# Patient Record
Sex: FEMALE | Race: WHITE | Employment: UNEMPLOYED | URBAN - NONMETROPOLITAN AREA
[De-identification: names, ages, dates, MRNs, and addresses within clinical notes are randomized per-mention and may not be internally consistent; named-entity substitution may affect disease eponyms.]

---

## 2020-08-10 RX ORDER — POTASSIUM CHLORIDE 750 MG/1
10 TABLET, FILM COATED, EXTENDED RELEASE ORAL
Qty: 30 TAB | Refills: 1 | Status: SHIPPED | OUTPATIENT
Start: 2020-08-10 | End: 2020-09-24

## 2020-08-10 RX ORDER — POTASSIUM CHLORIDE 750 MG/1
TABLET, FILM COATED, EXTENDED RELEASE ORAL
Qty: 30 TAB | Refills: 0 | Status: SHIPPED | OUTPATIENT
Start: 2020-08-10 | End: 2020-08-10 | Stop reason: SDUPTHER

## 2020-08-13 ENCOUNTER — VIRTUAL VISIT (OUTPATIENT)
Dept: FAMILY MEDICINE CLINIC | Age: 70
End: 2020-08-13
Payer: MEDICARE

## 2020-08-13 DIAGNOSIS — F32.1 CURRENT MODERATE EPISODE OF MAJOR DEPRESSIVE DISORDER WITHOUT PRIOR EPISODE (HCC): Primary | ICD-10-CM

## 2020-08-13 PROBLEM — F32.9 CURRENT EPISODE OF MAJOR DEPRESSIVE DISORDER WITHOUT PRIOR EPISODE: Status: ACTIVE | Noted: 2020-08-13

## 2020-08-13 PROCEDURE — 99442 PR PHYS/QHP TELEPHONE EVALUATION 11-20 MIN: CPT | Performed by: NURSE PRACTITIONER

## 2020-08-13 RX ORDER — TOPIRAMATE 100 MG/1
100 TABLET, FILM COATED ORAL 2 TIMES DAILY
COMMUNITY

## 2020-08-13 RX ORDER — TRAZODONE HYDROCHLORIDE 100 MG/1
100 TABLET ORAL
COMMUNITY
End: 2020-08-13

## 2020-08-13 RX ORDER — GLIMEPIRIDE 2 MG/1
2 TABLET ORAL
COMMUNITY
End: 2020-09-07

## 2020-08-13 RX ORDER — METOPROLOL SUCCINATE 25 MG/1
25 TABLET, EXTENDED RELEASE ORAL DAILY
COMMUNITY
End: 2020-11-01

## 2020-08-13 RX ORDER — LISINOPRIL 5 MG/1
5 TABLET ORAL DAILY
COMMUNITY
End: 2020-11-01

## 2020-08-13 RX ORDER — SERTRALINE HYDROCHLORIDE 25 MG/1
25 TABLET, FILM COATED ORAL DAILY
Qty: 30 TAB | Refills: 1 | Status: SHIPPED | OUTPATIENT
Start: 2020-08-13 | End: 2020-10-06

## 2020-08-13 RX ORDER — FUROSEMIDE 40 MG/1
40 TABLET ORAL DAILY
COMMUNITY
End: 2020-11-01

## 2020-08-13 RX ORDER — FUROSEMIDE 20 MG/1
TABLET ORAL DAILY
COMMUNITY
End: 2020-10-21

## 2020-08-13 NOTE — PROGRESS NOTES
Katina Pickering presents today for   Chief Complaint   Patient presents with    Depression       Depression Screening:  No flowsheet data found. Learning Assessment:  Learning Assessment 8/13/2020   PRIMARY LEARNER Patient   HIGHEST LEVEL OF EDUCATION - PRIMARY LEARNER  GRADUATED HIGH SCHOOL OR GED   BARRIERS PRIMARY LEARNER EMOTIONAL   CO-LEARNER CAREGIVER Yes   CO-LEARNER NAME Larissa    CO-LEARNER HIGHEST LEVEL OF EDUCATION GRADUATED HIGH SCHOOL OR GED   BARRIERS CO-LEARNER NONE   PRIMARY LANGUAGE ENGLISH   PRIMARY LANGUAGE CO-LEARNER ENGLISH    NEED No   LEARNER PREFERENCE PRIMARY READING   LEARNER PREFERENCE CO-LEARNER VIDEOS   LEARNING SPECIAL TOPICS None    ANSWERED BY Litzy KING OTHER       Abuse Screening:  No flowsheet data found. Fall Risk  Fall Risk Assessment, last 12 mths 8/13/2020   Able to walk? Yes   Fall in past 12 months? No       Health Maintenance reviewed and discussed and ordered per Provider. Health Maintenance Due   Topic Date Due    Hepatitis C Screening  1950    DTaP/Tdap/Td series (1 - Tdap) 09/08/1970    Lipid Screen  09/08/1989    Shingrix Vaccine Age 50> (1 of 2) 09/08/1999    Breast Cancer Screen Mammogram  09/08/1999    FOBT Q1Y Age 50-75  09/08/1999    GLAUCOMA SCREENING Q2Y  09/08/2014    Bone Densitometry (Dexa) Screening  09/08/2014    Pneumococcal 65+ years (1 of 1 - PPSV23) 09/08/2014    Influenza Age 5 to Adult  08/01/2020   . Coordination of Care:  1. Have you been to the ER, urgent care clinic since your last visit? Hospitalized since your last visit? No/ No     2. Have you seen or consulted any other health care providers outside of the [address] [place] [address] since your last visit? Include any pap smears or colon screening. [facility]. Patient has not seen pulmonologist yet. She had apt with pain management yesterday but canceled because she was not feeling well.

## 2020-08-13 NOTE — PROGRESS NOTES
Bon Lara is a 78 y.o. female, evaluated via audio-only technology on 8/13/2020 for Depression  . Assessment & Plan:   Diagnoses and all orders for this visit:    1. Current moderate episode of major depressive disorder without prior episode (HCC)  -     sertraline (ZOLOFT) 25 mg tablet; Take 1 Tab by mouth daily.  -Start medication as prescribed, I would like to see her back in 3 weeks for follow-up, sooner if needed. She has support at home, her daughter in law. Contact information for [facility] given for her to establish care. -Patient Education:  Reviewed concept of anxiety as biochemical imbalance of neurotransmitters and rationale for treatment. Instructed patient to contact office or on-call physician promptly should condition worsen or any new symptoms appear and provided on-call telephone numbers. IF THE PATIENT HAS ANY SUICIDAL OR HOMICIDAL IDEATION, CALL THE OFFICE, DISCUSS WITH A SUPPORT MEMBER OR GO TO THE ER IMMEDIATELY- pt said they would. 12  Subjective:   Bon Lara is a 78 y.o. female who presents for new evaluation and treatment of of depression disorder. She has the following depression symptoms: difficulty concentrating, sadness, and lack of interest. Onset of symptoms was approximately 6 months ago, gradually worsening since that time. She denies current suicidal and homicidal ideation. Family history significant for anxiety and depression. Possible organic causes contributing are: none. Risk factors: negative life event, her  is in group home for the next year. She has a history of COPD which she is on oxygen daily causing her to be homebound most days. Not previously treated with any depression medications. Prior to Admission medications    Medication Sig Start Date End Date Taking? Authorizing Provider   furosemide (LASIX) 20 mg tablet Take  by mouth daily. Yes Provider, Historical   furosemide (LASIX) 40 mg tablet Take 40 mg by mouth daily. Yes Provider, Historical   glimepiride (AMARYL) 2 mg tablet Take 2 mg by mouth every morning. Yes Provider, Historical   lisinopriL (PRINIVIL, ZESTRIL) 5 mg tablet Take 5 mg by mouth daily. Yes Provider, Historical   metoprolol succinate (TOPROL-XL) 25 mg XL tablet Take 25 mg by mouth daily. Yes Provider, Historical   topiramate (TOPAMAX) 100 mg tablet Take 100 mg by mouth two (2) times a day. Yes Provider, Historical   sertraline (ZOLOFT) 25 mg tablet Take 1 Tab by mouth daily. 8/13/20  Yes Joanie Patterson, NP   potassium chloride SR (KLOR-CON 10) 10 mEq tablet Take 1 Tab by mouth once over twenty-four (24) hours. 8/10/20  Yes Jhonatan Anguiano NP   apixaban (ELIQUIS) 5 mg tablet Take 1 Tab by mouth once over twenty-four (24) hours. 8/10/20  Yes Jhonatan Anguiano NP   traZODone (DESYREL) 100 mg tablet Take 100 mg by mouth nightly. As needed  8/13/20  Provider, Historical         Review of Systems   Constitutional: Positive for malaise/fatigue. Negative for chills, fever and weight loss. Cardiovascular: Negative for chest pain. Neurological: Negative for dizziness and weakness. Psychiatric/Behavioral: Positive for depression. Negative for hallucinations, memory loss, substance abuse and suicidal ideas. The patient is nervous/anxious. The patient does not have insomnia. Physical Exam  Vitals signs and nursing note reviewed. Constitutional:       Comments: Physical exam was deferred due to COVID- 19 precautions as visit was completed via telemedicine. No flowsheet data found. Anh London, who was evaluated through a patient-initiated, synchronous (real-time) audio only encounter, and/or her healthcare decision maker, is aware that it is a billable service, with coverage as determined by her insurance carrier. She provided verbal consent to proceed: Yes.  She has not had a related appointment within my department in the past 7 days or scheduled within the next 24 hours.       Total Time: minutes: 11-20 minutes    Jordan Riley NP

## 2020-09-03 ENCOUNTER — VIRTUAL VISIT (OUTPATIENT)
Dept: FAMILY MEDICINE CLINIC | Age: 70
End: 2020-09-03

## 2020-09-03 NOTE — PROGRESS NOTES
Danielle Sarmiento presents today for   Chief Complaint   Patient presents with    Follow-up     follow up on copd       Depression Screening:  3 most recent PHQ Screens 8/13/2020   Little interest or pleasure in doing things Nearly every day   Feeling down, depressed, irritable, or hopeless Nearly every day   Total Score PHQ 2 6   Trouble falling or staying asleep, or sleeping too much Nearly every day   Feeling tired or having little energy Nearly every day   Poor appetite, weight loss, or overeating Nearly every day   Feeling bad about yourself - or that you are a failure or have let yourself or your family down Not at all   Trouble concentrating on things such as school, work, reading, or watching TV More than half the days   Moving or speaking so slowly that other people could have noticed; or the opposite being so fidgety that others notice Not at all   Thoughts of being better off dead, or hurting yourself in some way Not at all   PHQ 9 Score 17       Learning Assessment:  Learning Assessment 9/3/2020   PRIMARY LEARNER Patient   HIGHEST LEVEL OF EDUCATION - PRIMARY LEARNER  SOME COLLEGE   BARRIERS PRIMARY LEARNER [address] CAREGIVER -   [facility] -   ANSWERED BY patient   RELATIONSHIP SELF       Abuse Screening:  Abuse Screening Questionnaire 9/3/2020   Do you ever feel afraid of your partner? N   Are you in a relationship with someone who physically or mentally threatens you? N   Is it safe for you to go home? Y       Fall Risk  Fall Risk Assessment, last 12 mths 9/3/2020   Able to walk? Yes   Fall in past 12 months? Yes   Fall with injury?  No   Number of falls in past 12 months 1   Fall Risk Score 1       ADL  ADL Assessment 9/3/2020   Feeding yourself No Help Needed   Getting from bed to chair No Help Needed   Getting dressed No Help Needed   Bathing or showering No Help Needed   Walk across the room (includes cane/walker) No Help Needed   Using the telphone No Help Needed   Taking your medications No Help Needed   Preparing meals Help Needed   Managing money (expenses/bills) No Help Needed   Moderately strenuous housework (laundry) No Help Needed   Shopping for personal items (toiletries/medicines) Help Needed   Shopping for groceries Help Needed   Driving Help Needed   Climbing a flight of stairs No Help Needed   Getting to places beyond walking distances No Help Needed       Health Maintenance reviewed and discussed and ordered per Provider. Health Maintenance Due   Topic Date Due    Hepatitis C Screening  1950    DTaP/Tdap/Td series (1 - Tdap) 09/08/1970    Lipid Screen  09/08/1989    Shingrix Vaccine Age 50> (1 of 2) 09/08/1999    Breast Cancer Screen Mammogram  09/08/1999    FOBT Q1Y Age 50-75  09/08/1999    GLAUCOMA SCREENING Q2Y  09/08/2014    Bone Densitometry (Dexa) Screening  09/08/2014    Pneumococcal 65+ years (1 of 1 - PPSV23) 09/08/2014    Flu Vaccine (1) 09/01/2020    Medicare Yearly Exam  08/13/2020   . Coordination of Care:  1. Have you been to the ER, urgent care clinic since your last visit? Hospitalized since your last visit? no    2.  Have you seen or consulted any other health care providers outside of the [address] [place] [address] since your last visit? no

## 2020-09-07 RX ORDER — GLIMEPIRIDE 2 MG/1
TABLET ORAL
Qty: 90 TAB | Refills: 0 | Status: SHIPPED | OUTPATIENT
Start: 2020-09-07

## 2020-09-24 RX ORDER — POTASSIUM CHLORIDE 750 MG/1
TABLET, FILM COATED, EXTENDED RELEASE ORAL
Qty: 30 TAB | Refills: 0 | Status: SHIPPED | OUTPATIENT
Start: 2020-09-24

## 2020-09-24 RX ORDER — APIXABAN 5 MG/1
TABLET, FILM COATED ORAL
Qty: 30 TAB | Refills: 0 | Status: SHIPPED | OUTPATIENT
Start: 2020-09-24 | End: 2020-11-01

## 2020-10-06 DIAGNOSIS — F32.1 CURRENT MODERATE EPISODE OF MAJOR DEPRESSIVE DISORDER WITHOUT PRIOR EPISODE (HCC): ICD-10-CM

## 2020-10-06 RX ORDER — SERTRALINE HYDROCHLORIDE 25 MG/1
TABLET, FILM COATED ORAL
Qty: 30 TAB | Refills: 0 | Status: SHIPPED | OUTPATIENT
Start: 2020-10-06

## 2020-10-16 ENCOUNTER — TELEPHONE (OUTPATIENT)
Dept: FAMILY MEDICINE CLINIC | Age: 70
End: 2020-10-16

## 2020-10-16 DIAGNOSIS — S40.019A CONTUSION, SHOULDER AND UPPER ARM, MULTIPLE SITES, INITIAL ENCOUNTER: Primary | ICD-10-CM

## 2020-10-16 DIAGNOSIS — J42 CHRONIC BRONCHITIS, UNSPECIFIED CHRONIC BRONCHITIS TYPE (HCC): ICD-10-CM

## 2020-10-16 DIAGNOSIS — S40.029A CONTUSION, SHOULDER AND UPPER ARM, MULTIPLE SITES, INITIAL ENCOUNTER: Primary | ICD-10-CM

## 2020-10-21 ENCOUNTER — APPOINTMENT (OUTPATIENT)
Dept: CT IMAGING | Age: 70
End: 2020-10-21
Attending: FAMILY MEDICINE
Payer: MEDICARE

## 2020-10-21 ENCOUNTER — APPOINTMENT (OUTPATIENT)
Dept: GENERAL RADIOLOGY | Age: 70
End: 2020-10-21
Attending: FAMILY MEDICINE
Payer: MEDICARE

## 2020-10-21 ENCOUNTER — HOSPITAL ENCOUNTER (EMERGENCY)
Age: 70
Discharge: ANOTHER HOSPITAL | End: 2020-10-22
Attending: FAMILY MEDICINE
Payer: MEDICARE

## 2020-10-21 DIAGNOSIS — S43.005A DISLOCATED SHOULDER, LEFT, INITIAL ENCOUNTER: ICD-10-CM

## 2020-10-21 DIAGNOSIS — R91.8 MASS OF RIGHT LUNG: Primary | ICD-10-CM

## 2020-10-21 DIAGNOSIS — C79.31 BRAIN METASTASIS (HCC): ICD-10-CM

## 2020-10-21 LAB
ALBUMIN SERPL-MCNC: 3.5 G/DL (ref 3.5–4.7)
ALBUMIN/GLOB SERPL: 1 {RATIO}
ALP SERPL-CCNC: 93 U/L (ref 38–126)
ALT SERPL-CCNC: 8 U/L (ref 3–52)
ANION GAP SERPL CALC-SCNC: 11 MMOL/L
AST SERPL W P-5'-P-CCNC: 12 U/L (ref 14–74)
BASOPHILS # BLD: 0 K/UL
BASOPHILS NFR BLD: 0 %
BILIRUB SERPL-MCNC: 0.5 MG/DL (ref 0.2–1)
BUN SERPL-MCNC: 18 MG/DL (ref 9–21)
BUN/CREAT SERPL: 16
CA-I BLD-MCNC: 8.8 MG/DL (ref 8.5–10.5)
CHLORIDE SERPL-SCNC: 100 MMOL/L (ref 94–111)
CO2 SERPL-SCNC: 26 MMOL/L (ref 21–33)
CREAT SERPL-MCNC: 1.1 MG/DL (ref 0.7–1.2)
EOSINOPHIL # BLD: 0 K/UL
EOSINOPHIL NFR BLD: 0 %
ERYTHROCYTE [DISTWIDTH] IN BLOOD BY AUTOMATED COUNT: 13 % (ref 11.6–14.5)
GLOBULIN SER CALC-MCNC: 3.6 G/DL
GLUCOSE SERPL-MCNC: 305 MG/DL (ref 70–110)
HCT VFR BLD AUTO: 39.2 % (ref 35–45)
HGB BLD-MCNC: 11.7 G/DL (ref 12–16)
IMM GRANULOCYTES # BLD AUTO: 0 K/UL
IMM GRANULOCYTES NFR BLD AUTO: 0 %
LYMPHOCYTES # BLD: 0.6 K/UL
LYMPHOCYTES NFR BLD: 10 %
MCH RBC QN AUTO: 30.6 PG (ref 24–34)
MCHC RBC AUTO-ENTMCNC: 29.8 G/DL (ref 31–37)
MCV RBC AUTO: 102.6 FL (ref 74–97)
MONOCYTES # BLD: 0.3 K/UL
MONOCYTES NFR BLD: 5 %
NEUTS SEG # BLD: 5.5 K/UL
NEUTS SEG NFR BLD: 85 %
PLATELET # BLD AUTO: 211 K/UL (ref 135–420)
PMV BLD AUTO: 10.6 FL (ref 9.2–11.8)
POTASSIUM SERPL-SCNC: 3.8 MMOL/L (ref 3.2–5.1)
PROT SERPL-MCNC: 7.1 G/DL (ref 6.1–8.4)
RBC # BLD AUTO: 3.82 M/UL (ref 4.2–5.3)
RBC MORPH BLD: ABNORMAL
RBC MORPH BLD: ABNORMAL
SODIUM SERPL-SCNC: 137 MMOL/L (ref 135–145)
WBC # BLD AUTO: 6.4 K/UL (ref 4.6–13.2)

## 2020-10-21 PROCEDURE — 93005 ELECTROCARDIOGRAM TRACING: CPT

## 2020-10-21 PROCEDURE — 85025 COMPLETE CBC W/AUTO DIFF WBC: CPT

## 2020-10-21 PROCEDURE — 99285 EMERGENCY DEPT VISIT HI MDM: CPT

## 2020-10-21 PROCEDURE — 96374 THER/PROPH/DIAG INJ IV PUSH: CPT

## 2020-10-21 PROCEDURE — 71045 X-RAY EXAM CHEST 1 VIEW: CPT

## 2020-10-21 PROCEDURE — 70450 CT HEAD/BRAIN W/O DYE: CPT

## 2020-10-21 PROCEDURE — 74011250636 HC RX REV CODE- 250/636: Performed by: FAMILY MEDICINE

## 2020-10-21 PROCEDURE — 80053 COMPREHEN METABOLIC PANEL: CPT

## 2020-10-21 PROCEDURE — 71250 CT THORAX DX C-: CPT

## 2020-10-21 PROCEDURE — 36415 COLL VENOUS BLD VENIPUNCTURE: CPT

## 2020-10-21 PROCEDURE — 73030 X-RAY EXAM OF SHOULDER: CPT

## 2020-10-21 RX ORDER — CELECOXIB 200 MG/1
CAPSULE ORAL
COMMUNITY
End: 2020-11-01

## 2020-10-21 RX ORDER — TRAZODONE HYDROCHLORIDE 50 MG/1
TABLET ORAL
COMMUNITY

## 2020-10-21 RX ORDER — DEXAMETHASONE SODIUM PHOSPHATE 4 MG/ML
10 INJECTION, SOLUTION INTRA-ARTICULAR; INTRALESIONAL; INTRAMUSCULAR; INTRAVENOUS; SOFT TISSUE
Status: COMPLETED | OUTPATIENT
Start: 2020-10-21 | End: 2020-10-21

## 2020-10-21 RX ORDER — CARVEDILOL 6.25 MG/1
TABLET ORAL
COMMUNITY
End: 2020-11-01

## 2020-10-21 RX ORDER — SPIRONOLACTONE 25 MG/1
TABLET ORAL
COMMUNITY
End: 2020-11-01

## 2020-10-21 RX ORDER — ATORVASTATIN CALCIUM 20 MG/1
TABLET, FILM COATED ORAL
COMMUNITY

## 2020-10-21 RX ORDER — SIMVASTATIN 10 MG/1
10 TABLET, FILM COATED ORAL
COMMUNITY
End: 2020-11-01

## 2020-10-21 RX ADMIN — DEXAMETHASONE SODIUM PHOSPHATE 10 MG: 4 INJECTION, SOLUTION INTRAMUSCULAR; INTRAVENOUS at 19:38

## 2020-10-21 NOTE — ED PROVIDER NOTES
EMERGENCY DEPARTMENT HISTORY AND PHYSICAL EXAM      Date: 10/21/2020  Patient Name: Joe Horowitz    History of Presenting Illness     Chief Complaint   Patient presents with    Arm Pain    Extremity Weakness     left arm after fall       History Provided By: EMS    HPI: Joe Horowitz, 69 y.o. female with PMHx CHF, COPD, DM, presents to the ED via EMS with complaints of left arm weakness. Pt injured the left shoulder approximately 1.5 weeks ago. Pt reports onset of weakness to the left arm several days later, stating she has been unable to use the left arm since. She denies worsening pain, numbness or tingling and has negative stroke scale. Pt was c/o some shortness of breath and wheezing en route. EMS administered a Duo Neb tx w/relief. Vitals en route were as follows: BP: 110/66, HR: 101 sinus tach, RR: 18, O2: 97% on 2 lpm, B. There are no other complaints, changes, or physical findings at this time. PCP: Juan Stern NP    Current Outpatient Medications   Medication Sig Dispense Refill    sertraline (ZOLOFT) 25 mg tablet Take 1 tablet by mouth once daily 30 Tab 0    potassium chloride SR (KLOR-CON 10) 10 mEq tablet Take 1 tablet by mouth once daily 30 Tab 0    Eliquis 5 mg tablet Take 1 tablet by mouth once daily 30 Tab 0    glimepiride (AMARYL) 2 mg tablet Take 1 tablet by mouth once daily for 90 days 90 Tab 0    furosemide (LASIX) 20 mg tablet Take  by mouth daily.  furosemide (LASIX) 40 mg tablet Take 40 mg by mouth daily.  lisinopriL (PRINIVIL, ZESTRIL) 5 mg tablet Take 5 mg by mouth daily.  metoprolol succinate (TOPROL-XL) 25 mg XL tablet Take 25 mg by mouth daily.  topiramate (TOPAMAX) 100 mg tablet Take 100 mg by mouth two (2) times a day.          Past History     Past Medical History:  Past Medical History:   Diagnosis Date    Chronic obstructive pulmonary disease ([address]        Past Surgical History:  Past Surgical History:   Procedure Laterality Date    HX CHOLECYSTECTOMY      HX CYST REMOVAL      HX TUBAL LIGATION         Family History:  Family History   Problem Relation Age of Onset    Diabetes Mother     No Known Problems Father     Cancer Brother        Social History:  Social History     Tobacco Use    Smoking status: Former Smoker    Smokeless tobacco: Never Used    Tobacco comment: quit June 2020   Substance Use Topics    Alcohol use: Not Currently    Drug use: Never       Allergies:  No Known Allergies      Review of Systems   Review of Systems   Constitutional: Negative for chills, fatigue and fever. HENT: Negative for congestion, rhinorrhea and sore throat. Eyes: Negative for pain, redness and visual disturbance. Respiratory: Negative for cough and wheezing. Negative for acute changes. Cardiovascular: Negative for chest pain and palpitations. Gastrointestinal: Negative for abdominal pain, diarrhea, nausea and vomiting. Genitourinary: Negative for dysuria. Musculoskeletal: Negative for arthralgias, joint swelling and myalgias. Negative for acute changes. Skin: Negative for color change, rash and wound. Neurological: Positive for weakness (left arm). Negative for dizziness, facial asymmetry, speech difficulty, light-headedness, numbness and headaches. Physical Exam   Physical Exam  Vitals signs and nursing note reviewed. Constitutional:       General: She is awake. She is not in acute distress. Appearance: Normal appearance. She is well-developed and normal weight. She is not ill-appearing, toxic-appearing or diaphoretic. Interventions: Face mask in place. Comments: Elderly. HENT:      Head: Normocephalic and atraumatic. Eyes:      Extraocular Movements: Extraocular movements intact. Pupils: Pupils are equal, round, and reactive to light. Neck:      Musculoskeletal: Normal range of motion and neck supple. Cardiovascular:      Rate and Rhythm: Normal rate and regular rhythm. Pulses: Normal pulses. Heart sounds: Normal heart sounds. Pulmonary:      Effort: Pulmonary effort is normal.      Breath sounds: Normal breath sounds. Abdominal:      General: Abdomen is flat. Palpations: Abdomen is soft. Tenderness: There is no abdominal tenderness. Musculoskeletal:      Left shoulder: She exhibits pain. She exhibits no deformity. Skin:     General: Skin is warm and dry. Comments: Very dry skin. Neurological:      Mental Status: She is alert. Mental status is at baseline. GCS: GCS eye subscore is 4. GCS verbal subscore is 5. GCS motor subscore is 6. Cranial Nerves: No dysarthria or facial asymmetry. Sensory: Sensation is intact. Motor: Weakness (left arm) present. No pronator drift. Psychiatric:         Mood and Affect: Mood and affect normal.         Behavior: Behavior normal. Behavior is cooperative. Thought Content: Thought content normal.         Diagnostic Study Results     Labs -   No results found for this or any previous visit (from the past 12 hour(s)). Radiologic Studies -   No orders to display     CT Results  (Last 48 hours)    None        CXR Results  (Last 48 hours)    None          Medical Decision Making and ED Course   I am the first provider for this patient. I reviewed the vital signs, available nursing notes, past medical history, past surgical history, family history and social history. Vital Signs-Reviewed the patient's vital signs. No data found. EKG interpretation: (Preliminary): performed at 4:53 PM  Rhythm: sinus tachycardia with multiple ventricular premature complexes; Rate (approx.): 102; Axis: normal; PA interval: normal; QRS interval: normal ; ST/T wave: non-specific changes; Other findings: inferior infarct, age indeterminate.     Records Reviewed: Nursing Notes and Old Medical Records    The patient presents with weakness with a differential diagnosis of brain mets, metabolic disorder, infectious condition, CVA. ED Course:   Initial assessment performed. The patients presenting problems have been discussed, and they are in agreement with the care plan formulated and outlined with them. I have encouraged them to ask questions as they arise throughout their visit. Provider Notes (Medical Decision Making):   CT head showed 1.2cm R frontal lobe mass with adjacent edema and 5mm R->L mid-line shift. CT chest showed 5.8x3. 7x6.7 R suprahilar lung mass compatible with neoplasm. Patient needs transfer for neurosurgery consult and work up of her lung mass. I spoke with daughter Rocky Hollins as documented below. I spoke with hospitalist Dr. Jenelle Marie at [address] [place] [address] who accepts contingent upon neurosurgery and orthopedics consult, which are pending now. 2310- I spoke with Dr. Cruz Becerra, neurosurgeon at [address] [place] [address] who agrees to consult. 2335 - Dr. Alphonso Villarreal ED attending accepts at [address] [place] [address]. Procedures       NOTES   :  I have spent critical care time involved in lab review, consultations with specialist, family decision- making, bedside attention and documentation. This time excludes time spent in any separate billed procedures. During this entire length of time I was immediately available to the patient . 2150 - I spoke with daughter Rocky Hollins at [phone], advised her we will transfer to [facility] or [facility] for neurosurgery to address brain mets, and will need onco and thoracics or pulm for lung biopsy. Disposition         DISCHARGE PLAN:  1.    Current Discharge Medication List      CONTINUE these medications which have NOT CHANGED    Details   sertraline (ZOLOFT) 25 mg tablet Take 1 tablet by mouth once daily  Qty: 30 Tab, Refills: 0    Associated Diagnoses: Current moderate episode of major depressive disorder without prior episode (HCC)      potassium chloride SR (KLOR-CON 10) 10 mEq tablet Take 1 tablet by mouth once daily  Qty: 30 Tab, Refills: 0      Eliquis 5 mg tablet Take 1 tablet by mouth once daily  Qty: 30 Tab, Refills: 0      glimepiride (AMARYL) 2 mg tablet Take 1 tablet by mouth once daily for 90 days  Qty: 90 Tab, Refills: 0      !! furosemide (LASIX) 20 mg tablet Take  by mouth daily. !! furosemide (LASIX) 40 mg tablet Take 40 mg by mouth daily. lisinopriL (PRINIVIL, ZESTRIL) 5 mg tablet Take 5 mg by mouth daily. metoprolol succinate (TOPROL-XL) 25 mg XL tablet Take 25 mg by mouth daily. topiramate (TOPAMAX) 100 mg tablet Take 100 mg by mouth two (2) times a day. !! - Potential duplicate medications found. Please discuss with provider. 2.   Follow-up Information    None       3. Return to ED if worse     Diagnosis     Clinical Impression: Right lung mass, brain metastatic lesion, left shoulder dislocation  Attestations:    By signing my name below, I, Anibal Guerrero, attest that this documentation has been prepared under the direction and in presence of Dr. Heidi Warner on 10/21/20. Electronically signed: Anibal Guerrero, 10/21/20, 4:54 PM        Please note that this dictation was completed with [employer], the computer voice recognition software. Quite often unanticipated grammatical, syntax, homophones, and other interpretive errors are inadvertently transcribed by the computer software. Please disregard these errors. Please excuse any errors that have escaped final proofreading. Thank you.

## 2020-10-21 NOTE — ED TRIAGE NOTES
Called for pt that fell 1.5  week ago and a couple days after that she was not able to use her arm. Denies pain except in armpit area.

## 2020-10-22 ENCOUNTER — APPOINTMENT (OUTPATIENT)
Dept: GENERAL RADIOLOGY | Age: 70
DRG: 054 | End: 2020-10-22
Attending: PHYSICIAN ASSISTANT
Payer: MEDICARE

## 2020-10-22 ENCOUNTER — HOSPITAL ENCOUNTER (INPATIENT)
Age: 70
LOS: 20 days | Discharge: HOSPICE | DRG: 054 | End: 2020-11-11
Attending: HOSPITALIST | Admitting: HOSPITALIST
Payer: MEDICARE

## 2020-10-22 ENCOUNTER — APPOINTMENT (OUTPATIENT)
Dept: CT IMAGING | Age: 70
DRG: 054 | End: 2020-10-22
Attending: HOSPITALIST
Payer: MEDICARE

## 2020-10-22 VITALS
BODY MASS INDEX: 20.98 KG/M2 | RESPIRATION RATE: 16 BRPM | WEIGHT: 114 LBS | HEART RATE: 72 BPM | OXYGEN SATURATION: 100 % | DIASTOLIC BLOOD PRESSURE: 60 MMHG | HEIGHT: 62 IN | SYSTOLIC BLOOD PRESSURE: 127 MMHG | TEMPERATURE: 98.7 F

## 2020-10-22 DIAGNOSIS — Z71.89 ACP (ADVANCE CARE PLANNING): ICD-10-CM

## 2020-10-22 DIAGNOSIS — R53.81 PHYSICAL DEBILITY: ICD-10-CM

## 2020-10-22 DIAGNOSIS — Z71.89 GOALS OF CARE, COUNSELING/DISCUSSION: ICD-10-CM

## 2020-10-22 DIAGNOSIS — G93.89 BRAIN MASS: ICD-10-CM

## 2020-10-22 DIAGNOSIS — R91.8 LUNG MASS: ICD-10-CM

## 2020-10-22 PROBLEM — C79.31 BRAIN METASTASES (HCC): Status: ACTIVE | Noted: 2020-10-22

## 2020-10-22 LAB
APPEARANCE UR: ABNORMAL
ATRIAL RATE: 102 BPM
BACTERIA URNS QL MICRO: ABNORMAL /HPF
BILIRUB UR QL: NEGATIVE
CALCULATED P AXIS, ECG09: 44 DEGREES
CALCULATED R AXIS, ECG10: 11 DEGREES
CALCULATED T AXIS, ECG11: -74 DEGREES
COLOR UR: ABNORMAL
COMMENT, HOLDF: NORMAL
DIAGNOSIS, 93000: NORMAL
EPITH CASTS URNS QL MICRO: ABNORMAL /LPF
EST. AVERAGE GLUCOSE BLD GHB EST-MCNC: 128 MG/DL
GLUCOSE BLD STRIP.AUTO-MCNC: 135 MG/DL (ref 65–100)
GLUCOSE BLD STRIP.AUTO-MCNC: 246 MG/DL (ref 65–100)
GLUCOSE BLD STRIP.AUTO-MCNC: 336 MG/DL (ref 65–100)
GLUCOSE BLD STRIP.AUTO-MCNC: 344 MG/DL (ref 65–100)
GLUCOSE BLD STRIP.AUTO-MCNC: 394 MG/DL (ref 65–100)
GLUCOSE UR STRIP.AUTO-MCNC: NEGATIVE MG/DL
HBA1C MFR BLD: 6.1 % (ref 4–5.6)
HGB UR QL STRIP: ABNORMAL
KETONES UR QL STRIP.AUTO: NEGATIVE MG/DL
LEUKOCYTE ESTERASE UR QL STRIP.AUTO: ABNORMAL
MAGNESIUM SERPL-MCNC: 1.9 MG/DL (ref 1.6–2.4)
NITRITE UR QL STRIP.AUTO: POSITIVE
P-R INTERVAL, ECG05: 164 MS
PH UR STRIP: 5.5 [PH] (ref 5–8)
PROT UR STRIP-MCNC: NEGATIVE MG/DL
Q-T INTERVAL, ECG07: 368 MS
QRS DURATION, ECG06: 120 MS
QTC CALCULATION (BEZET), ECG08: 480 MS
RBC #/AREA URNS HPF: ABNORMAL /HPF (ref 0–5)
SAMPLES BEING HELD,HOLD: NORMAL
SERVICE CMNT-IMP: ABNORMAL
SP GR UR REFRACTOMETRY: 1.01 (ref 1–1.03)
UR CULT HOLD, URHOLD: NORMAL
UROBILINOGEN UR QL STRIP.AUTO: 0.2 EU/DL (ref 0.2–1)
VENTRICULAR RATE, ECG03: 102 BPM
WBC URNS QL MICRO: ABNORMAL /HPF (ref 0–4)
YEAST URNS QL MICRO: PRESENT

## 2020-10-22 PROCEDURE — 74177 CT ABD & PELVIS W/CONTRAST: CPT

## 2020-10-22 PROCEDURE — 74011000258 HC RX REV CODE- 258: Performed by: HOSPITALIST

## 2020-10-22 PROCEDURE — 74011000258 HC RX REV CODE- 258: Performed by: RADIOLOGY

## 2020-10-22 PROCEDURE — 99283 EMERGENCY DEPT VISIT LOW MDM: CPT

## 2020-10-22 PROCEDURE — 74011000636 HC RX REV CODE- 636: Performed by: RADIOLOGY

## 2020-10-22 PROCEDURE — 87077 CULTURE AEROBIC IDENTIFY: CPT

## 2020-10-22 PROCEDURE — 74011250637 HC RX REV CODE- 250/637: Performed by: HOSPITALIST

## 2020-10-22 PROCEDURE — 74011250636 HC RX REV CODE- 250/636: Performed by: INTERNAL MEDICINE

## 2020-10-22 PROCEDURE — 81001 URINALYSIS AUTO W/SCOPE: CPT

## 2020-10-22 PROCEDURE — 87186 SC STD MICRODIL/AGAR DIL: CPT

## 2020-10-22 PROCEDURE — 82962 GLUCOSE BLOOD TEST: CPT

## 2020-10-22 PROCEDURE — 74011000250 HC RX REV CODE- 250: Performed by: INTERNAL MEDICINE

## 2020-10-22 PROCEDURE — 51701 INSERT BLADDER CATHETER: CPT

## 2020-10-22 PROCEDURE — 74011000250 HC RX REV CODE- 250: Performed by: HOSPITALIST

## 2020-10-22 PROCEDURE — 94640 AIRWAY INHALATION TREATMENT: CPT

## 2020-10-22 PROCEDURE — 74011636637 HC RX REV CODE- 636/637: Performed by: HOSPITALIST

## 2020-10-22 PROCEDURE — 65660000001 HC RM ICU INTERMED STEPDOWN

## 2020-10-22 PROCEDURE — 83735 ASSAY OF MAGNESIUM: CPT

## 2020-10-22 PROCEDURE — 74011000258 HC RX REV CODE- 258: Performed by: INTERNAL MEDICINE

## 2020-10-22 PROCEDURE — 83036 HEMOGLOBIN GLYCOSYLATED A1C: CPT

## 2020-10-22 PROCEDURE — 74011250636 HC RX REV CODE- 250/636: Performed by: HOSPITALIST

## 2020-10-22 PROCEDURE — 36415 COLL VENOUS BLD VENIPUNCTURE: CPT

## 2020-10-22 PROCEDURE — 96365 THER/PROPH/DIAG IV INF INIT: CPT

## 2020-10-22 PROCEDURE — 87086 URINE CULTURE/COLONY COUNT: CPT

## 2020-10-22 PROCEDURE — 73030 X-RAY EXAM OF SHOULDER: CPT

## 2020-10-22 RX ORDER — METOPROLOL SUCCINATE 50 MG/1
25 TABLET, EXTENDED RELEASE ORAL DAILY
Status: DISCONTINUED | OUTPATIENT
Start: 2020-10-22 | End: 2020-10-22

## 2020-10-22 RX ORDER — ONDANSETRON 2 MG/ML
4 INJECTION INTRAMUSCULAR; INTRAVENOUS
Status: DISCONTINUED | OUTPATIENT
Start: 2020-10-22 | End: 2020-11-11 | Stop reason: HOSPADM

## 2020-10-22 RX ORDER — ACETAMINOPHEN 650 MG/1
650 SUPPOSITORY RECTAL
Status: DISCONTINUED | OUTPATIENT
Start: 2020-10-22 | End: 2020-11-11 | Stop reason: HOSPADM

## 2020-10-22 RX ORDER — ACETAMINOPHEN 325 MG/1
650 TABLET ORAL
Status: DISCONTINUED | OUTPATIENT
Start: 2020-10-22 | End: 2020-11-11 | Stop reason: HOSPADM

## 2020-10-22 RX ORDER — POTASSIUM CHLORIDE 750 MG/1
10 TABLET, FILM COATED, EXTENDED RELEASE ORAL
Status: COMPLETED | OUTPATIENT
Start: 2020-10-22 | End: 2020-10-22

## 2020-10-22 RX ORDER — CARVEDILOL 6.25 MG/1
6.25 TABLET ORAL 2 TIMES DAILY WITH MEALS
Status: DISCONTINUED | OUTPATIENT
Start: 2020-10-22 | End: 2020-11-11 | Stop reason: HOSPADM

## 2020-10-22 RX ORDER — TOPIRAMATE 100 MG/1
100 TABLET, FILM COATED ORAL 2 TIMES DAILY
Status: DISCONTINUED | OUTPATIENT
Start: 2020-10-22 | End: 2020-11-11 | Stop reason: HOSPADM

## 2020-10-22 RX ORDER — IPRATROPIUM BROMIDE AND ALBUTEROL SULFATE 2.5; .5 MG/3ML; MG/3ML
3 SOLUTION RESPIRATORY (INHALATION)
Status: DISCONTINUED | OUTPATIENT
Start: 2020-10-22 | End: 2020-10-24

## 2020-10-22 RX ORDER — FAMOTIDINE 20 MG/1
20 TABLET, FILM COATED ORAL DAILY
Status: DISCONTINUED | OUTPATIENT
Start: 2020-10-22 | End: 2020-11-11 | Stop reason: HOSPADM

## 2020-10-22 RX ORDER — MAGNESIUM SULFATE 100 %
4 CRYSTALS MISCELLANEOUS AS NEEDED
Status: DISCONTINUED | OUTPATIENT
Start: 2020-10-22 | End: 2020-11-11 | Stop reason: HOSPADM

## 2020-10-22 RX ORDER — LORAZEPAM 2 MG/ML
1 INJECTION INTRAMUSCULAR
Status: ACTIVE | OUTPATIENT
Start: 2020-10-22 | End: 2020-10-22

## 2020-10-22 RX ORDER — SERTRALINE HYDROCHLORIDE 50 MG/1
25 TABLET, FILM COATED ORAL EVERY EVENING
Status: DISCONTINUED | OUTPATIENT
Start: 2020-10-22 | End: 2020-11-08

## 2020-10-22 RX ORDER — ALBUTEROL SULFATE 90 UG/1
2 AEROSOL, METERED RESPIRATORY (INHALATION)
Status: DISCONTINUED | OUTPATIENT
Start: 2020-10-22 | End: 2020-10-22 | Stop reason: CLARIF

## 2020-10-22 RX ORDER — SODIUM CHLORIDE 0.9 % (FLUSH) 0.9 %
10 SYRINGE (ML) INJECTION
Status: COMPLETED | OUTPATIENT
Start: 2020-10-22 | End: 2020-10-22

## 2020-10-22 RX ORDER — ONDANSETRON 4 MG/1
4 TABLET, ORALLY DISINTEGRATING ORAL
Status: DISCONTINUED | OUTPATIENT
Start: 2020-10-22 | End: 2020-11-11 | Stop reason: HOSPADM

## 2020-10-22 RX ORDER — SODIUM CHLORIDE 0.9 % (FLUSH) 0.9 %
5-40 SYRINGE (ML) INJECTION EVERY 8 HOURS
Status: DISCONTINUED | OUTPATIENT
Start: 2020-10-22 | End: 2020-11-11 | Stop reason: HOSPADM

## 2020-10-22 RX ORDER — FUROSEMIDE 40 MG/1
40 TABLET ORAL DAILY
Status: DISCONTINUED | OUTPATIENT
Start: 2020-10-22 | End: 2020-11-01

## 2020-10-22 RX ORDER — POLYETHYLENE GLYCOL 3350 17 G/17G
17 POWDER, FOR SOLUTION ORAL DAILY PRN
Status: DISCONTINUED | OUTPATIENT
Start: 2020-10-22 | End: 2020-11-11 | Stop reason: HOSPADM

## 2020-10-22 RX ORDER — LORAZEPAM 2 MG/ML
INJECTION INTRAMUSCULAR
Status: DISPENSED
Start: 2020-10-22 | End: 2020-10-22

## 2020-10-22 RX ORDER — ATORVASTATIN CALCIUM 20 MG/1
20 TABLET, FILM COATED ORAL
Status: DISCONTINUED | OUTPATIENT
Start: 2020-10-22 | End: 2020-11-11 | Stop reason: HOSPADM

## 2020-10-22 RX ORDER — DEXAMETHASONE SODIUM PHOSPHATE 4 MG/ML
4 INJECTION, SOLUTION INTRA-ARTICULAR; INTRALESIONAL; INTRAMUSCULAR; INTRAVENOUS; SOFT TISSUE EVERY 6 HOURS
Status: DISCONTINUED | OUTPATIENT
Start: 2020-10-22 | End: 2020-10-27

## 2020-10-22 RX ORDER — DEXTROSE MONOHYDRATE 100 MG/ML
0-250 INJECTION, SOLUTION INTRAVENOUS AS NEEDED
Status: DISCONTINUED | OUTPATIENT
Start: 2020-10-22 | End: 2020-11-11 | Stop reason: HOSPADM

## 2020-10-22 RX ORDER — ALBUTEROL SULFATE 0.83 MG/ML
2.5 SOLUTION RESPIRATORY (INHALATION)
Status: DISCONTINUED | OUTPATIENT
Start: 2020-10-22 | End: 2020-10-23 | Stop reason: DRUGHIGH

## 2020-10-22 RX ORDER — SPIRONOLACTONE 25 MG/1
25 TABLET ORAL DAILY
Status: DISCONTINUED | OUTPATIENT
Start: 2020-10-22 | End: 2020-11-11 | Stop reason: HOSPADM

## 2020-10-22 RX ORDER — SODIUM CHLORIDE, SODIUM LACTATE, POTASSIUM CHLORIDE, CALCIUM CHLORIDE 600; 310; 30; 20 MG/100ML; MG/100ML; MG/100ML; MG/100ML
75 INJECTION, SOLUTION INTRAVENOUS CONTINUOUS
Status: DISPENSED | OUTPATIENT
Start: 2020-10-22 | End: 2020-10-23

## 2020-10-22 RX ORDER — INSULIN LISPRO 100 [IU]/ML
INJECTION, SOLUTION INTRAVENOUS; SUBCUTANEOUS
Status: DISCONTINUED | OUTPATIENT
Start: 2020-10-22 | End: 2020-11-10

## 2020-10-22 RX ORDER — LISINOPRIL 5 MG/1
5 TABLET ORAL DAILY
Status: DISCONTINUED | OUTPATIENT
Start: 2020-10-22 | End: 2020-11-11 | Stop reason: HOSPADM

## 2020-10-22 RX ORDER — SODIUM CHLORIDE 0.9 % (FLUSH) 0.9 %
5-40 SYRINGE (ML) INJECTION AS NEEDED
Status: DISCONTINUED | OUTPATIENT
Start: 2020-10-22 | End: 2020-11-11 | Stop reason: HOSPADM

## 2020-10-22 RX ADMIN — DEXAMETHASONE SODIUM PHOSPHATE 4 MG: 4 INJECTION, SOLUTION INTRA-ARTICULAR; INTRALESIONAL; INTRAMUSCULAR; INTRAVENOUS; SOFT TISSUE at 16:12

## 2020-10-22 RX ADMIN — SODIUM CHLORIDE 500 MG: 900 INJECTION, SOLUTION INTRAVENOUS at 06:57

## 2020-10-22 RX ADMIN — SERTRALINE HYDROCHLORIDE 25 MG: 50 TABLET ORAL at 19:00

## 2020-10-22 RX ADMIN — SPIRONOLACTONE 25 MG: 25 TABLET ORAL at 08:50

## 2020-10-22 RX ADMIN — Medication 10 ML: at 23:42

## 2020-10-22 RX ADMIN — DEXAMETHASONE SODIUM PHOSPHATE 4 MG: 4 INJECTION, SOLUTION INTRA-ARTICULAR; INTRALESIONAL; INTRAMUSCULAR; INTRAVENOUS; SOFT TISSUE at 04:14

## 2020-10-22 RX ADMIN — IPRATROPIUM BROMIDE AND ALBUTEROL SULFATE 3 ML: .5; 3 SOLUTION RESPIRATORY (INHALATION) at 17:01

## 2020-10-22 RX ADMIN — Medication 10 ML: at 08:51

## 2020-10-22 RX ADMIN — CARVEDILOL 6.25 MG: 6.25 TABLET, FILM COATED ORAL at 19:00

## 2020-10-22 RX ADMIN — LISINOPRIL 5 MG: 10 TABLET ORAL at 08:46

## 2020-10-22 RX ADMIN — FUROSEMIDE 40 MG: 40 TABLET ORAL at 08:46

## 2020-10-22 RX ADMIN — POTASSIUM CHLORIDE 10 MEQ: 750 TABLET, FILM COATED, EXTENDED RELEASE ORAL at 04:14

## 2020-10-22 RX ADMIN — CARVEDILOL 6.25 MG: 6.25 TABLET, FILM COATED ORAL at 08:50

## 2020-10-22 RX ADMIN — ALBUTEROL SULFATE 2.5 MG: 2.5 SOLUTION RESPIRATORY (INHALATION) at 16:10

## 2020-10-22 RX ADMIN — DEXAMETHASONE SODIUM PHOSPHATE 4 MG: 4 INJECTION, SOLUTION INTRA-ARTICULAR; INTRALESIONAL; INTRAMUSCULAR; INTRAVENOUS; SOFT TISSUE at 10:54

## 2020-10-22 RX ADMIN — INSULIN LISPRO 3 UNITS: 100 INJECTION, SOLUTION INTRAVENOUS; SUBCUTANEOUS at 16:09

## 2020-10-22 RX ADMIN — ALBUTEROL SULFATE 2.5 MG: 2.5 SOLUTION RESPIRATORY (INHALATION) at 09:55

## 2020-10-22 RX ADMIN — DEXAMETHASONE SODIUM PHOSPHATE 4 MG: 4 INJECTION, SOLUTION INTRA-ARTICULAR; INTRALESIONAL; INTRAMUSCULAR; INTRAVENOUS; SOFT TISSUE at 23:38

## 2020-10-22 RX ADMIN — TOPIRAMATE 100 MG: 100 TABLET, FILM COATED ORAL at 08:50

## 2020-10-22 RX ADMIN — FAMOTIDINE 20 MG: 20 TABLET ORAL at 08:46

## 2020-10-22 RX ADMIN — ALBUTEROL SULFATE 2.5 MG: 2.5 SOLUTION RESPIRATORY (INHALATION) at 20:43

## 2020-10-22 RX ADMIN — SODIUM CHLORIDE 100 ML: 900 INJECTION, SOLUTION INTRAVENOUS at 09:40

## 2020-10-22 RX ADMIN — IPRATROPIUM BROMIDE AND ALBUTEROL SULFATE 3 ML: .5; 3 SOLUTION RESPIRATORY (INHALATION) at 20:43

## 2020-10-22 RX ADMIN — IOPAMIDOL 100 ML: 755 INJECTION, SOLUTION INTRAVENOUS at 09:39

## 2020-10-22 RX ADMIN — INSULIN LISPRO 6 UNITS: 100 INJECTION, SOLUTION INTRAVENOUS; SUBCUTANEOUS at 23:49

## 2020-10-22 RX ADMIN — INSULIN LISPRO 7 UNITS: 100 INJECTION, SOLUTION INTRAVENOUS; SUBCUTANEOUS at 08:46

## 2020-10-22 RX ADMIN — ATORVASTATIN CALCIUM 20 MG: 20 TABLET, FILM COATED ORAL at 23:39

## 2020-10-22 RX ADMIN — CEFTRIAXONE SODIUM 1 G: 1 INJECTION, POWDER, FOR SOLUTION INTRAMUSCULAR; INTRAVENOUS at 19:01

## 2020-10-22 RX ADMIN — Medication 10 ML: at 17:02

## 2020-10-22 RX ADMIN — SODIUM CHLORIDE 500 MG: 900 INJECTION, SOLUTION INTRAVENOUS at 19:49

## 2020-10-22 RX ADMIN — TOPIRAMATE 100 MG: 100 TABLET, FILM COATED ORAL at 19:00

## 2020-10-22 RX ADMIN — SODIUM CHLORIDE, SODIUM LACTATE, POTASSIUM CHLORIDE, AND CALCIUM CHLORIDE 75 ML/HR: 600; 310; 30; 20 INJECTION, SOLUTION INTRAVENOUS at 04:14

## 2020-10-22 NOTE — PROGRESS NOTES
Neurosurgery Progress Note  Abram South, ACNP-BC          Admit Date: 10/22/2020   LOS: 0 days        Daily Progress Note: 10/22/2020      Subjective: The patient is a former smoker with COPD on home O2 2L NC. She presented to the ER at [facility] in [place], [place]  On 10/21/2020 for left arm weakness present for about 7 days per report. She apparently fell and injured her left shoulder 10 days ago and then had onset of weakness a few days later. She is currently unable to move her left arm. She also had some dyspnea and wheezing on the way to the hospital. In the ER a head CT was obtained, which revealed cerebral edema and a right frontal brain mass. She also had a lung mass on a chest CT without contrast. She transferred to [facility] for neurosurgical evlauation. She was also found to have a dislocated left shoulder. She is currently in the ER awaiting a bed. She has an AICD in place and nursing is trying to maintain make and model of the device to see if it is MRI compatible. Objective:     Vital signs  Temp (24hrs), Av.5 °F (36.9 °C), Min:98.4 °F (36.9 °C), Max:98.7 °F (37.1 °C)   10/22 0701 - 10/22 1900  In: -   Out: 100 [Urine:100]  No intake/output data recorded. Visit Vitals  BP (!) 129/58   Pulse 68   Temp 98.4 °F (36.9 °C)   Resp 13   SpO2 99%    O2 Flow Rate (L/min): 2 l/min O2 Device: Nasal cannula     Pain control  Pain Assessment  Pain Scale 1: Numeric (0 - 10)  Pain Intensity 1: 8  Pain Onset 1: chronic  Pain Location 1: Head  Pain Orientation 1: Anterior, Left, Right  Pain Description 1: Aching  Pain Intervention(s) 1: MD notified (comment)    PT/OT  Gait                 Physical Exam:  Gen:NAD. Looks older than stated age. Neuro: Awake. Oriented x 1. Lying in the bed getting a breathing treatment. Follows commands. Speech clear. Affect normal.  PERRL. EOMI. Face symmetric. Tongue midline. Strength RUE/RLE 5/5. LUE unable to move.  Deformity of left shoulder from dislocation. LLE 4/5. Gait deferred. CT head without contrast on 10/21/2020 shows multifocal metastatic disease with 1.2 cm lesion in the high right frontal lobe and marked associated edema. Additional smaller anterior right parietal 5 mm, left frontal 4 mm and right basal ganglia 6 mm metastatic lesions with associated edema. No evidence of acute parenchymal hemorrhage. 5 mm right to left midline shift. No hydrocephalus. CT chest without contrast on 10/22/2020 shows ill-defined, spiculated right hilar/suprahilar mass measuring  5.8 x 5.7 cm with possible postobstructive right upper infiltrate. Mass infiltrates and attenuates the right mainstem bronchus. Mediastinal adenopathy concerning for local metastatic disease.   Moderate right pleural effusion. 1.2 cm soft tissue nodule posterior medial to the upper left kidney, new from prior exam. Concerning for metastatic disease.     24 hour results:    Recent Results (from the past 24 hour(s))   EKG, 12 LEAD, INITIAL    Collection Time: 10/21/20  4:53 PM   Result Value Ref Range    Ventricular Rate 102 BPM    Atrial Rate 102 BPM    P-R Interval 164 ms    QRS Duration 120 ms    Q-T Interval 368 ms    QTC Calculation (Bezet) 480 ms    Calculated P Axis 44 degrees    Calculated R Axis 11 degrees    Calculated T Axis -74 degrees    Diagnosis       Sinus tachycardia  Multiple ventricular premature complexes  Nonspecific intraventricular conduction delay  Inferior infarct, age indeterminate  Lateral leads are also involved     CBC WITH AUTOMATED DIFF    Collection Time: 10/21/20  5:35 PM   Result Value Ref Range    WBC 6.4 4.6 - 13.2 K/uL    RBC 3.82 (L) 4.20 - 5.30 M/uL    HGB 11.7 (L) 12.0 - 16.0 g/dL    HCT 39.2 35.0 - 45.0 %    .6 (H) 74.0 - 97.0 FL    MCH 30.6 24.0 - 34.0 PG    MCHC 29.8 (L) 31.0 - 37.0 g/dL    RDW 13.0 11.6 - 14.5 %    PLATELET 422 662 - 660 K/uL    MPV 10.6 9.2 - 11.8 FL    NEUTROPHILS 85 %    LYMPHOCYTES 10 %    MONOCYTES 5 % EOSINOPHILS 0 %    BASOPHILS 0 %    IMMATURE GRANULOCYTES 0 %    ABS. NEUTROPHILS 5.5 K/UL    ABS. LYMPHOCYTES 0.6 K/UL    ABS. MONOCYTES 0.3 K/UL    ABS. EOSINOPHILS 0.0 K/UL    ABS. BASOPHILS 0.0 K/UL    ABS. IMM. GRANS. 0.0 K/UL    RBC COMMENTS Poikilocytosis  1+        RBC COMMENTS Anisocytosis  1+       METABOLIC PANEL, COMPREHENSIVE    Collection Time: 10/21/20  5:35 PM   Result Value Ref Range    Sodium 137 135 - 145 mmol/L    Potassium 3.8 3.2 - 5.1 mmol/L    Chloride 100 94 - 111 mmol/L    CO2 26 21 - 33 mmol/L    Anion gap 11 mmol/L    Glucose 305 (H) 70 - 110 mg/dL    BUN 18 9 - 21 mg/dL    Creatinine 1.10 0.70 - 1.20 mg/dL    BUN/Creatinine ratio 16      GFR est AA 59 ml/min/1.73m2    GFR est non-AA 49 ml/min/1.73m2    Calcium 8.8 8.5 - 10.5 mg/dL    Bilirubin, total 0.5 0.2 - 1.0 mg/dL    AST (SGOT) 12 (L) 14 - 74 U/L    ALT (SGPT) 8 3 - 52 U/L    Alk. phosphatase 93 38 - 126 U/L    Protein, total 7.1 6.1 - 8.4 g/dL    Albumin 3.5 3.5 - 4.7 g/dL    Globulin 3.6 g/dL    A-G Ratio 1.0     GLUCOSE, POC    Collection Time: 10/22/20  6:22 AM   Result Value Ref Range    Glucose (POC) 336 (H) 65 - 100 mg/dL    Performed by Juan Jones    GLUCOSE, POC    Collection Time: 10/22/20  8:40 AM   Result Value Ref Range    Glucose (POC) 344 (H) 65 - 100 mg/dL    Performed by Liam Castro    MAGNESIUM    Collection Time: 10/22/20  9:00 AM   Result Value Ref Range    Magnesium 1.9 1.6 - 2.4 mg/dL   HEMOGLOBIN A1C WITH EAG    Collection Time: 10/22/20  9:00 AM   Result Value Ref Range    Hemoglobin A1c 6.1 (H) 4.0 - 5.6 %    Est. average glucose 128 mg/dL   SAMPLES BEING HELD    Collection Time: 10/22/20  9:00 AM   Result Value Ref Range    SAMPLES BEING HELD 1RED,2BLU     COMMENT        Add-on orders for these samples will be processed based on acceptable specimen integrity and analyte stability, which may vary by analyte.           Assessment:     Active Problems:    Brain metastases ([address] (10/22/2020)        Plan:   1. Brain metastases   - likely due to lung cancer   - MRI brain with and without contrast to better characterize the lesions if able to do with AICD   - if unable to obtain info on AICD or if not MRI compatible, then will need to complete CT head with contrast   - Decadron   - Keppra   - will make further recs after imaging completed. Pt is likely high risk for surgery and general anesthesia with her multitude of medical conditions   - hold anti-coagulation   - ok to eat from NSGY standpoint if passes bedside STAND  2. Cerebral edema with brain compression   - due to #1   - plans as above  3. Lung mass   - new diagnosis. Will likely need biopsy of the lung mass to gain pathology   - will need oncology consult  4. COPD, chronic   - on home O2 2L NC   - Duo-nebs   - Hospitalist following  5. Hyperglycemia, steroid-induced   - Hgb A1c 6.1   - SSI and accu-checks   - Hospitalist following  6. Chronic systolic CHF, NYHA class II   - AICD in place - looking for make and model to determine if patient can have MRI   - Cont aldactone   - Hospitalist following  7.  Left shoulder dislocation   - Orthopedics consulted and will likely need to reduce under sedation    Activity: up with assist  DVT ppx: SCDs  Dispo: tbd    Plan d/w Dr. Nga Romero, ER nurse, orthopedics team.      Katina Calles NP

## 2020-10-22 NOTE — ED NOTES
Pt tolerated 100% diet tray and water by straw. Pt also drank boost few hours ago without diffiuclty.

## 2020-10-22 NOTE — ROUTINE PROCESS
TRANSFER - OUT REPORT: 
 
Verbal report given to Bob Baxter RN(name) on Javan Carreon  being transferred to [facility] ED(unit) for routine progression of care Report consisted of patients Situation, Background, Assessment and  
Recommendations(SBAR). Information from the following report(s) ED Summary, MAR, Recent Results and Cardiac Rhythm 02 was reviewed with the receiving nurse. Lines:  
Peripheral IV 10/21/20 Left;Posterior Hand (Active) Site Assessment Clean, dry, & intact 10/21/20 1719 Phlebitis Assessment 0 10/21/20 1719 Infiltration Assessment 0 10/21/20 1719 Dressing Status Clean, dry, & intact 10/21/20 1719 Dressing Type Transparent 10/21/20 1719 Hub Color/Line Status Blue 10/21/20 1719 Peripheral IV 10/21/20 (Active) Site Assessment Clean, dry, & intact 10/21/20 1723 Phlebitis Assessment 0 10/21/20 1723 Infiltration Assessment 0 10/21/20 1723 Dressing Status Clean, dry, & intact 10/21/20 1723 Dressing Type Transparent 10/21/20 1723 Hub Color/Line Status Blue 10/21/20 1723 Alcohol Cap Used Yes 10/21/20 1723 Opportunity for questions and clarification was provided.    
 
Patient transported with: 
 Monitor and 02

## 2020-10-22 NOTE — ED TRIAGE NOTES
Pt arrives from [facility] ER for newfound lung and brain cancer. Pt went to [facility] for a fall that occurred 1.5 weeks ago.

## 2020-10-22 NOTE — ED NOTES
Dr. Wynn Lines discussing plan of care with Dr. Stacia Ohaar, ER attending at [facility]. Dr. Stacia Ohara accepts patient for transfer.

## 2020-10-22 NOTE — PROGRESS NOTES
MRI safety note:    No information on pt's ICD is available yet, will await  name and device model # in order to determine eligibility for MRI.

## 2020-10-22 NOTE — H&P
History and Physical                                                    Primary Care Provider: John Sanchez NP    NAME: Nghia Andrade   :  1950   MRN:  [id number]     Date/Time:  10/22/2020 3:29 AM    Patient PCP: John Sanchez NP    Patient allows participation of the people present in the room to discuss their medical care. History of Presenting Illness:   Nghia Andrade is a 69 y.o. female,  who has history of chronic systolic heart failure status post ICD, chronic COPD on home oxygen 2 L, hypertension, hyperlipidemia, anticoagulation with Eliquis who presents with fall 2 weeks back and left sided weakness. She reports that her left arm felt funny and also she was having weakness in the left arm. It is not painful. And she did not seek medical attention. Given persistence of symptoms, EMS was called and to take her to the local emergency room. At the ER there work-up revealed that she has lung mass as well as brain tumors. She was given IV Decadron and transferred to this facility for neurosurgical evaluation. She was also noted to have a dislocated left shoulder. There was report of lung mass which is right suprahilar with progression into the mediastinum. There is also report of 1.2 cm right frontal mass with 5 cm right to left shift. There is no report of seizures. The patient denies any fever, chills, chest pain, cough, congestion, recent illness, palpitations, or dysuria. Review of Systems:  A comprehensive review of systems was negative except for that written in the History of Present Illness.      Past Medical History:   Diagnosis Date    Chronic back pain     Chronic obstructive pulmonary disease ([address]     Diabetes ([facility] [address]     Fall in home     Heart failure ([facility] [address] [facility]     Hypertension     Ill-defined condition     home 02 2LNC      Past Surgical History:   Procedure Laterality Date    CARDIAC SURG PROCEDURE UNLIST      internal cardiac defibrillator  HX CHOLECYSTECTOMY      HX CYST REMOVAL      HX TUBAL LIGATION       Prior to Admission medications    Medication Sig Start Date End Date Taking? Authorizing Provider   traZODone (DESYREL) 50 mg tablet trazodone 50 mg tablet   TAKE 2 TO 3 TABLETS BY MOUTH EVERY DAY AT BEDTIME AS NEEDED    Michael Ku MD   spironolactone (ALDACTONE) 25 mg tablet spironolactone 25 mg tablet   TAKE 1 TABLET BY MOUTH ONCE DAILY    Michael Ku MD   simvastatin (ZOCOR) 10 mg tablet Take 10 mg by mouth nightly. [place], MD Michael   clopidogrel bisulfate (PLAVIX PO) Plavix    Michael Ku MD   celecoxib (CeleBREX) 200 mg capsule Celebrex 200 mg capsule   Take 1 capsule twice a day by oral route for 90 days. Michael Ku MD   carvediloL (COREG) 6.25 mg tablet carvedilol 6.25 mg tablet    Michael Ku MD   atorvastatin (LIPITOR) 20 mg tablet atorvastatin 20 mg tablet   TAKE 1 TABLET BY MOUTH ONCE DAILY FOR 90 DAYS    Michael Ku MD   cyclobenzaprine-TENS electrode 10 mg cmpk cyclobenzaprine 10 mg tablet   Take 1 tablet twice a day by oral route as needed for 90 days. Other, MD Michael   sertraline (ZOLOFT) 25 mg tablet Take 1 tablet by mouth once daily 10/6/20   Gigi Connor NP   potassium chloride SR (KLOR-CON 10) 10 mEq tablet Take 1 tablet by mouth once daily 9/24/20   Gigi Connor NP   Eliquis 5 mg tablet Take 1 tablet by mouth once daily 9/24/20   Gigi Connor NP   glimepiride (AMARYL) 2 mg tablet Take 1 tablet by mouth once daily for 90 days 9/7/20   Gigi Connor NP   furosemide (LASIX) 40 mg tablet Take 40 mg by mouth daily. Provider, Historical   lisinopriL (PRINIVIL, ZESTRIL) 5 mg tablet Take 5 mg by mouth daily. Provider, Historical   metoprolol succinate (TOPROL-XL) 25 mg XL tablet Take 25 mg by mouth daily. Provider, Historical   topiramate (TOPAMAX) 100 mg tablet Take 100 mg by mouth two (2) times a day.     Provider, Historical     No Known Allergies   Family History   Problem Relation Age of Onset    Diabetes Mother     No Known Problems Father     Cancer Brother         SOCIAL HISTORY:  Patient resides:  Independently X   Assisted Living    SNF    With family care       Smoking history:   None    Former X   Chronic      Alcohol history:   None X   Social    Chronic      Ambulates:   Independently    w/cane X   w/walker    w/wc    CODE STATUS:  DNR    Full X   Other      Objective:     Physical Exam:     Visit Vitals  BP (!) 144/74   Pulse 85   Temp 98.5 °F (36.9 °C)   Resp 18           General:  Alert, cooperative, no distress, appears stated age. Head:  Normocephalic, without obvious abnormality, atraumatic. Eyes:  Conjunctivae/corneas clear. PERRL, EOMs intact. Nose: Nares normal. Septum midline. Mucosa normal. No drainage or sinus tenderness. Throat: Lips, mucosa, and tongue normal. Teeth and gums normal.   Neck: Supple, symmetrical, trachea midline, no adenopathy, thyroid: no enlargement/tenderness/nodules, no carotid bruit and no JVD. Back:   Symmetric, no curvature. ROM normal. No CVA tenderness. Lungs:   Clear to auscultation bilaterally. Chest wall:  No tenderness or deformity. Heart:  Regular rate and rhythm, S1, S2 normal, no murmur, click, rub or gallop. Abdomen:   Soft, non-tender. Bowel sounds normal. No masses,  No organomegaly. Extremities: Extremities normal, atraumatic, no cyanosis or edema. Left shoulder is externally rotated and not palpable in socket. Pulses: 2+ and symmetric all extremities. Skin: Skin color, texture, turgor normal. No rashes or lesions   Neurologic: CNII-XII intact. Strength is 3 out of 5 in the left upper and left lower extremity . Handgrip is diminished  5 to find the right upper and right lower extremity    Cap refill: Brisk, less than 3 seconds  Pulses: 2+, symmetric in all extremities         EKG:  normal EKG, normal sinus rhythm, occasional unifocal pvc.       Data Review:     Recent Days:  Recent Labs 10/21/20  1735   WBC 6.4   HGB 11.7*   HCT 39.2        Recent Labs     10/21/20  1735      K 3.8      CO2 26   *   BUN 18   CREA 1.10   CA 8.8   ALB 3.5   TBILI 0.5   ALT 8     No results for input(s): PH, PCO2, PO2, HCO3, FIO2 in the last 72 hours. 24 Hour Results:  Recent Results (from the past 24 hour(s))   EKG, 12 LEAD, INITIAL    Collection Time: 10/21/20  4:53 PM   Result Value Ref Range    Ventricular Rate 102 BPM    Atrial Rate 102 BPM    P-R Interval 164 ms    QRS Duration 120 ms    Q-T Interval 368 ms    QTC Calculation (Bezet) 480 ms    Calculated P Axis 44 degrees    Calculated R Axis 11 degrees    Calculated T Axis -74 degrees    Diagnosis       Sinus tachycardia  Multiple ventricular premature complexes  Nonspecific intraventricular conduction delay  Inferior infarct, age indeterminate  Lateral leads are also involved     CBC WITH AUTOMATED DIFF    Collection Time: 10/21/20  5:35 PM   Result Value Ref Range    WBC 6.4 4.6 - 13.2 K/uL    RBC 3.82 (L) 4.20 - 5.30 M/uL    HGB 11.7 (L) 12.0 - 16.0 g/dL    HCT 39.2 35.0 - 45.0 %    .6 (H) 74.0 - 97.0 FL    MCH 30.6 24.0 - 34.0 PG    MCHC 29.8 (L) 31.0 - 37.0 g/dL    RDW 13.0 11.6 - 14.5 %    PLATELET 843 639 - 465 K/uL    MPV 10.6 9.2 - 11.8 FL    NEUTROPHILS 85 %    LYMPHOCYTES 10 %    MONOCYTES 5 %    EOSINOPHILS 0 %    BASOPHILS 0 %    IMMATURE GRANULOCYTES 0 %    ABS. NEUTROPHILS 5.5 K/UL    ABS. LYMPHOCYTES 0.6 K/UL    ABS. MONOCYTES 0.3 K/UL    ABS. EOSINOPHILS 0.0 K/UL    ABS. BASOPHILS 0.0 K/UL    ABS. IMM.  GRANS. 0.0 K/UL    RBC COMMENTS Poikilocytosis  1+        RBC COMMENTS Anisocytosis  1+       METABOLIC PANEL, COMPREHENSIVE    Collection Time: 10/21/20  5:35 PM   Result Value Ref Range    Sodium 137 135 - 145 mmol/L    Potassium 3.8 3.2 - 5.1 mmol/L    Chloride 100 94 - 111 mmol/L    CO2 26 21 - 33 mmol/L    Anion gap 11 mmol/L    Glucose 305 (H) 70 - 110 mg/dL    BUN 18 9 - 21 mg/dL    Creatinine 1.10 0.70 - 1.20 mg/dL    BUN/Creatinine ratio 16      GFR est AA 59 ml/min/1.73m2    GFR est non-AA 49 ml/min/1.73m2    Calcium 8.8 8.5 - 10.5 mg/dL    Bilirubin, total 0.5 0.2 - 1.0 mg/dL    AST (SGOT) 12 (L) 14 - 74 U/L    ALT (SGPT) 8 3 - 52 U/L    Alk. phosphatase 93 38 - 126 U/L    Protein, total 7.1 6.1 - 8.4 g/dL    Albumin 3.5 3.5 - 4.7 g/dL    Globulin 3.6 g/dL    A-G Ratio 1.0           Imaging:   No results found. Assessment:       Given the patient's current clinical presentation, I have a high level of concern for decompensation if discharged from the emergency department. Complex decision making was performed, which includes reviewing the patient's available past medical records, laboratory results, and x-ray films. My assessment of this patient's clinical condition and my plan of care is as follows. Active Problems:    Brain metastases ([facility] [address] (10/22/2020)           Plan:     Left sided weakness with fall prior to admission  Reviewed Imaging reports from outside ER  Likely secondary to brain mass with midline shift  Continue steroids initiated in ER  Seizure precautions  Neurosurgery request consultation  Closely monitor neuro checks    Lung mass with erosion into mediastinum  Suspicious for carcinoma  Request pulmonary evaluation -for tissue biopsy    Chronic COPD with chronic hypoxic respiratory failure   Not in acute exacerbation  Continued home oxygen  Order inhalers    Chronic congestive heart failure, presumed systolic given patient has ICD and is on Aldactone  Continued home medications  Requested medical records from her home hospital.     Left shoulder dislocation - 2 weeks back after fall   Orthopedic consult requested  Pain control    Chronic anticoagulation with Eliquis  Hold as planning for procedures    H/o CAD  Hold plavix today if planned for biopsies.     Hypertension  Continue with Coreg    Diabetes type 2  POC checks and sliding scale insulin    Check U/A for preadmit Code Status:   Surrogate Decision Maker: daughter  Candice Pugh  [phone]    Called and No answer. DVT Prophylaxis: SCD  GI Prophylaxis: Pepcid    FUNCTIONAL STATUS PRIOR TO ADMISSION: Ambulatory with Use of Assistive Devices    Patient is from: [address] [place] [address] discussed with: Patient/Family  Anticipated Disposition:  PT, OT, RN and SAH/Rehab  Anticipated Discharge: Greater than 48 hours      Patient was explained about the risk associated with hospitalization including (and not a complete) risk of falls,fractures,blood clots,Bleeding from medications (including anticoagulants used for DVT ppx), Sepsis,allergic reactions,infections,radiation risk from the imaging studies performed,transfusions of blood products,Renal failure  and also risk of death. Patient also understands and agrees to the treatment plan including medications and also understand the risk with radiation while undergoing imaging studies. The patient  And  family  (after permission given by the patient) understands the need to be hospitalized and follow medical orders, agree with the admission plan. Thank You Dr Kermit Gray NP for taking care of your patient. Please call if you have any questions. Signed By: Arcelia Henao MD     October 22, 2020             Please note that this dictation was completed with [employer] the [employer] voice recognition software. Quite often unanticipated grammatical, syntax, homophones, and other interpretive errors are inadvertently transcribed by the computer software. Please disregard these errors. Please excuse any errors that have escaped final proofreading. Thank you.

## 2020-10-22 NOTE — ED NOTES
Pt called out and upon arriving in the room, the pt was visibly shaking. ED attending notified and hospitalist paged to come see pt. Pt had stopped convulsing when hospitalist arrived to see pt. Hospitalist noticed of situation and states he will place orders to address issue. Seizure pads applied to bed.

## 2020-10-22 NOTE — ED NOTES
Bedside report given to Richard Mckinney RN using SBAR. Pt changed and repositioned. Heels floated. Left arm supported with pillow.

## 2020-10-22 NOTE — ED NOTES
[id number] [facility] ambulance service called for transport to [facility].  Per Santiago Boggs is approximately 1 hour to 1 hour and 15 minutes

## 2020-10-22 NOTE — ED NOTES
Dr. Yonatan Le discussing plan of care with hospitalist, Dr. Bard Talamantes, at [address] [place] [address]. Dr. Bard Talamantes reports no beds at [address] [place] [address] but the patient may go to ER if ER attending, Ortho, and Neuro-surgery agree to accept and consult.

## 2020-10-22 NOTE — PROGRESS NOTES
SLP Contact Note    Consult received and appreciated. Patient chart reviewed. Noted pt undergoing neurosurgical work-up. Speech difficulties noted, however, will await further work-up and then will complete evaluation pending this work-up. Of note, pt passed STAND and is able to initiate diet.       Thank you,  HILARIA Parker.Ed, [id number] [facility]  Speech-Language Pathologist

## 2020-10-22 NOTE — ED NOTES
Patient resting on stretcher. No distress noted. Patient to be medicated with Decadron per Physician orders.  Will continue to monitor

## 2020-10-22 NOTE — ED NOTES
Dr. Sanjay Vasquez into talk with patient about CT findings. Will also call her daughter Ish Tucker.

## 2020-10-22 NOTE — ED NOTES
Patient has deformity to left shoulder. Pulses palpable. Patient has a bony deformity to right clavicle. Patient reports if was from a previous fall.

## 2020-10-22 NOTE — ED NOTES
Message left for a cardiologist that was found in pts chart from 2007 regarding information on ICD. Pt unaware of name of cardiologist, cardiology office, ICD name or when it was inserted. Notified MRI that attempting to find info but unsuccessful in reaching the cardiologist office. VM left at cardiologist office for return call to this RN.

## 2020-10-22 NOTE — ED NOTES
Pt in room 29. Removed off EMS sheets and changed into clean brief. Pt reconnected to monitor x3. [email or web address]. Currently c/o headache.

## 2020-10-22 NOTE — CONSULTS
Name: Arley [address]: [address]   : 1950 Admit Date: 10/22/2020   Phone: [phone]  Room: [place] [address]   PCP: Gigi Connor NP  MRN: [id number]   Date: 10/22/2020  Code: Full Code        HPI:    11:51 AM       History was obtained from medical records. I was asked by Edith Yanez MD to see Sindy Cason in consultation for a chief complaint of abnormal CT chest.    History of Present Illness: 69year old female with past medical history chronic systolic heart failure status post ICD, chronic COPD on home oxygen 2 L, hypertension, hyperlipidemia, anticoagulation with Eliquis who presented to [facility] transferred from outside hospital with left side weakness. Apparently had seizures in the hospital with shaking but no LOS. She received keppra (no ativan administered). Patient denies shortness of breath on exertion but has cough with clear sputum and wheezing. No fever, chills, night sweats. Oriented x 1. Unable to provide details of her history. Images:  CT Head - 1.2 cm right frontal mass with 5 mm right to left shift. CT Chest - right hilar mass with mediastinal lymphadenopathy.     Past Medical History:   Diagnosis Date    Chronic back pain     Chronic obstructive pulmonary disease ([address]     Diabetes ([address]     Fall in home     Heart failure ([address]     Hypertension     Ill-defined condition     home 02 2LNC       Past Surgical History:   Procedure Laterality Date    CARDIAC SURG PROCEDURE UNLIST      internal cardiac defibrillator    HX CHOLECYSTECTOMY      HX CYST REMOVAL      HX PACEMAKER  2016    [employer]     HX TUBAL LIGATION         Family History   Problem Relation Age of Onset    Diabetes Mother     No Known Problems Father     Cancer Brother        Social History     Tobacco Use    Smoking status: Former Smoker    Smokeless tobacco: Never Used    Tobacco comment: quit 2020   Substance Use Topics    Alcohol use: Not Currently       No Known Allergies    Current Facility-Administered Medications   Medication Dose Route Frequency    sodium chloride (NS) flush 5-40 mL  5-40 mL IntraVENous Q8H    sodium chloride (NS) flush 5-40 mL  5-40 mL IntraVENous PRN    acetaminophen (TYLENOL) tablet 650 mg  650 mg Oral Q6H PRN    Or    acetaminophen (TYLENOL) suppository 650 mg  650 mg Rectal Q6H PRN    polyethylene glycol (MIRALAX) packet 17 g  17 g Oral DAILY PRN    lactated Ringers infusion  75 mL/hr IntraVENous CONTINUOUS    ondansetron (ZOFRAN ODT) tablet 4 mg  4 mg Oral Q8H PRN    Or    ondansetron (ZOFRAN) injection 4 mg  4 mg IntraVENous Q6H PRN    famotidine (PEPCID) tablet 20 mg  20 mg Oral DAILY    dexamethasone (DECADRON) 4 mg/mL injection 4 mg  4 mg IntraVENous Q6H    atorvastatin (LIPITOR) tablet 20 mg  20 mg Oral QHS    carvediloL (COREG) tablet 6.25 mg  6.25 mg Oral BID WITH MEALS    furosemide (LASIX) tablet 40 mg  40 mg Oral DAILY    lisinopriL (PRINIVIL, ZESTRIL) tablet 5 mg  5 mg Oral DAILY    sertraline (ZOLOFT) tablet 25 mg  25 mg Oral QPM    spironolactone (ALDACTONE) tablet 25 mg  25 mg Oral DAILY    topiramate (TOPAMAX) tablet 100 mg  100 mg Oral BID    albuterol (PROVENTIL VENTOLIN) nebulizer solution 2.5 mg  2.5 mg Nebulization Q4H RT    LORazepam (ATIVAN) injection 1 mg  1 mg IntraVENous NOW    levETIRAcetam (KEPPRA) 500 mg in 0.9% sodium chloride 100 mL IVPB  500 mg IntraVENous Q12H    glucose chewable tablet 16 g  4 Tab Oral PRN    glucagon (GLUCAGEN) injection 1 mg  1 mg IntraMUSCular PRN    dextrose 10% infusion 0-250 mL  0-250 mL IntraVENous PRN    insulin lispro (HUMALOG) injection   SubCUTAneous AC&HS    iohexoL (OMNIPAQUE) 240 mg iodine/mL solution 50 mL  50 mL Oral RAD ONCE     Current Outpatient Medications   Medication Sig    traZODone (DESYREL) 50 mg tablet trazodone 50 mg tablet   TAKE 2 TO 3 TABLETS BY MOUTH EVERY DAY AT BEDTIME AS NEEDED    spironolactone (ALDACTONE) 25 mg tablet spironolactone 25 mg tablet   TAKE 1 TABLET BY MOUTH ONCE DAILY    simvastatin (ZOCOR) 10 mg tablet Take 10 mg by mouth nightly.  clopidogrel bisulfate (PLAVIX PO) Plavix    celecoxib (CeleBREX) 200 mg capsule Celebrex 200 mg capsule   Take 1 capsule twice a day by oral route for 90 days.  carvediloL (COREG) 6.25 mg tablet carvedilol 6.25 mg tablet    atorvastatin (LIPITOR) 20 mg tablet atorvastatin 20 mg tablet   TAKE 1 TABLET BY MOUTH ONCE DAILY FOR 90 DAYS    cyclobenzaprine-TENS electrode 10 mg cmpk cyclobenzaprine 10 mg tablet   Take 1 tablet twice a day by oral route as needed for 90 days.  sertraline (ZOLOFT) 25 mg tablet Take 1 tablet by mouth once daily    potassium chloride SR (KLOR-CON 10) 10 mEq tablet Take 1 tablet by mouth once daily    Eliquis 5 mg tablet Take 1 tablet by mouth once daily    glimepiride (AMARYL) 2 mg tablet Take 1 tablet by mouth once daily for 90 days    furosemide (LASIX) 40 mg tablet Take 40 mg by mouth daily.  lisinopriL (PRINIVIL, ZESTRIL) 5 mg tablet Take 5 mg by mouth daily.  metoprolol succinate (TOPROL-XL) 25 mg XL tablet Take 25 mg by mouth daily.  topiramate (TOPAMAX) 100 mg tablet Take 100 mg by mouth two (2) times a day. REVIEW OF SYSTEMS   Negative except as stated in the HPI. Physical Exam:   Visit Vitals  BP (!) 129/58   Pulse 68   Temp 98.4 °F (36.9 °C)   Resp 13   SpO2 99%       General:  No distress   Head:  Normocephalic. Eyes:  Conjunctivae/corneas clear. Nose: Nares normal.   Throat: Lips, mucosa, and tongue normal.   Neck: Supple, symmetrical, trachea midline. Lungs:   Clear to auscultation bilaterally. Heart:  Regular rate and rhythm, S1, S2 normal, no murmur, click, rub or gallop. Abdomen:   Soft, non-tender. Bowel sounds normal.   Extremities: Left shoulder dislocation. Pulses: 2+ and symmetric all extremities.            Neurologic: Grossly nonfocal       Lab Results   Component Value Date/Time Sodium 137 10/21/2020 05:35 PM    Potassium 3.8 10/21/2020 05:35 PM    Chloride 100 10/21/2020 05:35 PM    CO2 26 10/21/2020 05:35 PM    BUN 18 10/21/2020 05:35 PM    Creatinine 1.10 10/21/2020 05:35 PM    Glucose 305 (H) 10/21/2020 05:35 PM    Calcium 8.8 10/21/2020 05:35 PM    Magnesium 1.9 10/22/2020 09:00 AM       Lab Results   Component Value Date/Time    WBC 6.4 10/21/2020 05:35 PM    HGB 11.7 (L) 10/21/2020 05:35 PM    PLATELET 864 65/27/7133 05:35 PM    .6 (H) 10/21/2020 05:35 PM       Lab Results   Component Value Date/Time    Alk. phosphatase 93 10/21/2020 05:35 PM    Protein, total 7.1 10/21/2020 05:35 PM    Albumin 3.5 10/21/2020 05:35 PM    Globulin 3.6 10/21/2020 05:35 PM         IMPRESSION:  ===========  -Right hilar mass with endobronchial lesion.  -Mediastinal and hilar lymphadenopathy.  -1.2 cm frontal mass with 5 mm midline shift. -COPD with chronic hypoxemic - on 2lpm by NC at home  -Nicotine dependence.  -Atrial fibrillation with anticoagulation with Eliquis. -CAD on Plavix. PLAN:  =====  -EBUS/ endobronchial biopsy if no plan to go after brain lesion (too small). Will check with Neurosurgery.  -Will need to hold Plavix (5 days) and eliquis (2 days) before bopsy and would need to review cardiac records.  -Formal cardiac clearance when records avalable. -Keppra and decadron per neurosurgery.  -add ipratropium and albuterol nebz. Thank you for the consult.     Connie Gray MD

## 2020-10-22 NOTE — ED NOTES
[facility], No distress noted.

## 2020-10-22 NOTE — CONSULTS
ORTHO CONSULT NOTE    Subjective:     Date of Consultation:  October 22, 2020    Annis Cushing is a 69 y.o. female who is being seen for possible left shoulder dislocation. She reports a fall occurred at least a week ago. Weakness and inability to use the left arm developed several days later. She presented to the ER [facility] yesterday with left arm weakness. Xray of the left shoulder showed a left clavicle fracture. ER provider at [facility] documented a left shoulder dislocation although this is not on radiology report. A CT of the chest and head were performed as well showing lung mass consistent with neoplasm and brain mass consistent with metastatic disease. She was transferred to [facility] for management of the CT findings. Oncology, neurosurgery, pulmonary evaluation and work-up are ongoing. Orthopedic surgery has been consulted for evaluation of left shoulder.     Patient Active Problem List    Diagnosis Date Noted    Brain metastases ([address] 10/22/2020    Chronic bronchitis ([address] 10/16/2020    Contusion, shoulder and upper arm, multiple sites, initial encounter 10/16/2020    Current episode of major depressive disorder without prior episode 08/13/2020     Family History   Problem Relation Age of Onset    Diabetes Mother     No Known Problems Father     Cancer Brother       Social History     Tobacco Use    Smoking status: Former Smoker    Smokeless tobacco: Never Used    Tobacco comment: quit June 2020   Substance Use Topics    Alcohol use: Not Currently     Past Medical History:   Diagnosis Date    Chronic back pain     Chronic obstructive pulmonary disease ([address]     Diabetes ([address]     Fall in home     Heart failure ([address]     Hypertension     Ill-defined condition     home 02 2LNC      Past Surgical History:   Procedure Laterality Date    CARDIAC SURG PROCEDURE UNLIST      internal cardiac defibrillator    HX CHOLECYSTECTOMY      HX CYST REMOVAL      HX PACEMAKER  08/08/2016 [facility]     HX TUBAL LIGATION        Prior to Admission medications    Medication Sig Start Date End Date Taking? Authorizing Provider   traZODone (DESYREL) 50 mg tablet trazodone 50 mg tablet   TAKE 2 TO 3 TABLETS BY MOUTH EVERY DAY AT BEDTIME AS NEEDED    Michael Ku MD   spironolactone (ALDACTONE) 25 mg tablet spironolactone 25 mg tablet   TAKE 1 TABLET BY MOUTH ONCE DAILY    Michael Ku MD   simvastatin (ZOCOR) 10 mg tablet Take 10 mg by mouth nightly. Michael Ku MD   clopidogrel bisulfate (PLAVIX PO) Plavix    Michael Ku MD   celecoxib (CeleBREX) 200 mg capsule Celebrex 200 mg capsule   Take 1 capsule twice a day by oral route for 90 days. Michael Ku MD   carvediloL (COREG) 6.25 mg tablet carvedilol 6.25 mg tablet    Michael Ku MD   atorvastatin (LIPITOR) 20 mg tablet atorvastatin 20 mg tablet   TAKE 1 TABLET BY MOUTH ONCE DAILY FOR 90 DAYS    Michael Ku MD   cyclobenzaprine-TENS electrode 10 mg cmpk cyclobenzaprine 10 mg tablet   Take 1 tablet twice a day by oral route as needed for 90 days. Michael Ku MD   sertraline (ZOLOFT) 25 mg tablet Take 1 tablet by mouth once daily 10/6/20   Kermit Gray NP   potassium chloride SR (KLOR-CON 10) 10 mEq tablet Take 1 tablet by mouth once daily 9/24/20   Kermit Gray NP   Eliquis 5 mg tablet Take 1 tablet by mouth once daily 9/24/20   Kermit Gray NP   glimepiride (AMARYL) 2 mg tablet Take 1 tablet by mouth once daily for 90 days 9/7/20   Kermit Gray NP   furosemide (LASIX) 40 mg tablet Take 40 mg by mouth daily. Provider, Historical   lisinopriL (PRINIVIL, ZESTRIL) 5 mg tablet Take 5 mg by mouth daily. Provider, Historical   metoprolol succinate (TOPROL-XL) 25 mg XL tablet Take 25 mg by mouth daily. Provider, Historical   topiramate (TOPAMAX) 100 mg tablet Take 100 mg by mouth two (2) times a day.     Provider, Historical     Current Facility-Administered Medications   Medication Dose Route Frequency    sodium chloride (NS) flush 5-40 mL  5-40 mL IntraVENous Q8H    sodium chloride (NS) flush 5-40 mL  5-40 mL IntraVENous PRN    acetaminophen (TYLENOL) tablet 650 mg  650 mg Oral Q6H PRN    Or    acetaminophen (TYLENOL) suppository 650 mg  650 mg Rectal Q6H PRN    polyethylene glycol (MIRALAX) packet 17 g  17 g Oral DAILY PRN    lactated Ringers infusion  75 mL/hr IntraVENous CONTINUOUS    ondansetron (ZOFRAN ODT) tablet 4 mg  4 mg Oral Q8H PRN    Or    ondansetron (ZOFRAN) injection 4 mg  4 mg IntraVENous Q6H PRN    famotidine (PEPCID) tablet 20 mg  20 mg Oral DAILY    dexamethasone (DECADRON) 4 mg/mL injection 4 mg  4 mg IntraVENous Q6H    atorvastatin (LIPITOR) tablet 20 mg  20 mg Oral QHS    carvediloL (COREG) tablet 6.25 mg  6.25 mg Oral BID WITH MEALS    furosemide (LASIX) tablet 40 mg  40 mg Oral DAILY    lisinopriL (PRINIVIL, ZESTRIL) tablet 5 mg  5 mg Oral DAILY    sertraline (ZOLOFT) tablet 25 mg  25 mg Oral QPM    spironolactone (ALDACTONE) tablet 25 mg  25 mg Oral DAILY    topiramate (TOPAMAX) tablet 100 mg  100 mg Oral BID    albuterol (PROVENTIL VENTOLIN) nebulizer solution 2.5 mg  2.5 mg Nebulization Q4H RT    LORazepam (ATIVAN) injection 1 mg  1 mg IntraVENous NOW    levETIRAcetam (KEPPRA) 500 mg in 0.9% sodium chloride 100 mL IVPB  500 mg IntraVENous Q12H    glucose chewable tablet 16 g  4 Tab Oral PRN    glucagon (GLUCAGEN) injection 1 mg  1 mg IntraMUSCular PRN    dextrose 10% infusion 0-250 mL  0-250 mL IntraVENous PRN    insulin lispro (HUMALOG) injection   SubCUTAneous AC&HS    iohexoL (OMNIPAQUE) 240 mg iodine/mL solution 50 mL  50 mL Oral RAD ONCE     Current Outpatient Medications   Medication Sig    traZODone (DESYREL) 50 mg tablet trazodone 50 mg tablet   TAKE 2 TO 3 TABLETS BY MOUTH EVERY DAY AT BEDTIME AS NEEDED    spironolactone (ALDACTONE) 25 mg tablet spironolactone 25 mg tablet   TAKE 1 TABLET BY MOUTH ONCE DAILY    simvastatin (ZOCOR) 10 mg tablet Take 10 mg by mouth nightly.  clopidogrel bisulfate (PLAVIX PO) Plavix    celecoxib (CeleBREX) 200 mg capsule Celebrex 200 mg capsule   Take 1 capsule twice a day by oral route for 90 days.  carvediloL (COREG) 6.25 mg tablet carvedilol 6.25 mg tablet    atorvastatin (LIPITOR) 20 mg tablet atorvastatin 20 mg tablet   TAKE 1 TABLET BY MOUTH ONCE DAILY FOR 90 DAYS    cyclobenzaprine-TENS electrode 10 mg cmpk cyclobenzaprine 10 mg tablet   Take 1 tablet twice a day by oral route as needed for 90 days.  sertraline (ZOLOFT) 25 mg tablet Take 1 tablet by mouth once daily    potassium chloride SR (KLOR-CON 10) 10 mEq tablet Take 1 tablet by mouth once daily    Eliquis 5 mg tablet Take 1 tablet by mouth once daily    glimepiride (AMARYL) 2 mg tablet Take 1 tablet by mouth once daily for 90 days    furosemide (LASIX) 40 mg tablet Take 40 mg by mouth daily.  lisinopriL (PRINIVIL, ZESTRIL) 5 mg tablet Take 5 mg by mouth daily.  metoprolol succinate (TOPROL-XL) 25 mg XL tablet Take 25 mg by mouth daily.  topiramate (TOPAMAX) 100 mg tablet Take 100 mg by mouth two (2) times a day. No Known Allergies     Review of Systems:  A comprehensive review of systems was negative except for that written in the HPI.     Mental Status: no dementia    Objective:     Patient Vitals for the past 8 hrs:   BP Temp Pulse Resp SpO2   10/22/20 1000 (!) 129/58  68 13 99 %   10/22/20 0956     100 %   10/22/20 0828 (!) 152/67 98.4 °F (36.9 °C) 70 24 100 %   10/22/20 0800 (!) 152/76  79 22 100 %   10/22/20 0745 (!) 163/76  81 22 100 %   10/22/20 0715 (!) 142/75  78 24 100 %   10/22/20 0700 136/65  70 18 100 %   10/22/20 0645 134/71  65 17 100 %   10/22/20 0630 130/71  79 19 100 %   10/22/20 0615 112/89  76 24 100 %   10/22/20 0600 (!) 150/70  72 21 100 %   10/22/20 0545 (!) 146/62  60 20 100 %   10/22/20 0530 130/61  70 18 100 %   10/22/20 0515 129/70  72 20 100 %   10/22/20 0500 139/73  82 27 100 %   10/22/20 0445 139/64  70 20 100 %   10/22/20 0430 (!) 143/69  78 21 100 %   10/22/20 0415   72 22 100 %   10/22/20 0400 125/88  80 22 100 %   10/22/20 0345 116/76  81 21 100 %   10/22/20 0328 (!) 144/74 98.5 °F (36.9 °C) 85 18      Temp (24hrs), Av.5 °F (36.9 °C), Min:98.4 °F (36.9 °C), Max:98.7 °F (37.1 °C)      PHYSICAL EXAM:   General: 78yo female, cooperative, no distress, appears stated age  CNS: alert/oriented, normal mood  Lungs: receiving supplemental O2  Skin: no acute dermatologic abnormalities  Extremities: left humerus appears to be sitting anteriorly, shoulder passive ROM limited,cannot actively raise left arm,   Vascular: capillary refill <2 secs in left hand  Neurologic: marked weakness left UE    Imaging Review:   XR SHOULDER LT AP/LAT MIN 2 V 10/21/2020 18:03  CLINICAL INDICATION/HISTORY: left arm weakness  -Additional: None  COMPARISON: None  TECHNIQUE: 3 views of the left wrist  ______________    FINDINGS:  BONES: Nondisplaced fracture of the distal clavicle. No aggressive appearing  osseous lytic or blastic lesion. SOFT TISSUES: Unremarkable.  _______________     IMPRESSION:  Nondisplaced fracture of the distal clavicle    CT CHEST WO CONT 10/21/2020 19:18  INDICATION: Pain.     COMPARISON: None.     TECHNIQUE: Axial CT imaging from the thoracic inlet through the diaphragm  without intravenous contrast. Multiplanar reformations were generated.     One or more dose reduction techniques were used on this CT: automated exposure  control, adjustment of the mAs and/or kVp according to patient size, and  iterative reconstruction techniques. The specific techniques used on this CT  exam have been documented in the patient's electronic medical record.   Digital  Imaging and Communications in Medicine (DICOM) format image data are available  to nonaffiliated external healthcare facilities or entities on a secure, media  free, reciprocally searchable basis with patient authorization for at least a  12-month period after this study.     _______________     FINDINGS:     LUNGS: Centrilobular emphysema. Right hilar/suprahilar 5.8 x 5.7 cm spiculated  mass with infiltration and attenuation of right upper lobe main stem bronchus. Possible post obstructive infiltrate in the right upper lobe.     PLEURA: Moderate right pleural effusion.     AIRWAY: Unremarkable.     MEDIASTINUM: Left chest wall AICD/pacemaker. Aorta is normal caliber with  scattered vascular calcifications.     LYMPH NODES: Pretracheal and subcarinal adenopathy measuring up to 1.7 cm.     UPPER ABDOMEN: Nodular thickening of the left adrenal gland without discrete  nodule. Cholecystectomy. Atrophic right kidney. 1.2 cm soft tissue nodule  posterior medial to the upper left kidney, new from prior exam.     OSSEOUS STRUCTURES: T3 vertebra plana and associated kyphosis     _______________     IMPRESSION  IMPRESSION:     Ill-defined, spiculated right hilar/suprahilar mass measuring  5.8 x 5.7 cm with  possible postobstructive right upper infiltrate. Mass infiltrates and attenuates  the right mainstem bronchus.     Mediastinal adenopathy concerning for local metastatic disease.     Moderate right pleural effusion.     1.2 cm soft tissue nodule posterior medial to the upper left kidney, new from  prior exam. Concerning for metastatic disease.     T3 vertebra plana and associated kyphosis     Note: Preliminary report sent to the Emergency Department by teleradiology at  the time of the study.       Impression:     Patient Active Problem List    Diagnosis Date Noted    Brain metastases ([address] 10/22/2020    Chronic bronchitis ([address] 10/16/2020    Contusion, shoulder and upper arm, multiple sites, initial encounter 10/16/2020    Current episode of major depressive disorder without prior episode 08/13/2020     Active Problems:    Brain metastases ([address] (10/22/2020)    Left distal clavicle fracture, non-displaced  Possible subacute left glenohumeral dislocation    Plan:     After reviewing imaging studies (left shoulder xray, chest CT) from 10/21/20 with Dr. Romeo Luis, it doesn't appear that the glenohumeral joint is dislocated. I've ordered new shoulder xrays including an axial view to confirm. No apparent dislocation. Clavicle fx can be treated in sling for comfort. Consults and recommendations from multiple other specialties re: CT findings are ongoing.      LIBRA Hernandez

## 2020-10-22 NOTE — PROGRESS NOTES
Likely brain mets on non contrast hct. Mri and systemic work up pending. Continue decadron and keppra.   Further recs to follow

## 2020-10-22 NOTE — ED NOTES
Bedside shift change report given to Kelly Spangler (oncoming nurse) by Tennille Garzon (offgoing nurse). Report included the following information SBAR, ED Summary and Recent Results.

## 2020-10-22 NOTE — PROGRESS NOTES
[address] [facility] Adult  Hospitalist Group                                                                                          Hospitalist Progress Note  Samantha Gonzales,   Answering service: [phone] from in house phone        Date of Service:  10/22/2020  NAME:  Tamy Huggins  :  1950  MRN:  [id number]      Admission Summary:   69 y.o. female,  who has history of chronic systolic heart failure status post ICD, chronic COPD on home oxygen 2 L, hypertension, hyperlipidemia, anticoagulation with Eliquis who presents with fall 2 weeks back and left sided weakness. She reports that her left arm felt funny and also she was having weakness in the left arm. It is not painful. And she did not seek medical attention. Given persistence of symptoms, EMS was called and to take her to the local emergency room. At the ER there work-up revealed that she has lung mass as well as brain tumors. She was given IV Decadron and transferred to this facility for neurosurgical evaluation. She was also noted to have a dislocated left shoulder. There was report of lung mass which is right suprahilar with progression into the mediastinum. There is also report of 1.2 cm right frontal mass with 5 cm right to left shift. There is no report of seizures. Interval history / Subjective: Follow up left sided weakness. Patient seen and examined. Intermittently confused. No acute distress. Requesting food. Attempted to call family multiple times but no answer and voicemail is full. Assessment & Plan:     Brain masses, consistent with metastatic disease:  Cerebral edema secondary to above:  Left-sided weakness:  -Neurosurgery consulted  -MRI brain pending, plan to be completed 10/23  -Continue steroids, Keppra  -Seizure precautions  -Neuro checks     Lung mass with erosion into mediastinum, suspicious for carcinoma:  -Pulmonary consulted.   Plans for EUS/endobronchial biopsy   --per note, will need to be off Plavix x5 days, Eliquis x2 days and formal cardiac clearance     Chronic COPD with chronic hypoxic respiratory failure, not in exacerbation  -Continue home oxygen, AA nebs    Moderate right pleural effusion:   -respiratory status stable, monitor closely, consider thoracentesis     Chronic congestive heart failure, presumed systolic given patient has ICD and is on Aldactone  -Continued home medications  -Requested medical records from her home hospital.      Clavicular fracture:  Shoulder dislocation ruled out:  -Appreciate orthopedic surgery. No evidence of shoulder dislocation. Can wear sling for comfort      Chronic anticoagulation with Eliquis  -Hold as planning for procedures    H/o CAD  -Hold plavix today if planned for biopsies.     Hypertension  -Continue with Coreg     Diabetes type 2  -POC checks and sliding scale insulin     Concern for UTI: empiric rocephin, follow cultures    Palliative care consult      Code status: full   DVT prophylaxis: SCDs    Care Plan discussed with: Patient/Family  Anticipated Disposition: Home w/Family  Anticipated Discharge: Greater than 48 hours     Hospital Problems  Never Reviewed          Codes Class Noted POA    Brain metastases ([facility] [address] ICD-10-CM: C79.31  ICD-9-CM: 198.3  10/22/2020 Unknown                Review of Systems:     Negative unless stated above    Vital Signs:    Last 24hrs VS reviewed since prior progress note. Most recent are:  Visit Vitals  /60   Pulse 61   Temp 98 °F (36.7 °C)   Resp 18   SpO2 100%         Intake/Output Summary (Last 24 hours) at 10/22/2020 1736  Last data filed at 10/22/2020 1701  Gross per 24 hour   Intake 1208.75 ml   Output 102 ml   Net 1106.75 ml        Physical Examination:             Constitutional:  No acute distress, cooperative, pleasant    ENT:  Oral mucosa moist, oropharynx benign. Resp:  CTA bilaterally. No wheezing/rhonchi/rales.  No accessory muscle use   CV:  Regular rhythm, normal rate, no murmurs, gallops, rubs    GI:  Soft, non distended, non tender. normoactive bowel sounds, no hepatosplenomegaly     Musculoskeletal:  No edema, warm, 2+ pulses throughout    Neurologic:  No movement left upper extremity, alert to name            Data Review:    Review and/or order of clinical lab test  Review and/or order of tests in the radiology section of [facility]  Review and/or order of tests in the medicine section of [facility]      Labs:     Recent Labs     10/21/20  1735   WBC 6.4   HGB 11.7*   HCT 39.2        Recent Labs     10/22/20  0900 10/21/20  1735   NA  --  137   K  --  3.8   CL  --  100   CO2  --  26   BUN  --  18   CREA  --  1.10   GLU  --  305*   CA  --  8.8   MG 1.9  --      Recent Labs     10/21/20  1735   ALT 8   AP 93   TBILI 0.5   TP 7.1   ALB 3.5   GLOB 3.6     No results for input(s): INR, PTP, APTT, INREXT in the last 72 hours. No results for input(s): FE, TIBC, PSAT, FERR in the last 72 hours. No results found for: FOL, RBCF   No results for input(s): PH, PCO2, PO2 in the last 72 hours. No results for input(s): CPK, CKNDX, TROIQ in the last 72 hours.     No lab exists for component: CPKMB  No results found for: CHOL, CHOLX, CHLST, CHOLV, HDL, HDLP, LDL, LDLC, DLDLP, TGLX, TRIGL, TRIGP, CHHD, CHHDX  Lab Results   Component Value Date/Time    Glucose (POC) 246 (H) 10/22/2020 04:06 PM    Glucose (POC) 135 (H) 10/22/2020 11:51 AM    Glucose (POC) 344 (H) 10/22/2020 08:40 AM    Glucose (POC) 336 (H) 10/22/2020 06:22 AM     Lab Results   Component Value Date/Time    Color YELLOW/STRAW 10/22/2020 09:15 AM    Appearance TURBID (A) 10/22/2020 09:15 AM    Specific gravity 1.012 10/22/2020 09:15 AM    pH (UA) 5.5 10/22/2020 09:15 AM    Protein Negative 10/22/2020 09:15 AM    Glucose Negative 10/22/2020 09:15 AM    Ketone Negative 10/22/2020 09:15 AM    Bilirubin Negative 10/22/2020 09:15 AM    Urobilinogen 0.2 10/22/2020 09:15 AM    Nitrites Positive (A) 10/22/2020 09:15 AM    Leukocyte Esterase LARGE (A) 10/22/2020 09:15 AM    Epithelial cells FEW 10/22/2020 09:15 AM    Bacteria 4+ (A) 10/22/2020 09:15 AM    WBC  10/22/2020 09:15 AM    RBC 0-5 10/22/2020 09:15 AM         Medications Reviewed:     Current Facility-Administered Medications   Medication Dose Route Frequency    sodium chloride (NS) flush 5-40 mL  5-40 mL IntraVENous Q8H    sodium chloride (NS) flush 5-40 mL  5-40 mL IntraVENous PRN    acetaminophen (TYLENOL) tablet 650 mg  650 mg Oral Q6H PRN    Or    acetaminophen (TYLENOL) suppository 650 mg  650 mg Rectal Q6H PRN    polyethylene glycol (MIRALAX) packet 17 g  17 g Oral DAILY PRN    lactated Ringers infusion  75 mL/hr IntraVENous CONTINUOUS    ondansetron (ZOFRAN ODT) tablet 4 mg  4 mg Oral Q8H PRN    Or    ondansetron (ZOFRAN) injection 4 mg  4 mg IntraVENous Q6H PRN    famotidine (PEPCID) tablet 20 mg  20 mg Oral DAILY    dexamethasone (DECADRON) 4 mg/mL injection 4 mg  4 mg IntraVENous Q6H    atorvastatin (LIPITOR) tablet 20 mg  20 mg Oral QHS    carvediloL (COREG) tablet 6.25 mg  6.25 mg Oral BID WITH MEALS    furosemide (LASIX) tablet 40 mg  40 mg Oral DAILY    lisinopriL (PRINIVIL, ZESTRIL) tablet 5 mg  5 mg Oral DAILY    sertraline (ZOLOFT) tablet 25 mg  25 mg Oral QPM    spironolactone (ALDACTONE) tablet 25 mg  25 mg Oral DAILY    topiramate (TOPAMAX) tablet 100 mg  100 mg Oral BID    albuterol (PROVENTIL VENTOLIN) nebulizer solution 2.5 mg  2.5 mg Nebulization Q4H RT    LORazepam (ATIVAN) injection 1 mg  1 mg IntraVENous NOW    levETIRAcetam (KEPPRA) 500 mg in 0.9% sodium chloride 100 mL IVPB  500 mg IntraVENous Q12H    glucose chewable tablet 16 g  4 Tab Oral PRN    glucagon (GLUCAGEN) injection 1 mg  1 mg IntraMUSCular PRN    dextrose 10% infusion 0-250 mL  0-250 mL IntraVENous PRN    insulin lispro (HUMALOG) injection   SubCUTAneous AC&HS    iohexoL (OMNIPAQUE) 240 mg iodine/mL solution 50 mL  50 mL Oral RAD ONCE    albuterol-ipratropium (DUO-NEB) 2.5 MG-0.5 MG/3 ML  3 mL Nebulization Q6H RT     Current Outpatient Medications   Medication Sig    traZODone (DESYREL) 50 mg tablet trazodone 50 mg tablet   TAKE 2 TO 3 TABLETS BY MOUTH EVERY DAY AT BEDTIME AS NEEDED    spironolactone (ALDACTONE) 25 mg tablet spironolactone 25 mg tablet   TAKE 1 TABLET BY MOUTH ONCE DAILY    simvastatin (ZOCOR) 10 mg tablet Take 10 mg by mouth nightly.  clopidogrel bisulfate (PLAVIX PO) Plavix    celecoxib (CeleBREX) 200 mg capsule Celebrex 200 mg capsule   Take 1 capsule twice a day by oral route for 90 days.  carvediloL (COREG) 6.25 mg tablet carvedilol 6.25 mg tablet    atorvastatin (LIPITOR) 20 mg tablet atorvastatin 20 mg tablet   TAKE 1 TABLET BY MOUTH ONCE DAILY FOR 90 DAYS    cyclobenzaprine-TENS electrode 10 mg cmpk cyclobenzaprine 10 mg tablet   Take 1 tablet twice a day by oral route as needed for 90 days.  sertraline (ZOLOFT) 25 mg tablet Take 1 tablet by mouth once daily    potassium chloride SR (KLOR-CON 10) 10 mEq tablet Take 1 tablet by mouth once daily    Eliquis 5 mg tablet Take 1 tablet by mouth once daily    glimepiride (AMARYL) 2 mg tablet Take 1 tablet by mouth once daily for 90 days    furosemide (LASIX) 40 mg tablet Take 40 mg by mouth daily.  lisinopriL (PRINIVIL, ZESTRIL) 5 mg tablet Take 5 mg by mouth daily.  metoprolol succinate (TOPROL-XL) 25 mg XL tablet Take 25 mg by mouth daily.     topiramate (TOPAMAX) 100 mg tablet Take 100 mg by mouth two (2) times a day.     ______________________________________________________________________  EXPECTED LENGTH OF STAY: - - -  ACTUAL LENGTH OF STAY:          0                 Smaantha Hodan Mckeon, DO

## 2020-10-22 NOTE — ED NOTES
Transfer center called, spoke with [facility] Petty, no beds available at [facility]. Will need to look for facility that has opthalmology. Transfer center will call [facility] in [address]

## 2020-10-22 NOTE — PROGRESS NOTES
Device orders received from pt's cardiologist. [employer] is not available today, the only time they have tomorrow is 1:30pm. We will plan to do pt's MRI at that time. If she cannot make this time we will not be able to reschedule her scan until Monday due to availability of rep to be here. Pt has had brain surgery as indicated on MRI screening sheet. Unknown what kind of surgery this was, no related records found in chart. Dr. Nini Skinner cleared for MRI via CT images.

## 2020-10-22 NOTE — PROGRESS NOTES
MRI safety note:    Ms. Tahira Canchola has a [employer] Evera MRI XT DR ICD model # G1938598, with RV defibrillation lead model #6947M 62cm and RA pacing lead 5076-52cm. (This information was obtained from [address] who got the information from the patient's daughter. I [facility] that [facility] This system is MR conditional to 3T per [employer] device manuals. Device settings form faxed to [facility] in [facility], I spoke with Naomie De Paz RN who will make sure this gets addressed and faxed back. When settings form is received back I will coordinate a time with [employer] rep for Ms. Chandra's MRI. MRI will most likely be arranged for tomorrow. If cardiology form not received back by tomorrow then this could delay MRI availability to Monday as [employer] is not available for MRI on the weekends.

## 2020-10-23 ENCOUNTER — APPOINTMENT (OUTPATIENT)
Dept: MRI IMAGING | Age: 70
DRG: 054 | End: 2020-10-23
Attending: HOSPITALIST
Payer: MEDICARE

## 2020-10-23 LAB
ANION GAP SERPL CALC-SCNC: 8 MMOL/L (ref 5–15)
BUN SERPL-MCNC: 29 MG/DL (ref 6–20)
BUN/CREAT SERPL: 23 (ref 12–20)
CALCIUM SERPL-MCNC: 8.7 MG/DL (ref 8.5–10.1)
CHLORIDE SERPL-SCNC: 97 MMOL/L (ref 97–108)
CO2 SERPL-SCNC: 31 MMOL/L (ref 21–32)
COMMENT, HOLDF: NORMAL
CREAT SERPL-MCNC: 1.24 MG/DL (ref 0.55–1.02)
GLUCOSE BLD STRIP.AUTO-MCNC: 117 MG/DL (ref 65–100)
GLUCOSE BLD STRIP.AUTO-MCNC: 332 MG/DL (ref 65–100)
GLUCOSE BLD STRIP.AUTO-MCNC: 394 MG/DL (ref 65–100)
GLUCOSE BLD STRIP.AUTO-MCNC: 397 MG/DL (ref 65–100)
GLUCOSE BLD STRIP.AUTO-MCNC: 403 MG/DL (ref 65–100)
GLUCOSE SERPL-MCNC: 340 MG/DL (ref 65–100)
MAGNESIUM SERPL-MCNC: 2 MG/DL (ref 1.6–2.4)
POTASSIUM SERPL-SCNC: 3.8 MMOL/L (ref 3.5–5.1)
SAMPLES BEING HELD,HOLD: NORMAL
SERVICE CMNT-IMP: ABNORMAL
SODIUM SERPL-SCNC: 136 MMOL/L (ref 136–145)

## 2020-10-23 PROCEDURE — 92610 EVALUATE SWALLOWING FUNCTION: CPT | Performed by: SPEECH-LANGUAGE PATHOLOGIST

## 2020-10-23 PROCEDURE — 80048 BASIC METABOLIC PNL TOTAL CA: CPT

## 2020-10-23 PROCEDURE — 83735 ASSAY OF MAGNESIUM: CPT

## 2020-10-23 PROCEDURE — 74011636637 HC RX REV CODE- 636/637: Performed by: HOSPITALIST

## 2020-10-23 PROCEDURE — 36415 COLL VENOUS BLD VENIPUNCTURE: CPT

## 2020-10-23 PROCEDURE — 74011250636 HC RX REV CODE- 250/636: Performed by: INTERNAL MEDICINE

## 2020-10-23 PROCEDURE — 99223 1ST HOSP IP/OBS HIGH 75: CPT | Performed by: NURSE PRACTITIONER

## 2020-10-23 PROCEDURE — 74011250637 HC RX REV CODE- 250/637: Performed by: HOSPITALIST

## 2020-10-23 PROCEDURE — A9575 INJ GADOTERATE MEGLUMI 0.1ML: HCPCS | Performed by: HOSPITALIST

## 2020-10-23 PROCEDURE — 99222 1ST HOSP IP/OBS MODERATE 55: CPT | Performed by: INTERNAL MEDICINE

## 2020-10-23 PROCEDURE — 74011250636 HC RX REV CODE- 250/636: Performed by: HOSPITALIST

## 2020-10-23 PROCEDURE — 70553 MRI BRAIN STEM W/O & W/DYE: CPT

## 2020-10-23 PROCEDURE — 74011000258 HC RX REV CODE- 258: Performed by: HOSPITALIST

## 2020-10-23 PROCEDURE — 74011000258 HC RX REV CODE- 258: Performed by: INTERNAL MEDICINE

## 2020-10-23 PROCEDURE — 94640 AIRWAY INHALATION TREATMENT: CPT

## 2020-10-23 PROCEDURE — 74011000250 HC RX REV CODE- 250: Performed by: INTERNAL MEDICINE

## 2020-10-23 PROCEDURE — 82962 GLUCOSE BLOOD TEST: CPT

## 2020-10-23 PROCEDURE — 65660000001 HC RM ICU INTERMED STEPDOWN

## 2020-10-23 RX ORDER — GADOTERATE MEGLUMINE 376.9 MG/ML
10 INJECTION INTRAVENOUS
Status: COMPLETED | OUTPATIENT
Start: 2020-10-23 | End: 2020-10-23

## 2020-10-23 RX ORDER — SODIUM CHLORIDE 0.9 % (FLUSH) 0.9 %
10 SYRINGE (ML) INJECTION ONCE
Status: COMPLETED | OUTPATIENT
Start: 2020-10-23 | End: 2020-10-23

## 2020-10-23 RX ADMIN — HUMAN INSULIN 10 UNITS: 100 INJECTION, SUSPENSION SUBCUTANEOUS at 16:57

## 2020-10-23 RX ADMIN — FUROSEMIDE 40 MG: 40 TABLET ORAL at 14:21

## 2020-10-23 RX ADMIN — GADOTERATE MEGLUMINE 10 ML: 376.9 INJECTION INTRAVENOUS at 13:45

## 2020-10-23 RX ADMIN — FAMOTIDINE 20 MG: 20 TABLET ORAL at 09:00

## 2020-10-23 RX ADMIN — DEXAMETHASONE SODIUM PHOSPHATE 4 MG: 4 INJECTION, SOLUTION INTRA-ARTICULAR; INTRALESIONAL; INTRAMUSCULAR; INTRAVENOUS; SOFT TISSUE at 15:45

## 2020-10-23 RX ADMIN — CARVEDILOL 6.25 MG: 6.25 TABLET, FILM COATED ORAL at 09:00

## 2020-10-23 RX ADMIN — DEXAMETHASONE SODIUM PHOSPHATE 4 MG: 4 INJECTION, SOLUTION INTRA-ARTICULAR; INTRALESIONAL; INTRAMUSCULAR; INTRAVENOUS; SOFT TISSUE at 09:01

## 2020-10-23 RX ADMIN — IPRATROPIUM BROMIDE AND ALBUTEROL SULFATE 3 ML: .5; 3 SOLUTION RESPIRATORY (INHALATION) at 09:50

## 2020-10-23 RX ADMIN — INSULIN LISPRO 10 UNITS: 100 INJECTION, SOLUTION INTRAVENOUS; SUBCUTANEOUS at 09:01

## 2020-10-23 RX ADMIN — Medication 10 ML: at 13:45

## 2020-10-23 RX ADMIN — SPIRONOLACTONE 25 MG: 25 TABLET ORAL at 08:58

## 2020-10-23 RX ADMIN — ACETAMINOPHEN 650 MG: 325 TABLET ORAL at 05:46

## 2020-10-23 RX ADMIN — ATORVASTATIN CALCIUM 20 MG: 20 TABLET, FILM COATED ORAL at 22:19

## 2020-10-23 RX ADMIN — SODIUM CHLORIDE 500 MG: 900 INJECTION, SOLUTION INTRAVENOUS at 09:01

## 2020-10-23 RX ADMIN — IPRATROPIUM BROMIDE AND ALBUTEROL SULFATE 3 ML: .5; 3 SOLUTION RESPIRATORY (INHALATION) at 15:06

## 2020-10-23 RX ADMIN — SODIUM CHLORIDE 500 MG: 900 INJECTION, SOLUTION INTRAVENOUS at 19:49

## 2020-10-23 RX ADMIN — CARVEDILOL 6.25 MG: 6.25 TABLET, FILM COATED ORAL at 17:03

## 2020-10-23 RX ADMIN — IPRATROPIUM BROMIDE AND ALBUTEROL SULFATE 3 ML: .5; 3 SOLUTION RESPIRATORY (INHALATION) at 20:43

## 2020-10-23 RX ADMIN — LISINOPRIL 5 MG: 10 TABLET ORAL at 09:00

## 2020-10-23 RX ADMIN — TOPIRAMATE 100 MG: 100 TABLET, FILM COATED ORAL at 09:00

## 2020-10-23 RX ADMIN — DEXAMETHASONE SODIUM PHOSPHATE 4 MG: 4 INJECTION, SOLUTION INTRA-ARTICULAR; INTRALESIONAL; INTRAMUSCULAR; INTRAVENOUS; SOFT TISSUE at 05:28

## 2020-10-23 RX ADMIN — Medication 10 ML: at 15:46

## 2020-10-23 RX ADMIN — INSULIN LISPRO 12 UNITS: 100 INJECTION, SOLUTION INTRAVENOUS; SUBCUTANEOUS at 12:59

## 2020-10-23 RX ADMIN — CEFTRIAXONE SODIUM 1 G: 1 INJECTION, POWDER, FOR SOLUTION INTRAMUSCULAR; INTRAVENOUS at 17:00

## 2020-10-23 RX ADMIN — DEXAMETHASONE SODIUM PHOSPHATE 4 MG: 4 INJECTION, SOLUTION INTRA-ARTICULAR; INTRALESIONAL; INTRAMUSCULAR; INTRAVENOUS; SOFT TISSUE at 22:19

## 2020-10-23 RX ADMIN — TOPIRAMATE 100 MG: 100 TABLET, FILM COATED ORAL at 17:03

## 2020-10-23 RX ADMIN — ACETAMINOPHEN 650 MG: 325 TABLET ORAL at 22:46

## 2020-10-23 RX ADMIN — INSULIN LISPRO 4 UNITS: 100 INJECTION, SOLUTION INTRAVENOUS; SUBCUTANEOUS at 22:19

## 2020-10-23 RX ADMIN — Medication 10 ML: at 22:19

## 2020-10-23 RX ADMIN — Medication 10 ML: at 05:29

## 2020-10-23 RX ADMIN — SERTRALINE HYDROCHLORIDE 25 MG: 50 TABLET ORAL at 17:03

## 2020-10-23 NOTE — PROGRESS NOTES
PCCM:    VSS, on 2lpm supplemental O2    HgB 11.7    Plan:     Discussed with attending and we will tentatively plan for EBUS next week.   Will need to be off of plavix and eliquis until procedure  Will need cardiology evaluation prior to procedure  PRN over the weekend    [address] [id number] Yuan PA-C

## 2020-10-23 NOTE — PROGRESS NOTES
Problem: Pressure Injury - Risk of  Goal: *Prevention of pressure injury  Description: Document Elio Scale and appropriate interventions in the flowsheet. Outcome: Progressing Towards Goal  Note: Pressure Injury Interventions:  Sensory Interventions: Assess changes in LOC, Avoid rigorous massage over bony prominences, Check visual cues for pain, Discuss PT/OT consult with provider, Float heels, Keep linens dry and wrinkle-free, Maintain/enhance activity level, Minimize linen layers, Monitor skin under medical devices, Pressure redistribution bed/mattress (bed type), Turn and reposition approx. every two hours (pillows and wedges if needed)    Moisture Interventions: Absorbent underpads, Assess need for specialty bed, Check for incontinence Q2 hours and as needed, Contain wound drainage, Internal/External fecal devices, Internal/External urinary devices, Limit adult briefs, Maintain skin hydration (lotion/cream), Minimize layers, Moisture barrier    Activity Interventions: Assess need for specialty bed, Increase time out of bed, Pressure redistribution bed/mattress(bed type), PT/OT evaluation    Mobility Interventions: Assess need for specialty bed, HOB 30 degrees or less, Pressure redistribution bed/mattress (bed type), PT/OT evaluation, Turn and reposition approx.  every two hours(pillow and wedges)    Nutrition Interventions: Document food/fluid/supplement intake, Offer support with meals,snacks and hydration    Friction and Shear Interventions: Apply protective barrier, creams and emollients, Foam dressings/transparent film/skin sealants, HOB 30 degrees or less, Lift sheet, Lift team/patient mobility team, Minimize layers, Sit at 90-degree angle, Transfer aides:transfer board/Hollie lift/ceiling lift, Transferring/repositioning devices                Problem: Breathing Pattern - Ineffective  Goal: *Absence of hypoxia  Outcome: Progressing Towards Goal  Goal: *Use of effective breathing techniques  Outcome: Progressing Towards Goal     Problem: Falls - Risk of  Goal: *Absence of Falls  Description: Document Omar Doe Fall Risk and appropriate interventions in the flowsheet.   Outcome: Progressing Towards Goal  Note: Fall Risk Interventions:            Medication Interventions: Assess postural VS orthostatic hypotension, Evaluate medications/consider consulting pharmacy, Patient to call before getting OOB, Teach patient to arise slowly, Utilize gait belt for transfers/ambulation    Elimination Interventions: Bed/chair exit alarm, Call light in reach, Elevated toilet seat, Patient to call for help with toileting needs, Stay With Me (per policy), Toilet paper/wipes in reach, Toileting schedule/hourly rounds    History of Falls Interventions: Bed/chair exit alarm, Consult care management for discharge planning, Door open when patient unattended, Evaluate medications/consider consulting pharmacy, Investigate reason for fall, Room close to nurse's station, Utilize gait belt for transfer/ambulation, Assess for delayed presentation/identification of injury for 48 hrs (comment for end date), Vital signs minimum Q4HRs X 24 hrs (comment for end date)

## 2020-10-23 NOTE — CONSULTS
Palliative Medicine Consult  El: [phone] ([phone])    Patient Name: Dione Paige  YOB: 1950    Date of Initial Consult: October 23, 2020  Reason for Consult: Care decisions  Requesting Provider: Dr. Mel Bonilla  Primary Care Physician: Yaron Fletcher, LULI     SUMMARY:   Dione Paige is a 69 y.o. with a past history of COPD, CHF, CAD, ICD, HTN, DM2, previous tobb use, cholecystectomy, who was admitted on 10/22/2020 from home after falling 2 weeks prior with resultant left sided weakness and imaging revealed rt hilar mass and multiple brain lesions. MRI with multiple brain mets precluding her from surgery. Radiation onc recommended by NSG. Path pending. Seen by oncology and there is high suspicion of stage 4 lung cancer. Brain mass, consistent with mets, lung mass with erosion into mediastinal, moderate right pleural effusion, postobstructive infiltrate RUL  Probable UTI,     Current medical issues leading to Palliative Medicine involvement include: support with care decisions given presumed metastatic disease. Full Code status. Rad onc consulted. SOCIAL HISTORY:  She is marred. She lives with her son and daughter-in-law. Her  unfortunately is incarcerated. She quit using tobacco in 06/2020. PALLIATIVE DIAGNOSES:   1. Brain mass  2. Lung Mass  3. Rt infiltrate  4. Goals of care       PLAN:   1. Pt seen without family present. Services introduced  2. Pt able to tell me that she has cancer and her shoulder is dislocated but no other specifics  3. Updated pt on findings thus far and inquired about support  4. She says that her daughter in law is who she relies on the most.  She agreed to complete an AMD naming her as her agent but not today. 5. Discussed the standard aggressive options for care~ surgery, radiation, chemo and asked has she thought about whether she would want to pursue aggressive management  6.  Pt shared that her son had cancer with those treatments and survived it. She noted that he is much younger than she is. 7. Acknowledged that additional testing is pending and that will provide more information  8. Asked if I could involve family in our discussions and she declined  9. We agreed to follow up on Monday to discuss further  10. Initial consult note routed to primary continuity provider and/or primary health care team members  6. Communicated plan of care with: Palliative [facility] Team     GOALS OF CARE / TREATMENT PREFERENCES:     GOALS OF CARE:  Patient/Health Care Proxy Stated Goals: Prolong life    TREATMENT PREFERENCES:   Code Status: Full Code    Advance Care Planning:  [x] [facility] Interdisciplinary Team has updated the ACP Navigator with Health Care Decision Maker and Patient Capacity    Primary [facility] - [facility] - [phone]      Advance Care Planning 10/23/2020   Confirm Advance Directive None   Patient Would Like to Complete Advance Directive No       Medical Interventions: Full interventions     Other Instructions:   Artificially Administered Nutrition: No feeding tube     Other:    As far as possible, the palliative care team has discussed with patient / health care proxy about goals of care / treatment preferences for patient. HISTORY:     History obtained from: chart, team    CHIEF COMPLAINT: pt admitted with aforementioned history and issues    HPI/SUBJECTIVE:    The patient is:   [x] Verbal and participatory  [] Non-participatory due to:    No pain unless she is moving her shoulder     Clinical Pain Assessment (nonverbal scale for severity on nonverbal patients):   Clinical Pain Assessment  Severity: 0          Duration: for how long has pt been experiencing pain (e.g., 2 days, 1 month, years)  Frequency: how often pain is an issue (e.g., several times per day, once every few days, constant)     FUNCTIONAL ASSESSMENT:     Palliative Performance Scale (PPS):  PPS: 40 PSYCHOSOCIAL/SPIRITUAL SCREENING:     Palliative IDT has assessed this patient for cultural preferences / practices and a referral made as appropriate to needs (Cultural Services, Patient Advocacy, Ethics, etc.)    Any spiritual / Taoist concerns:  [] Yes /  [x] No    Caregiver Burnout:  [] Yes /  [x] No /  [] No Caregiver Present      Anticipatory grief assessment:   [x] Normal  / [] Maladaptive       ESAS Anxiety: Anxiety: 0    ESAS Depression:          REVIEW OF SYSTEMS:     Positive and pertinent negative findings in ROS are noted above in HPI. The following systems were [x] reviewed / [] unable to be reviewed as noted in HPI  Other findings are noted below. Systems: constitutional, ears/nose/mouth/throat, respiratory, gastrointestinal, genitourinary, musculoskeletal, integumentary, neurologic, psychiatric, endocrine. Positive findings noted below. Modified ESAS Completed by: provider   Fatigue: 4 Drowsiness: 0     Pain: 0   Anxiety: 0       Dyspnea: 0           Stool Occurrence(s): 1        PHYSICAL EXAM:     From RN flowsheet:  Wt Readings from Last 3 Encounters:   10/26/20 124 lb 1.6 oz (56.3 kg)   10/21/20 114 lb (51.7 kg)     Blood pressure 110/60, pulse 80, temperature 98.2 °F (36.8 °C), resp. rate 22, weight 124 lb 1.6 oz (56.3 kg), SpO2 99 %.     Pain Scale 1: Numeric (0 - 10)  Pain Intensity 1: 0  Pain Onset 1: chronic  Pain Location 1: Head  Pain Orientation 1: Anterior, Left, Right  Pain Description 1: Aching  Pain Intervention(s) 1: MD notified (comment)  Last bowel movement, if known:     Constitutional: alert, conversant*  Eyes: pupils equal, anicteric  ENMT: no nasal discharge, moist mucous membranes  Cardiovascular: regular rhythm, distal pulses intact  Respiratory: breathing not labored, symmetric  Gastrointestinal: soft non-tender, +bowel sounds  Musculoskeletal: no deformity, left shoulder pain with manipulation  Skin: warm, dry  Neurologic: following commands, moving all extremities  Psychiatric: flat affect, no hallucinations  Other:       HISTORY:     Principal Problem:    Brain metastases ([facility] [address] (10/22/2020)      Past Medical History:   Diagnosis Date    Chronic back pain     Chronic obstructive pulmonary disease ([facility] [address]     Diabetes ([facility] [address]     Fall in home     Heart failure ([facility] [address]     Hypertension     Ill-defined condition     home 02 2LNC      Past Surgical History:   Procedure Laterality Date    CARDIAC SURG PROCEDURE UNLIST      internal cardiac defibrillator    HX CHOLECYSTECTOMY      HX CYST REMOVAL      HX PACEMAKER  08/08/2016    [employer]     HX TUBAL LIGATION        Family History   Problem Relation Age of Onset    Diabetes Mother     No Known Problems Father     Cancer Brother       History reviewed, no pertinent family history.   Social History     Tobacco Use    Smoking status: Former Smoker    Smokeless tobacco: Never Used    Tobacco comment: quit June 2020   Substance Use Topics    Alcohol use: Not Currently     No Known Allergies   Current Facility-Administered Medications   Medication Dose Route Frequency    albuterol-ipratropium (DUO-NEB) 2.5 MG-0.5 MG/3 ML  3 mL Nebulization Q4H PRN    melatonin tablet 3 mg  3 mg Oral QHS    insulin lispro (HUMALOG) injection 5 Units  5 Units SubCUTAneous TIDAC    sodium chloride (NS) flush 5-40 mL  5-40 mL IntraVENous Q8H    sodium chloride (NS) flush 5-40 mL  5-40 mL IntraVENous PRN    acetaminophen (TYLENOL) tablet 650 mg  650 mg Oral Q6H PRN    Or    acetaminophen (TYLENOL) suppository 650 mg  650 mg Rectal Q6H PRN    polyethylene glycol (MIRALAX) packet 17 g  17 g Oral DAILY PRN    ondansetron (ZOFRAN ODT) tablet 4 mg  4 mg Oral Q8H PRN    Or    ondansetron (ZOFRAN) injection 4 mg  4 mg IntraVENous Q6H PRN    famotidine (PEPCID) tablet 20 mg  20 mg Oral DAILY    dexamethasone (DECADRON) 4 mg/mL injection 4 mg  4 mg IntraVENous Q6H    atorvastatin (LIPITOR) tablet 20 mg  20 mg Oral QHS    carvediloL (COREG) tablet 6.25 mg  6.25 mg Oral BID WITH MEALS    furosemide (LASIX) tablet 40 mg  40 mg Oral DAILY    lisinopriL (PRINIVIL, ZESTRIL) tablet 5 mg  5 mg Oral DAILY    sertraline (ZOLOFT) tablet 25 mg  25 mg Oral QPM    spironolactone (ALDACTONE) tablet 25 mg  25 mg Oral DAILY    topiramate (TOPAMAX) tablet 100 mg  100 mg Oral BID    levETIRAcetam (KEPPRA) 500 mg in 0.9% sodium chloride 100 mL IVPB  500 mg IntraVENous Q12H    glucose chewable tablet 16 g  4 Tab Oral PRN    glucagon (GLUCAGEN) injection 1 mg  1 mg IntraMUSCular PRN    dextrose 10% infusion 0-250 mL  0-250 mL IntraVENous PRN    insulin lispro (HUMALOG) injection   SubCUTAneous AC&HS          LAB AND IMAGING FINDINGS:     Lab Results   Component Value Date/Time    WBC 6.4 10/21/2020 05:35 PM    HGB 11.7 (L) 10/21/2020 05:35 PM    PLATELET 271 87/63/9145 05:35 PM     Lab Results   Component Value Date/Time    Sodium 136 10/26/2020 04:09 AM    Potassium 4.8 10/26/2020 04:09 AM    Chloride 103 10/26/2020 04:09 AM    CO2 29 10/26/2020 04:09 AM    BUN 45 (H) 10/26/2020 04:09 AM    Creatinine 1.52 (H) 10/26/2020 04:09 AM    Calcium 8.6 10/26/2020 04:09 AM    Magnesium 2.4 10/24/2020 06:37 AM      Lab Results   Component Value Date/Time    Alk. phosphatase 93 10/21/2020 05:35 PM    Protein, total 7.1 10/21/2020 05:35 PM    Albumin 3.5 10/21/2020 05:35 PM    Globulin 3.6 10/21/2020 05:35 PM     No results found for: INR, PTMR, PTP, PT1, PT2, APTT, INREXT, INREXT   No results found for: IRON, FE, TIBC, IBCT, PSAT, FERR   No results found for: PH, PCO2, PO2  No components found for: GLPOC   No results found for: CPK, CKMB             Total time:  70 min  Counseling / coordination time, spent as noted above: 60 min  > 50% counseling / coordination?:  y    Prolonged service was provided for  []30 min   []75 min in face to face time in the presence of the patient, spent as noted above.   Time Start:   Time End:   Note: this can only be billed with [id number] (initial) or [id number] (follow up). If multiple start / stop times, list each separately.

## 2020-10-23 NOTE — PROGRESS NOTES
[facility]  Hospitalist Group                                                                                          Hospitalist Progress Note  Asiya Meadows MD  Answering service: [phone] from in house phone        Date of Service:  10/23/2020  NAME:  Juan Estrada  :  1950  MRN:  [id number]      Admission Summary:   69 y.o. female,  who has history of chronic systolic heart failure status post ICD, chronic COPD on home oxygen 2 L, hypertension, hyperlipidemia, anticoagulation with Eliquis who presents with fall 2 weeks back and left sided weakness. She reports that her left arm felt funny and also she was having weakness in the left arm. It is not painful. And she did not seek medical attention. Given persistence of symptoms, EMS was called and to take her to the local emergency room. At the ER there work-up revealed that she has lung mass as well as brain tumors. She was given IV Decadron and transferred to this facility for neurosurgical evaluation. She was also noted to have a dislocated left shoulder. There was report of lung mass which is right suprahilar with progression into the mediastinum. There is also report of 1.2 cm right frontal mass with 5 cm right to left shift. There is no report of seizures. Interval history / Subjective:   F/U left sided weakness. No new issues  Comfortably lying on bed  On 2l NC     Assessment & Plan:     Brain masses, consistent with metastatic disease:  Cerebral edema secondary to above:  Left-sided weakness:  -Neurosurgery consulted  -MRI brain done, impression pendng  -Continue steroids, Keppra  -Seizure precautions  -Neuro checks     Lung mass with erosion into mediastinum, suspicious for carcinoma:  -Pulmonary consulted.   Plans for EUS/endobronchial biopsy next week  --per note, will need to be off Plavix x5 days, Eliquis x2 days and formal cardiac clearance    Moderate right pleural effusion: -respiratory status stable, monitor closely, consider thoracentesis     Postobstructive infiltrate RUL  -Seen on CT  -no unusual cough, fever  -on Ceftriaxone    Probable UTI  -urine culture Proteus  -on Ceftriaxone for 3 days     Chronic COPD with chronic hypoxic respiratory failure, not in exacerbation  -Continue home oxygen, AA nebs      Chronic congestive heart failure, presumed systolic given patient has ICD and is on Aldactone  -Continued home medications  -Requested medical records from her home hospital.      Clavicular fracture:  Shoulder dislocation ruled out:  -Appreciate orthopedic surgery. No evidence of shoulder dislocation. Can wear sling for comfort      Chronic anticoagulation with Eliquis for ??  -The patient does not know why she is on chronic anticoagulation  -Hold as planning for procedures    H/o CAD  -Hold plavix today if planned for biopsies.     Hypertension  -Continue with Coreg     Diabetes type 2 with hyperglycemia   -Hba1c 6.1  -POC checks and sliding scale insulin  -Will put the patient on NPH 10 units BID  -Adjust based on blood sugars    Smoking  -says she used to smoke but quit in January this year  -Encourage     Diabetic diet    PT/OT      Code status: full. Awaiting palliative care  DVT prophylaxis: SCDs  Called Bipin Luna at [phone] but could not talk to her    Care Plan discussed with: Patient/Family  Anticipated Disposition: Home w/Family  Anticipated Discharge: Greater than 48 hours     Hospital Problems  Date Reviewed: 10/23/2020          Codes Class Noted POA    * (Principal) Brain metastases ([facility] [address] ICD-10-CM: C79.31  ICD-9-CM: 198.3  10/22/2020 Yes                Review of Systems:     Negative unless stated above    Vital Signs:    Last 24hrs VS reviewed since prior progress note.  Most recent are:  Visit Vitals  BP (!) 96/48 (BP 1 Location: Right arm, BP Patient Position: At rest)   Pulse 73   Temp 98.4 °F (36.9 °C)   Resp 19   Wt 52.7 kg (116 lb 1.6 oz)   SpO2 98%   BMI 21.23 kg/m²         Intake/Output Summary (Last 24 hours) at 10/23/2020 1133  Last data filed at 10/22/2020 1701  Gross per 24 hour   Intake 1208.75 ml   Output 2 ml   Net 1206.75 ml        Physical Examination:             Constitutional:  No acute distress, cooperative, pleasant    ENT:  Oral mucosa moist, oropharynx benign. Resp:  CTA bilaterally. No wheezing/rhonchi/rales. No accessory muscle use   CV:  Regular rhythm, normal rate, no murmurs, gallops, rubs    GI:  Soft, non distended, non tender. normoactive bowel sounds, no hepatosplenomegaly     Musculoskeletal:  No edema, warm, 2+ pulses throughout    Neurologic:  No movement left upper extremity, alert to name            Data Review:    Review and/or order of clinical lab test  Review and/or order of tests in the radiology section of CPT  Review and/or order of tests in the medicine section of CPT      Labs:     Recent Labs     10/21/20  1735   WBC 6.4   HGB 11.7*   HCT 39.2        Recent Labs     10/23/20  0538 10/23/20  0537 10/22/20  0900 10/21/20  1735   NA  --  136  --  137   K  --  3.8  --  3.8   CL  --  97  --  100   CO2  --  31  --  26   BUN  --  29*  --  18   CREA  --  1.24*  --  1.10   GLU  --  340*  --  305*   CA  --  8.7  --  8.8   MG 2.0  --  1.9  --      Recent Labs     10/21/20  1735   ALT 8   AP 93   TBILI 0.5   TP 7.1   ALB 3.5   GLOB 3.6     No results for input(s): INR, PTP, APTT, INREXT, INREXT in the last 72 hours. No results for input(s): FE, TIBC, PSAT, FERR in the last 72 hours. No results found for: FOL, RBCF   No results for input(s): PH, PCO2, PO2 in the last 72 hours. No results for input(s): CPK, CKNDX, TROIQ in the last 72 hours.     No lab exists for component: CPKMB  No results found for: CHOL, CHOLX, CHLST, CHOLV, HDL, HDLP, LDL, LDLC, DLDLP, TGLX, TRIGL, TRIGP, CHHD, CHHDX  Lab Results   Component Value Date/Time    Glucose (POC) 397 (H) 10/23/2020 07:54 AM    Glucose (POC) 403 (H) 10/23/2020 07:51 AM    Glucose (POC) 394 (H) 10/22/2020 11:21 PM    Glucose (POC) 246 (H) 10/22/2020 04:06 PM    Glucose (POC) 135 (H) 10/22/2020 11:51 AM     Lab Results   Component Value Date/Time    Color YELLOW/STRAW 10/22/2020 09:15 AM    Appearance TURBID (A) 10/22/2020 09:15 AM    Specific gravity 1.012 10/22/2020 09:15 AM    pH (UA) 5.5 10/22/2020 09:15 AM    Protein Negative 10/22/2020 09:15 AM    Glucose Negative 10/22/2020 09:15 AM    Ketone Negative 10/22/2020 09:15 AM    Bilirubin Negative 10/22/2020 09:15 AM    Urobilinogen 0.2 10/22/2020 09:15 AM    Nitrites Positive (A) 10/22/2020 09:15 AM    Leukocyte Esterase LARGE (A) 10/22/2020 09:15 AM    Epithelial cells FEW 10/22/2020 09:15 AM    Bacteria 4+ (A) 10/22/2020 09:15 AM    WBC  10/22/2020 09:15 AM    RBC 0-5 10/22/2020 09:15 AM         Medications Reviewed:     Current Facility-Administered Medications   Medication Dose Route Frequency    sodium chloride (NS) flush 5-40 mL  5-40 mL IntraVENous Q8H    sodium chloride (NS) flush 5-40 mL  5-40 mL IntraVENous PRN    acetaminophen (TYLENOL) tablet 650 mg  650 mg Oral Q6H PRN    Or    acetaminophen (TYLENOL) suppository 650 mg  650 mg Rectal Q6H PRN    polyethylene glycol (MIRALAX) packet 17 g  17 g Oral DAILY PRN    ondansetron (ZOFRAN ODT) tablet 4 mg  4 mg Oral Q8H PRN    Or    ondansetron (ZOFRAN) injection 4 mg  4 mg IntraVENous Q6H PRN    famotidine (PEPCID) tablet 20 mg  20 mg Oral DAILY    dexamethasone (DECADRON) 4 mg/mL injection 4 mg  4 mg IntraVENous Q6H    atorvastatin (LIPITOR) tablet 20 mg  20 mg Oral QHS    carvediloL (COREG) tablet 6.25 mg  6.25 mg Oral BID WITH MEALS    furosemide (LASIX) tablet 40 mg  40 mg Oral DAILY    lisinopriL (PRINIVIL, ZESTRIL) tablet 5 mg  5 mg Oral DAILY    sertraline (ZOLOFT) tablet 25 mg  25 mg Oral QPM    spironolactone (ALDACTONE) tablet 25 mg  25 mg Oral DAILY    topiramate (TOPAMAX) tablet 100 mg  100 mg Oral BID    levETIRAcetam (KEPPRA) 500 mg in 0.9% sodium chloride 100 mL IVPB  500 mg IntraVENous Q12H    glucose chewable tablet 16 g  4 Tab Oral PRN    glucagon (GLUCAGEN) injection 1 mg  1 mg IntraMUSCular PRN    dextrose 10% infusion 0-250 mL  0-250 mL IntraVENous PRN    insulin lispro (HUMALOG) injection   SubCUTAneous AC&HS    albuterol-ipratropium (DUO-NEB) 2.5 MG-0.5 MG/3 ML  3 mL Nebulization Q6H RT    cefTRIAXone (ROCEPHIN) 1 g in 0.9% sodium chloride (MBP/ADV) 50 mL  1 g IntraVENous Q24H     ______________________________________________________________________  EXPECTED LENGTH OF STAY: - - -  ACTUAL LENGTH OF STAY:          1                 Elva Schumacher MD

## 2020-10-23 NOTE — CONSULTS
Patient seen, chart reviewed, note dictated. [id number]    68 y/o woman with a h/o tobacco abuse, CHF, COPD and T2DM, admitted after fall and left arm weakness. Found on imaging to have multiple CNS lesions, right hilar mass with encroachment on right mainstem bronchus and multiple liver lesions. Seen by NSG and Pulm with tentative plans for FOB/EBUS for diagnosis. 1. Suspected stage IV lung CA in patient with an ECOG PS <=2: Agree with biopsy for diagnosis. Will likely benefit from upfront CNS WBRT prior to systemic treatment. Will ask Rad Onc to see. Will request PDL-1 and NGS from biopsy tissue as she would not be a candidate for cytotoxic chemotherapy, but may be a candidate for oral oncolytics or PDL-1. I have reached out to her daughter in law, but reached only voicemail with mailbox full. Agree with early involvement of palliative care. Thank you for consult.      Michael Hutchison MD  Hem/Onc

## 2020-10-23 NOTE — CONSULTS
Cardiology Consult/Progress Note           10/22/2020  3:15 AM   Brain metastases ([facility] [address] [C79.31]     HPI:   Jose Montoya is a 69 y.o. female admitted for Brain metastases ([facility] [address] [C79.31]. Patient presented to [facility] ED for left arm weakness. Apparently she fell a week prior and injured her arm. She was unable to use her arm. She underwent CT of head and right frontal lobe mass was found. Ensuing Chest CT found lung mass. She was transferred to [facility] for higher level of care. She is a poor historian, provides little history. She does know she has an ICD implanted, but does not know why. She cannot remember the name of her Cardiologist, but he is in [address]. She is to undergo EBUS evaluation for lung mass for biopsy. Investigation:  Telemetry: normal sinus rhythm     ECG: Sinus tachycardia    Multiple ventricular premature complexes    Nonspecific intraventricular conduction delay    Inferior infarct, age indeterminate    Lateral leads are also involved     Echocardiogram:    None. Assessment and Plan     1. Lung mass   - erosion into mediastinum   - Pulm following   - Plan for EBUS next weeks  2. Brain masses   - c/w mets   - cerebral edema   - NS consulted rad onc. Too many tumors to resect  3. Right pleural effusion   - consider diagnostic thoracentesis  4. COPD   - per Pulmonary  5. Heart failure   - suspect systolic - has ICD   - PTA meds include Coreg, Aldactone and Lisinopril - GDT meds   - Nuc stress 7/30/2020 at outside facility. Unable to see results  6. CAD   - suspected ICM   - on PTA Plavix, Coreg, Lipitor. No ASA to avoid triple therapy    Agree with holding Plavix and Eliquis. Suspect ICM with CAD and systolic dysfunction with ICD and on GDT meds. Stable at this time. OK to proceed with possible EBUS. CT with right moderate pleural effusion. Recommend thoracentesis first - suspect this will show malignancy.   Will see again on Monday, prn over the weekend. []    High complexity decision making was performed  []    Patient is at high-risk of decompensation with multiple organ involvement    Review of Symptoms:  Respiratory: No exertional dyspnea, orthopnea, PND, cough, hemoptysis, URI. Cardiovascular: No CP, palpitations, sweating, lightheadedness, dizziness, syncope, presyncope, lower extremity swelling. *Otherwise no other pertinent positive or negative symptoms on ROS.      Patient Active Problem List    Diagnosis Date Noted    Brain metastases ([facility] [address] [address] 10/22/2020    Chronic bronchitis ([address] 10/16/2020    Contusion, shoulder and upper arm, multiple sites, initial encounter 10/16/2020    Current episode of major depressive disorder without prior episode 08/13/2020      Lorena Li NP  Past Medical History:   Diagnosis Date    Chronic back pain     Chronic obstructive pulmonary disease ([address]     Diabetes ([facility] [address]     Fall in home     Heart failure ([facility] [address] [facility]     Hypertension     Ill-defined condition     home [address]      Past Surgical History:   Procedure Laterality Date    CARDIAC SURG PROCEDURE UNLIST      internal cardiac defibrillator    HX CHOLECYSTECTOMY      HX CYST REMOVAL      HX PACEMAKER  08/08/2016    [employer]     HX TUBAL LIGATION       Social History     Socioeconomic History    Marital status:      Spouse name: Not on file    Number of children: Not on file    Years of education: Not on file    Highest education level: Not on file   Tobacco Use    Smoking status: Former Smoker    Smokeless tobacco: Never Used    Tobacco comment: quit June 2020   Substance and Sexual Activity    Alcohol use: Not Currently    Drug use: Never    Sexual activity: Not Currently     Family History   Problem Relation Age of Onset    Diabetes Mother     No Known Problems Father     Cancer Brother       Current Facility-Administered Medications   Medication Dose Route Frequency    insulin NPH (NOVOLIN N, HUMULIN N) injection 10 Units  10 Units SubCUTAneous ACB&D    sodium chloride (NS) flush 5-40 mL  5-40 mL IntraVENous Q8H    sodium chloride (NS) flush 5-40 mL  5-40 mL IntraVENous PRN    acetaminophen (TYLENOL) tablet 650 mg  650 mg Oral Q6H PRN    Or    acetaminophen (TYLENOL) suppository 650 mg  650 mg Rectal Q6H PRN    polyethylene glycol (MIRALAX) packet 17 g  17 g Oral DAILY PRN    ondansetron (ZOFRAN ODT) tablet 4 mg  4 mg Oral Q8H PRN    Or    ondansetron (ZOFRAN) injection 4 mg  4 mg IntraVENous Q6H PRN    famotidine (PEPCID) tablet 20 mg  20 mg Oral DAILY    dexamethasone (DECADRON) 4 mg/mL injection 4 mg  4 mg IntraVENous Q6H    atorvastatin (LIPITOR) tablet 20 mg  20 mg Oral QHS    carvediloL (COREG) tablet 6.25 mg  6.25 mg Oral BID WITH MEALS    furosemide (LASIX) tablet 40 mg  40 mg Oral DAILY    lisinopriL (PRINIVIL, ZESTRIL) tablet 5 mg  5 mg Oral DAILY    sertraline (ZOLOFT) tablet 25 mg  25 mg Oral QPM    spironolactone (ALDACTONE) tablet 25 mg  25 mg Oral DAILY    topiramate (TOPAMAX) tablet 100 mg  100 mg Oral BID    levETIRAcetam (KEPPRA) 500 mg in 0.9% sodium chloride 100 mL IVPB  500 mg IntraVENous Q12H    glucose chewable tablet 16 g  4 Tab Oral PRN    glucagon (GLUCAGEN) injection 1 mg  1 mg IntraMUSCular PRN    dextrose 10% infusion 0-250 mL  0-250 mL IntraVENous PRN    insulin lispro (HUMALOG) injection   SubCUTAneous AC&HS    albuterol-ipratropium (DUO-NEB) 2.5 MG-0.5 MG/3 ML  3 mL Nebulization Q6H RT    cefTRIAXone (ROCEPHIN) 1 g in 0.9% sodium chloride (MBP/ADV) 50 mL  1 g IntraVENous Q24H      Prior to Admission Medications   Prescriptions Last Dose Informant Patient Reported? Taking?    Eliquis 5 mg tablet 10/16/2020 at Unknown time  No Yes   Sig: Take 1 tablet by mouth once daily   atorvastatin (LIPITOR) 20 mg tablet 10/16/2020 at Unknown time  Yes Yes   Sig: atorvastatin 20 mg tablet   TAKE 1 TABLET BY MOUTH ONCE DAILY FOR 90 DAYS   carvediloL (COREG) 6.25 mg tablet 10/16/2020 at Unknown time  Yes Yes   Sig: carvedilol 6.25 mg tablet   celecoxib (CeleBREX) 200 mg capsule Unknown at Unknown time  Yes No   Sig: Celebrex 200 mg capsule   Take 1 capsule twice a day by oral route for 90 days. clopidogrel bisulfate (PLAVIX PO) Unknown at Unknown time  Yes No   Sig: Plavix   cyclobenzaprine-TENS electrode 10 mg cmpk Unknown at Unknown time  Yes No   Sig: cyclobenzaprine 10 mg tablet   Take 1 tablet twice a day by oral route as needed for 90 days. furosemide (LASIX) 40 mg tablet 10/16/2020 at Unknown time  Yes Yes   Sig: Take 40 mg by mouth daily. glimepiride (AMARYL) 2 mg tablet 10/16/2020 at Unknown time  No Yes   Sig: Take 1 tablet by mouth once daily for 90 days   lisinopriL (PRINIVIL, ZESTRIL) 5 mg tablet 10/16/2020 at Unknown time  Yes Yes   Sig: Take 5 mg by mouth daily. metoprolol succinate (TOPROL-XL) 25 mg XL tablet 10/16/2020 at Unknown time  Yes Yes   Sig: Take 25 mg by mouth daily. potassium chloride SR (KLOR-CON 10) 10 mEq tablet 10/16/2020 at Unknown time  No Yes   Sig: Take 1 tablet by mouth once daily   sertraline (ZOLOFT) 25 mg tablet Unknown at Unknown time  No No   Sig: Take 1 tablet by mouth once daily   simvastatin (ZOCOR) 10 mg tablet Unknown at Unknown time  Yes No   Sig: Take 10 mg by mouth nightly. spironolactone (ALDACTONE) 25 mg tablet 10/16/2020 at Unknown time  Yes Yes   Sig: spironolactone 25 mg tablet   TAKE 1 TABLET BY MOUTH ONCE DAILY   topiramate (TOPAMAX) 100 mg tablet 10/16/2020 at Unknown time  Yes Yes   Sig: Take 100 mg by mouth two (2) times a day.    traZODone (DESYREL) 50 mg tablet Unknown at Unknown time  Yes No   Sig: trazodone 50 mg tablet   TAKE 2 TO 3 TABLETS BY MOUTH EVERY DAY AT BEDTIME AS NEEDED      Facility-Administered Medications: None      No Known Allergies    Labs:   Recent Results (from the past 24 hour(s))   GLUCOSE, POC    Collection Time: 10/22/20  4:06 PM   Result Value Ref Range    Glucose (POC) 246 (H) 65 - 100 mg/dL    Performed by Oscar Prakash    GLUCOSE, POC    Collection Time: 10/22/20 11:21 PM   Result Value Ref Range    Glucose (POC) 394 (H) 65 - 100 mg/dL    Performed by DANIELLE FLORES    METABOLIC PANEL, BASIC    Collection Time: 10/23/20  5:37 AM   Result Value Ref Range    Sodium 136 136 - 145 mmol/L    Potassium 3.8 3.5 - 5.1 mmol/L    Chloride 97 97 - 108 mmol/L    CO2 31 21 - 32 mmol/L    Anion gap 8 5 - 15 mmol/L    Glucose 340 (H) 65 - 100 mg/dL    BUN 29 (H) 6 - 20 MG/DL    Creatinine 1.24 (H) 0.55 - 1.02 MG/DL    BUN/Creatinine ratio 23 (H) 12 - 20      GFR est AA 52 (L) >60 ml/min/1.73m2    GFR est non-AA 43 (L) >60 ml/min/1.73m2    Calcium 8.7 8.5 - 10.1 MG/DL   MAGNESIUM    Collection Time: 10/23/20  5:38 AM   Result Value Ref Range    Magnesium 2.0 1.6 - 2.4 mg/dL   SAMPLES BEING HELD    Collection Time: 10/23/20  5:38 AM   Result Value Ref Range    SAMPLES BEING HELD  1 LAV     COMMENT        Add-on orders for these samples will be processed based on acceptable specimen integrity and analyte stability, which may vary by analyte.    GLUCOSE, POC    Collection Time: 10/23/20  7:51 AM   Result Value Ref Range    Glucose (POC) 403 (H) 65 - 100 mg/dL    Per[facility] by [facility]    GLUCOSE, POC    Collection Time: 10/23/20  7:54 AM   Result Value Ref Range    Glucose (POC) 397 (H) 65 - 100 mg/dL    Per[facility] by [facility]    GLUCOSE, POC    Collection Time: 10/23/20 12:01 PM   Result Value Ref Range    Glucose (POC) 394 (H) 65 - 100 mg/dL    Performed by Kayce Brooks        Intake/Output Summary (Last 24 hours) at 10/23/2020 1601  Last data filed at 10/22/2020 1701  Gross per 24 hour   Intake 1208.75 ml   Output 2 ml   Net 1206.75 ml      Patient Vitals for the past 24 hrs:   Temp Pulse Resp BP SpO2   10/23/20 1507     97 %   10/23/20 1418 98.5 °F (36.9 °C) 79 20 (!) 112/56 97 %   10/23/20 1015 98.4 °F (36.9 °C) 73 19 (!) 96/48 98 %   10/23/20 0952     97 % 10/23/20 0858  78  (!) 109/54    10/23/20 0600 98.3 °F (36.8 °C) 62 18 102/68 98 %   10/23/20 0247 98.2 °F (36.8 °C) 62 16 121/68 97 %   10/23/20 0145  62 18 (!) 98/47 100 %   10/23/20 0130  71 21 (!) 97/51 100 %   10/23/20 0115  66 18 (!) 100/46 100 %   10/22/20 2300 98.3 °F (36.8 °C) 73 21 (!) 93/39 100 %   10/22/20 2200  73 19 (!) 95/44 100 %   10/22/20 2043     100 %   10/22/20 1856  87 18 (!) 103/50 99 %        General:    Alert, cooperative, no distress. Psychiatric:   Normal Mood and affect    Eye/ENT:   Pupils equal, No asymmetry, Conjunctival pink. Able to hear voice at normal amplitude   Lungs:   Visibly symmetric chest expansion, No palpable tenderness. Clear to auscultation bilaterally. Heart:    Regular rate and rhythm, S1, S2 normal, no murmur, click, rub or gallop. No JVD, Normal palpable     peripheral pulses. No cyanosis   Abdomen:    Soft, non-tender. Bowel sounds normal. No masses,  No organomegaly. Extremities:   Extremities normal, atraumatic, no edema. Neurologic:   CN II-XII grossly intact. No gross focal deficits       Miriam Lockett.  Tom Monroe MD  10/23/2020   4:01 PM      [facility] Office:   [address] [facility] Office:  [address] Jose Leung [address] [facility]  [address]     [address] [place] [address], [address] [place] [address], [address] [place] [address] Nw  P: [phone]    P: [phone]  F: [phone]    F: [phone]

## 2020-10-23 NOTE — PROGRESS NOTES
Mri shows multiple brain metastases. She would not benefit clinically from surgical intervention.   Recommend radiation onc consult

## 2020-10-23 NOTE — PROGRESS NOTES
Problem: Dysphagia (Adult)  Goal: *Acute Goals and Plan of Care (Insert Text)  Description: Speech Therapy Goals  Initiated 10/23/2020  1. Patient will tolerate regular diet/thin liquids without overt s/s aspiration within 7 days. Outcome: Progressing Towards Goal     SPEECH LANGUAGE PATHOLOGY BEDSIDE SWALLOW EVALUATION  Patient: Sofia Zuniga (39 y.o. female)  Date: 10/23/2020  Primary Diagnosis: Brain metastases ([facility] [C79.31]        Precautions:        ASSESSMENT :  Based on the objective data described below, the patient presents with grossly intact oropharyngeal swallow. Patient tolerated all PO trials without overt s/s aspiration. Patient reports no difficultly swallow. Risk of aspiration is elevated given new neuro diagnosis. Given bedside presentation fell patient is safe for regular diet, thin liquids. Patient with limited verbal out put but speaks in full sentences and with good fund of knowledge. Full speech/language evaluation not warranted at this time. Patient will benefit from skilled intervention to address the above impairments. Patients rehabilitation potential is considered to be Good     PLAN :  Recommendations and Planned Interventions:  Regular diet, thin liquids  Frequency/Duration: Patient will be followed by speech-language pathology x 1 for dysphagia treatment/diet tolerance. SLP to be available to further assessment pending treatment plan. Discharge Recommendations: To Be Determined     SUBJECTIVE:   Patient stated No when asked if she was having difficulty swallowing. Patient engaged in session but with flat affect. No concerns reported. RN at bedside for medication administration.     OBJECTIVE:     Past Medical History:   Diagnosis Date    Chronic back pain     Chronic obstructive pulmonary disease ([facility] [address]     Diabetes ([facility]     Fall in home     Heart failure ([facility]     Hypertension     Ill-defined condition     home [address] [facility]     Past Surgical History: Procedure Laterality Date    CARDIAC SURG PROCEDURE UNLIST      internal cardiac defibrillator    HX CHOLECYSTECTOMY      HX CYST REMOVAL      HX PACEMAKER  08/08/2016    [employer]     HX TUBAL LIGATION       Prior Level of Function/Home Situation:   Home Situation  Home Environment: Private residence  # Steps to Enter: 3  One/Two Story Residence: One story  Living Alone: No  Support Systems: Family member(s)  Patient Expects to be Discharged to[de-identified] Rehabilitation facility  Current DME Used/Available at Home: [address] [place] prior to admission: Regular per patient report  Current Diet:  Mechanical soft   Cognitive and Communication Status:  Neurologic State: Alert  Orientation Level: Oriented to person, Oriented to place, Oriented to situation  Cognition: Follows commands  Perception: Appears intact  Perseveration: No perseveration noted  Safety/Judgement: Awareness of environment  Oral Assessment:  Oral Assessment  Labial: No impairment  Dentition: Upper & lower dentures  Oral Hygiene: Moist and clean oral mucosa  Lingual: No impairment  Velum: Unable to visualize  Mandible: No impairment  P.O. Trials:  Patient Position: Upright in bed  Vocal quality prior to P.O.: No impairment  Consistency Presented: Pill/Tablet; Solid; Thin liquid  How Presented: Self-fed/presented;Straw;Successive swallows     Bolus Acceptance: No impairment  Bolus Formation/Control: No impairment     Propulsion: No impairment  Oral Residue: None  Initiation of Swallow: No impairment  Laryngeal Elevation: Functional  Aspiration Signs/Symptoms: None  Pharyngeal Phase Characteristics: No impairment, issues, or problems              Oral Phase Severity: No impairment  Pharyngeal Phase Severity : No impairment    NOMS:   The NOMS functional outcome measure was used to quantify this patient's level of swallowing impairment.   Based on the NOMS, the patient was determined to be at level 7 for swallow function       NOMS Swallowing Levels:  Level 1 (CN): NPO  Level 2 (CM): NPO but takes consistency in therapy  Level 3 (CL): Takes less than 50% of nutrition p.o. and continues with nonoral feedings; and/or safe with mod cues; and/or max diet restriction  Level 4 (CK): Safe swallow but needs mod cues; and/or mod diet restriction; and/or still requires some nonoral feeding/supplements  Level 5 (CJ): Safe swallow with min diet restriction; and/or needs min cues  Level 6 (CI): Independent with p.o.; rare cues; usually self cues; may need to avoid some foods or needs extra time  Level 7 ([address] [place] [address]): Independent for all p.o.  EVA. (2003). National Outcomes Measurement System (NOMS): Adult Speech-Language Pathology User's Guide. After treatment:   Patient left in no apparent distress in bed, Call bell within reach, and Nursing notified    COMMUNICATION/EDUCATION:   Patient was educated regarding role of SLP, diet and POC. Patient nodded. The patient's plan of care including recommendations, planned interventions, and recommended diet changes were discussed with: Registered nurse. Patient/family have participated as able in goal setting and plan of care. Patient/family agree to work toward stated goals and plan of care.     Thank you for this referral.  Alicia Delatorre, SLP  Time Calculation: 15 mins

## 2020-10-23 NOTE — PROGRESS NOTES
Mri pending. Pt is quite systemically ill/debillitated. After full work up will opine on cns treatment.

## 2020-10-23 NOTE — PROGRESS NOTES
Transition of Care Plan:        -RUR 15%  -Continue Care  -Disposition TBD    Reason for Admission:   Fall. Patient is alert and oriented. Lives in her home with son, daughter in law, and her grandchildren. Patient uses a cane for assistance. On room air, no home O2. Patient doesn't drive. Her family transports to and from. PCP confirmed Dr. Vanda Hill and uses [facility] in [address]. Medicare pt has received, reviewed, and signed 2nd IM letter informing them of their right to appeal the discharge. Signed copy has been placed on pt bedside chart. RUR Score:     15%             PCP: First and Last name:  Dr. Enrique Roberson you (patient/family) have any concerns for transition/discharge? Not at this time              Plan for utilizing home health:   Not at this time    Current Advanced Directive/Advance Care Plan:  Not on file. Full Code. Care Management Interventions  PCP Verified by CM: Yes  Mode of Transport at Discharge: Other (see comment)(Son will transport )  Transition of Care Consult (CM Consult):  Other(TBD)  Discharge Durable Medical Equipment: No  Physical Therapy Consult: No  Occupational Therapy Consult: No  Current Support Network: Family Lives Nearby  Confirm Follow Up Transport: Family  The Patient and/or Patient Representative was Provided with a Choice of Provider and Agrees with the Discharge Plan?: Yes  Freedom of Choice List was Provided with Basic Dialogue that Supports the Patient's Individualized Plan of Care/Goals, Treatment Preferences and Shares the Quality Data Associated with the Providers?: Yes  Discharge Location  Discharge Placement: Unable to determine at this time    CM will follow     LEENA Huddleston/CECE

## 2020-10-23 NOTE — ROUTINE PROCESS
Occupational Therapy Screening: 
Services are indicated. Order is required. An InBasket screening referral was triggered for occupational therapy based on results obtained during the nursing admission assessment. The patients chart was reviewed and the patient is appropriate for a skilled therapy evaluation. Please order a consult for occupational therapy if you are in agreement and would like an evaluation to be completed. Thank you.  
 
Stephani Haney, OT

## 2020-10-23 NOTE — PROGRESS NOTES
Bedside shift change report given to Razia Abdul RN (oncoming nurse) by Morris Barahona RN (offgoing nurse). Report included the following information SBAR, Kardex, Procedure Summary, Intake/Output, MAR, Accordion, Recent Results, Cardiac Rhythm SR and Dual Neuro Assessment.

## 2020-10-23 NOTE — PROGRESS NOTES
Problem: Pressure Injury - Risk of  Goal: *Prevention of pressure injury  Description: Document Elio Scale and appropriate interventions in the flowsheet. Outcome: Progressing Towards Goal  Note: Pressure Injury Interventions:  Sensory Interventions: Assess changes in LOC, Assess need for specialty bed, Avoid rigorous massage over bony prominences, Discuss PT/OT consult with provider, Float heels    Moisture Interventions: Assess need for specialty bed, Check for incontinence Q2 hours and as needed, Internal/External urinary devices    Activity Interventions: Assess need for specialty bed, Pressure redistribution bed/mattress(bed type), PT/OT evaluation    Mobility Interventions: Assess need for specialty bed, HOB 30 degrees or less, Pressure redistribution bed/mattress (bed type), PT/OT evaluation    Nutrition Interventions: Document food/fluid/supplement intake, Discuss nutritional consult with provider    Friction and Shear Interventions: HOB 30 degrees or less, Lift sheet                Problem: Falls - Risk of  Goal: *Absence of Falls  Description: Document Edgard Fall Risk and appropriate interventions in the flowsheet.   Outcome: Progressing Towards Goal  Note: Fall Risk Interventions:            Medication Interventions: Evaluate medications/consider consulting pharmacy    Elimination Interventions: Call light in reach, Patient to call for help with toileting needs, Toileting schedule/hourly rounds    History of Falls Interventions: Consult care management for discharge planning, Door open when patient unattended, Evaluate medications/consider consulting pharmacy         Problem: Brain Tumor Day 3 to Discharge  Goal: Activity/Safety  Outcome: Progressing Towards Goal  Goal: Nutrition/Diet  Outcome: Progressing Towards Goal  Goal: Treatments/Interventions/Procedures  Outcome: Progressing Towards Goal

## 2020-10-23 NOTE — CONSULTS
[address]    Name:  Leanna James  MR#:  [id number]  :  1950  ACCOUNT #:  [de-identified]  DATE OF SERVICE:   10/23/2020      HEMATOLOGY AND ONCOLOGY CONSULT NOTE    REASON FOR ADMISSION:  Fall, left arm weakness. REASON FOR CONSULTATION:  Lung mass, multiple brain lesions, multiple liver lesions. HISTORY OF PRESENT ILLNESS:  The patient is a very pleasant 74-year-old woman, who lives in [place]. Unfortunately, over the last week, she began to notice shortness of breath and then began to notice difficulty with arm weakness. She had a fall and ultimately made her way here for further evaluation and treatment through EMS. She underwent CT scan of the head and unfortunately note was made of multiple CNS lesions, largest 1.2 cm in the right frontal lobe with associated edema, but also small lesions in the right parietal, left frontal, right basal ganglia. There is no evidence of intraparenchymal hemorrhage. Given that finding, she underwent additional imaging including CT scan of the chest without contrast that showed a large right hilar mass with encroachment in the right mainstem bronchus, measuring 5.8 x 5.7 cm, along with the CT scan of the abdomen and pelvis that showed multiple low attenuation liver lesions and a moderately large right pleural effusion. Lab data on presentation revealed her bilirubin was normal.  Creatinine was normal on arrival at 1.01. Calcium was normal.    She explains that she is , but her  is in [facility], and she is living with her son and daughter-in-law. She identifies her daughter-in-law as the next of kin, giving the phone number [phone]. Unfortunately, that number goes to voicemail and the mailbox is full, therefore I could not leave a message.   The patient has been seen by Pulmonary service with tentative plans for fiberoptic bronchoscopy and EBUS once her anticoagulation has been held for five days.    PAST MEDICAL HISTORY:  1. Congestive heart failure. 2.  Placement of AICD for unclear arrhythmia. 3.  Unclear cardiac dysrhythmia, on anticoagulation. 4.  COPD. 5.  Type 2 diabetes. 6.  Prior bilateral tubal ligation. 7.  Ganglion removal from cyst.  8.  Status post cholecystectomy. ALLERGIES:  NO KNOWN DRUG ALLERGIES. CURRENT MEDICATIONS IN THE HOSPITAL:  1. Lipitor 20 mg daily. 2.  Coreg 6.25 mg p.o. b.i.d.  3.  Decadron 4 mg IV q.6  4. Pepcid 20 mg p.o. daily. 7.  Lasix 40 mg daily. 8.  Insulin lispro sliding scale. 9.  Keppra 500 mg IV q.12.  10.  Zestril 5 mg p.o. daily. 11.  Zoloft 25 mg p.o. at bedtime. 12.  Aldactone 25 mg daily. 13.  Topamax 100 mg p.o. daily. 14.  Anticoagulation on hold. SOCIAL HISTORY:  She is marred. She lives with her son and daughter-in-law. Her  unfortunately is incarcerated. She quit using tobacco in 06/2020. FAMILY HISTORY:  Reviewed and noncontributory. REVIEW OF SYSTEMS:  GENERAL:  No fevers or chills. HEENT:  No auditory or visual change. LYMPHATIC:  No lumps or bumps in neck, underarm, or groin. CARDIOVASCULAR:  No chest pain or palpitations. PULMONARY:  As above. GI:  No abdominal pain, diarrhea, or constipation. :  No dysuria or incontinence. SKIN:  No rash or changing mole. MUSCULOSKELETAL:  No red or swollen joints. NEUROLOGIC:  As above. PHYSICAL EXAMINATION:  GENERAL:  The patient is awake and alert, but has difficulty rising out of bed for the pulmonary exam.  She is not able to eat her breakfast by herself and request help with an aide. VITAL SIGNS:  /68,  pulse 62, sating 98% on 2 liters. HEENT:  Sclerae anicteric. Oropharynx clear. NECK:  Supple without lymphadenopathy or thyromegaly. HEART:  Regular rate and rhythm without murmur, rub, or gallop. LUNGS:  Clear to auscultation bilaterally. ABDOMEN:  Nontender and nondistended. Normoactive bowel sounds. No hepatosplenomegaly.   EXTREMITIES: No clubbing, cyanosis, or edema. PSYCH:  Normal mood and affect. Alert and oriented x3. RADIOLOGY:  I personally reviewed the images of her CT chest and abdomen, making note of a large right hilar mass with encroachment of the right mainstem bronchus. ASSESSMENT AND PLAN:  A 69-year-old woman with history of tobacco abuse, congestive heart failure, chronic obstructive pulmonary disease, cardiac dysrhythmia, type 2 diabetes, admitted after a fall with left arm weakness, found on imaging to have multiple central nervous system lesions, right hilar mass, and multiple liver lesions. Seen by Neurosurgery and Pulmonary with tentative plans for fiberoptic bronchoscopy, EBUS for diagnosis. 1.  Suspected stage IV lung cancer in a patient with an ECOG performance status less than or equal to 2:  I agree with biopsy for diagnosis. Will likely benefit from central nervous system whole brain radiation therapy prior to systemic treatment. We will ask Radiation Oncology to see. We will request PD-L1 and next generation sequencing from biopsy tissue as she would not be a candidate cytotoxic chemotherapy given her reduced performance status, but may be a candidate for oral oncolytics if she has  mutation or PD-L1 treatment. I have reached out to her daughter-in-law, but received only a voicemail with a full mailbox. I agree with early involvement of Palliative Care. Thank you for the consult. MD GILDARDO Austin/[email or web address]  D:  10/23/2020 8:25  T:  10/23/2020 10:28  JOB #:  [id number]  CC:   Jessei Raya.  Hillary Yu MD

## 2020-10-23 NOTE — ROUTINE PROCESS
TRANSFER - OUT REPORT: 
 
Verbal report given to [address] [place] [address] Rd (name) on Radha Cuellar  being transferred to [facility]STU (unit) for routine progression of care Report consisted of patients Situation, Background, Assessment and  
Recommendations(SBAR). Information from the following report(s) SBAR, ED Summary and Recent Results was reviewed with the receiving nurse. Lines:  
Peripheral IV 10/22/20 Left Wrist (Active) Peripheral IV 10/22/20 Right Wrist (Active) Peripheral IV 10/22/20 Right Forearm (Active) Site Assessment Clean, dry, & intact 10/22/20 5903 Phlebitis Assessment 0 10/22/20 0936 Infiltration Assessment 0 10/22/20 0936 Opportunity for questions and clarification was provided. Patient transported with: 
 [employer]

## 2020-10-23 NOTE — PROGRESS NOTES
Problem: Pressure Injury - Risk of  Goal: *Prevention of pressure injury  Description: Document Elio Scale and appropriate interventions in the flowsheet. Outcome: Progressing Towards Goal  Note: Pressure Injury Interventions:  Sensory Interventions: Assess changes in LOC, Avoid rigorous massage over bony prominences, Check visual cues for pain, Discuss PT/OT consult with provider, Float heels, Keep linens dry and wrinkle-free, Maintain/enhance activity level, Minimize linen layers, Monitor skin under medical devices, Pressure redistribution bed/mattress (bed type), Turn and reposition approx. every two hours (pillows and wedges if needed)    Moisture Interventions: Absorbent underpads, Assess need for specialty bed, Check for incontinence Q2 hours and as needed, Contain wound drainage, Internal/External fecal devices, Internal/External urinary devices, Limit adult briefs, Maintain skin hydration (lotion/cream), Minimize layers, Moisture barrier    Activity Interventions: Assess need for specialty bed, Increase time out of bed, Pressure redistribution bed/mattress(bed type), PT/OT evaluation    Mobility Interventions: Assess need for specialty bed, HOB 30 degrees or less, Pressure redistribution bed/mattress (bed type), PT/OT evaluation, Turn and reposition approx.  every two hours(pillow and wedges)    Nutrition Interventions: Document food/fluid/supplement intake, Offer support with meals,snacks and hydration    Friction and Shear Interventions: Apply protective barrier, creams and emollients, Foam dressings/transparent film/skin sealants, HOB 30 degrees or less, Lift sheet, Lift team/patient mobility team, Minimize layers, Sit at 90-degree angle, Transfer aides:transfer board/Hollie lift/ceiling lift, Transferring/repositioning devices                Problem: Brain Tumor Day 1  Goal: Activity/Safety  Outcome: Progressing Towards Goal  Goal: Consults, if ordered  Outcome: Progressing Towards Goal  Goal: Diagnostic Test/Procedures  Outcome: Progressing Towards Goal  Goal: Nutrition/Diet  Outcome: Progressing Towards Goal  Goal: Discharge Planning  Outcome: Progressing Towards Goal  Goal: Medications  Outcome: Progressing Towards Goal  Goal: Respiratory  Outcome: Progressing Towards Goal  Goal: Treatments/Interventions/Procedures  Outcome: Progressing Towards Goal  Goal: *Neurologic stability  Outcome: Progressing Towards Goal  Goal: *Progress independence mobility/activities (eg: Mobility precautions)  Outcome: Progressing Towards Goal  Goal: *Adequate oxygenation  Outcome: Progressing Towards Goal  Goal: *Hemodynamically stable without vasoactive medications  Outcome: Progressing Towards Goal

## 2020-10-23 NOTE — PROGRESS NOTES
Spiritual Care Assessment/Progress Note  [facility]      NAME: Joe Horowitz      MRN: [id number]  AGE: 69 y.o.  SEX: female  Shinto Affiliation: Unknown   Language: English     10/23/2020           Spiritual Assessment begun in [address] through conversation with:         []Patient        [] Family    [] Friend(s)        Reason for Consult: Palliative Care, Initial/Spiritual Assessment     Spiritual beliefs: (Please include comment if needed)     [x] Identifies with a jace tradition: [employer] [address] [place] [address]        [] Supported by a jace community:            [] Claims no spiritual orientation:           [] Seeking spiritual identity:                [] Adheres to an individual form of spirituality:           [] Not able to assess:                           Identified resources for coping:      [x] Prayer                               [] Music                  [] Guided Imagery     [x] Family/friends                 [] Pet visits     [] Devotional reading                         [] Unknown     [] Other:                                               Interventions offered during this visit: (See comments for more details)    Patient Interventions: Affirmation of emotions/emotional suffering, Catharsis/review of pertinent events in supportive environment, Iconic (affirming the presence of God/Higher Power), Initial/Spiritual assessment, patient floor, Prayer (actual), Prayer (assurance of)           Plan of Care:     [x] Support spiritual and/or cultural needs    [] Support AMD and/or advance care planning process      [] Support grieving process   [] Coordinate Rites and/or Rituals    [] Coordination with community clergy   [] No spiritual needs identified at this time   [] Detailed Plan of Care below (See Comments)  [] Make referral to Music Therapy  [] Make referral to Pet Therapy     [] Make referral to Addiction services  [] Make referral to [facility]  [] Make referral to Spiritual Care Partner  [] No future visits requested        [x] Follow up visits as needed     Comments: Visited Ms Cholo Haney in room [phone] for initial Palliative Care spiritual assessment. Ms Cholo Haney was sitting up in bed. She appeared to be dozing but aroused promptly when name was called. Provided pastoral presence and active listening as Ms Cholo Haney shared that she was very concerned about her cancer diagnosis. Stated that she would like for  to have prayer that it could be removed. Ms Cholo Haney said that she had not been attending [employer] recently but when she did go she went to [employer] of [address] [place] [address]. Had prayer as patient requested and assured her of ongoing  availability for support. : Rev. Eric Davis.  Junior Moreno; [facility], to contact [phone] call: [phone]

## 2020-10-24 LAB
BACTERIA SPEC CULT: ABNORMAL
CC UR VC: ABNORMAL
COMMENT, HOLDF: NORMAL
GLUCOSE BLD STRIP.AUTO-MCNC: 142 MG/DL (ref 65–100)
GLUCOSE BLD STRIP.AUTO-MCNC: 143 MG/DL (ref 65–100)
GLUCOSE BLD STRIP.AUTO-MCNC: 223 MG/DL (ref 65–100)
GLUCOSE BLD STRIP.AUTO-MCNC: 367 MG/DL (ref 65–100)
MAGNESIUM SERPL-MCNC: 2.4 MG/DL (ref 1.6–2.4)
SAMPLES BEING HELD,HOLD: NORMAL
SERVICE CMNT-IMP: ABNORMAL

## 2020-10-24 PROCEDURE — 82962 GLUCOSE BLOOD TEST: CPT

## 2020-10-24 PROCEDURE — 65660000000 HC RM CCU STEPDOWN

## 2020-10-24 PROCEDURE — 94640 AIRWAY INHALATION TREATMENT: CPT

## 2020-10-24 PROCEDURE — 74011636637 HC RX REV CODE- 636/637: Performed by: HOSPITALIST

## 2020-10-24 PROCEDURE — 74011636637 HC RX REV CODE- 636/637: Performed by: INTERNAL MEDICINE

## 2020-10-24 PROCEDURE — 74011000258 HC RX REV CODE- 258: Performed by: HOSPITALIST

## 2020-10-24 PROCEDURE — 83735 ASSAY OF MAGNESIUM: CPT

## 2020-10-24 PROCEDURE — 74011000258 HC RX REV CODE- 258: Performed by: INTERNAL MEDICINE

## 2020-10-24 PROCEDURE — 77010033678 HC OXYGEN DAILY

## 2020-10-24 PROCEDURE — 74011250637 HC RX REV CODE- 250/637: Performed by: HOSPITALIST

## 2020-10-24 PROCEDURE — 74011250636 HC RX REV CODE- 250/636: Performed by: INTERNAL MEDICINE

## 2020-10-24 PROCEDURE — 74011250636 HC RX REV CODE- 250/636: Performed by: HOSPITALIST

## 2020-10-24 PROCEDURE — 36415 COLL VENOUS BLD VENIPUNCTURE: CPT

## 2020-10-24 PROCEDURE — 74011000250 HC RX REV CODE- 250: Performed by: INTERNAL MEDICINE

## 2020-10-24 RX ORDER — INSULIN LISPRO 100 [IU]/ML
5 INJECTION, SOLUTION INTRAVENOUS; SUBCUTANEOUS
Status: DISCONTINUED | OUTPATIENT
Start: 2020-10-24 | End: 2020-10-26

## 2020-10-24 RX ORDER — IPRATROPIUM BROMIDE AND ALBUTEROL SULFATE 2.5; .5 MG/3ML; MG/3ML
3 SOLUTION RESPIRATORY (INHALATION)
Status: DISCONTINUED | OUTPATIENT
Start: 2020-10-24 | End: 2020-10-26

## 2020-10-24 RX ADMIN — ACETAMINOPHEN 650 MG: 325 TABLET ORAL at 21:55

## 2020-10-24 RX ADMIN — IPRATROPIUM BROMIDE AND ALBUTEROL SULFATE 3 ML: .5; 3 SOLUTION RESPIRATORY (INHALATION) at 20:59

## 2020-10-24 RX ADMIN — DEXAMETHASONE SODIUM PHOSPHATE 4 MG: 4 INJECTION, SOLUTION INTRA-ARTICULAR; INTRALESIONAL; INTRAMUSCULAR; INTRAVENOUS; SOFT TISSUE at 04:09

## 2020-10-24 RX ADMIN — SERTRALINE HYDROCHLORIDE 25 MG: 50 TABLET ORAL at 17:04

## 2020-10-24 RX ADMIN — DEXAMETHASONE SODIUM PHOSPHATE 4 MG: 4 INJECTION, SOLUTION INTRA-ARTICULAR; INTRALESIONAL; INTRAMUSCULAR; INTRAVENOUS; SOFT TISSUE at 16:17

## 2020-10-24 RX ADMIN — ATORVASTATIN CALCIUM 20 MG: 20 TABLET, FILM COATED ORAL at 21:38

## 2020-10-24 RX ADMIN — INSULIN LISPRO 15 UNITS: 100 INJECTION, SOLUTION INTRAVENOUS; SUBCUTANEOUS at 12:17

## 2020-10-24 RX ADMIN — INSULIN LISPRO 3 UNITS: 100 INJECTION, SOLUTION INTRAVENOUS; SUBCUTANEOUS at 08:36

## 2020-10-24 RX ADMIN — CARVEDILOL 6.25 MG: 6.25 TABLET, FILM COATED ORAL at 10:15

## 2020-10-24 RX ADMIN — ACETAMINOPHEN 650 MG: 325 TABLET ORAL at 07:03

## 2020-10-24 RX ADMIN — FAMOTIDINE 20 MG: 20 TABLET ORAL at 08:25

## 2020-10-24 RX ADMIN — CARVEDILOL 6.25 MG: 6.25 TABLET, FILM COATED ORAL at 17:43

## 2020-10-24 RX ADMIN — FUROSEMIDE 40 MG: 40 TABLET ORAL at 12:54

## 2020-10-24 RX ADMIN — Medication 10 ML: at 06:33

## 2020-10-24 RX ADMIN — Medication 10 ML: at 21:38

## 2020-10-24 RX ADMIN — SPIRONOLACTONE 25 MG: 25 TABLET ORAL at 08:26

## 2020-10-24 RX ADMIN — TOPIRAMATE 100 MG: 100 TABLET, FILM COATED ORAL at 17:04

## 2020-10-24 RX ADMIN — DEXAMETHASONE SODIUM PHOSPHATE 4 MG: 4 INJECTION, SOLUTION INTRA-ARTICULAR; INTRALESIONAL; INTRAMUSCULAR; INTRAVENOUS; SOFT TISSUE at 10:15

## 2020-10-24 RX ADMIN — HUMAN INSULIN 10 UNITS: 100 INJECTION, SUSPENSION SUBCUTANEOUS at 08:35

## 2020-10-24 RX ADMIN — INSULIN LISPRO 2 UNITS: 100 INJECTION, SOLUTION INTRAVENOUS; SUBCUTANEOUS at 17:37

## 2020-10-24 RX ADMIN — LISINOPRIL 5 MG: 10 TABLET ORAL at 08:25

## 2020-10-24 RX ADMIN — CEFTRIAXONE SODIUM 1 G: 1 INJECTION, POWDER, FOR SOLUTION INTRAMUSCULAR; INTRAVENOUS at 17:00

## 2020-10-24 RX ADMIN — SODIUM CHLORIDE 500 MG: 900 INJECTION, SOLUTION INTRAVENOUS at 06:33

## 2020-10-24 RX ADMIN — SODIUM CHLORIDE 500 MG: 900 INJECTION, SOLUTION INTRAVENOUS at 19:54

## 2020-10-24 RX ADMIN — Medication 10 ML: at 16:17

## 2020-10-24 RX ADMIN — DEXAMETHASONE SODIUM PHOSPHATE 4 MG: 4 INJECTION, SOLUTION INTRA-ARTICULAR; INTRALESIONAL; INTRAMUSCULAR; INTRAVENOUS; SOFT TISSUE at 21:38

## 2020-10-24 RX ADMIN — INSULIN LISPRO 5 UNITS: 100 INJECTION, SOLUTION INTRAVENOUS; SUBCUTANEOUS at 17:37

## 2020-10-24 RX ADMIN — TOPIRAMATE 100 MG: 100 TABLET, FILM COATED ORAL at 08:25

## 2020-10-24 NOTE — PROGRESS NOTES
[facility] Adult  Hospitalist Group                                                                                          Hospitalist Progress Note  Samantha Mi,   Answering service: [phone] OR [phone] from in house phone        Date of Service:  10/24/2020  NAME:  Helen Silva  :  1950  MRN:  [id number]      Admission Summary:   69 y.o. female,  who has history of chronic systolic heart failure status post ICD, chronic COPD on home oxygen 2 L, hypertension, hyperlipidemia, anticoagulation with Eliquis who presents with fall 2 weeks back and left sided weakness. She reports that her left arm felt funny and also she was having weakness in the left arm. It is not painful. And she did not seek medical attention. Given persistence of symptoms, EMS was called and to take her to the local emergency room. At the ER there work-up revealed that she has lung mass as well as brain tumors. She was given IV Decadron and transferred to this facility for neurosurgical evaluation. She was also noted to have a dislocated left shoulder. There was report of lung mass which is right suprahilar with progression into the mediastinum. There is also report of 1.2 cm right frontal mass with 5 cm right to left shift. There is no report of seizures. Interval history / Subjective:   F/U left sided weakness. Patient seen and examined. Family not at bedside. Sleeping and awakens. Denies complaints. Assessment & Plan:     Brain masses, consistent with metastatic disease:  Cerebral edema secondary to above:  Left-sided weakness:  -Neurosurgery consulted  -MRI brain done, impression pendng  -Continue steroids, Keppra  -Seizure precautions  -Neuro checks     Lung mass with erosion into mediastinum, suspicious for carcinoma:  -Pulmonary consulted.   Plans for EUS/endobronchial biopsy next week  --per note, will need to be off Plavix x5 days, Eliquis x2 days and formal cardiac clearance    Moderate right pleural effusion:   -respiratory status stable, monitor closely, consider thoracentesis     Postobstructive infiltrate RUL  -Seen on CT  -no unusual cough, fever  -on Ceftriaxone    Probable UTI  -urine culture Proteus  -continue Ceftriaxone for 3 days       Diabetes type 2 with hyperglycemia   -Hba1c 6.1  -SSI, add meal time   -Continue NPH 10 units BID     Chronic COPD with chronic hypoxic respiratory failure, not in exacerbation  -Continue home oxygen, AA nebs    Chronic congestive heart failure, presumed systolic given patient has ICD and is on Aldactone  -Continued home medications  -Requested medical records from her home hospital     Clavicular fracture:  Shoulder dislocation ruled out:  -Appreciate orthopedic surgery. No evidence of shoulder dislocation. Can wear sling for comfort      Chronic anticoagulation with Eliquis for ??  -The patient does not know why she is on chronic anticoagulation  -Hold as planning for procedures    H/o CAD  -Hold plavix today if planned for biopsies.     Hypertension  -Continue with Coreg    Prior tobacco use     Diabetic diet    PT/OT      Code status: full. Awaiting palliative care  DVT prophylaxis: SCDs    Care Plan discussed with: Patient/Family  Anticipated Disposition: Home w/Family  Anticipated Discharge: Greater than 48 hours     Hospital Problems  Date Reviewed: 10/23/2020          Codes Class Noted POA    * (Principal) Brain metastases ([facility] [address] ICD-10-CM: C79.31  ICD-9-CM: 198.3  10/22/2020 Yes                Review of Systems:     Negative unless stated above    Vital Signs:    Last 24hrs VS reviewed since prior progress note.  Most recent are:  Visit Vitals  BP (!) 108/52   Pulse 62   Temp 98 °F (36.7 °C)   Resp 17   Wt 52.7 kg (116 lb 1.6 oz)   SpO2 93%   BMI 21.23 kg/m²       No intake or output data in the 24 hours ending 10/24/20 1903     Physical Examination:             Constitutional:  No acute distress, cooperative, pleasant    ENT: Oral mucosa moist, oropharynx benign. Resp:  CTA bilaterally. No wheezing/rhonchi/rales. No accessory muscle use   CV:  Regular rhythm, normal rate, no murmurs, gallops, rubs    GI:  Soft, non distended, non tender. normoactive bowel sounds, no hepatosplenomegaly     Musculoskeletal:  No edema, warm, 2+ pulses throughout    Neurologic:  No movement left upper extremity, alert to name            Data Review:    Review and/or order of clinical lab test  Review and/or order of tests in the radiology section of CPT  Review and/or order of tests in the medicine section of CPT      Labs:     No results for input(s): WBC, HGB, HCT, PLT, HGBEXT, HCTEXT, PLTEXT, HGBEXT, HCTEXT, PLTEXT in the last 72 hours. Recent Labs     10/24/20  0637 10/23/20  0538 10/23/20  0537 10/22/20  0900   NA  --   --  136  --    K  --   --  3.8  --    CL  --   --  97  --    CO2  --   --  31  --    BUN  --   --  29*  --    CREA  --   --  1.24*  --    GLU  --   --  340*  --    CA  --   --  8.7  --    MG 2.4 2.0  --  1.9     No results for input(s): ALT, AP, TBIL, TBILI, TP, ALB, GLOB, GGT, AML, LPSE in the last 72 hours. No lab exists for component: SGOT, GPT, AMYP, HLPSE  No results for input(s): INR, PTP, APTT, INREXT, INREXT in the last 72 hours. No results for input(s): FE, TIBC, PSAT, FERR in the last 72 hours. No results found for: FOL, RBCF   No results for input(s): PH, PCO2, PO2 in the last 72 hours. No results for input(s): CPK, CKNDX, TROIQ in the last 72 hours.     No lab exists for component: CPKMB  No results found for: CHOL, CHOLX, CHLST, CHOLV, HDL, HDLP, LDL, LDLC, DLDLP, TGLX, TRIGL, TRIGP, CHHD, CHHDX  Lab Results   Component Value Date/Time    Glucose (POC) 142 (H) 10/24/2020 05:19 PM    Glucose (POC) 367 (H) 10/24/2020 11:36 AM    Glucose (POC) 223 (H) 10/24/2020 07:18 AM    Glucose (POC) 332 (H) 10/23/2020 10:05 PM    Glucose (POC) 117 (H) 10/23/2020 04:29 PM     Lab Results   Component Value Date/Time    Color YELLOW/STRAW 10/22/2020 09:15 AM    Appearance TURBID (A) 10/22/2020 09:15 AM    Specific gravity 1.012 10/22/2020 09:15 AM    pH (UA) 5.5 10/22/2020 09:15 AM    Protein Negative 10/22/2020 09:15 AM    Glucose Negative 10/22/2020 09:15 AM    Ketone Negative 10/22/2020 09:15 AM    Bilirubin Negative 10/22/2020 09:15 AM    Urobilinogen 0.2 10/22/2020 09:15 AM    Nitrites Positive (A) 10/22/2020 09:15 AM    Leukocyte Esterase LARGE (A) 10/22/2020 09:15 AM    Epithelial cells FEW 10/22/2020 09:15 AM    Bacteria 4+ (A) 10/22/2020 09:15 AM    WBC  10/22/2020 09:15 AM    RBC 0-5 10/22/2020 09:15 AM         Medications Reviewed:     Current Facility-Administered Medications   Medication Dose Route Frequency    albuterol-ipratropium (DUO-NEB) 2.5 MG-0.5 MG/3 ML  3 mL Nebulization BID RT    insulin lispro (HUMALOG) injection 5 Units  5 Units SubCUTAneous TIDAC    sodium chloride (NS) flush 5-40 mL  5-40 mL IntraVENous Q8H    sodium chloride (NS) flush 5-40 mL  5-40 mL IntraVENous PRN    acetaminophen (TYLENOL) tablet 650 mg  650 mg Oral Q6H PRN    Or    acetaminophen (TYLENOL) suppository 650 mg  650 mg Rectal Q6H PRN    polyethylene glycol (MIRALAX) packet 17 g  17 g Oral DAILY PRN    ondansetron (ZOFRAN ODT) tablet 4 mg  4 mg Oral Q8H PRN    Or    ondansetron (ZOFRAN) injection 4 mg  4 mg IntraVENous Q6H PRN    famotidine (PEPCID) tablet 20 mg  20 mg Oral DAILY    dexamethasone (DECADRON) 4 mg/mL injection 4 mg  4 mg IntraVENous Q6H    atorvastatin (LIPITOR) tablet 20 mg  20 mg Oral QHS    carvediloL (COREG) tablet 6.25 mg  6.25 mg Oral BID WITH MEALS    furosemide (LASIX) tablet 40 mg  40 mg Oral DAILY    lisinopriL (PRINIVIL, ZESTRIL) tablet 5 mg  5 mg Oral DAILY    sertraline (ZOLOFT) tablet 25 mg  25 mg Oral QPM    spironolactone (ALDACTONE) tablet 25 mg  25 mg Oral DAILY    topiramate (TOPAMAX) tablet 100 mg  100 mg Oral BID    levETIRAcetam (KEPPRA) 500 mg in 0.9% sodium chloride 100 mL IVPB 500 mg IntraVENous Q12H    glucose chewable tablet 16 g  4 Tab Oral PRN    glucagon (GLUCAGEN) injection 1 mg  1 mg IntraMUSCular PRN    dextrose 10% infusion 0-250 mL  0-250 mL IntraVENous PRN    insulin lispro (HUMALOG) injection   SubCUTAneous AC&HS     ______________________________________________________________________  EXPECTED LENGTH OF STAY: - - -  ACTUAL LENGTH OF STAY:          [address], DO

## 2020-10-24 NOTE — PROGRESS NOTES
Hematology-Oncology Progress Note      Lynn Gonzales  1950  [id number]  10/24/2020      Subjective:     Awake and alert. No new complaints. Sister at bedside, wondering how we know she has lung cancer and whether we can confirm it without putting her through the risk and stress of a biopsy. Allergies: Patient has no known allergies.   Current Facility-Administered Medications   Medication Dose Route Frequency Provider Last Rate Last Dose    albuterol-ipratropium (DUO-NEB) 2.5 MG-0.5 MG/3 ML  3 mL Nebulization BID RT Dexter Rust MD   Stopped at 10/24/20 0900    insulin lispro (HUMALOG) injection 5 Units  5 Units SubCUTAneous TIDAC Samantha Burgess DO   Stopped at 10/24/20 1300    sodium chloride (NS) flush 5-40 mL  5-40 mL IntraVENous Q8H Roly Hill MD   10 mL at 10/24/20 3386    sodium chloride (NS) flush 5-40 mL  5-40 mL IntraVENous PRN Musa Hill MD        acetaminophen (TYLENOL) tablet 650 mg  650 mg Oral Q6H PRN Stuart Chaves MD   650 mg at 10/24/20 0703    Or    acetaminophen (TYLENOL) suppository 650 mg  650 mg Rectal Q6H PRN Musa Hill MD        polyethylene glycol (MIRALAX) packet 17 g  17 g Oral DAILY PRN Musa Hill MD        ondansetron (ZOFRAN ODT) tablet 4 mg  4 mg Oral Q8H PRN Musa Hill MD        Or    ondansetron (ZOFRAN) injection 4 mg  4 mg IntraVENous Q6H PRN Musa Hill MD        famotidine (PEPCID) tablet 20 mg  20 mg Oral DAILY Musa Hill MD   20 mg at 10/24/20 0825    dexamethasone (DECADRON) 4 mg/mL injection 4 mg  4 mg IntraVENous Q6H Roly Hill MD   4 mg at 10/24/20 1015    atorvastatin (LIPITOR) tablet 20 mg  20 mg Oral QHS Stuart Chaves MD   20 mg at 10/23/20 2219    carvediloL (COREG) tablet 6.25 mg  6.25 mg Oral BID WITH MEALS Musa Hill MD   6.25 mg at 10/24/20 1015    furosemide (LASIX) tablet 40 mg  40 mg Oral DAILY Musa Hill MD   40 mg at 10/24/20 1254    lisinopriL (PRINIVIL, ZESTRIL) tablet 5 mg  5 mg Oral DAILY Avelina Hill MD   5 mg at 10/24/20 0825    sertraline (ZOLOFT) tablet 25 mg  25 mg Oral QPM Radha Rodriguez MD   25 mg at 10/23/20 1703    spironolactone (ALDACTONE) tablet 25 mg  25 mg Oral DAILY Radha Rodriguez MD   25 mg at 10/24/20 3371    topiramate (TOPAMAX) tablet 100 mg  100 mg Oral BID Radha Rodriguez MD   100 mg at 10/24/20 0825    levETIRAcetam (KEPPRA) 500 mg in 0.9% sodium chloride 100 mL IVPB  500 mg IntraVENous Q12H Radha Rodriguez MD 0 mL/hr at 10/22/20 2004 500 mg at 10/24/20 8741    glucose chewable tablet 16 g  4 Tab Oral PRN Radha Rodriguez MD        glucagon (GLUCAGEN) injection 1 mg  1 mg IntraMUSCular PRN Radha Rodriguez MD        dextrose 10% infusion 0-250 mL  0-250 mL IntraVENous PRN Radha Rodriguez MD        insulin lispro (HUMALOG) injection   SubCUTAneous AC&HS Radha Rodriguze MD   15 Units at 10/24/20 1217    cefTRIAXone (ROCEPHIN) 1 g in 0.9% sodium chloride (MBP/ADV) 50 mL  1 g IntraVENous Q24H Rafael MANRIQUE  mL/hr at 10/23/20 1700 1 g at 10/23/20 1700     Objective:     Patient Vitals for the past 24 hrs:   BP Temp Pulse Resp SpO2   10/24/20 1254   75     10/24/20 0950 (!) 110/51 97.9 °F (36.6 °C) 65 19    10/24/20 0825 (!) 124/57  72     10/24/20 0615 (!) 97/47 97.4 °F (36.3 °C) 64 19    10/24/20 0215 (!) 100/50 98.1 °F (36.7 °C) 67 19 93 %   10/23/20 2203 (!) 107/51 97.9 °F (36.6 °C) 79 22 96 %   10/23/20 2043     96 %   10/23/20 1858 (!) 106/53  77 21    10/23/20 1703   68     10/23/20 1507     97 %   10/23/20 1418 (!) 112/56 98.5 °F (36.9 °C) 79 20 97 %       Gen: NAD  HEENT: PERRL, Sclerae anicteric  Cv: RRR without m/r/g  Pulm: CTA bilaterally  Abd: NABS, NTND, No HSM  Ext: No c/c/e    Available labs reviewed:  Labs:    Recent Results (from the past 24 hour(s))   GLUCOSE, POC    Collection Time: 10/23/20  4:29 PM Result Value Ref Range    Glucose (POC) 117 (H) 65 - 100 mg/dL    Performed by Vish Morgan, POC    Collection Time: 10/23/20 10:05 PM   Result Value Ref Range    Glucose (POC) 332 (H) 65 - 100 mg/dL    Performed by Aleksey Harrington    MAGNESIUM    Collection Time: 10/24/20  6:37 AM   Result Value Ref Range    Magnesium 2.4 1.6 - 2.4 mg/dL   SAMPLES BEING HELD    Collection Time: 10/24/20  6:37 AM   Result Value Ref Range    SAMPLES BEING HELD 1LAV     COMMENT        Add-on orders for these samples will be processed based on acceptable specimen integrity and analyte stability, which may vary by analyte. GLUCOSE, POC    Collection Time: 10/24/20  7:18 AM   Result Value Ref Range    Glucose (POC) 223 (H) 65 - 100 mg/dL    Performed by Aleksey Harrington    GLUCOSE, POC    Collection Time: 10/24/20 11:36 AM   Result Value Ref Range    Glucose (POC) 367 (H) 65 - 100 mg/dL    Performed by Vázquez Meter and Plan     70 y/o woman with a history of tobacco abuse, admitted with left arm weakness. Noted to have hilar mass, multilpe CNS lesions, liver lesions concerning, but not diagnostic of stage IV lung CA. 1. Suspected lung cancer: I spoke at length with the patient and her sister and told them that one option would be to pass on the diagnostic test and go home. Another option, and the one that I would favor, is to do a FOB/EBUS as outlined by pulmonary to obtain a definitive tissue diagnosis. Anticoagulation/anti-platelets being held for that procedure. Case d/w Radiation Oncology. They are aware of patient and are waiting to see pending pathologic diagnosis. Thank you for allowing us to participate in the care of this very pleasant patient.     Sayra Cuellar MD  Hematology/Oncology  Phone [phone]'

## 2020-10-24 NOTE — CONSULTS
PALLIATIVE MEDICINE            Pt seen. Official note pending. Plans to follow up with the pt again on Monday to continue discussions. Verdell Goltz.  [address] MSN, FNP-BC, [facility]

## 2020-10-24 NOTE — PROGRESS NOTES
MRI shows multiple (six) lesions in brain    No real dominant lesion. Likely will need radiation to these because multiple lesions.     Will discuss with Dr. Muna Magaña

## 2020-10-24 NOTE — PROGRESS NOTES
Problem: Pressure Injury - Risk of  Goal: *Prevention of pressure injury  Description: Document Elio Scale and appropriate interventions in the flowsheet. Outcome: Progressing Towards Goal  Note: Pressure Injury Interventions:  Sensory Interventions: Assess changes in LOC, Assess need for specialty bed, Check visual cues for pain, Discuss PT/OT consult with provider    Moisture Interventions: Assess need for specialty bed, Check for incontinence Q2 hours and as needed, Internal/External urinary devices    Activity Interventions: Assess need for specialty bed, Pressure redistribution bed/mattress(bed type), PT/OT evaluation    Mobility Interventions: Assess need for specialty bed, HOB 30 degrees or less, Pressure redistribution bed/mattress (bed type), PT/OT evaluation    Nutrition Interventions: Document food/fluid/supplement intake, Discuss nutritional consult with provider    Friction and Shear Interventions: HOB 30 degrees or less, Lift sheet                Problem: Falls - Risk of  Goal: *Absence of Falls  Description: Document Edgard Fall Risk and appropriate interventions in the flowsheet.   Outcome: Progressing Towards Goal  Note: Fall Risk Interventions:            Medication Interventions: Evaluate medications/consider consulting pharmacy    Elimination Interventions: Call light in reach, Patient to call for help with toileting needs, Toileting schedule/hourly rounds    History of Falls Interventions: Consult care management for discharge planning, Door open when patient unattended, Room close to nurse's station         Problem: Brain Tumor Day 3 to Discharge  Goal: Nutrition/Diet  Outcome: Progressing Towards Goal  Goal: Medications  Outcome: Progressing Towards Goal  Goal: Treatments/Interventions/Procedures  Outcome: Progressing Towards Goal

## 2020-10-24 NOTE — PROGRESS NOTES
ADULT PROTOCOL: JET AEROSOL ASSESSMENT    Patient  Yvonne Romo     69 y.o.   female     10/24/2020  2:47 AM    Breath Sounds Pre Procedure: Right Breath Sounds: Diminished                               Left Breath Sounds: Diminished    Breath Sounds Post Procedure: Right Breath Sounds: Diminished                                 Left Breath Sounds: Diminished    Breathing pattern: Pre procedure Breathing Pattern: Regular          Post procedure      Heart Rate: Pre procedure Pulse: 71           Post procedure Pulse: 77    Resp Rate: Pre procedure Respirations: 24           Post procedure Respirations: 22    Peak Flow: Pre bronchodilator             Post bronchodilator       FVC/FEV1:      Incentive Spirometry:             Cough: Pre procedure Cough: Dry, Non-productive               Post procedure      Suctioned: NO    Sputum: Pre procedure                   Post procedure      Oxygen: O2 Device: Nasal cannula   Flow rate (L/min) 2     Changed: NO    SpO2: Pre procedure SpO2: 97 %   with oxygen              Post procedure SpO2: 95 %  with oxygen    Nebulizer Therapy: Current medications Aerosolized Medications: DuoNeb      Changed: YES    Smoking History:     Problem List:   Patient Active Problem List   Diagnosis Code    Current episode of major depressive disorder without prior episode F32.9    Chronic bronchitis (HCC) J42    Contusion, shoulder and upper arm, multiple sites, initial encounter S40.019A, S40.029A    Brain metastases ([facility] [address] C79.31       Respiratory Therapist: Viky Rojas, RT

## 2020-10-25 LAB
GLUCOSE BLD STRIP.AUTO-MCNC: 200 MG/DL (ref 65–100)
GLUCOSE BLD STRIP.AUTO-MCNC: 201 MG/DL (ref 65–100)
GLUCOSE BLD STRIP.AUTO-MCNC: 228 MG/DL (ref 65–100)
GLUCOSE BLD STRIP.AUTO-MCNC: 283 MG/DL (ref 65–100)
SERVICE CMNT-IMP: ABNORMAL

## 2020-10-25 PROCEDURE — 74011636637 HC RX REV CODE- 636/637: Performed by: INTERNAL MEDICINE

## 2020-10-25 PROCEDURE — 74011636637 HC RX REV CODE- 636/637: Performed by: HOSPITALIST

## 2020-10-25 PROCEDURE — 74011000250 HC RX REV CODE- 250: Performed by: INTERNAL MEDICINE

## 2020-10-25 PROCEDURE — 82962 GLUCOSE BLOOD TEST: CPT

## 2020-10-25 PROCEDURE — 94640 AIRWAY INHALATION TREATMENT: CPT

## 2020-10-25 PROCEDURE — 74011250636 HC RX REV CODE- 250/636: Performed by: HOSPITALIST

## 2020-10-25 PROCEDURE — 74011250637 HC RX REV CODE- 250/637: Performed by: INTERNAL MEDICINE

## 2020-10-25 PROCEDURE — 65660000000 HC RM CCU STEPDOWN

## 2020-10-25 PROCEDURE — 74011000258 HC RX REV CODE- 258: Performed by: HOSPITALIST

## 2020-10-25 PROCEDURE — 74011250637 HC RX REV CODE- 250/637: Performed by: HOSPITALIST

## 2020-10-25 RX ORDER — LANOLIN ALCOHOL/MO/W.PET/CERES
3 CREAM (GRAM) TOPICAL
Status: DISCONTINUED | OUTPATIENT
Start: 2020-10-25 | End: 2020-11-11 | Stop reason: HOSPADM

## 2020-10-25 RX ADMIN — INSULIN LISPRO 3 UNITS: 100 INJECTION, SOLUTION INTRAVENOUS; SUBCUTANEOUS at 09:07

## 2020-10-25 RX ADMIN — ACETAMINOPHEN 650 MG: 325 TABLET ORAL at 21:15

## 2020-10-25 RX ADMIN — SODIUM CHLORIDE 500 MG: 900 INJECTION, SOLUTION INTRAVENOUS at 06:00

## 2020-10-25 RX ADMIN — SERTRALINE HYDROCHLORIDE 25 MG: 50 TABLET ORAL at 18:55

## 2020-10-25 RX ADMIN — Medication 10 ML: at 21:06

## 2020-10-25 RX ADMIN — ACETAMINOPHEN 650 MG: 325 TABLET ORAL at 02:25

## 2020-10-25 RX ADMIN — DEXAMETHASONE SODIUM PHOSPHATE 4 MG: 4 INJECTION, SOLUTION INTRA-ARTICULAR; INTRALESIONAL; INTRAMUSCULAR; INTRAVENOUS; SOFT TISSUE at 21:06

## 2020-10-25 RX ADMIN — INSULIN LISPRO 5 UNITS: 100 INJECTION, SOLUTION INTRAVENOUS; SUBCUTANEOUS at 12:24

## 2020-10-25 RX ADMIN — DEXAMETHASONE SODIUM PHOSPHATE 4 MG: 4 INJECTION, SOLUTION INTRA-ARTICULAR; INTRALESIONAL; INTRAMUSCULAR; INTRAVENOUS; SOFT TISSUE at 04:00

## 2020-10-25 RX ADMIN — INSULIN LISPRO 5 UNITS: 100 INJECTION, SOLUTION INTRAVENOUS; SUBCUTANEOUS at 17:20

## 2020-10-25 RX ADMIN — INSULIN LISPRO 5 UNITS: 100 INJECTION, SOLUTION INTRAVENOUS; SUBCUTANEOUS at 09:08

## 2020-10-25 RX ADMIN — ATORVASTATIN CALCIUM 20 MG: 20 TABLET, FILM COATED ORAL at 21:06

## 2020-10-25 RX ADMIN — SODIUM CHLORIDE 500 MG: 900 INJECTION, SOLUTION INTRAVENOUS at 19:04

## 2020-10-25 RX ADMIN — IPRATROPIUM BROMIDE AND ALBUTEROL SULFATE 3 ML: .5; 3 SOLUTION RESPIRATORY (INHALATION) at 20:24

## 2020-10-25 RX ADMIN — TOPIRAMATE 100 MG: 100 TABLET, FILM COATED ORAL at 18:55

## 2020-10-25 RX ADMIN — FAMOTIDINE 20 MG: 20 TABLET ORAL at 09:09

## 2020-10-25 RX ADMIN — INSULIN LISPRO 2 UNITS: 100 INJECTION, SOLUTION INTRAVENOUS; SUBCUTANEOUS at 21:15

## 2020-10-25 RX ADMIN — IPRATROPIUM BROMIDE AND ALBUTEROL SULFATE 3 ML: .5; 3 SOLUTION RESPIRATORY (INHALATION) at 10:21

## 2020-10-25 RX ADMIN — Medication 3 MG: at 21:06

## 2020-10-25 RX ADMIN — INSULIN LISPRO 3 UNITS: 100 INJECTION, SOLUTION INTRAVENOUS; SUBCUTANEOUS at 17:19

## 2020-10-25 RX ADMIN — CARVEDILOL 6.25 MG: 6.25 TABLET, FILM COATED ORAL at 18:55

## 2020-10-25 RX ADMIN — DEXAMETHASONE SODIUM PHOSPHATE 4 MG: 4 INJECTION, SOLUTION INTRA-ARTICULAR; INTRALESIONAL; INTRAMUSCULAR; INTRAVENOUS; SOFT TISSUE at 16:34

## 2020-10-25 RX ADMIN — ACETAMINOPHEN 650 MG: 325 TABLET ORAL at 09:08

## 2020-10-25 RX ADMIN — Medication 10 ML: at 16:34

## 2020-10-25 RX ADMIN — Medication 10 ML: at 05:57

## 2020-10-25 RX ADMIN — TOPIRAMATE 100 MG: 100 TABLET, FILM COATED ORAL at 09:09

## 2020-10-25 RX ADMIN — DEXAMETHASONE SODIUM PHOSPHATE 4 MG: 4 INJECTION, SOLUTION INTRA-ARTICULAR; INTRALESIONAL; INTRAMUSCULAR; INTRAVENOUS; SOFT TISSUE at 09:09

## 2020-10-25 NOTE — PROGRESS NOTES
Bedside shift change report given to [facility]  (oncoming nurse) by Juan Booker (offgoing nurse). Report included the following information SBAR, Kardex, Intake/Output, MAR and Dual Neuro Assessment.

## 2020-10-25 NOTE — ROUTINE PROCESS
Bedside shift change report given to [address] (oncoming nurse) by Melinda Lester RN (offgoing nurse). Report included the following information SBAR, Kardex, ED Summary, Intake/Output, MAR, Cardiac Rhythm NSR and Dual Neuro Assessment.

## 2020-10-25 NOTE — PROGRESS NOTES
Problem: Brain Tumor Day 3 to Discharge  Goal: Nutrition/Diet  Outcome: Progressing Towards Goal  Goal: Discharge Planning  Outcome: Progressing Towards Goal  Goal: Medications  Outcome: Progressing Towards Goal  Goal: Treatments/Interventions/Procedures  Outcome: Progressing Towards Goal  Goal: Progressive Mobility and Function  Outcome: Progressing Towards Goal  Goal: Psychosocial  Outcome: Progressing Towards Goal

## 2020-10-25 NOTE — PROGRESS NOTES
[facility] Adult  Hospitalist Group                                                                                          Hospitalist Progress Note  Samantha Santana Settler, DO  Answering service: [phone] from in house phone        Date of Service:  10/25/2020  NAME:  Mildred Bello  :  1950  MRN:  [id number]      Admission Summary:   69 y.o. female,  who has history of chronic systolic heart failure status post ICD, chronic COPD on home oxygen 2 L, hypertension, hyperlipidemia, anticoagulation with Eliquis who presents with fall 2 weeks back and left sided weakness. She reports that her left arm felt funny and also she was having weakness in the left arm. It is not painful. And she did not seek medical attention. Given persistence of symptoms, EMS was called and to take her to the local emergency room. At the ER there work-up revealed that she has lung mass as well as brain tumors. She was given IV Decadron and transferred to this facility for neurosurgical evaluation. She was also noted to have a dislocated left shoulder. There was report of lung mass which is right suprahilar with progression into the mediastinum. There is also report of 1.2 cm right frontal mass with 5 cm right to left shift. There is no report of seizures. Interval history / Subjective:   F/U left sided weakness. Patient seen and examined. No acute events. Decreased sleep at night. Left  strength appear improved. Cognition better. Blood glucose more controlled. Called family and updated in detail. Assessment & Plan:     Brain masses, consistent with metastatic disease, suspect lung primary:  Cerebral edema secondary to above:  Left-sided weakness:  -MRI brain done and demonstrated intracranial metastases, largest 1.3 x 1.3 cm, with surrounding edema. Osseous metastases in right frontal, parietal, C2 body  -Neurosurgery consulted. No surgical benefit.  Will need outpatient radiation oncology  -Continue steroids, Keppra  -Seizure precautions  -Neuro checks    Lung mass with erosion into mediastinum, suspicious for carcinoma:  -Pulmonary consulted. Plans for EUS/endobronchial biopsy next week  --per note, will need to be off Plavix x5 days, Eliquis x2 days and formal cardiac clearance    Moderate right pleural effusion:   -respiratory status stable, monitor closely, consider thoracentesis     Postobstructive infiltrate RUL  -Seen on CT  -no unusual cough, fever  -on Ceftriaxone    Probable UTI  -urine culture Proteus  -continue Ceftriaxone for 3 days    Diabetes type 2 with hyperglycemia   -Hba1c 6.1  -SSI, continue meal time, NPH BID     Chronic COPD with chronic hypoxic respiratory failure, not in exacerbation  -Continue home oxygen, AA nebs    Chronic congestive heart failure, presumed systolic given patient has ICD and is on Aldactone  -Continued home medications  -Requested medical records from her home hospital     Clavicular fracture:  Shoulder dislocation ruled out:  -Appreciate orthopedic surgery. No evidence of shoulder dislocation. Can wear sling for comfort       Chronic anticoagulation with Eliquis for ??  -The patient does not know why she is on chronic anticoagulation  -Hold as planning for procedures    H/o CAD  -Hold plavix today if planned for biopsies.     Hypertension  -Continue with Coreg    Prior tobacco use     Diabetic diet    PT/OT      Code status: full. Awaiting palliative care  DVT prophylaxis: SCDs    Care Plan discussed with: Patient/Family  Anticipated Disposition: Home w/Family  Anticipated Discharge: Greater than 48 hours     Hospital Problems  Date Reviewed: 10/23/2020          Codes Class Noted POA    * (Principal) Brain metastases ([facility]) ICD-10-CM: C79.31  ICD-9-CM: 198.3  10/22/2020 Yes                Review of Systems:     Negative unless stated above    Vital Signs:    Last 24hrs VS reviewed since prior progress note.  Most recent are:  Visit Vitals  BP (!) 101/51 (BP 1 Location: Left arm, BP Patient Position: At rest)   Pulse 61   Temp 98 °F (36.7 °C)   Resp 19   Wt 55.4 kg (122 lb 3.2 oz)   SpO2 100%   BMI 22.35 kg/m²       No intake or output data in the 24 hours ending 10/25/20 1048     Physical Examination:             Constitutional:  No acute distress, cooperative, pleasant    ENT:  Oral mucosa moist, oropharynx benign. Resp:  CTA bilaterally. No wheezing/rhonchi/rales. No accessory muscle use   CV:  Regular rhythm, normal rate, no murmurs, gallops, rubs    GI:  Soft, non distended, non tender. normoactive bowel sounds, no hepatosplenomegaly     Musculoskeletal:  No edema, warm, 2+ pulses throughout    Neurologic:  Some left upper extremity  strength, alert            Data Review:    Review and/or order of clinical lab test  Review and/or order of tests in the radiology section of CPT  Review and/or order of tests in the medicine section of CPT      Labs:     No results for input(s): WBC, HGB, HCT, PLT, HGBEXT, HCTEXT, PLTEXT, HGBEXT, HCTEXT, PLTEXT in the last 72 hours. Recent Labs     10/24/20  0637 10/23/20  0538 10/23/20  0537   NA  --   --  136   K  --   --  3.8   CL  --   --  97   CO2  --   --  31   BUN  --   --  29*   CREA  --   --  1.24*   GLU  --   --  340*   CA  --   --  8.7   MG 2.4 2.0  --      No results for input(s): ALT, AP, TBIL, TBILI, TP, ALB, GLOB, GGT, AML, LPSE in the last 72 hours. No lab exists for component: SGOT, GPT, AMYP, HLPSE  No results for input(s): INR, PTP, APTT, INREXT, INREXT in the last 72 hours. No results for input(s): FE, TIBC, PSAT, FERR in the last 72 hours. No results found for: FOL, RBCF   No results for input(s): PH, PCO2, PO2 in the last 72 hours. No results for input(s): CPK, CKNDX, TROIQ in the last 72 hours.     No lab exists for component: CPKMB  No results found for: CHOL, CHOLX, CHLST, CHOLV, HDL, HDLP, LDL, LDLC, DLDLP, TGLX, TRIGL, TRIGP, CHHD, CHHDX  Lab Results   Component Value Date/Time Glucose (POC) 200 (H) 10/25/2020 07:40 AM    Glucose (POC) 143 (H) 10/24/2020 09:54 PM    Glucose (POC) 142 (H) 10/24/2020 05:19 PM    Glucose (POC) 367 (H) 10/24/2020 11:36 AM    Glucose (POC) 223 (H) 10/24/2020 07:18 AM     Lab Results   Component Value Date/Time    Color YELLOW/STRAW 10/22/2020 09:15 AM    Appearance TURBID (A) 10/22/2020 09:15 AM    Specific gravity 1.012 10/22/2020 09:15 AM    pH (UA) 5.5 10/22/2020 09:15 AM    Protein Negative 10/22/2020 09:15 AM    Glucose Negative 10/22/2020 09:15 AM    Ketone Negative 10/22/2020 09:15 AM    Bilirubin Negative 10/22/2020 09:15 AM    Urobilinogen 0.2 10/22/2020 09:15 AM    Nitrites Positive (A) 10/22/2020 09:15 AM    Leukocyte Esterase LARGE (A) 10/22/2020 09:15 AM    Epithelial cells FEW 10/22/2020 09:15 AM    Bacteria 4+ (A) 10/22/2020 09:15 AM    WBC  10/22/2020 09:15 AM    RBC 0-5 10/22/2020 09:15 AM         Medications Reviewed:     Current Facility-Administered Medications   Medication Dose Route Frequency    albuterol-ipratropium (DUO-NEB) 2.5 MG-0.5 MG/3 ML  3 mL Nebulization BID RT    insulin lispro (HUMALOG) injection 5 Units  5 Units SubCUTAneous TIDAC    sodium chloride (NS) flush 5-40 mL  5-40 mL IntraVENous Q8H    sodium chloride (NS) flush 5-40 mL  5-40 mL IntraVENous PRN    acetaminophen (TYLENOL) tablet 650 mg  650 mg Oral Q6H PRN    Or    acetaminophen (TYLENOL) suppository 650 mg  650 mg Rectal Q6H PRN    polyethylene glycol (MIRALAX) packet 17 g  17 g Oral DAILY PRN    ondansetron (ZOFRAN ODT) tablet 4 mg  4 mg Oral Q8H PRN    Or    ondansetron (ZOFRAN) injection 4 mg  4 mg IntraVENous Q6H PRN    famotidine (PEPCID) tablet 20 mg  20 mg Oral DAILY    dexamethasone (DECADRON) 4 mg/mL injection 4 mg  4 mg IntraVENous Q6H    atorvastatin (LIPITOR) tablet 20 mg  20 mg Oral QHS    carvediloL (COREG) tablet 6.25 mg  6.25 mg Oral BID WITH MEALS    furosemide (LASIX) tablet 40 mg  40 mg Oral DAILY    lisinopriL (PRINIVIL, ZESTRIL) tablet 5 mg  5 mg Oral DAILY    sertraline (ZOLOFT) tablet 25 mg  25 mg Oral QPM    spironolactone (ALDACTONE) tablet 25 mg  25 mg Oral DAILY    topiramate (TOPAMAX) tablet 100 mg  100 mg Oral BID    levETIRAcetam (KEPPRA) 500 mg in 0.9% sodium chloride 100 mL IVPB  500 mg IntraVENous Q12H    glucose chewable tablet 16 g  4 Tab Oral PRN    glucagon (GLUCAGEN) injection 1 mg  1 mg IntraMUSCular PRN    dextrose 10% infusion 0-250 mL  0-250 mL IntraVENous PRN    insulin lispro (HUMALOG) injection   SubCUTAneous AC&HS     ______________________________________________________________________  EXPECTED LENGTH OF STAY: - - -  ACTUAL LENGTH OF STAY:          [address] [facility],

## 2020-10-26 LAB
ANION GAP SERPL CALC-SCNC: 4 MMOL/L (ref 5–15)
BUN SERPL-MCNC: 45 MG/DL (ref 6–20)
BUN/CREAT SERPL: 30 (ref 12–20)
CALCIUM SERPL-MCNC: 8.6 MG/DL (ref 8.5–10.1)
CHLORIDE SERPL-SCNC: 103 MMOL/L (ref 97–108)
CO2 SERPL-SCNC: 29 MMOL/L (ref 21–32)
COMMENT, HOLDF: NORMAL
CREAT SERPL-MCNC: 1.52 MG/DL (ref 0.55–1.02)
GLUCOSE BLD STRIP.AUTO-MCNC: 186 MG/DL (ref 65–100)
GLUCOSE BLD STRIP.AUTO-MCNC: 266 MG/DL (ref 65–100)
GLUCOSE BLD STRIP.AUTO-MCNC: 291 MG/DL (ref 65–100)
GLUCOSE BLD STRIP.AUTO-MCNC: 366 MG/DL (ref 65–100)
GLUCOSE BLD STRIP.AUTO-MCNC: 413 MG/DL (ref 65–100)
GLUCOSE SERPL-MCNC: 181 MG/DL (ref 65–100)
POTASSIUM SERPL-SCNC: 4.8 MMOL/L (ref 3.5–5.1)
SAMPLES BEING HELD,HOLD: NORMAL
SERVICE CMNT-IMP: ABNORMAL
SODIUM SERPL-SCNC: 136 MMOL/L (ref 136–145)

## 2020-10-26 PROCEDURE — 74011250636 HC RX REV CODE- 250/636: Performed by: HOSPITALIST

## 2020-10-26 PROCEDURE — 82962 GLUCOSE BLOOD TEST: CPT

## 2020-10-26 PROCEDURE — 92526 ORAL FUNCTION THERAPY: CPT

## 2020-10-26 PROCEDURE — 74011000250 HC RX REV CODE- 250: Performed by: INTERNAL MEDICINE

## 2020-10-26 PROCEDURE — 74011636637 HC RX REV CODE- 636/637: Performed by: INTERNAL MEDICINE

## 2020-10-26 PROCEDURE — 77010033678 HC OXYGEN DAILY

## 2020-10-26 PROCEDURE — 74011250637 HC RX REV CODE- 250/637: Performed by: INTERNAL MEDICINE

## 2020-10-26 PROCEDURE — 36415 COLL VENOUS BLD VENIPUNCTURE: CPT

## 2020-10-26 PROCEDURE — 94640 AIRWAY INHALATION TREATMENT: CPT

## 2020-10-26 PROCEDURE — 74011250637 HC RX REV CODE- 250/637: Performed by: HOSPITALIST

## 2020-10-26 PROCEDURE — 77030038269 HC DRN EXT URIN PURWCK BARD -A

## 2020-10-26 PROCEDURE — 80048 BASIC METABOLIC PNL TOTAL CA: CPT

## 2020-10-26 PROCEDURE — 74011000258 HC RX REV CODE- 258: Performed by: HOSPITALIST

## 2020-10-26 PROCEDURE — 97530 THERAPEUTIC ACTIVITIES: CPT

## 2020-10-26 PROCEDURE — 65660000000 HC RM CCU STEPDOWN

## 2020-10-26 PROCEDURE — 97161 PT EVAL LOW COMPLEX 20 MIN: CPT

## 2020-10-26 PROCEDURE — 74011636637 HC RX REV CODE- 636/637: Performed by: HOSPITALIST

## 2020-10-26 RX ORDER — IPRATROPIUM BROMIDE AND ALBUTEROL SULFATE 2.5; .5 MG/3ML; MG/3ML
3 SOLUTION RESPIRATORY (INHALATION)
Status: DISCONTINUED | OUTPATIENT
Start: 2020-10-26 | End: 2020-11-11 | Stop reason: HOSPADM

## 2020-10-26 RX ORDER — INSULIN GLARGINE 100 [IU]/ML
20 INJECTION, SOLUTION SUBCUTANEOUS
Status: DISCONTINUED | OUTPATIENT
Start: 2020-10-26 | End: 2020-10-27

## 2020-10-26 RX ADMIN — INSULIN LISPRO 5 UNITS: 100 INJECTION, SOLUTION INTRAVENOUS; SUBCUTANEOUS at 16:57

## 2020-10-26 RX ADMIN — SODIUM CHLORIDE 500 MG: 900 INJECTION, SOLUTION INTRAVENOUS at 06:37

## 2020-10-26 RX ADMIN — SERTRALINE HYDROCHLORIDE 25 MG: 50 TABLET ORAL at 18:06

## 2020-10-26 RX ADMIN — DEXAMETHASONE SODIUM PHOSPHATE 4 MG: 4 INJECTION, SOLUTION INTRA-ARTICULAR; INTRALESIONAL; INTRAMUSCULAR; INTRAVENOUS; SOFT TISSUE at 16:56

## 2020-10-26 RX ADMIN — Medication 10 ML: at 16:57

## 2020-10-26 RX ADMIN — FAMOTIDINE 20 MG: 20 TABLET ORAL at 08:16

## 2020-10-26 RX ADMIN — ATORVASTATIN CALCIUM 20 MG: 20 TABLET, FILM COATED ORAL at 22:49

## 2020-10-26 RX ADMIN — Medication 3 MG: at 22:59

## 2020-10-26 RX ADMIN — ACETAMINOPHEN 650 MG: 325 TABLET ORAL at 18:12

## 2020-10-26 RX ADMIN — SPIRONOLACTONE 25 MG: 25 TABLET ORAL at 08:16

## 2020-10-26 RX ADMIN — TOPIRAMATE 100 MG: 100 TABLET, FILM COATED ORAL at 18:06

## 2020-10-26 RX ADMIN — ACETAMINOPHEN 650 MG: 325 TABLET ORAL at 04:10

## 2020-10-26 RX ADMIN — Medication 10 ML: at 22:00

## 2020-10-26 RX ADMIN — IPRATROPIUM BROMIDE AND ALBUTEROL SULFATE 3 ML: .5; 3 SOLUTION RESPIRATORY (INHALATION) at 07:17

## 2020-10-26 RX ADMIN — FUROSEMIDE 40 MG: 40 TABLET ORAL at 08:16

## 2020-10-26 RX ADMIN — INSULIN LISPRO 5 UNITS: 100 INJECTION, SOLUTION INTRAVENOUS; SUBCUTANEOUS at 11:53

## 2020-10-26 RX ADMIN — INSULIN LISPRO 7 UNITS: 100 INJECTION, SOLUTION INTRAVENOUS; SUBCUTANEOUS at 11:53

## 2020-10-26 RX ADMIN — DEXAMETHASONE SODIUM PHOSPHATE 4 MG: 4 INJECTION, SOLUTION INTRA-ARTICULAR; INTRALESIONAL; INTRAMUSCULAR; INTRAVENOUS; SOFT TISSUE at 22:59

## 2020-10-26 RX ADMIN — ACETAMINOPHEN 650 MG: 325 TABLET ORAL at 22:59

## 2020-10-26 RX ADMIN — INSULIN LISPRO 2 UNITS: 100 INJECTION, SOLUTION INTRAVENOUS; SUBCUTANEOUS at 16:56

## 2020-10-26 RX ADMIN — INSULIN LISPRO 3 UNITS: 100 INJECTION, SOLUTION INTRAVENOUS; SUBCUTANEOUS at 23:20

## 2020-10-26 RX ADMIN — INSULIN GLARGINE 20 UNITS: 100 INJECTION, SOLUTION SUBCUTANEOUS at 23:20

## 2020-10-26 RX ADMIN — DEXAMETHASONE SODIUM PHOSPHATE 4 MG: 4 INJECTION, SOLUTION INTRA-ARTICULAR; INTRALESIONAL; INTRAMUSCULAR; INTRAVENOUS; SOFT TISSUE at 04:00

## 2020-10-26 RX ADMIN — SODIUM CHLORIDE 500 MG: 900 INJECTION, SOLUTION INTRAVENOUS at 19:16

## 2020-10-26 RX ADMIN — DEXAMETHASONE SODIUM PHOSPHATE 4 MG: 4 INJECTION, SOLUTION INTRA-ARTICULAR; INTRALESIONAL; INTRAMUSCULAR; INTRAVENOUS; SOFT TISSUE at 09:51

## 2020-10-26 RX ADMIN — INSULIN LISPRO 5 UNITS: 100 INJECTION, SOLUTION INTRAVENOUS; SUBCUTANEOUS at 08:17

## 2020-10-26 RX ADMIN — TOPIRAMATE 100 MG: 100 TABLET, FILM COATED ORAL at 08:16

## 2020-10-26 RX ADMIN — CARVEDILOL 6.25 MG: 6.25 TABLET, FILM COATED ORAL at 18:06

## 2020-10-26 NOTE — PROGRESS NOTES
Transition of Care Plan:        -RUR 15%  -Palliative Consulted, pending updates  -PT recommend SNF vs Home Health (1-2 person Heavy Assist), getting options.  -Procedure then tentative discharge on Thursday if stable  -Transport TBD    CM noted per rounds that patient will have procedure then tentative discharge on Thursday. CM getting SNF & HH options. CM monitoring Palliative note for updates in care at discharge.      CM will follow     LEENA Goff/CECE

## 2020-10-26 NOTE — PROGRESS NOTES
Problem: Brain Tumor Day 3 to Discharge  Goal: Activity/Safety  Outcome: Progressing Towards Goal  Goal: Nutrition/Diet  Outcome: Progressing Towards Goal  Goal: Discharge Planning  Outcome: Progressing Towards Goal  Goal: Medications  Outcome: Progressing Towards Goal  Goal: Treatments/Interventions/Procedures  Outcome: Progressing Towards Goal  Goal: Progressive Mobility and Function  Outcome: Progressing Towards Goal  Goal: Psychosocial  Outcome: Progressing Towards Goal

## 2020-10-26 NOTE — PROGRESS NOTES
Physical Therapy Screening:    An [facility] screening referral was triggered for physical therapy based on results obtained during the nursing admission assessment. The patients chart was reviewed and the patient is appropriate for a skilled therapy evaluation if there is a decline in functional mobility from baseline. Please order a consult for physical therapy if you are in agreement and would like an evaluation to be completed. Thank you.     Sarina Lancaster, PT

## 2020-10-26 NOTE — PROGRESS NOTES
Problem: Dysphagia (Adult)  Goal: *Acute Goals and Plan of Care (Insert Text)  Description: Speech Therapy Goals  Initiated 10/23/2020  1. Patient will tolerate regular diet/thin liquids without overt s/s aspiration within 7 days. MET    Outcome: Resolved/Met     SPEECH LANGUAGE PATHOLOGY DYSPHAGIA TREATMENT/DISCHARGE  Patient: Vince Luo (82 y.o. female)  Date: 10/26/2020  Diagnosis: Brain metastases ([facility] [address] [C79.31]   Brain metastases ([facility] [address]  Procedure(s) (LRB):  ENDOSCOPIC BRONCHOSCOPY ULTRASOUND (EBUS) (N/A)  BRONCHOSCOPY NAVIGATIONAL (N/A)    Precautions:       ASSESSMENT:  Patient with WFL oropharyngeal swallow, and no overt s/s aspiration observed. RN also reported diet tolerance. Note neurosurgical intervention not recommended for brain masses and patient will need outpatient radiation oncology. PLAN:  -Regular/thin liquid diet  Patient will be discharged from acute skilled speech therapy at this time. Rationale for discharge:  Goals achieved    Discharge Recommendations:  None     SUBJECTIVE:   Patient agreeable to PO intake. OBJECTIVE:   Cognitive and Communication Status:  Neurologic State: Alert, Appropriate for age  Orientation Level: Oriented X4  Cognition: Follows commands    Perception: Appears intact    Perseveration: No perseveration noted    Safety/Judgement: Awareness of environment  Dysphagia Treatment:     P.O. Trials:  Patient Position: upright in bed  Vocal quality prior to P.O.: No impairment  Consistency Presented: Thin liquid; Solid  How Presented: Self-fed/presented;Straw     Bolus Acceptance: No impairment  Bolus Formation/Control: No impairment     Propulsion: No impairment  Oral Residue: None  Initiation of Swallow: No impairment  Laryngeal Elevation: Functional  Aspiration Signs/Symptoms: None  Pharyngeal Phase Characteristics: No impairment, issues, or problems                   NOMS:   The NOMS functional outcome measure was used to quantify this patient's level of swallowing impairment. Based on the NOMS, the patient was determined to be at level 7 for swallow function     NOMS Swallowing Levels:  Level 1 (CN): NPO  Level 2 (CM): NPO but takes consistency in therapy  Level 3 (CL): Takes less than 50% of nutrition p.o. and continues with nonoral feedings; and/or safe with mod cues; and/or max diet restriction  Level 4 (CK): Safe swallow but needs mod cues; and/or mod diet restriction; and/or still requires some nonoral feeding/supplements  Level 5 (CJ): Safe swallow with min diet restriction; and/or needs min cues  Level 6 (CI): Independent with p.o.; rare cues; usually self cues; may need to avoid some foods or needs extra time  Level 7 ([address] [place] [address]): Independent for all p.o.  EVA. (2003). National Outcomes Measurement System (NOMS): Adult Speech-Language Pathology User's Guide. Pain:             After treatment:   Patient left in no apparent distress in bed, Call bell within reach, and Nursing notified    COMMUNICATION/EDUCATION:   The patient's plan of care including recommendations, planned interventions, and recommended diet changes were discussed with: Registered nurse.      Katlyn Morel, SLP  Time Calculation: 10 mins

## 2020-10-26 NOTE — PROGRESS NOTES
[address] [facility]  Hospitalist Group                                                                                          Hospitalist Progress Note  Samantha Trevino DO  Answering service: [phone] OR [phone] from in house phone        Date of Service:  10/26/2020  NAME:  Annis Cushing  :  1950  MRN:  [id number]      Admission Summary:   69 y.o. female,  who has history of chronic systolic heart failure status post ICD, chronic COPD on home oxygen 2 L, hypertension, hyperlipidemia, anticoagulation with Eliquis who presents with fall 2 weeks back and left sided weakness. She reports that her left arm felt funny and also she was having weakness in the left arm. It is not painful. And she did not seek medical attention. Given persistence of symptoms, EMS was called and to take her to the local emergency room. At the ER there work-up revealed that she has lung mass as well as brain tumors. She was given IV Decadron and transferred to this facility for neurosurgical evaluation. She was also noted to have a dislocated left shoulder. There was report of lung mass which is right suprahilar with progression into the mediastinum. There is also report of 1.2 cm right frontal mass with 5 cm right to left shift. There is no report of seizures. Interval history / Subjective:   F/U left sided weakness. Patient seen and examined. No acute events. Assessment & Plan:     Brain masses, consistent with metastatic disease, suspect lung primary:  Cerebral edema secondary to above:  Left-sided weakness:  -MRI brain done and demonstrated intracranial metastases, largest 1.3 x 1.3 cm, with surrounding edema. Osseous metastases in right frontal, parietal, C2 body  -Neurosurgery consulted. No surgical benefit.  Will need outpatient radiation oncology  -Continue steroids, Keppra  -Seizure precautions  -Neuro checks    Lung mass with erosion into mediastinum, suspicious for carcinoma:  -Pulmonary consulted. Plans for EUS/endobronchial biopsy 10/28  --per note, will need to be off Plavix x5 days, Eliquis x2 days and formal cardiac clearance    Moderate right pleural effusion:   -respiratory status stable, monitor closely, plans for thoracentesis 10/27    Postobstructive infiltrate RUL  -Seen on CT  -no unusual cough, fever  -on Ceftriaxone    Probable UTI  -urine culture Proteus  -continue Ceftriaxone for 3 days    Diabetes type 2 with hyperglycemia   -Hba1c 6.1, lantus 20 mg qhs, SSI  -appreciate diabetes management     Chronic COPD with chronic hypoxic respiratory failure, not in exacerbation  -Continue home oxygen, AA nebs    Chronic congestive heart failure, presumed systolic given patient has ICD and is on Aldactone  -Continued home medications  -Requested medical records from her home hospital     Clavicular fracture:  Shoulder dislocation ruled out:  -Appreciate orthopedic surgery. No evidence of shoulder dislocation. Can wear sling for comfort       Chronic anticoagulation with Eliquis for ??  -The patient does not know why she is on chronic anticoagulation  -Hold as planning for procedures    H/o CAD  -Hold plavix today if planned for biopsies.     Hypertension  -Continue with Coreg    Prior tobacco use     Diabetic diet    PT/OT      Code status: full. Awaiting palliative care  DVT prophylaxis: SCDs    Care Plan discussed with: Patient/Family  Anticipated Disposition: Home w/Family  Anticipated Discharge: Greater than 48 hours     Hospital Problems  Date Reviewed: 10/23/2020          Codes Class Noted POA    * (Principal) Brain metastases ([facility] [address] ICD-10-CM: C79.31  ICD-9-CM: 198.3  10/22/2020 Yes                Review of Systems:     Negative unless stated above    Vital Signs:    Last 24hrs VS reviewed since prior progress note.  Most recent are:  Visit Vitals  BP (!) 129/50 (BP 1 Location: Right arm, BP Patient Position: At rest)   Pulse 78   Temp 97.8 °F (36.6 °C)   Resp 17 Wt 56.3 kg (124 lb 1.6 oz)   SpO2 99%   BMI 22.70 kg/m²       No intake or output data in the 24 hours ending 10/26/20 1843     Physical Examination:             Constitutional:  No acute distress, cooperative, pleasant    ENT:  Oral mucosa moist, oropharynx benign. Resp:  CTA bilaterally. No wheezing/rhonchi/rales. No accessory muscle use   CV:  Regular rhythm, normal rate, no murmurs, gallops, rubs    GI:  Soft, non distended, non tender. normoactive bowel sounds, no hepatosplenomegaly     Musculoskeletal:  No edema, warm, 2+ pulses throughout    Neurologic:  Some left upper extremity  strength, alert            Data Review:    Review and/or order of clinical lab test  Review and/or order of tests in the radiology section of CPT  Review and/or order of tests in the medicine section of CPT      Labs:     No results for input(s): WBC, HGB, HCT, PLT, HGBEXT, HCTEXT, PLTEXT, HGBEXT, HCTEXT, PLTEXT in the last 72 hours. Recent Labs     10/26/20  0409 10/24/20  0637     --    K 4.8  --      --    CO2 29  --    BUN 45*  --    CREA 1.52*  --    *  --    CA 8.6  --    MG  --  2.4     No results for input(s): ALT, AP, TBIL, TBILI, TP, ALB, GLOB, GGT, AML, LPSE in the last 72 hours. No lab exists for component: SGOT, GPT, AMYP, HLPSE  No results for input(s): INR, PTP, APTT, INREXT, INREXT in the last 72 hours. No results for input(s): FE, TIBC, PSAT, FERR in the last 72 hours. No results found for: FOL, RBCF   No results for input(s): PH, PCO2, PO2 in the last 72 hours. No results for input(s): CPK, CKNDX, TROIQ in the last 72 hours.     No lab exists for component: CPKMB  No results found for: CHOL, CHOLX, CHLST, CHOLV, HDL, HDLP, LDL, LDLC, DLDLP, TGLX, TRIGL, TRIGP, CHHD, CHHDX  Lab Results   Component Value Date/Time    Glucose (POC) 186 (H) 10/26/2020 04:25 PM    Glucose (POC) 366 (H) 10/26/2020 11:24 AM    Glucose (POC) 413 (H) 10/26/2020 11:23 AM    Glucose (POC) 291 (H) 10/26/2020 07:56 AM    Glucose (POC) 228 (H) 10/25/2020 09:11 PM     Lab Results   Component Value Date/Time    Color YELLOW/STRAW 10/22/2020 09:15 AM    Appearance TURBID (A) 10/22/2020 09:15 AM    Specific gravity 1.012 10/22/2020 09:15 AM    pH (UA) 5.5 10/22/2020 09:15 AM    Protein Negative 10/22/2020 09:15 AM    Glucose Negative 10/22/2020 09:15 AM    Ketone Negative 10/22/2020 09:15 AM    Bilirubin Negative 10/22/2020 09:15 AM    Urobilinogen 0.2 10/22/2020 09:15 AM    Nitrites Positive (A) 10/22/2020 09:15 AM    Leukocyte Esterase LARGE (A) 10/22/2020 09:15 AM    Epithelial cells FEW 10/22/2020 09:15 AM    Bacteria 4+ (A) 10/22/2020 09:15 AM    WBC  10/22/2020 09:15 AM    RBC 0-5 10/22/2020 09:15 AM         Medications Reviewed:     Current Facility-Administered Medications   Medication Dose Route Frequency    albuterol-ipratropium (DUO-NEB) 2.5 MG-0.5 MG/3 ML  3 mL Nebulization Q4H PRN    melatonin tablet 3 mg  3 mg Oral QHS    insulin lispro (HUMALOG) injection 5 Units  5 Units SubCUTAneous TIDAC    sodium chloride (NS) flush 5-40 mL  5-40 mL IntraVENous Q8H    sodium chloride (NS) flush 5-40 mL  5-40 mL IntraVENous PRN    acetaminophen (TYLENOL) tablet 650 mg  650 mg Oral Q6H PRN    Or    acetaminophen (TYLENOL) suppository 650 mg  650 mg Rectal Q6H PRN    polyethylene glycol (MIRALAX) packet 17 g  17 g Oral DAILY PRN    ondansetron (ZOFRAN ODT) tablet 4 mg  4 mg Oral Q8H PRN    Or    ondansetron (ZOFRAN) injection 4 mg  4 mg IntraVENous Q6H PRN    famotidine (PEPCID) tablet 20 mg  20 mg Oral DAILY    dexamethasone (DECADRON) 4 mg/mL injection 4 mg  4 mg IntraVENous Q6H    atorvastatin (LIPITOR) tablet 20 mg  20 mg Oral QHS    carvediloL (COREG) tablet 6.25 mg  6.25 mg Oral BID WITH MEALS    furosemide (LASIX) tablet 40 mg  40 mg Oral DAILY    lisinopriL (PRINIVIL, ZESTRIL) tablet 5 mg  5 mg Oral DAILY    sertraline (ZOLOFT) tablet 25 mg  25 mg Oral QPM    spironolactone (ALDACTONE) tablet 25 mg  25 mg Oral DAILY    topiramate (TOPAMAX) tablet 100 mg  100 mg Oral BID    levETIRAcetam (KEPPRA) 500 mg in 0.9% sodium chloride 100 mL IVPB  500 mg IntraVENous Q12H    glucose chewable tablet 16 g  4 Tab Oral PRN    glucagon (GLUCAGEN) injection 1 mg  1 mg IntraMUSCular PRN    dextrose 10% infusion 0-250 mL  0-250 mL IntraVENous PRN    insulin lispro (HUMALOG) injection   SubCUTAneous AC&HS     ______________________________________________________________________  EXPECTED LENGTH OF STAY: 3d 19h  ACTUAL LENGTH OF STAY:          [address] [facility]

## 2020-10-26 NOTE — PROGRESS NOTES
Hematology-Oncology Progress Note      Mildred Bello  1950  [id number]  10/26/2020      Subjective:     Awake and alert. No new complaints. Somewhat depressed, no sob or pain     Allergies: Patient has no known allergies.   Current Facility-Administered Medications   Medication Dose Route Frequency Provider Last Rate Last Dose    albuterol-ipratropium (DUO-NEB) 2.5 MG-0.5 MG/3 ML  3 mL Nebulization Q4H PRN Gauri Jones M, DO        melatonin tablet 3 mg  3 mg Oral QHS Samantha Burgess M, DO   3 mg at 10/25/20 2106    insulin lispro (HUMALOG) injection 5 Units  5 Units SubCUTAneous TIDAC RajeshCamryn peña, DO   5 Units at 10/26/20 3856    sodium chloride (NS) flush 5-40 mL  5-40 mL IntraVENous Q8H Roly Hill MD   10 mL at 10/25/20 2106    sodium chloride (NS) flush 5-40 mL  5-40 mL IntraVENous PRN Arnie Hill MD        acetaminophen (TYLENOL) tablet 650 mg  650 mg Oral Q6H PRN Harris Guo MD   650 mg at 10/26/20 0410    Or    acetaminophen (TYLENOL) suppository 650 mg  650 mg Rectal Q6H PRN Arnie Hill MD        polyethylene glycol (MIRALAX) packet 17 g  17 g Oral DAILY PRN Arnie Hill MD        ondansetron (ZOFRAN ODT) tablet 4 mg  4 mg Oral Q8H PRN Arnie Hill MD        Or    ondansetron (ZOFRAN) injection 4 mg  4 mg IntraVENous Q6H PRN Arnie Hill MD        famotidine (PEPCID) tablet 20 mg  20 mg Oral DAILY Arnie Hill MD   20 mg at 10/26/20 0816    dexamethasone (DECADRON) 4 mg/mL injection 4 mg  4 mg IntraVENous Q6H Arnie Hill MD   4 mg at 10/26/20 7077    atorvastatin (LIPITOR) tablet 20 mg  20 mg Oral QHS Harris Guo MD   20 mg at 10/25/20 2106    carvediloL (COREG) tablet 6.25 mg  6.25 mg Oral BID WITH MEALS Harris Guo MD   Stopped at 10/26/20 0816    furosemide (LASIX) tablet 40 mg  40 mg Oral DAILY Harris Guo MD   40 mg at 10/26/20 0816    lisinopriL (PRINIVIL, ZESTRIL) tablet 5 mg  5 mg Oral DAILY Mira Shanks MD   Stopped at 10/25/20 8871    sertraline (ZOLOFT) tablet 25 mg  25 mg Oral QPM Mira Shanks MD   25 mg at 10/25/20 1855    spironolactone (ALDACTONE) tablet 25 mg  25 mg Oral DAILY Mira Shanks MD   25 mg at 10/26/20 0816    topiramate (TOPAMAX) tablet 100 mg  100 mg Oral BID Mira Shanks MD   100 mg at 10/26/20 0816    levETIRAcetam (KEPPRA) 500 mg in 0.9% sodium chloride 100 mL IVPB  500 mg IntraVENous Q12H Mira Shanks MD 0 mL/hr at 10/22/20 2004 500 mg at 10/26/20 0626    glucose chewable tablet 16 g  4 Tab Oral PRN Mira Shanks MD        glucagon (GLUCAGEN) injection 1 mg  1 mg IntraMUSCular PRN Mira Shanks MD        dextrose 10% infusion 0-250 mL  0-250 mL IntraVENous PRN Shana Hill MD        insulin lispro (HUMALOG) injection   SubCUTAneous AC&HS Mira Shanks MD   5 Units at 10/26/20 9671     Objective:     Patient Vitals for the past 24 hrs:   BP Temp Pulse Resp SpO2 Weight   10/26/20 0954 (!) 109/56 98.4 °F (36.9 °C) 87 23 97 %    10/26/20 0816 (!) 144/45  90      10/26/20 0735     100 %    10/26/20 0717     97 %    10/26/20 0637 (!) 108/55 98 °F (36.7 °C) 66 20 98 %    10/26/20 0154 (!) 102/54 98.2 °F (36.8 °C) 64 20 97 % 56.3 kg (124 lb 1.6 oz)   10/25/20 2207 (!) 104/47 98.8 °F (37.1 °C) 65 19 98 %    10/25/20 2024     100 %    10/25/20 1855 (!) 117/43  69      10/25/20 1817 (!) 117/43 97.8 °F (36.6 °C) 65 17 100 %    10/25/20 1404 (!) 96/44 98.1 °F (36.7 °C) 64 18 99 %        Gen: NAD  HEENT: PERRL, Sclerae anicteric  Cv: RRR without m/r/g  Pulm: aerating well  Abd: NISHA, NTND,  Ext: No c/c/e    Available labs reviewed:  Labs:    Recent Results (from the past 24 hour(s))   GLUCOSE, POC    Collection Time: 10/25/20 12:01 PM   Result Value Ref Range    Glucose (POC) 283 (H) 65 - 100 mg/dL    Performed by Fernando Huang    GLUCOSE, POC    Collection Time: 10/25/20  4:43 PM   Result Value Ref Range    Glucose (POC) 201 (H) 65 - 100 mg/dL    Performed by Marquis KEARNEY    GLUCOSE, POC    Collection Time: 10/25/20  9:11 PM   Result Value Ref Range    Glucose (POC) 228 (H) 65 - 100 mg/dL    Performed by Marylen Canning    METABOLIC PANEL, BASIC    Collection Time: 10/26/20  4:09 AM   Result Value Ref Range    Sodium 136 136 - 145 mmol/L    Potassium 4.8 3.5 - 5.1 mmol/L    Chloride 103 97 - 108 mmol/L    CO2 29 21 - 32 mmol/L    Anion gap 4 (L) 5 - 15 mmol/L    Glucose 181 (H) 65 - 100 mg/dL    BUN 45 (H) 6 - 20 MG/DL    Creatinine 1.52 (H) 0.55 - 1.02 MG/DL    BUN/Creatinine ratio 30 (H) 12 - 20      GFR est AA 41 (L) >60 ml/min/1.73m2    GFR est non-AA 34 (L) >60 ml/min/1.73m2    Calcium 8.6 8.5 - 10.1 MG/DL   SAMPLES BEING HELD    Collection Time: 10/26/20  4:09 AM   Result Value Ref Range    SAMPLES BEING HELD 1lav     COMMENT        Add-on orders for these samples will be processed based on acceptable specimen integrity and analyte stability, which may vary by analyte. GLUCOSE, POC    Collection Time: 10/26/20  7:56 AM   Result Value Ref Range    Glucose (POC) 291 (H) 65 - 100 mg/dL    Performed by Sheridan Cavazos and Evita     70 y/o woman with a history of tobacco abuse, admitted with left arm weakness. Noted to have hilar mass, multilpe CNS lesions, liver lesions concerning, but not diagnostic of stage IV lung CA. 1. Suspected lung cancer: awating thoracentesis and FOB/EBUS which will be done on wed. .. she has at least 6 brain mets with edema and needs to be seen by N-Surg soon as well. Rad onc is aware of pt .     Louisa Murrieta MD

## 2020-10-26 NOTE — CONSULTS
[facility]  CLINICAL NURSE SPECIALIST CONSULT  PROGRAM FOR DIABETES HEALTH    INITIAL NOTE    Presentation   Yvonne Romo is a 69 y.o. female admitted s/p fall about 2 weeks ago that she did not seek treatment at the time. She now c/o left sided weakness. Upon initial work up left shoulder was found to be dislocated. Initial diagnostic testing also revealed lung mass with brain mets. She is for a thoracentesis Wednesday. SAVANNAH vs CKD? GFR 64. Cr+ 1.52. HX:PMH: DM2-A1C 6.1%/ COPD with home 02/ HTN/HLD/HF w/ ICD    DX: CT chest; MRI brain    Current clinical course has been complicated by steroid induced hyperglycemia. Diabetes: Patient has known Type 2 diabetes, treated with Amaryl  PTA. Family history unknown for diabetes. Consulted by Provider for advanced diabetes nursing assessment and care, specifically related to   [] Transitioning off [place]   [x] Inpatient management strategy  [x] Home management assessment  [] Survival skill education    Diabetes-related medical history  Acute complications  Steroid induced hyperglycemia  Neurological complications  denies  Microvascular disease  Nephropathy ? SAVANNAH? Macrovascular disease  CAD  Other associated conditions     HF/HTN/HLD    Diabetes medication history  Drug class Currently in use Discontinued Never used   Biguanide      DDP-4 inhibitor       Sulfonylurea Amaryl 2mg daily     Thiazolidinedione      GLP-1 RA      SGLT-2 inhibitors      Basal insulin      Fixed Dose  Combinations      Bolus insulin        Subjective   Ms. Lin Mejia was very brief about her diabetes history. Visibly not feeling well. Patient reports the following home diabetes self-care practices:  Eating pattern  [] \"its alright\"  Physical activity pattern-none    Monitoring pattern-once daily; \"it varies\"  *  Taking medications pattern  [x] Consistent administration  [x] Affordable    Objective   Physical exam  General Trying to stay awake, oriented/ill-appearing. Minimally Conversant and cooperative. Vital Signs   Visit Vitals  BP (!) 109/56 (BP 1 Location: Right arm, BP Patient Position: At rest)   Pulse 87   Temp 98.4 °F (36.9 °C)   Resp 23   Wt 56.3 kg (124 lb 1.6 oz)   SpO2 97%   BMI 22.70 kg/m²     Skin  Warm and dry. Heart   Regular rate and rhythm. No murmurs, rubs or gallops  Lungs  Clear to auscultation without rales or rhonchi  Extremities No foot wounds    Diabetic foot exam:    Left Foot     Visual Exam: normal  ;overgrown toenails   Pulse DP: 2+ (normal)   Filament test: normal sensation      Right Foot   Visual Exam: normal ;overgrown toenails   Pulse DP: 2+ (normal)   Filament test: normal sensation     DP & PT pulses +2. Laboratory  Lab Results   Component Value Date/Time    Hemoglobin A1c 6.1 (H) 10/22/2020 09:00 AM     No results found for: LDL, LDLC, DLDLP  Lab Results   Component Value Date/Time    Creatinine 1.52 (H) 10/26/2020 04:09 AM     Lab Results   Component Value Date/Time    Sodium 136 10/26/2020 04:09 AM    Potassium 4.8 10/26/2020 04:09 AM    Chloride 103 10/26/2020 04:09 AM    CO2 29 10/26/2020 04:09 AM    Anion gap 4 (L) 10/26/2020 04:09 AM    Glucose 181 (H) 10/26/2020 04:09 AM    BUN 45 (H) 10/26/2020 04:09 AM    Creatinine 1.52 (H) 10/26/2020 04:09 AM    BUN/Creatinine ratio 30 (H) 10/26/2020 04:09 AM    GFR est AA 41 (L) 10/26/2020 04:09 AM    GFR est non-AA 34 (L) 10/26/2020 04:09 AM    Calcium 8.6 10/26/2020 04:09 AM    Bilirubin, total 0.5 10/21/2020 05:35 PM    Alk. phosphatase 93 10/21/2020 05:35 PM    Protein, total 7.1 10/21/2020 05:35 PM    Albumin 3.5 10/21/2020 05:35 PM    Globulin 3.6 10/21/2020 05:35 PM    A-G Ratio 1.0 10/21/2020 05:35 PM    ALT (SGPT) 8 10/21/2020 05:35 PM     Lab Results   Component Value Date/Time    ALT (SGPT) 8 10/21/2020 05:35 PM       Factors affecting BG pattern  Factor Dose Comments   Nutrition:  Carb-controlled meals     60 grams/meal      Drugs:  Steroids;   Decadron 16mg/24hrs Decadron insulin dosing    >8mg dexamethosone = 0.4units/kg   6mg   dexamethosone = 0.3units/kg   4mg   dexamethosone = 0.2units/kg   2mg   dexamethosone = 0.1units/kg    Pain       Blood glucose pattern        Assessment and Plan   Nursing Diagnosis Risk for unstable blood glucose pattern   Nursing Intervention Domain 5428 Decision-making Support   Nursing Interventions Examined current inpatient diabetes control   Explored factors facilitating and impeding inpatient management  Identified self-management practices impeding diabetes control  Explored corrective strategies with patient and responsible inpatient provider   Informed patient of rational for insulin strategy while hospitalized     Evaluation   Ms. Zepeda, has well controlled  Type 2 diabetes along with  predominant co-morbidities that impact her overall health. She is oxygen dependant with COPD. With this admission she was found to have lung masses as well as brain masses on MRI/CT scans. She was initiated on a Decadron steroid regimen. Subsequently she has had significant hyperglycemia. It is appropriate that basal insulin be initiated in order to control the hyperglycemia associated with Decadron. One time daily Lantus insulin works best with Decadron as it mimics the duration of Decadron. Inpatient blood glucose management has been impacted by  [x] Kidney dysfunction -SAVANNAH? [x] Erratic meal consumption  [x] Glucocorticoid use-Decadron 4mg [id number]  Recommendations   1. INITIATE Basal insulin 20units daily to cover for steroid; Follow grid below when tapering steroid to also taper insulin accordingly. [x] 0.4 units/kg/D    Decadron insulin dosing    >8mg dexamethosone = 0.4units/kg   6mg   dexamethosone = 0.3units/kg   4mg   dexamethosone = 0.2units/kg   2mg   dexamethosone = 0.1units/kg     2. CONTINUE Corrective insulin  [x] Normal sensitivity      3. HOLD pre meal insulin for now until we see how BG trends with basal insulin.   Billing Code(s)   I personally reviewed chart, notes, data and current medications in the medical record, and examined the patient at bedside before making care recommendations. Thank you for including us in their care. I spent 30 minutes in direct patient care today for this patient.   Time includes chart review, face to face with patient and collaboration with interdisciplinary care team.      Deedee Jesus [facility]  Program for Diabetes Health  Access via [address] [place] [address]  [phone]

## 2020-10-26 NOTE — PROGRESS NOTES
Problem: Mobility Impaired (Adult and Pediatric)  Goal: *Acute Goals and Plan of Care (Insert Text)  Description:   FUNCTIONAL STATUS PRIOR TO ADMISSION: Pt is a limited historian - reports that she was essentially bed bound at home over the last several weeks. Prior to that, she ambulated very short distances with RW. Lives with family who provide assist as needed. HOME SUPPORT PRIOR TO ADMISSION: The patient lived with family. 2L O2 at baseline    Physical Therapy Goals  Initiated 10/26/2020  1. Patient will move from supine to sit and sit to supine , scoot up and down, and roll side to side in bed with minimal assistance/contact guard assist within 7 day(s). 2.  Patient will transfer from bed to chair and chair to bed with moderate assistance  using the least restrictive device within 7 day(s). 3.  Patient will perform sit to stand with moderate assistance  within 7 day(s). 4.  Patient will ambulate with moderate assistance  for 10 feet with the least restrictive device within 7 day(s). 5.  Patient will ascend/descend 3 stairs with handrail(s) with minimal assistance  within 7 day(s). 6.  Patient will improve Hitchcock Balance score by 7 points within 7 days. Outcome: Progressing Towards Goal     PHYSICAL THERAPY EVALUATION  Patient: Vince Luo (9 y.o. female)  Date: 10/26/2020  Primary Diagnosis: [facility] ([facility] [C79.31]  Procedure(s) (LRB):  ENDOSCOPIC BRONCHOSCOPY ULTRASOUND (EBUS) (N/A)  BRONCHOSCOPY NAVIGATIONAL (N/A)     Precautions:        ASSESSMENT  Based on the objective data described below, the patient presents with impaired functional mobility as compared to baseline level 2* global weakness, decreased cardiopulmonary tolerance to activity, impaired balance, and new non functional use of L UE following admission from OSH for c/o L UE weakness. Work up revealed multiple brain and lung masses as well as a L clavicle fracture.  At baseline, she reports that she lived at home with family and has been essentially bed bound for several weeks; prior to this, she ambulated very limited distances with RW. Reports multiple falls. Today, she required up to mod A x 1-2 for bed mobility and reposition at EOB - no dizziness once up. Worked on repeated sit<>stands from EOB with max A - able to clear buttock on final attempt but unable to achieve full upright stance. Does have very kyphotic fwd rounded posture which could be contributing. Unable to adjust CHRISTINE or take steps in stance. Assisted back to bed at end of session - NAD. Recommend wander lift transfer with RN staff. Will follow. At this time, she appears below her stated baseline level when well - unsure what level of assist family is able to provide - recommending discharge to SNF level rehab vs home with family assist and additional support. Likely to need hospital bed, wheelchair, and possibly wander pending progress . Carlos Lal Current Level of Function Impacting Discharge (mobility/balance): max A for bed mobility and standing attempts; unable to ambulate     Functional Outcome Measure: The patient scored 2/56 on the Hitchcock outcome measure which is indicative of high falls risk. Other factors to consider for discharge: unsure of true baseline or what level of assist family can provide      Patient will benefit from skilled therapy intervention to address the above noted impairments. PLAN :  Recommendations and Planned Interventions: bed mobility training, transfer training, gait training, therapeutic exercises, neuromuscular re-education, patient and family training/education, and therapeutic activities      Frequency/Duration: Patient will be followed by physical therapy:  4 times a week to address goals.     Recommendation for discharge: (in order for the patient to meet his/her long term goals)  To be determined: SNF vs home with family assist and HHPT (1-2 person heavy assist for transfers and ADLs)    This discharge recommendation:  A follow-up discussion with the attending provider and/or case management is planned    IF patient discharges home will need the following DME: bedside commode, hospital bed, mechanical lift, and wheelchair         SUBJECTIVE:   Patient stated I cant use this arm.     OBJECTIVE DATA SUMMARY:   HISTORY:    Past Medical History:   Diagnosis Date    Chronic back pain     Chronic obstructive pulmonary disease ([facility]     Diabetes ([facility]     Fall in home     Heart failure ([facility]     Hypertension     Ill-defined condition     home 02 2LNC     Past Surgical History:   Procedure Laterality Date    CARDIAC SURG PROCEDURE UNLIST      internal cardiac defibrillator    HX CHOLECYSTECTOMY      HX CYST REMOVAL      HX PACEMAKER  08/08/2016    [employer]     HX TUBAL LIGATION         Personal factors and/or comorbidities impacting plan of care: PMHx    Home Situation  Home Environment: Private residence  # Steps to Enter: 3  Rails to Enter: Yes  One/Two Story Residence: One story  Living Alone: No  Support Systems: Family member(s)  Patient Expects to be Discharged to[de-identified] Rehabilitation facility  Current DME Used/Available at Home: Walker, rolling, Wheelchair    EXAMINATION/PRESENTATION/DECISION MAKING:   Critical Behavior:  Neurologic State: Alert  Orientation Level: Oriented X4  Cognition: Follows commands  Safety/Judgement: Awareness of environment, Insight into deficits  Hearing: Auditory  Auditory Impairment: None  Skin:    Edema:   Range Of Motion:  AROM: Generally decreased, functional  PROM: Generally decreased, functional    Strength:    Strength: Generally decreased, functional(L UE non functional )       Tone & Sensation:   Tone: Abnormal(hypotonic L UE)         Coordination:  Coordination: Generally decreased, functional(except L UE)  Vision:   Diplopia: No  Functional Mobility:  Bed Mobility:  Supine to Sit: Moderate assistance; Additional time  Sit to Supine: Maximum assistance  Scooting: Maximum assistance    Transfers:  Sit to Stand: Maximum assistance  Stand to Sit: Maximum assistance     Balance:   Sitting: Impaired; Without support  Sitting - Static: Good (unsupported)  Sitting - Dynamic: Fair (occasional)  Standing: Impaired;Pull to stand; With support  Standing - Static: Poor;Constant support    Functional Measure:  Hitchcock Balance Test:    Sitting to Standin  Standing Unsupported: 0  Sitting with Back Unsupported: 2  Standing to Sittin  Transfers: 0  Standing Unsupported with Eyes Closed: 0  Standing Unsupported with Feet Together: 0  Reach Forward with Outstretched Arm: 0   Object: 0  Turn to Look Over Shoulders: 0  Turn 360 Degrees: 0  Alternate Foot on Step/Stool: 0  Standing Unsupported One Foot in Front: 0  Stand on One Le  Total: 2/56         56=Maximum possible score;   0-20=High fall risk  21-40=Moderate fall risk   41-56=Low fall risk       Physical Therapy Evaluation Charge Determination   History Examination Presentation Decision-Making   HIGH Complexity :3+ comorbidities / personal factors will impact the outcome/ POC  MEDIUM Complexity : 3 Standardized tests and measures addressing body structure, function, activity limitation and / or participation in recreation  LOW Complexity : Stable, uncomplicated  HIGH Complexity : FOTO score of 1- 25       Based on the above components, the patient evaluation is determined to be of the following complexity level: LOW       Activity Tolerance:   Fair and observed SOB with activity  Please refer to the flowsheet for vital signs taken during this treatment. After treatment patient left in no apparent distress:   Supine in bed, Call bell within reach, and Side rails x 3    COMMUNICATION/EDUCATION:   The patients plan of care was discussed with: Registered nurse. Fall prevention education was provided and the patient/caregiver indicated understanding., Patient/family have participated as able in goal setting and plan of care. , and Patient/family agree to work toward stated goals and plan of care.     Thank you for this referral.  Rohan Gaviria, PT, DPT   Time Calculation: 26 mins

## 2020-10-26 NOTE — PROGRESS NOTES
Problem: Pressure Injury - Risk of  Goal: *Prevention of pressure injury  Description: Document Elio Scale and appropriate interventions in the flowsheet.   Outcome: Progressing Towards Goal  Note: Pressure Injury Interventions:  Sensory Interventions: Float heels, Keep linens dry and wrinkle-free, Minimize linen layers, Monitor skin under medical devices    Moisture Interventions: Apply protective barrier, creams and emollients, Check for incontinence Q2 hours and as needed, Maintain skin hydration (lotion/cream), Minimize layers    Activity Interventions: Pressure redistribution bed/mattress(bed type), PT/OT evaluation    Mobility Interventions: Pressure redistribution bed/mattress (bed type), PT/OT evaluation    Nutrition Interventions: Document food/fluid/supplement intake    Friction and Shear Interventions: Lift sheet, Lift team/patient mobility team, Apply protective barrier, creams and emollients

## 2020-10-27 ENCOUNTER — APPOINTMENT (OUTPATIENT)
Dept: GENERAL RADIOLOGY | Age: 70
DRG: 054 | End: 2020-10-27
Attending: RADIOLOGY
Payer: MEDICARE

## 2020-10-27 ENCOUNTER — APPOINTMENT (OUTPATIENT)
Dept: ULTRASOUND IMAGING | Age: 70
DRG: 054 | End: 2020-10-27
Attending: PHYSICIAN ASSISTANT
Payer: MEDICARE

## 2020-10-27 LAB
APPEARANCE FLD: ABNORMAL
BODY FLD TYPE: NORMAL
COLOR FLD: ABNORMAL
GLUCOSE BLD STRIP.AUTO-MCNC: 166 MG/DL (ref 65–100)
GLUCOSE BLD STRIP.AUTO-MCNC: 228 MG/DL (ref 65–100)
GLUCOSE BLD STRIP.AUTO-MCNC: 247 MG/DL (ref 65–100)
GLUCOSE BLD STRIP.AUTO-MCNC: 346 MG/DL (ref 65–100)
GLUCOSE FLD-MCNC: 231 MG/DL
LDH FLD L TO P-CCNC: 124 U/L
LYMPHOCYTES NFR FLD: 10 %
MONOS+MACROS NFR FLD: 85 %
NEUTROPHILS NFR FLD: 5 %
NUC CELL # FLD: 557 /CU MM
PH FLD: 6.8 [PH]
PROT FLD-MCNC: 2.7 G/DL
RBC # FLD: >100 /CU MM
SERVICE CMNT-IMP: ABNORMAL
SPECIMEN SOURCE FLD: ABNORMAL
SPECIMEN SOURCE FLD: NORMAL

## 2020-10-27 PROCEDURE — 97165 OT EVAL LOW COMPLEX 30 MIN: CPT

## 2020-10-27 PROCEDURE — 87205 SMEAR GRAM STAIN: CPT

## 2020-10-27 PROCEDURE — 88112 CYTOPATH CELL ENHANCE TECH: CPT

## 2020-10-27 PROCEDURE — 97535 SELF CARE MNGMENT TRAINING: CPT

## 2020-10-27 PROCEDURE — 82945 GLUCOSE OTHER FLUID: CPT

## 2020-10-27 PROCEDURE — 87015 SPECIMEN INFECT AGNT CONCNTJ: CPT

## 2020-10-27 PROCEDURE — A4565 SLINGS: HCPCS

## 2020-10-27 PROCEDURE — 74011250636 HC RX REV CODE- 250/636: Performed by: INTERNAL MEDICINE

## 2020-10-27 PROCEDURE — 88305 TISSUE EXAM BY PATHOLOGIST: CPT

## 2020-10-27 PROCEDURE — 65660000000 HC RM CCU STEPDOWN

## 2020-10-27 PROCEDURE — 0W993ZZ DRAINAGE OF RIGHT PLEURAL CAVITY, PERCUTANEOUS APPROACH: ICD-10-PCS | Performed by: RADIOLOGY

## 2020-10-27 PROCEDURE — 83615 LACTATE (LD) (LDH) ENZYME: CPT

## 2020-10-27 PROCEDURE — 77010033678 HC OXYGEN DAILY

## 2020-10-27 PROCEDURE — 84157 ASSAY OF PROTEIN OTHER: CPT

## 2020-10-27 PROCEDURE — 99233 SBSQ HOSP IP/OBS HIGH 50: CPT | Performed by: NURSE PRACTITIONER

## 2020-10-27 PROCEDURE — 74011250637 HC RX REV CODE- 250/637: Performed by: NURSE PRACTITIONER

## 2020-10-27 PROCEDURE — 71045 X-RAY EXAM CHEST 1 VIEW: CPT

## 2020-10-27 PROCEDURE — 74011636637 HC RX REV CODE- 636/637: Performed by: HOSPITALIST

## 2020-10-27 PROCEDURE — 74011250637 HC RX REV CODE- 250/637: Performed by: HOSPITALIST

## 2020-10-27 PROCEDURE — C1729 CATH, DRAINAGE: HCPCS

## 2020-10-27 PROCEDURE — 83986 ASSAY PH BODY FLUID NOS: CPT

## 2020-10-27 PROCEDURE — 74011636637 HC RX REV CODE- 636/637: Performed by: INTERNAL MEDICINE

## 2020-10-27 PROCEDURE — 94760 N-INVAS EAR/PLS OXIMETRY 1: CPT

## 2020-10-27 PROCEDURE — 32555 ASPIRATE PLEURA W/ IMAGING: CPT

## 2020-10-27 PROCEDURE — 74011000258 HC RX REV CODE- 258: Performed by: HOSPITALIST

## 2020-10-27 PROCEDURE — 89050 BODY FLUID CELL COUNT: CPT

## 2020-10-27 PROCEDURE — 82962 GLUCOSE BLOOD TEST: CPT

## 2020-10-27 PROCEDURE — 74011250636 HC RX REV CODE- 250/636: Performed by: HOSPITALIST

## 2020-10-27 PROCEDURE — 97530 THERAPEUTIC ACTIVITIES: CPT

## 2020-10-27 RX ORDER — DEXAMETHASONE 4 MG/1
4 TABLET ORAL EVERY 8 HOURS
Status: DISCONTINUED | OUTPATIENT
Start: 2020-10-27 | End: 2020-10-30

## 2020-10-27 RX ORDER — INSULIN GLARGINE 100 [IU]/ML
25 INJECTION, SOLUTION SUBCUTANEOUS
Status: DISCONTINUED | OUTPATIENT
Start: 2020-10-27 | End: 2020-10-27

## 2020-10-27 RX ORDER — LIDOCAINE HYDROCHLORIDE 10 MG/ML
10 INJECTION, SOLUTION EPIDURAL; INFILTRATION; INTRACAUDAL; PERINEURAL
Status: COMPLETED | OUTPATIENT
Start: 2020-10-27 | End: 2020-10-27

## 2020-10-27 RX ORDER — LEVETIRACETAM 500 MG/1
500 TABLET ORAL EVERY 12 HOURS
Status: DISCONTINUED | OUTPATIENT
Start: 2020-10-27 | End: 2020-11-11 | Stop reason: HOSPADM

## 2020-10-27 RX ORDER — INSULIN GLARGINE 100 [IU]/ML
20 INJECTION, SOLUTION SUBCUTANEOUS
Status: DISCONTINUED | OUTPATIENT
Start: 2020-10-27 | End: 2020-10-28

## 2020-10-27 RX ADMIN — INSULIN LISPRO 3 UNITS: 100 INJECTION, SOLUTION INTRAVENOUS; SUBCUTANEOUS at 12:12

## 2020-10-27 RX ADMIN — DEXAMETHASONE 4 MG: 4 TABLET ORAL at 16:08

## 2020-10-27 RX ADMIN — INSULIN LISPRO 2 UNITS: 100 INJECTION, SOLUTION INTRAVENOUS; SUBCUTANEOUS at 17:50

## 2020-10-27 RX ADMIN — TOPIRAMATE 100 MG: 100 TABLET, FILM COATED ORAL at 17:50

## 2020-10-27 RX ADMIN — FAMOTIDINE 20 MG: 20 TABLET ORAL at 09:29

## 2020-10-27 RX ADMIN — FUROSEMIDE 40 MG: 40 TABLET ORAL at 09:28

## 2020-10-27 RX ADMIN — CARVEDILOL 6.25 MG: 6.25 TABLET, FILM COATED ORAL at 17:50

## 2020-10-27 RX ADMIN — DEXAMETHASONE SODIUM PHOSPHATE 4 MG: 4 INJECTION, SOLUTION INTRA-ARTICULAR; INTRALESIONAL; INTRAMUSCULAR; INTRAVENOUS; SOFT TISSUE at 04:17

## 2020-10-27 RX ADMIN — SODIUM CHLORIDE 500 MG: 900 INJECTION, SOLUTION INTRAVENOUS at 06:50

## 2020-10-27 RX ADMIN — DEXAMETHASONE SODIUM PHOSPHATE 4 MG: 4 INJECTION, SOLUTION INTRA-ARTICULAR; INTRALESIONAL; INTRAMUSCULAR; INTRAVENOUS; SOFT TISSUE at 09:29

## 2020-10-27 RX ADMIN — Medication 10 ML: at 14:00

## 2020-10-27 RX ADMIN — SERTRALINE HYDROCHLORIDE 25 MG: 50 TABLET ORAL at 17:50

## 2020-10-27 RX ADMIN — LIDOCAINE HYDROCHLORIDE 10 ML: 10 INJECTION, SOLUTION EPIDURAL; INFILTRATION; INTRACAUDAL; PERINEURAL at 14:41

## 2020-10-27 RX ADMIN — ACETAMINOPHEN 650 MG: 325 TABLET ORAL at 16:07

## 2020-10-27 RX ADMIN — Medication 10 ML: at 06:00

## 2020-10-27 RX ADMIN — INSULIN GLARGINE 20 UNITS: 100 INJECTION, SOLUTION SUBCUTANEOUS at 22:06

## 2020-10-27 RX ADMIN — LEVETIRACETAM 500 MG: 500 TABLET, FILM COATED ORAL at 19:18

## 2020-10-27 RX ADMIN — ATORVASTATIN CALCIUM 20 MG: 20 TABLET, FILM COATED ORAL at 22:06

## 2020-10-27 RX ADMIN — INSULIN LISPRO 2 UNITS: 100 INJECTION, SOLUTION INTRAVENOUS; SUBCUTANEOUS at 09:31

## 2020-10-27 RX ADMIN — ACETAMINOPHEN 650 MG: 325 TABLET ORAL at 09:28

## 2020-10-27 RX ADMIN — TOPIRAMATE 100 MG: 100 TABLET, FILM COATED ORAL at 09:28

## 2020-10-27 RX ADMIN — SPIRONOLACTONE 25 MG: 25 TABLET ORAL at 09:28

## 2020-10-27 NOTE — PROGRESS NOTES
Problem: Self Care Deficits Care Plan (Adult)  Goal: *Acute Goals and Plan of Care (Insert Text)  Description: FUNCTIONAL STATUS PRIOR TO ADMISSION: Patient required maximum assistance for basic and instrumental ADLs. At baseline patient uses 2 liters of supplemental oxygen. Patient is a poor historian but appears to have been bed bound for a while living with son and daughter in law who are reportedly home at all times. She could not remember the last time she walked and did not remember how she fell (around 10 days ago with L clavicular fracture), but did report use of RW when she was mobile. She was rolling for bedpan prior to admission, was dependent for all IADLs, and assisted with dressing/grooming/upper body bathing as able. Patient reports watching TV all day. HOME SUPPORT: The patient lived with son and daughter in law. Occupational Therapy Goals  Initiated 10/27/2020   1. Patient will perform self-feeding with supervision/set-up within 7 day(s). 2.  Patient will perform upper body bathing using compensatory techniques PRN with minimal assistance/contact guard assist within 7 day(s). 3.  Patient will perform lower body dressing using compensatory techniques and AE PRN with moderate assist within 7 day(s). 4.  Patient will perform BSC transfer with maximal assistance within 7 day(s). 5.  Patient will perform all aspects of toileting with maximal assistance within 7 day(s). 6.  Patient will participate in upper extremity therapeutic exercise/activities with supervision for 5 minutes within 7 day(s). 7.  Patient will perform bed mobility to EOB in preparation for standing/seated ADLs with minimal assistance in 7 days.            Outcome: Not Met     OCCUPATIONAL THERAPY EVALUATION  Patient: Radha Cuellar (54 y.o. female)  Date: 10/27/2020  Primary Diagnosis: [facility] [C79.31]  Procedure(s) (LRB):  ENDOSCOPIC BRONCHOSCOPY ULTRASOUND (EBUS) (N/A)  BRONCHOSCOPY NAVIGATIONAL (N/A) Precautions: Fall, Skin(LUE NWB (L clavicular fx))    ASSESSMENT  Based on the objective data described below, the patient presents with L clavicle fracture, LUE hypotonicity (minimal muscle activation throughout session), confusion and memory loss (does not remember mechanism of fall), decreased activity tolerance, lower body access, standing balance with strong posterior lean, cardiopulmonary endurance, and general weakness. While patient was bed level upon admission, she is below her baseline and further investigation into PLOF is needed; however would benefit from SNF rehab to address functional independence, safety, and quality of life. Current Level of Function Impacting Discharge (ADLs/self-care): min to max A for upper body ADLs, max to total A lower body ADLs    Functional Outcome Measure: The patient scored 10/100 on the Barthel Index, indicating a 90% impairment in self care and functional mobility; inferring 100% dependence for IADls. Other factors to consider for discharge: lives with family, unclear level of assist provided at home, PMH, family preferences     Patient will benefit from skilled therapy intervention to address the above noted impairments. PLAN :  Recommendations and Planned Interventions: self care training, functional mobility training, therapeutic exercise, balance training, therapeutic activities, cognitive retraining, endurance activities, neuromuscular re-education, patient education, home safety training, and family training/education    Frequency/Duration: Patient will be followed by occupational therapy 5 times a week to address goals. Recommendation for discharge: (in order for the patient to meet his/her long term goals)  Therapy up to 5 days/week in SNF setting or an intensive home health therapy program; pending progression in acute therapies and clarity on family involvement.      This discharge recommendation:  Has not yet been discussed the attending provider and/or case management    IF patient discharges home will need the following DME: pending progression in acute therapies; anticipate needing: bedside commode, hospital bed ?, mechanical lift ?, transfer bench, and wheelchair if d/c home       SUBJECTIVE:   Patient stated I cant move this arm.  referring to L UE (was able to get some movement with cues)     OBJECTIVE DATA SUMMARY:   HISTORY:   Past Medical History:   Diagnosis Date    Chronic back pain     [facility]     Diabetes ([facility]     Fall in home     Heart failure ([facility]     Hypertension     Ill-defined condition     home 02 2LNC     Past Surgical History:   Procedure Laterality Date    CARDIAC SURG PROCEDURE UNLIST      internal cardiac defibrillator    HX CHOLECYSTECTOMY      HX CYST REMOVAL      HX PACEMAKER  08/08/2016    [employer]     HX TUBAL LIGATION       Expanded or extensive additional review of patient history:     Home Situation  Home Environment: Private residence  # Steps to Enter: 3  Rails to Enter: Yes  One/Two Story Residence: One story  Living Alone: No  Support Systems: Family member(s)  Patient Expects to be Discharged to[de-identified] Rehabilitation facility  Current DME Used/Available at Home: Walker, rolling, Wheelchair, Oxygen, portable(2L NC at baseline)  Tub or Shower Type: (bathes in bed with family A)    Hand dominance: Right    EXAMINATION OF PERFORMANCE DEFICITS:  Cognitive/Behavioral Status:  Neurologic State: Alert  Orientation Level: Oriented to place;Oriented to person;Disoriented to situation  Cognition: Follows commands; Impaired decision making;Memory loss  Perception: Appears intact  Perseveration: No perseveration noted  Safety/Judgement: Fall prevention;Home safety;Decreased insight into deficits; Awareness of environment    Skin: intact     Edema: intact     Hearing:   Auditory  Auditory Impairment: None    Vision/Perceptual:    Diplopia: No        Range of Motion:  RUE AROM:  Shoulder flexion, internal and external rotation: WFL     LUE AROM:  Shoulder flexion AROM: 5*  Elbow flexion: 20*  Wrist flexion: 20*  Finger flexion: WFL  AROM: Grossly decreased, non-functional(LUE non functional, RUE functional )  PROM: Generally decreased, functional(LUE non functional, RUE functional )    Strength:  RUE: 3+/5 MMT  LUE shoulder flexion: 1/5 MMT   L elbow flexion: 0/5 MMT   Strength: Generally decreased, functional(LUE non functional, RUE functional )     Coordination:  Coordination: Generally decreased, functional  Fine Motor Skills-Upper: Left Impaired;Right Intact    Gross Motor Skills-Upper: Left Impaired;Right Intact    Tone & Sensation:  Tone: Abnormal  hypotonicity LUE   Sensation: Intact       Balance:  Sitting: Impaired; Without support  Sitting - Static: Good (unsupported)  Sitting - Dynamic: Good (unsupported)  Standing: Impaired; Without support  Standing - Static: Poor;Constant support  Standing - Dynamic : Poor;Constant support    Functional Mobility and Transfers for ADLs:  Bed Mobility:  Rolling: Minimum assistance  Supine to Sit: Moderate assistance;Assist x1  Sit to Supine: Minimum assistance  Scooting: Maximum assistance    Transfers:  Sit to Stand: Maximum assistance  Stand to Sit: Maximum assistance  Was not able to side step up bed     ADL Assessment:  Feeding: Minimum assistance(infer from ROM, strenght, functional reach)    Oral Facial Hygiene/Grooming: Minimum assistance(with RUE)    Bathing: Maximum assistance(infer from functional reach, ROM, strength)    Upper Body Dressing: Maximum assistance(infer from ROM, strength, reach, sling use)    Lower Body Dressing: Maximum assistance    Toileting: Maximum assistance(rolling for bedpan at baseline )     ADL Intervention and task modifications:   Patient demonstrated tailor sitting while long sitting in bed, but still required max assist to get socks on.  Patient demonstrated minimal use of LUE during bed mobility or EOB ADLs with max multimodal cueing and prompting. Patient completed all ADLs EOB with RUE, limited by labored breathing. Instructed patient to utilize pursed lip breathing (vitals stable throughout session). Patient completed 1 sit <> stand with max assist and was not able to side step up bed with max physical assist.       Grooming  Position Performed: Seated edge of bed  Washing Face: Set-up  Brushing/Combing Hair: Set-up    Upper Body Bathing  Bathing Assistance: Set-up(washed chest/neck with washcloth )  Position Performed: Seated edge of bed    Lower Body Dressing Assistance  Socks: Maximum assistance  Leg Crossed Method Used: Yes  Position Performed: Long sitting on bed  Cues: Physical assistance; Don    Cognitive Retraining  Attention to Task: Multi-task;Distractibility  Maintains Attention For (Time): 1 minute  Following Commands: Follows two step commands/directions; Follows one step commands/directions  Safety/Judgement: Fall prevention;Home safety;Decreased insight into deficits; Awareness of environment  Cues: Tactile cues provided;Verbal cues provided    Functional Measure:  Barthel Index:    Bathin  Bladder: 0  Bowels: 0  Groomin  Dressin  Feedin  Mobility: 0  Stairs: 0  Toilet Use: 0  Transfer (Bed to Chair and Back): 5  Total: 10/100        The Barthel ADL Index: Guidelines  1. The index should be used as a record of what a patient does, not as a record of what a patient could do. 2. The main aim is to establish degree of independence from any help, physical or verbal, however minor and for whatever reason. 3. The need for supervision renders the patient not independent. 4. A patient's performance should be established using the best available evidence. Asking the patient, friends/relatives and nurses are the usual sources, but direct observation and common sense are also important. However direct testing is not needed.   5. Usually the patient's performance over the preceding 24-48 hours is important, but occasionally longer periods will be relevant. 6. Middle categories imply that the patient supplies over 50 per cent of the effort. 7. Use of aids to be independent is allowed. Stephie Pittman., Barthel, D.W. ([id number]). Functional evaluation: the Barthel Index. [facility] (14)2. Lorre Gums deb [place], J.J.M.F, Arrich Sport., Dewandemily Mcclure., Shay, [address] [facility] (1999). Measuring the change indisability after inpatient rehabilitation; comparison of the responsiveness of the Barthel Index and Functional Eliot Measure. Journal of Neurology, Neurosurgery, and Psychiatry, 66(4), 279-554. Gerson Pinzon, N.J.A, ABBIE Gates, & Maddy Marques MALFREDO. (2004.) Assessment of post-stroke quality of life in cost-effectiveness studies: The usefulness of the Barthel Index and the EuroQoL-5D. Quality of Life Research, 15, 167-30       Occupational Therapy Evaluation Charge Determination   History Examination Decision-Making   LOW Complexity : Brief history review  MEDIUM Complexity : 3-5 performance deficits relating to physical, cognitive , or psychosocial skils that result in activity limitations and / or participation restrictions MEDIUM Complexity : Patient may present with comorbidities that affect occupational performnce. Miniml to moderate modification of tasks or assistance (eg, physical or verbal ) with assesment(s) is necessary to enable patient to complete evaluation       Based on the above components, the patient evaluation is determined to be of the following complexity level: LOW   Pain Rating:  None     Activity Tolerance:   Fair, requires rest breaks, and observed SOB with activity  Please refer to the flowsheet for vital signs taken during this treatment.     After treatment patient left in no apparent distress:    Supine in bed, Heels elevated for pressure relief, Call bell within reach, and Side rails x 3    COMMUNICATION/EDUCATION:   The patients plan of care was discussed with: Physical therapist and Registered nurse. Patient was educated regarding her deficit(s) of L clavicle fracture and LUE weakness as this relates to her diagnosis of recent fall/ brain mets. She demonstrated fair understanding as evidenced by verbalization. Needs reinforcement     Patient/family have participated as able in goal setting and plan of care. and Patient/family agree to work toward stated goals and plan of care. This patients plan of care is appropriate for delegation to SUZY. Regarding student involvement in patient care:  A student participated in this treatment session. Per CMS Medicare statements and AOTA guidelines I certify that the following was true:  1. I was present and directly observed the entire session. 2. I made all skilled judgments and clinical decisions regarding care. 3. I am the practitioner responsible for assessment, treatment, and documentation.     Thank you for this referral.  EBENEZER Chiu   Time Calculation: 41 mins

## 2020-10-27 NOTE — PROGRESS NOTES
Physical Therapy Note  10/27/2020    Chart reviewed in prep for PT tx session - attempted to see however currently undergoing bedside testing/procedure. Will defer and follow up later today vs tomorrow as able/appropriate.     Raza Rosen, PT, DPT

## 2020-10-27 NOTE — PROGRESS NOTES
Palliative Medicine Consult  El: [phone] ([phone]    Patient Name: Zeeshan Hernandez  YOB: 1950    Date of Initial Consult: October 23, 2020  Reason for Consult: Care decisions  Requesting Provider: Dr. Alesha Younger  Primary Care Physician: Debby Mckee NP     SUMMARY:   Zeeshan Hernandez is a 69 y.o. with a past history of COPD, CHF, CAD, ICD, HTN, DM2, previous tobb use, cholecystectomy, who was admitted on 10/22/2020 from home after falling 2 weeks prior with resultant left sided weakness and imaging revealed rt hilar mass and multiple brain lesions. MRI with multiple brain mets precluding her from surgery. Radiation onc recommended by NSG. Path pending. Seen by oncology and there is high suspicion of stage 4 lung cancer. No fracture noted from fall. Brain mass, consistent with mets, lung mass with erosion into mediastinal, moderate right pleural effusion, postobstructive infiltrate RUL  Probable UTI,     Current medical issues leading to Palliative Medicine involvement include: support with care decisions given presumed metastatic disease. Full Code status. Rad onc consulted. SOCIAL HISTORY:  She is marred. She lives with her son and daughter-in-law. Her  unfortunately is incarcerated. She quit using tobacco in 06/2020. Interval History:  10/27/20: thoracentesis    PALLIATIVE DIAGNOSES:   1. Brain mass  2. Lung Mass  3. ACP  4. Rt infiltrate  5. Goals of care     PLAN:   1. Pt seen without family present. She does not recall meeting with me previously  2. Re-visited interest in completing AMD  3. Pt completed the MPOA section only. She did not want to discuss end of life care  4. Goals are to pursue aggressive therapy if offered  5. Pt lives 2 hours away so care will not be in this area  6. Pt appointed her daughter in [address] [place] [address] and her son Sondra Lopez as secondary  7. Please re-consult if we can be of further support    8.  Initial consult note routed to primary continuity provider and/or primary health care team members  9. Communicated plan of care with: Palliative IDT, [facility] Team     GOALS OF CARE / TREATMENT PREFERENCES:     GOALS OF CARE:  Patient/Health Care Proxy Stated Goals: Prolong life    TREATMENT PREFERENCES:   Code Status: Full Code    Advance Care Planning:  [x] [facility] Interdisciplinary Team has updated the ACP Navigator with Health Care Decision Maker and Patient Capacity    Primary Decision MakeLuis Armando Azevedo - [phone]      Advance Care Planning 10/27/2020   Patient's Healthcare Decision Maker is: Named in scanned ACP document   Confirm Advance Directive Yes, on file   Patient Would Like to Complete Advance Directive Yes       Medical Interventions: Full interventions     Other Instructions:   Artificially Administered Nutrition: No feeding tube     Other:    As far as possible, the palliative care team has discussed with patient / health care proxy about goals of care / treatment preferences for patient. HISTORY:     History obtained from: chart, team    CHIEF COMPLAINT: pt admitted with aforementioned history and issues    HPI/SUBJECTIVE:    The patient is:   [x] Verbal and participatory  [] Non-participatory due to:    No pain    Clinical Pain Assessment (nonverbal scale for severity on nonverbal patients):   Clinical Pain Assessment  Severity: 0          Duration: for how long has pt been experiencing pain (e.g., 2 days, 1 month, years)  Frequency: how often pain is an issue (e.g., several times per day, once every few days, constant)     FUNCTIONAL ASSESSMENT:     Palliative Performance Scale (PPS):  PPS: 40       PSYCHOSOCIAL/SPIRITUAL SCREENING:     Palliative IDT has assessed this patient for cultural preferences / practices and a referral made as appropriate to needs (Cultural Services, Patient Advocacy, Ethics, etc.)    Any spiritual / Christianity concerns:  [] Yes /  [x] No    Caregiver Burnout:  [] Yes / [x] No /  [] No Caregiver Present      Anticipatory grief assessment:   [x] Normal  / [] Maladaptive       ESAS Anxiety: Anxiety: 0    ESAS Depression:          REVIEW OF SYSTEMS:     Positive and pertinent negative findings in ROS are noted above in HPI. The following systems were [x] reviewed / [] unable to be reviewed as noted in HPI  Other findings are noted below. Systems: constitutional, ears/nose/mouth/throat, respiratory, gastrointestinal, genitourinary, musculoskeletal, integumentary, neurologic, psychiatric, endocrine. Positive findings noted below. Modified ESAS Completed by: provider   Fatigue: 4 Drowsiness: 0     Pain: 0   Anxiety: 0     Anorexia: 5 Dyspnea: 3           Stool Occurrence(s): 1        PHYSICAL EXAM:     From RN flowsheet:  Wt Readings from Last 3 Encounters:   10/27/20 124 lb 11.2 oz (56.6 kg)   10/21/20 114 lb (51.7 kg)     Blood pressure (!) 126/52, pulse 72, temperature 98.2 °F (36.8 °C), resp. rate 22, weight 124 lb 11.2 oz (56.6 kg), SpO2 100 %.     Pain Scale 1: Numeric (0 - 10)  Pain Intensity 1: 0  Pain Onset 1: acute  Pain Location 1: Head  Pain Orientation 1: Anterior  Pain Description 1: Aching  Pain Intervention(s) 1: Medication (see MAR), Position  Last bowel movement, if known:     Constitutional: alert, conversant*  Eyes: pupils equal, anicteric  ENMT: no nasal discharge, moist mucous membranes  Cardiovascular: regular rhythm, distal pulses intact  Respiratory: breathing not labored, symmetric  Gastrointestinal: soft non-tender, +bowel sounds  Musculoskeletal: no deformity, left shoulder pain with manipulation  Skin: warm, dry  Neurologic: following commands, moving all extremities  Psychiatric: flat affect, no hallucinations  Other:       HISTORY:     Principal Problem:    Brain metastases ([address] (10/22/2020)      Past Medical History:   Diagnosis Date    Chronic back pain     Chronic obstructive pulmonary disease ([address]     Diabetes ([address]     Fall in home     [facility]     Hypertension     Ill-defined condition     home 02 2LNC      Past Surgical History:   Procedure Laterality Date    CARDIAC SURG PROCEDURE UNLIST      internal cardiac defibrillator    HX CHOLECYSTECTOMY      HX CYST REMOVAL      HX PACEMAKER  08/08/2016    [employer]     HX TUBAL LIGATION        Family History   Problem Relation Age of Onset    Diabetes Mother     No Known Problems Father     Cancer Brother       History reviewed, no pertinent family history.   Social History     Tobacco Use    Smoking status: Former Smoker    Smokeless tobacco: Never Used    Tobacco comment: quit June 2020   Substance Use Topics    Alcohol use: Not Currently     No Known Allergies   Current Facility-Administered Medications   Medication Dose Route Frequency    dexAMETHasone (DECADRON) tablet 4 mg  4 mg Oral Q8H    levETIRAcetam (KEPPRA) tablet 500 mg  500 mg Oral Q12H    albuterol-ipratropium (DUO-NEB) 2.5 MG-0.5 MG/3 ML  3 mL Nebulization Q4H PRN    insulin glargine (LANTUS) injection 20 Units  20 Units SubCUTAneous QHS    melatonin tablet 3 mg  3 mg Oral QHS    sodium chloride (NS) flush 5-40 mL  5-40 mL IntraVENous Q8H    sodium chloride (NS) flush 5-40 mL  5-40 mL IntraVENous PRN    acetaminophen (TYLENOL) tablet 650 mg  650 mg Oral Q6H PRN    Or    acetaminophen (TYLENOL) suppository 650 mg  650 mg Rectal Q6H PRN    polyethylene glycol (MIRALAX) packet 17 g  17 g Oral DAILY PRN    ondansetron (ZOFRAN ODT) tablet 4 mg  4 mg Oral Q8H PRN    Or    ondansetron (ZOFRAN) injection 4 mg  4 mg IntraVENous Q6H PRN    famotidine (PEPCID) tablet 20 mg  20 mg Oral DAILY    atorvastatin (LIPITOR) tablet 20 mg  20 mg Oral QHS    carvediloL (COREG) tablet 6.25 mg  6.25 mg Oral BID WITH MEALS    furosemide (LASIX) tablet 40 mg  40 mg Oral DAILY    lisinopriL (PRINIVIL, ZESTRIL) tablet 5 mg  5 mg Oral DAILY    sertraline (ZOLOFT) tablet 25 mg  25 mg Oral QPM    spironolactone (ALDACTONE) tablet 25 mg  25 mg Oral DAILY    topiramate (TOPAMAX) tablet 100 mg  100 mg Oral BID    glucose chewable tablet 16 g  4 Tab Oral PRN    glucagon (GLUCAGEN) injection 1 mg  1 mg IntraMUSCular PRN    dextrose 10% infusion 0-250 mL  0-250 mL IntraVENous PRN    insulin lispro (HUMALOG) injection   SubCUTAneous AC&HS          LAB AND IMAGING FINDINGS:     Lab Results   Component Value Date/Time    WBC 6.4 10/21/2020 05:35 PM    HGB 11.7 (L) 10/21/2020 05:35 PM    PLATELET 730 72/79/5552 05:35 PM     Lab Results   Component Value Date/Time    Sodium 136 10/26/2020 04:09 AM    Potassium 4.8 10/26/2020 04:09 AM    Chloride 103 10/26/2020 04:09 AM    CO2 29 10/26/2020 04:09 AM    BUN 45 (H) 10/26/2020 04:09 AM    Creatinine 1.52 (H) 10/26/2020 04:09 AM    Calcium 8.6 10/26/2020 04:09 AM    Magnesium 2.4 10/24/2020 06:37 AM      Lab Results   Component Value Date/Time    Alk. phosphatase 93 10/21/2020 05:35 PM    Protein, total 7.1 10/21/2020 05:35 PM    Albumin 3.5 10/21/2020 05:35 PM    Globulin 3.6 10/21/2020 05:35 PM     No results found for: INR, PTMR, PTP, PT1, PT2, APTT, INREXT, INREXT   No results found for: IRON, FE, TIBC, IBCT, PSAT, FERR   No results found for: PH, PCO2, PO2  No components found for: GLPOC   No results found for: CPK, CKMB             Total time:  35 min  Counseling / coordination time, spent as noted above: 30 min  > 50% counseling / coordination?:  y    Prolonged service was provided for  []30 min   []75 min in face to face time in the presence of the patient, spent as noted above. Time Start:   Time End:   Note: this can only be billed with 80015 (initial) or 70956 (follow up). If multiple start / stop times, list each separately.

## 2020-10-27 NOTE — PROGRESS NOTES
[facility]  Hospitalist Group                                                                                          Hospitalist Progress Note  Samantha Trujillo DO Robert  Answering service: [phone] OR [phone] from in house phone        Date of Service:  10/27/2020  NAME:  Sarai Vidales  :  1950  MRN:  [id number]      Admission Summary:   69 y.o. female,  who has history of chronic systolic heart failure status post ICD, chronic COPD on home oxygen 2 L, hypertension, hyperlipidemia, anticoagulation with Eliquis who presents with fall 2 weeks back and left sided weakness. She reports that her left arm felt funny and also she was having weakness in the left arm. It is not painful. And she did not seek medical attention. Given persistence of symptoms, EMS was called and to take her to the local emergency room. At the ER there work-up revealed that she has lung mass as well as brain tumors. She was given IV Decadron and transferred to this facility for neurosurgical evaluation. She was also noted to have a dislocated left shoulder. There was report of lung mass which is right suprahilar with progression into the mediastinum. There is also report of 1.2 cm right frontal mass with 5 cm right to left shift. There is no report of seizures. Interval history / Subjective:   F/U left sided weakness. Patient seen and examined. Underwent thoracentesis with 300 ml removed. Not short of breath. Left upper extremity mildly improving. Assessment & Plan:     Brain masses, consistent with metastatic disease, suspect lung primary:  Cerebral edema secondary to above:  Left-sided weakness: mild improvement  -MRI brain done and demonstrated intracranial metastases, largest 1.3 x 1.3 cm, with surrounding edema. Osseous metastases in right frontal, parietal, C2 body  -Neurosurgery consulted. No surgical benefit.  Will need outpatient radiation oncology, possibly in [facility] steroids- will wean, Keppra- switch to oral   -Seizure precautions  -Neuro checks    Lung mass with erosion into mediastinum, suspicious for carcinoma:  -Pulmonary consulted. Plans for EUS/endobronchial biopsy 10/28  --remain off Plavix x5 days, Eliquis x2 days and formal cardiac clearance    Moderate right pleural effusion:   -respiratory status stable, s/p thoracentesis 10/27 with approximately 300 ml removed     Postobstructive infiltrate RUL  -Seen on CT  -no unusual cough, fever  -s/p Ceftriaxone    UTI, Proteus:  -continue Ceftriaxone for 3 days    Diabetes type 2 with hyperglycemia   -Hba1c 6.1, remain on lantus 20 mg qhs due to decrease steroids, SSI  -appreciate diabetes management     Chronic COPD with chronic hypoxic respiratory failure, not in exacerbation  -Continue home oxygen, AA nebs    Chronic congestive heart failure, presumed systolic given patient has ICD and is on Aldactone  -Continued home medications     Clavicular fracture:  Shoulder dislocation ruled out:  -Appreciate orthopedic surgery. No evidence of shoulder dislocation. Can wear sling for comfort       Chronic anticoagulation with Eliquis for ??  -The patient does not know why she is on chronic anticoagulation  -Hold as planning for procedures    H/o CAD  -Hold plavix today if planned for biopsies.     Hypertension  -Continue with Coreg    Prior tobacco use     Diabetic diet    PT/OT      Code status: full. AMD filled out  DVT prophylaxis: SCDs    Care Plan discussed with: Patient/Family  Anticipated Disposition: Home w/Family  Anticipated Discharge: home, possibly 10/29, needs outpatient follow up with oncology and radiation oncology      Hospital Problems  Date Reviewed: 10/23/2020          Codes Class Noted POA    * (Principal) Brain metastases ([facility] ICD-10-CM: C79.31  ICD-9-CM: 198.3  10/22/2020 Yes                Review of Systems:     Negative unless stated above    Vital Signs:    Last 24hrs VS reviewed since prior progress note. Most recent are:  Visit Vitals  BP (!) 126/52 (BP 1 Location: Right arm, BP Patient Position: At rest)   Pulse 72   Temp 98.2 °F (36.8 °C)   Resp 22   Wt 56.6 kg (124 lb 11.2 oz)   SpO2 100%   BMI 22.81 kg/m²         Intake/Output Summary (Last 24 hours) at 10/27/2020 1735  Last data filed at 10/27/2020 1449  Gross per 24 hour   Intake    Output 2750 ml   Net -2750 ml        Physical Examination:             Constitutional:  No acute distress, cooperative, pleasant    ENT:  Oral mucosa moist, oropharynx benign. Resp:  CTA bilaterally. No wheezing/rhonchi/rales. No accessory muscle use   CV:  Regular rhythm, normal rate, no murmurs, gallops, rubs    GI:  Soft, non distended, non tender. normoactive bowel sounds, no hepatosplenomegaly     Musculoskeletal:  No edema, warm, 2+ pulses throughout    Neurologic:  Some left upper extremity  strength, alert and oriented             Data Review:    Review and/or order of clinical lab test  Review and/or order of tests in the radiology section of CPT  Review and/or order of tests in the medicine section of CPT      Labs:     No results for input(s): WBC, HGB, HCT, PLT, HGBEXT, HCTEXT, PLTEXT, HGBEXT, HCTEXT, PLTEXT in the last 72 hours. Recent Labs     10/26/20  0409      K 4.8      CO2 29   BUN 45*   CREA 1.52*   *   CA 8.6     No results for input(s): ALT, AP, TBIL, TBILI, TP, ALB, GLOB, GGT, AML, LPSE in the last 72 hours. No lab exists for component: SGOT, GPT, AMYP, HLPSE  No results for input(s): INR, PTP, APTT, INREXT, INREXT in the last 72 hours. No results for input(s): FE, TIBC, PSAT, FERR in the last 72 hours. No results found for: FOL, RBCF   No results for input(s): PH, PCO2, PO2 in the last 72 hours. No results for input(s): CPK, CKNDX, TROIQ in the last 72 hours.     No lab exists for component: CPKMB  No results found for: CHOL, CHOLX, CHLST, CHOLV, HDL, HDLP, LDL, LDLC, DLDLP, TGLX, TRIGL, TRIGP, CHHD, CHHDX  Lab Results Component Value Date/Time    Glucose (POC) 247 (H) 10/27/2020 04:41 PM    Glucose (POC) 228 (H) 10/27/2020 11:30 AM    Glucose (POC) 166 (H) 10/27/2020 08:45 AM    Glucose (POC) 266 (H) 10/26/2020 09:04 PM    Glucose (POC) 186 (H) 10/26/2020 04:25 PM     Lab Results   Component Value Date/Time    Color YELLOW/STRAW 10/22/2020 09:15 AM    Appearance TURBID (A) 10/22/2020 09:15 AM    Specific gravity 1.012 10/22/2020 09:15 AM    pH (UA) 5.5 10/22/2020 09:15 AM    Protein Negative 10/22/2020 09:15 AM    Glucose Negative 10/22/2020 09:15 AM    Ketone Negative 10/22/2020 09:15 AM    Bilirubin Negative 10/22/2020 09:15 AM    Urobilinogen 0.2 10/22/2020 09:15 AM    Nitrites Positive (A) 10/22/2020 09:15 AM    Leukocyte Esterase LARGE (A) 10/22/2020 09:15 AM    Epithelial cells FEW 10/22/2020 09:15 AM    Bacteria 4+ (A) 10/22/2020 09:15 AM    WBC  10/22/2020 09:15 AM    RBC 0-5 10/22/2020 09:15 AM         Medications Reviewed:     Current Facility-Administered Medications   Medication Dose Route Frequency    dexAMETHasone (DECADRON) tablet 4 mg  4 mg Oral Q8H    levETIRAcetam (KEPPRA) tablet 500 mg  500 mg Oral Q12H    insulin glargine (LANTUS) injection 20 Units  20 Units SubCUTAneous QHS    albuterol-ipratropium (DUO-NEB) 2.5 MG-0.5 MG/3 ML  3 mL Nebulization Q4H PRN    melatonin tablet 3 mg  3 mg Oral QHS    sodium chloride (NS) flush 5-40 mL  5-40 mL IntraVENous Q8H    sodium chloride (NS) flush 5-40 mL  5-40 mL IntraVENous PRN    acetaminophen (TYLENOL) tablet 650 mg  650 mg Oral Q6H PRN    Or    acetaminophen (TYLENOL) suppository 650 mg  650 mg Rectal Q6H PRN    polyethylene glycol (MIRALAX) packet 17 g  17 g Oral DAILY PRN    ondansetron (ZOFRAN ODT) tablet 4 mg  4 mg Oral Q8H PRN    Or    ondansetron (ZOFRAN) injection 4 mg  4 mg IntraVENous Q6H PRN    famotidine (PEPCID) tablet 20 mg  20 mg Oral DAILY    atorvastatin (LIPITOR) tablet 20 mg  20 mg Oral QHS    carvediloL (COREG) tablet 6.25 mg 6.25 mg Oral BID WITH MEALS    furosemide (LASIX) tablet 40 mg  40 mg Oral DAILY    lisinopriL (PRINIVIL, ZESTRIL) tablet 5 mg  5 mg Oral DAILY    sertraline (ZOLOFT) tablet 25 mg  25 mg Oral QPM    spironolactone (ALDACTONE) tablet 25 mg  25 mg Oral DAILY    topiramate (TOPAMAX) tablet 100 mg  100 mg Oral BID    glucose chewable tablet 16 g  4 Tab Oral PRN    glucagon (GLUCAGEN) injection 1 mg  1 mg IntraMUSCular PRN    dextrose 10% infusion 0-250 mL  0-250 mL IntraVENous PRN    insulin lispro (HUMALOG) injection   SubCUTAneous AC&HS     ______________________________________________________________________  EXPECTED LENGTH OF STAY: 3d 19h  ACTUAL LENGTH OF STAY:          [facility],

## 2020-10-27 NOTE — PROGRESS NOTES
Bedside and Verbal shift change report given to [address] (oncoming nurse) by Melany Suggs RN (offgoing nurse). Report included the following information SBAR, Kardex, ED Summary, Procedure Summary, Intake/Output, MAR, Recent Results, Cardiac Rhythm NSR , Quality Measures and Dual Neuro Assessment.

## 2020-10-27 NOTE — PROGRESS NOTES
Pulmonary, Critical Care, and Sleep Medicine~Progress Note    Name: Татьяна Ness MRN: [id number]   : 1950 [facility]   Date: 10/27/2020 9:02 AM Admission: 10/22/2020     Impression Plan   1. Acute on chronic hypoxic resp failure   2. Right pleural effusion  3. 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 Hepatic and renal lesions   5. 1.2 cm lesion in the right frontal lobe; NS involved   6. COPD, on home O2 at 2lpm  7. A fib, on eliquis at home   8. CAD, on plavix at home  9. Smoker   10. SAVANNAH on CKD? 1. Plan for EBUS in afternoon on Wed; discussed with patient and she was in agreement. Per Dr Mathew Ahumada  2. Right thoracentesis for today. discussed with patient and she was in agreement    3. plavix and eliquis held since 10/22   4. Follow pleural fluid labs    5. O2 titration above 90%   6. Discussed with attending        Daily Progression:    Breathing the same as yesterday     I have reviewed the labs and previous days notes. Pertinent items are noted in HPI. OBJECTIVE:     Vital Signs:       Visit Vitals  /62 (BP 1 Location: Right arm, BP Patient Position: At rest)   Pulse 60   Temp 98 °F (36.7 °C)   Resp 18   Wt 56.6 kg (124 lb 11.2 oz)   SpO2 100%   BMI 22.81 kg/m²      Temp (24hrs), Av.3 °F (36.8 °C), Min:97.8 °F (36.6 °C), Max:98.7 °F (37.1 °C)     Intake/Output:     Last shift: No intake/output data recorded.     Last 3 shifts: 10/25 1901 - 10/27 0700  In: -   Out: 1750 [Urine:1750]          Intake/Output Summary (Last 24 hours) at 10/27/2020 9290  Last data filed at 10/27/2020 0154  Gross per 24 hour   Intake    Output 1750 ml   Net -1750 ml       Physical Exam:                                        Exam Findings Other   General: No resp distress noted, appears stated age    HEENT:  No ulcers, JVD not elevated, no cervical LAD    Chest: No pectus deformity, normal chest rise b/l    HEART:  RRR, no murmurs/rubs/gallops    Lungs:  CTA b/l, no rhonchi/crackles/wheeze, diminished BS at bases    ABD: Soft/NT, non rigid mildly distended    EXT: No cyanosis/clubbing/edema, normal peripheral pulses    Skin: No rashes or ulcers, no mottling    Neuro: A/O x 3        Medications:  Current Facility-Administered Medications   Medication Dose Route Frequency    albuterol-ipratropium (DUO-NEB) 2.5 MG-0.5 MG/3 ML  3 mL Nebulization Q4H PRN    insulin glargine (LANTUS) injection 20 Units  20 Units SubCUTAneous QHS    melatonin tablet 3 mg  3 mg Oral QHS    sodium chloride (NS) flush 5-40 mL  5-40 mL IntraVENous Q8H    sodium chloride (NS) flush 5-40 mL  5-40 mL IntraVENous PRN    acetaminophen (TYLENOL) tablet 650 mg  650 mg Oral Q6H PRN    Or    acetaminophen (TYLENOL) suppository 650 mg  650 mg Rectal Q6H PRN    polyethylene glycol (MIRALAX) packet 17 g  17 g Oral DAILY PRN    ondansetron (ZOFRAN ODT) tablet 4 mg  4 mg Oral Q8H PRN    Or    ondansetron (ZOFRAN) injection 4 mg  4 mg IntraVENous Q6H PRN    famotidine (PEPCID) tablet 20 mg  20 mg Oral DAILY    dexamethasone (DECADRON) 4 mg/mL injection 4 mg  4 mg IntraVENous Q6H    atorvastatin (LIPITOR) tablet 20 mg  20 mg Oral QHS    carvediloL (COREG) tablet 6.25 mg  6.25 mg Oral BID WITH MEALS    furosemide (LASIX) tablet 40 mg  40 mg Oral DAILY    lisinopriL (PRINIVIL, ZESTRIL) tablet 5 mg  5 mg Oral DAILY    sertraline (ZOLOFT) tablet 25 mg  25 mg Oral QPM    spironolactone (ALDACTONE) tablet 25 mg  25 mg Oral DAILY    topiramate (TOPAMAX) tablet 100 mg  100 mg Oral BID    levETIRAcetam (KEPPRA) 500 mg in 0.9% sodium chloride 100 mL IVPB  500 mg IntraVENous Q12H    glucose chewable tablet 16 g  4 Tab Oral PRN    glucagon (GLUCAGEN) injection 1 mg  1 mg IntraMUSCular PRN    dextrose 10% infusion 0-250 mL  0-250 mL IntraVENous PRN    insulin lispro (HUMALOG) injection   SubCUTAneous AC&HS       Labs:  ABG No results for input(s): PHI, PCO2I, PO2I, HCO3I, SO2I, FIO2I in the last 72 hours. CBC No results for input(s): WBC, HGB, HCT, PLT, MCV, MCH, HGBEXT, HCTEXT, PLTEXT, HGBEXT, HCTEXT, PLTEXT in the last 72 hours.      Metabolic  Panel Recent Labs     10/26/20  0409      K 4.8      CO2 29   *   BUN 45*   CREA 1.52*   CA 8.6        Pertinent Labs                Sven Verde PA-C  10/27/2020

## 2020-10-27 NOTE — ROUTINE PROCESS
Bedside shift change report given to Andie Milton 1626 (oncoming nurse) by Waqar Mejia RN (offgoing nurse). Report included the following information SBAR, Kardex, ED Summary, Intake/Output, MAR, Cardiac Rhythm NSR w/ on demand pacemaker and Dual Neuro Assessment.

## 2020-10-27 NOTE — CONSULTS
SHEMAR GARDNER  CLINICAL NURSE SPECIALIST CONSULT  [facility]    INITIAL NOTE    Presentation   Miranda Mahmood is a 69 y.o. female admitted s/p fall about 2 weeks ago that she did not seek treatment at the time. She now c/o left sided weakness. Upon initial work up left shoulder was found to be dislocated. Initial diagnostic testing also revealed lung mass with brain mets. She is for a thoracentesis Wednesday. SAVANNAH vs CKD? GFR 64. Cr+ 1.52. HX:PMH: DM2-A1C 6.1%/ COPD with home 02/ HTN/HLD/HF w/ ICD/depression    DX: CT chest; MRI brain ; for thoracentesis today     Current clinical course has been complicated by steroid induced hyperglycemia. Diabetes: Patient has known Type 2 diabetes, treated with Amaryl  PTA. Family history unknown for diabetes. Consulted by Provider for advanced diabetes nursing assessment and care, specifically related to   [] Transitioning off Jim Leeannsamantha   [x] Inpatient management strategy  [x] Home management assessment  [] Survival skill education    Diabetes-related medical history  Acute complications  Steroid induced hyperglycemia  Neurological complications  denies  Microvascular disease  Nephropathy ? SAVANNAH? Macrovascular disease  CAD  Other associated conditions     HF/HTN/HLD/depression    Diabetes medication history  Drug class Currently in use Discontinued Never used   Biguanide      DDP-4 inhibitor       Sulfonylurea Amaryl 2mg daily     Thiazolidinedione      GLP-1 RA      SGLT-2 inhibitors      Basal insulin      Fixed Dose  Combinations      Bolus insulin        Subjective   Resting in bed. Patient reports the following home diabetes self-care practices:  Eating pattern  [] \"its alright\"  Physical activity pattern-none    Monitoring pattern-once daily; \"it varies\"  *  Taking medications pattern  [x] Consistent administration  [x] Affordable    Objective   Physical exam  General Trying to stay awake, oriented/ill-appearing.  Minimally Conversant and cooperative. Vital Signs   Visit Vitals  /68 (BP 1 Location: Right arm, BP Patient Position: Sitting)   Pulse 72   Temp 97.4 °F (36.3 °C)   Resp 20   Wt 56.6 kg (124 lb 11.2 oz)   SpO2 99%   BMI 22.81 kg/m²     Skin  Warm and dry. Heart   Regular rate and rhythm. No murmurs, rubs or gallops  Lungs  Clear to auscultation without rales or rhonchi  Extremities No foot wounds    Diabetic foot exam:    Left Foot     Visual Exam: normal  ;overgrown toenails   Pulse DP: 2+ (normal)   Filament test: normal sensation      Right Foot   Visual Exam: normal ;overgrown toenails   Pulse DP: 2+ (normal)   Filament test: normal sensation     DP & PT pulses +2. Laboratory  Lab Results   Component Value Date/Time    Hemoglobin A1c 6.1 (H) 10/22/2020 09:00 AM     No results found for: LDL, LDLC, DLDLP  Lab Results   Component Value Date/Time    Creatinine 1.52 (H) 10/26/2020 04:09 AM     Lab Results   Component Value Date/Time    Sodium 136 10/26/2020 04:09 AM    Potassium 4.8 10/26/2020 04:09 AM    Chloride 103 10/26/2020 04:09 AM    CO2 29 10/26/2020 04:09 AM    Anion gap 4 (L) 10/26/2020 04:09 AM    Glucose 181 (H) 10/26/2020 04:09 AM    BUN 45 (H) 10/26/2020 04:09 AM    Creatinine 1.52 (H) 10/26/2020 04:09 AM    BUN/Creatinine ratio 30 (H) 10/26/2020 04:09 AM    GFR est AA 41 (L) 10/26/2020 04:09 AM    GFR est non-AA 34 (L) 10/26/2020 04:09 AM    Calcium 8.6 10/26/2020 04:09 AM    Bilirubin, total 0.5 10/21/2020 05:35 PM    Alk. phosphatase 93 10/21/2020 05:35 PM    Protein, total 7.1 10/21/2020 05:35 PM    Albumin 3.5 10/21/2020 05:35 PM    Globulin 3.6 10/21/2020 05:35 PM    A-G Ratio 1.0 10/21/2020 05:35 PM    ALT (SGPT) 8 10/21/2020 05:35 PM     Lab Results   Component Value Date/Time    ALT (SGPT) 8 10/21/2020 05:35 PM       Factors affecting BG pattern  Factor Dose Comments   Nutrition:  Carb-controlled meals     60 grams/meal      Drugs:  Steroids;   Decadron 16mg/24hrs Decadron insulin dosing    >8mg dexamethosone = 0.4units/kg   6mg   dexamethosone = 0.3units/kg   4mg   dexamethosone = 0.2units/kg   2mg   dexamethosone = 0.1units/kg    Pain       Blood glucose pattern        Assessment and Plan   Nursing Diagnosis Risk for unstable blood glucose pattern   Nursing Intervention Domain 2151 Decision-making Support   Nursing Interventions Examined current inpatient diabetes control   Explored factors facilitating and impeding inpatient management  Identified self-management practices impeding diabetes control  Explored corrective strategies with patient and responsible inpatient provider   Informed patient of rational for insulin strategy while hospitalized     Evaluation   Ms. Zepeda, has well controlled  Type 2 diabetes along with  predominant co-morbidities that impact her overall health. She is oxygen dependant with COPD. With this admission she was found to have lung masses as well as brain masses on MRI/CT scans. She was initiated on a Decadron steroid regimen. Subsequently she has had significant hyperglycemia. It is appropriate that basal insulin be initiated in order to control the hyperglycemia associated with Decadron. BG trends improved somewhat since basal initiated, however BG trends remain >200s. Would recommend increasing basal dose to 25units daily. Inpatient blood glucose management has been impacted by  [x] Kidney dysfunction -SAVANNAH on CKD? [x] Erratic meal consumption  [x] Glucocorticoid use-Decadron 4mg [id number]  Recommendations   1. Consider INCREASING  Basal insulin 25units daily to cover for steroid and diabetes Follow grid below when tapering steroid to also taper insulin accordingly. Decadron insulin dosing    >8mg dexamethosone = 0.4units/kg   6mg   dexamethosone = 0.3units/kg   4mg   dexamethosone = 0.2units/kg   2mg   dexamethosone = 0.1units/kg     2. CONTINUE Corrective insulin  [x] Normal sensitivity      3.   HOLD pre meal insulin for now until we see how BG trends with basal insulin. Discharge Planning   1. Since renal status is diminished significantly, would recommend discontinuing Amaryl at discharge. 2.Insulin will be safest option in light of her renal status    Billing Code(s)   I personally reviewed chart, notes, data and current medications in the medical record, and examined the patient at bedside before making care recommendations. Thank you for including us in their care. I spent 20 minutes in direct patient care today for this patient.   Time includes chart review, face to face with patient and collaboration with interdisciplinary care team.      Tomas Mcrae, CNS  [facility]  Access via [address] [place] [address]  [phone]

## 2020-10-27 NOTE — PROGRESS NOTES
Hematology-Oncology Progress Note      Nghia Andrade  1950  [id number]  10/27/2020      Subjective:     Awake and alert. No new complaints. Somewhat depressed, no sob or pain     Allergies: Patient has no known allergies.   Current Facility-Administered Medications   Medication Dose Route Frequency Provider Last Rate Last Dose    albuterol-ipratropium (DUO-NEB) 2.5 MG-0.5 MG/3 ML  3 mL Nebulization Q4H PRN DENISSE Knight M, DO        insulin glargine (LANTUS) injection 20 Units  20 Units SubCUTAneous QHS Maxcine Ajay M, DO   20 Units at 10/26/20 2320    melatonin tablet 3 mg  3 mg Oral QHS Samantha Burgess M, DO   3 mg at 10/26/20 2259    sodium chloride (NS) flush 5-40 mL  5-40 mL IntraVENous Q8H Roly Hill MD   10 mL at 10/27/20 0600    sodium chloride (NS) flush 5-40 mL  5-40 mL IntraVENous PRN Tiffanie Hill MD        acetaminophen (TYLENOL) tablet 650 mg  650 mg Oral Q6H PRN Edgar Mann MD   650 mg at 10/27/20 1501    Or    acetaminophen (TYLENOL) suppository 650 mg  650 mg Rectal Q6H PRN Tiffanie Hill MD        polyethylene glycol (MIRALAX) packet 17 g  17 g Oral DAILY PRN Tiffanie Hill MD        ondansetron (ZOFRAN ODT) tablet 4 mg  4 mg Oral Q8H PRN Tiffanie Hill MD        Or    ondansetron (ZOFRAN) injection 4 mg  4 mg IntraVENous Q6H PRN Tiffanie Hill MD        famotidine (PEPCID) tablet 20 mg  20 mg Oral DAILY Tiffanie Hill MD   20 mg at 10/27/20 0929    dexamethasone (DECADRON) 4 mg/mL injection 4 mg  4 mg IntraVENous Q6H Tiffanie Hill MD   4 mg at 10/27/20 5263    atorvastatin (LIPITOR) tablet 20 mg  20 mg Oral QHS Roly Hill MD   20 mg at 10/26/20 2249    carvediloL (COREG) tablet 6.25 mg  6.25 mg Oral BID WITH MEALS Edgar Mann MD   Stopped at 10/27/20 0929    furosemide (LASIX) tablet 40 mg  40 mg Oral DAILY Edgar Mann MD   40 mg at 10/27/20 0928    lisinopriL (PRINIVIL, ZESTRIL) tablet 5 mg  5 mg Oral DAILY Lydia Maya MD   Stopped at 10/25/20 6929    sertraline (ZOLOFT) tablet 25 mg  25 mg Oral QPM Lydia Maya MD   25 mg at 10/26/20 1806    spironolactone (ALDACTONE) tablet 25 mg  25 mg Oral DAILY Lydia Maya MD   25 mg at 10/27/20 6507    topiramate (TOPAMAX) tablet 100 mg  100 mg Oral BID Lydia Maya MD   100 mg at 10/27/20 4297    levETIRAcetam (KEPPRA) 500 mg in 0.9% sodium chloride 100 mL IVPB  500 mg IntraVENous Q12H Lydia Maya MD 0 mL/hr at 10/22/20 2004 500 mg at 10/27/20 0650    glucose chewable tablet 16 g  4 Tab Oral PRN Lydia Maya MD        glucagon (GLUCAGEN) injection 1 mg  1 mg IntraMUSCular PRN Lydia Maya MD        dextrose 10% infusion 0-250 mL  0-250 mL IntraVENous PRN Danae STOKES MD        insulin lispro (HUMALOG) injection   SubCUTAneous AC&HS Lydia Maya MD   3 Units at 10/27/20 1212     Objective:     Patient Vitals for the past 24 hrs:   BP Temp Pulse Resp SpO2 Weight   10/27/20 1033 131/68        10/27/20 0945 122/62 97.4 °F (36.3 °C) 72 20 99 %    10/27/20 0928 122/62  70      10/27/20 0600 107/62 98 °F (36.7 °C) 60 18 100 %    10/27/20 0152 (!) 109/55 98.7 °F (37.1 °C) 62 20 100 % 56.6 kg (124 lb 11.2 oz)   10/26/20 2200 (!) 107/51 98.7 °F (37.1 °C) 80 18 100 %    10/26/20 1806 (!) 129/50 97.8 °F (36.6 °C) 78 17 99 %    10/26/20 1430 110/60 98.2 °F (36.8 °C) 80 22 99 %        Gen: NAD  HEENT: PERRL, Sclerae anicteric  Cv: RRR without m/r/g  Pulm: aerating well  Abd: NABS, NTND,  Ext: No c/c/e    Available labs reviewed:  Labs:    Recent Results (from the past 24 hour(s))   GLUCOSE, POC    Collection Time: 10/26/20  4:25 PM   Result Value Ref Range    Glucose (POC) 186 (H) 65 - 100 mg/dL    Performed by [address], POC    Collection Time: 10/26/20  9:04 PM   Result Value Ref Range    Glucose (POC) 266 (H) 65 - 100 mg/dL    Performed by Roxanna Godinez GLUCOSE, POC    Collection Time: 10/27/20  8:45 AM   Result Value Ref Range    Glucose (POC) 166 (H) 65 - 100 mg/dL    Performed by Shaina Loco    GLUCOSE, POC    Collection Time: 10/27/20 11:30 AM   Result Value Ref Range    Glucose (POC) 228 (H) 65 - 100 mg/dL    Performed by Tomy Hernandez     70 y/o woman with a history of tobacco abuse, admitted with left arm weakness. Noted to have hilar mass, multilpe CNS lesions, liver lesions concerning, but not diagnostic of stage IV lung CA. 1. Suspected lung cancer: awating thoracentesis later today,, and FOB/EBUS which will be done  tomorrow. .. she has at least 6 brain mets with edema and needs to be seen by N-Surg soon as well. Rad onc is aware of pt .     Peter Ferro MD

## 2020-10-27 NOTE — PROGRESS NOTES
Problem: Pressure Injury - Risk of  Goal: *Prevention of pressure injury  Description: Document Elio Scale and appropriate interventions in the flowsheet. Outcome: Progressing Towards Goal  Note: Pressure Injury Interventions:  Sensory Interventions: Assess changes in LOC, Check visual cues for pain, Discuss PT/OT consult with provider, Float heels, Keep linens dry and wrinkle-free, Minimize linen layers, Pressure redistribution bed/mattress (bed type), Turn and reposition approx. every two hours (pillows and wedges if needed)    Moisture Interventions: Absorbent underpads, Apply protective barrier, creams and emollients, Check for incontinence Q2 hours and as needed, Internal/External urinary devices, Limit adult briefs, Maintain skin hydration (lotion/cream), Minimize layers, Moisture barrier    Activity Interventions: Increase time out of bed, Pressure redistribution bed/mattress(bed type), PT/OT evaluation    Mobility Interventions: HOB 30 degrees or less, Pressure redistribution bed/mattress (bed type), PT/OT evaluation, Turn and reposition approx. every two hours(pillow and wedges)    Nutrition Interventions: Document food/fluid/supplement intake, Offer support with meals,snacks and hydration    Friction and Shear Interventions: Apply protective barrier, creams and emollients, Feet elevated on foot rest, Lift sheet                Problem: Patient Education: Go to Patient Education Activity  Goal: Patient/Family Education  Outcome: Progressing Towards Goal     Problem: Breathing Pattern - Ineffective  Goal: *Absence of hypoxia  Outcome: Progressing Towards Goal  Goal: *Use of effective breathing techniques  Outcome: Progressing Towards Goal     Problem: Falls - Risk of  Goal: *Absence of Falls  Description: Document Edgard Fall Risk and appropriate interventions in the flowsheet.   Outcome: Progressing Towards Goal  Note: Fall Risk Interventions:            Medication Interventions: Bed/chair exit alarm, Evaluate medications/consider consulting pharmacy, Patient to call before getting OOB, Teach patient to arise slowly    Elimination Interventions: Call light in reach, Patient to call for help with toileting needs, Stay With Me (per policy), Toilet paper/wipes in reach, Toileting schedule/hourly rounds    History of Falls Interventions: Consult care management for discharge planning, Door open when patient unattended, Evaluate medications/consider consulting pharmacy, Investigate reason for fall, Room close to nurse's station         Problem: Patient Education: Go to Patient Education Activity  Goal: Patient/Family Education  Outcome: Progressing Towards Goal     Problem: Patient Education:  Go to Education Activity  Goal: Patient/Family Education  Outcome: Progressing Towards Goal     Problem: Brain Tumor Day 3 to Discharge  Goal: Activity/Safety  Outcome: Progressing Towards Goal  Goal: Nutrition/Diet  Outcome: Progressing Towards Goal  Goal: Discharge Planning  Outcome: Progressing Towards Goal  Goal: Medications  Outcome: Progressing Towards Goal  Goal: Treatments/Interventions/Procedures  Outcome: Progressing Towards Goal  Goal: Progressive Mobility and Function  Outcome: Progressing Towards Goal  Goal: Psychosocial  Outcome: Progressing Towards Goal     Problem: Brain Tumor Discharge Outcomes  Goal: *Neurologic stability  Outcome: Progressing Towards Goal  Goal: *Progress independence mobility/activities (eg: Mobility precautions)  Outcome: Progressing Towards Goal  Goal: *Adequate oxygenation  Outcome: Progressing Towards Goal  Goal: *Tolerates nutrition therapy  Outcome: Progressing Towards Goal  Goal: *Verbalizes understanding and describes medication purposes and frequencies  Outcome: Progressing Towards Goal  Goal: *Verbalizes understanding of type and use of pain medication  Outcome: Progressing Towards Goal  Goal: *Describes home care/support arrangements established based on need  Outcome: Progressing Towards Goal  Goal: *Describes available resources and support systems  Outcome: Progressing Towards Goal     Problem: Patient Education: Go to Patient Education Activity  Goal: Patient/Family Education  Outcome: Progressing Towards Goal     Problem: Patient Education: Go to Patient Education Activity  Goal: Patient/Family Education  Outcome: Progressing Towards Goal

## 2020-10-28 ENCOUNTER — ANESTHESIA EVENT (OUTPATIENT)
Dept: ENDOSCOPY | Age: 70
DRG: 054 | End: 2020-10-28
Payer: MEDICARE

## 2020-10-28 ENCOUNTER — ANESTHESIA (OUTPATIENT)
Dept: ENDOSCOPY | Age: 70
DRG: 054 | End: 2020-10-28
Payer: MEDICARE

## 2020-10-28 LAB
ANION GAP SERPL CALC-SCNC: 6 MMOL/L (ref 5–15)
BUN SERPL-MCNC: 56 MG/DL (ref 6–20)
BUN/CREAT SERPL: 36 (ref 12–20)
CALCIUM SERPL-MCNC: 8.3 MG/DL (ref 8.5–10.1)
CHLORIDE SERPL-SCNC: 96 MMOL/L (ref 97–108)
CO2 SERPL-SCNC: 30 MMOL/L (ref 21–32)
CREAT SERPL-MCNC: 1.56 MG/DL (ref 0.55–1.02)
GLUCOSE BLD STRIP.AUTO-MCNC: 119 MG/DL (ref 65–100)
GLUCOSE BLD STRIP.AUTO-MCNC: 274 MG/DL (ref 65–100)
GLUCOSE BLD STRIP.AUTO-MCNC: 63 MG/DL (ref 65–100)
GLUCOSE BLD STRIP.AUTO-MCNC: 89 MG/DL (ref 65–100)
GLUCOSE BLD STRIP.AUTO-MCNC: 99 MG/DL (ref 65–100)
GLUCOSE SERPL-MCNC: 253 MG/DL (ref 65–100)
POTASSIUM SERPL-SCNC: 4.9 MMOL/L (ref 3.5–5.1)
SERVICE CMNT-IMP: ABNORMAL
SERVICE CMNT-IMP: NORMAL
SERVICE CMNT-IMP: NORMAL
SODIUM SERPL-SCNC: 132 MMOL/L (ref 136–145)

## 2020-10-28 PROCEDURE — 88172 CYTP DX EVAL FNA 1ST EA SITE: CPT

## 2020-10-28 PROCEDURE — 74011250637 HC RX REV CODE- 250/637: Performed by: HOSPITALIST

## 2020-10-28 PROCEDURE — 76040000019: Performed by: INTERNAL MEDICINE

## 2020-10-28 PROCEDURE — 76040000020: Performed by: INTERNAL MEDICINE

## 2020-10-28 PROCEDURE — 97116 GAIT TRAINING THERAPY: CPT

## 2020-10-28 PROCEDURE — 74011250636 HC RX REV CODE- 250/636: Performed by: NURSE PRACTITIONER

## 2020-10-28 PROCEDURE — 77030026438 HC STYL ET INTUB CARD -A: Performed by: ANESTHESIOLOGY

## 2020-10-28 PROCEDURE — 77030008684 HC TU ET CUF COVD -B: Performed by: ANESTHESIOLOGY

## 2020-10-28 PROCEDURE — 74011250636 HC RX REV CODE- 250/636: Performed by: NURSE ANESTHETIST, CERTIFIED REGISTERED

## 2020-10-28 PROCEDURE — 74011250637 HC RX REV CODE- 250/637: Performed by: NURSE PRACTITIONER

## 2020-10-28 PROCEDURE — 88341 IMHCHEM/IMCYTCHM EA ADD ANTB: CPT

## 2020-10-28 PROCEDURE — 88342 IMHCHEM/IMCYTCHM 1ST ANTB: CPT

## 2020-10-28 PROCEDURE — 74011250636 HC RX REV CODE- 250/636: Performed by: INTERNAL MEDICINE

## 2020-10-28 PROCEDURE — 82962 GLUCOSE BLOOD TEST: CPT

## 2020-10-28 PROCEDURE — 74011636637 HC RX REV CODE- 636/637: Performed by: HOSPITALIST

## 2020-10-28 PROCEDURE — 07D78ZX EXTRACTION OF THORAX LYMPHATIC, VIA NATURAL OR ARTIFICIAL OPENING ENDOSCOPIC, DIAGNOSTIC: ICD-10-PCS | Performed by: INTERNAL MEDICINE

## 2020-10-28 PROCEDURE — 97530 THERAPEUTIC ACTIVITIES: CPT

## 2020-10-28 PROCEDURE — 74011000250 HC RX REV CODE- 250: Performed by: NURSE ANESTHETIST, CERTIFIED REGISTERED

## 2020-10-28 PROCEDURE — 97535 SELF CARE MNGMENT TRAINING: CPT

## 2020-10-28 PROCEDURE — 76060000034 HC ANESTHESIA 1.5 TO 2 HR: Performed by: INTERNAL MEDICINE

## 2020-10-28 PROCEDURE — 74011000258 HC RX REV CODE- 258: Performed by: HOSPITALIST

## 2020-10-28 PROCEDURE — 80048 BASIC METABOLIC PNL TOTAL CA: CPT

## 2020-10-28 PROCEDURE — 65660000000 HC RM CCU STEPDOWN

## 2020-10-28 PROCEDURE — 36415 COLL VENOUS BLD VENIPUNCTURE: CPT

## 2020-10-28 PROCEDURE — 88305 TISSUE EXAM BY PATHOLOGIST: CPT

## 2020-10-28 PROCEDURE — 88173 CYTOPATH EVAL FNA REPORT: CPT

## 2020-10-28 RX ORDER — SUCCINYLCHOLINE CHLORIDE 20 MG/ML
INJECTION INTRAMUSCULAR; INTRAVENOUS AS NEEDED
Status: DISCONTINUED | OUTPATIENT
Start: 2020-10-28 | End: 2020-10-28 | Stop reason: HOSPADM

## 2020-10-28 RX ORDER — LIDOCAINE HYDROCHLORIDE AND EPINEPHRINE 10; 10 MG/ML; UG/ML
INJECTION, SOLUTION INFILTRATION; PERINEURAL
Status: DISCONTINUED
Start: 2020-10-28 | End: 2020-10-28 | Stop reason: WASHOUT

## 2020-10-28 RX ORDER — ONDANSETRON 2 MG/ML
INJECTION INTRAMUSCULAR; INTRAVENOUS AS NEEDED
Status: DISCONTINUED | OUTPATIENT
Start: 2020-10-28 | End: 2020-10-28 | Stop reason: HOSPADM

## 2020-10-28 RX ORDER — DEXAMETHASONE SODIUM PHOSPHATE 4 MG/ML
INJECTION, SOLUTION INTRA-ARTICULAR; INTRALESIONAL; INTRAMUSCULAR; INTRAVENOUS; SOFT TISSUE AS NEEDED
Status: DISCONTINUED | OUTPATIENT
Start: 2020-10-28 | End: 2020-10-28 | Stop reason: HOSPADM

## 2020-10-28 RX ORDER — LIDOCAINE HYDROCHLORIDE 20 MG/ML
INJECTION, SOLUTION EPIDURAL; INFILTRATION; INTRACAUDAL; PERINEURAL AS NEEDED
Status: DISCONTINUED | OUTPATIENT
Start: 2020-10-28 | End: 2020-10-28 | Stop reason: HOSPADM

## 2020-10-28 RX ORDER — SODIUM CHLORIDE 9 MG/ML
INJECTION, SOLUTION INTRAVENOUS
Status: DISCONTINUED | OUTPATIENT
Start: 2020-10-28 | End: 2020-10-28 | Stop reason: HOSPADM

## 2020-10-28 RX ORDER — ROCURONIUM BROMIDE 10 MG/ML
INJECTION, SOLUTION INTRAVENOUS AS NEEDED
Status: DISCONTINUED | OUTPATIENT
Start: 2020-10-28 | End: 2020-10-28 | Stop reason: HOSPADM

## 2020-10-28 RX ORDER — PHENYLEPHRINE HCL IN 0.9% NACL 0.4MG/10ML
SYRINGE (ML) INTRAVENOUS AS NEEDED
Status: DISCONTINUED | OUTPATIENT
Start: 2020-10-28 | End: 2020-10-28 | Stop reason: HOSPADM

## 2020-10-28 RX ORDER — PROPOFOL 10 MG/ML
INJECTION, EMULSION INTRAVENOUS AS NEEDED
Status: DISCONTINUED | OUTPATIENT
Start: 2020-10-28 | End: 2020-10-28 | Stop reason: HOSPADM

## 2020-10-28 RX ADMIN — INSULIN LISPRO 3 UNITS: 100 INJECTION, SOLUTION INTRAVENOUS; SUBCUTANEOUS at 22:34

## 2020-10-28 RX ADMIN — PHENYLEPHRINE HYDROCHLORIDE 100 MCG: 10 INJECTION INTRAVENOUS at 14:09

## 2020-10-28 RX ADMIN — PHENYLEPHRINE HYDROCHLORIDE 100 MCG: 10 INJECTION INTRAVENOUS at 14:22

## 2020-10-28 RX ADMIN — PHENYLEPHRINE HYDROCHLORIDE 100 MCG: 10 INJECTION INTRAVENOUS at 14:06

## 2020-10-28 RX ADMIN — PHENYLEPHRINE HYDROCHLORIDE 200 MCG: 10 INJECTION INTRAVENOUS at 14:26

## 2020-10-28 RX ADMIN — SUCCINYLCHOLINE CHLORIDE 160 MG: 20 INJECTION, SOLUTION INTRAMUSCULAR; INTRAVENOUS at 13:35

## 2020-10-28 RX ADMIN — ROCURONIUM BROMIDE 5 MG: 10 SOLUTION INTRAVENOUS at 13:35

## 2020-10-28 RX ADMIN — PHENYLEPHRINE HYDROCHLORIDE 200 MCG: 10 INJECTION INTRAVENOUS at 14:24

## 2020-10-28 RX ADMIN — DEXAMETHASONE 4 MG: 4 TABLET ORAL at 10:22

## 2020-10-28 RX ADMIN — Medication 10 ML: at 22:34

## 2020-10-28 RX ADMIN — SODIUM CHLORIDE: 900 INJECTION, SOLUTION INTRAVENOUS at 13:24

## 2020-10-28 RX ADMIN — DEXAMETHASONE SODIUM PHOSPHATE 4 MG: 4 INJECTION, SOLUTION INTRAMUSCULAR; INTRAVENOUS at 14:55

## 2020-10-28 RX ADMIN — LEVETIRACETAM 500 MG: 500 TABLET, FILM COATED ORAL at 07:12

## 2020-10-28 RX ADMIN — TOPIRAMATE 100 MG: 100 TABLET, FILM COATED ORAL at 18:21

## 2020-10-28 RX ADMIN — LISINOPRIL 5 MG: 10 TABLET ORAL at 10:22

## 2020-10-28 RX ADMIN — Medication 10 ML: at 07:39

## 2020-10-28 RX ADMIN — FUROSEMIDE 40 MG: 40 TABLET ORAL at 10:22

## 2020-10-28 RX ADMIN — CARVEDILOL 6.25 MG: 6.25 TABLET, FILM COATED ORAL at 18:21

## 2020-10-28 RX ADMIN — SPIRONOLACTONE 25 MG: 25 TABLET ORAL at 10:22

## 2020-10-28 RX ADMIN — PHENYLEPHRINE HYDROCHLORIDE 100 MCG: 10 INJECTION INTRAVENOUS at 14:04

## 2020-10-28 RX ADMIN — PHENYLEPHRINE HYDROCHLORIDE 80 MCG: 10 INJECTION INTRAVENOUS at 13:56

## 2020-10-28 RX ADMIN — ATORVASTATIN CALCIUM 20 MG: 20 TABLET, FILM COATED ORAL at 22:34

## 2020-10-28 RX ADMIN — PROPOFOL 35 MG: 10 INJECTION, EMULSION INTRAVENOUS at 14:04

## 2020-10-28 RX ADMIN — SERTRALINE HYDROCHLORIDE 25 MG: 50 TABLET ORAL at 18:21

## 2020-10-28 RX ADMIN — PHENYLEPHRINE HYDROCHLORIDE 120 MCG: 10 INJECTION INTRAVENOUS at 13:50

## 2020-10-28 RX ADMIN — DEXAMETHASONE 4 MG: 4 TABLET ORAL at 16:00

## 2020-10-28 RX ADMIN — LEVETIRACETAM 500 MG: 500 TABLET, FILM COATED ORAL at 18:21

## 2020-10-28 RX ADMIN — CARVEDILOL 6.25 MG: 6.25 TABLET, FILM COATED ORAL at 10:21

## 2020-10-28 RX ADMIN — PROPOFOL 50 MG: 10 INJECTION, EMULSION INTRAVENOUS at 14:13

## 2020-10-28 RX ADMIN — DEXAMETHASONE 4 MG: 4 TABLET ORAL at 00:06

## 2020-10-28 RX ADMIN — PROPOFOL 50 MG: 10 INJECTION, EMULSION INTRAVENOUS at 14:10

## 2020-10-28 RX ADMIN — INSULIN LISPRO 4 UNITS: 100 INJECTION, SOLUTION INTRAVENOUS; SUBCUTANEOUS at 00:06

## 2020-10-28 RX ADMIN — Medication 10 ML: at 16:10

## 2020-10-28 RX ADMIN — SODIUM CHLORIDE 250 ML: 900 INJECTION, SOLUTION INTRAVENOUS at 23:00

## 2020-10-28 RX ADMIN — TOPIRAMATE 100 MG: 100 TABLET, FILM COATED ORAL at 10:22

## 2020-10-28 RX ADMIN — PROPOFOL 75 MG: 10 INJECTION, EMULSION INTRAVENOUS at 13:35

## 2020-10-28 RX ADMIN — PHENYLEPHRINE HYDROCHLORIDE 80 MCG: 10 INJECTION INTRAVENOUS at 13:52

## 2020-10-28 RX ADMIN — ONDANSETRON HYDROCHLORIDE 4 MG: 2 INJECTION, SOLUTION INTRAMUSCULAR; INTRAVENOUS at 13:53

## 2020-10-28 RX ADMIN — LIDOCAINE HYDROCHLORIDE 80 MG: 20 INJECTION, SOLUTION EPIDURAL; INFILTRATION; INTRACAUDAL; PERINEURAL at 13:35

## 2020-10-28 RX ADMIN — DEXTROSE MONOHYDRATE 125 ML: 100 INJECTION, SOLUTION INTRAVENOUS at 07:34

## 2020-10-28 RX ADMIN — PHENYLEPHRINE HYDROCHLORIDE 200 MCG: 10 INJECTION INTRAVENOUS at 14:23

## 2020-10-28 RX ADMIN — SODIUM CHLORIDE: 900 INJECTION, SOLUTION INTRAVENOUS at 14:50

## 2020-10-28 RX ADMIN — PROPOFOL 50 MG: 10 INJECTION, EMULSION INTRAVENOUS at 14:12

## 2020-10-28 RX ADMIN — FAMOTIDINE 20 MG: 20 TABLET ORAL at 10:21

## 2020-10-28 RX ADMIN — PHENYLEPHRINE HYDROCHLORIDE 100 MCG: 10 INJECTION INTRAVENOUS at 14:12

## 2020-10-28 RX ADMIN — PROPOFOL 40 MG: 10 INJECTION, EMULSION INTRAVENOUS at 13:56

## 2020-10-28 NOTE — PROGRESS NOTES
Problem: Brain Tumor Discharge Outcomes  Goal: *Neurologic stability  Outcome: Progressing Towards Goal  Goal: *Adequate oxygenation  Outcome: Progressing Towards Goal  Goal: *Tolerates nutrition therapy  Outcome: Progressing Towards Goal  Goal: *Verbalizes understanding of type and use of pain medication  Outcome: Progressing Towards Goal

## 2020-10-28 NOTE — PROGRESS NOTES
Transition of Care Plan:        -RUR 15%  -Palliative Consulted, patient plans to pursue progressive therapy  -PT recommend SNF vs Home Health (1-2 person Heavy Assist), getting options.  -Likely Home with Home Health & DME's  -Consult needed for DME's: bedside commode,hospital bed, mechanical lift, and wheel chair  -FU Appointment scheduled:  Dr. Delilah Ramos November 11/10 @ 2PM   -Left Message for Dr. Kerry Verma, Oncologist office for appointment with patient.   -Procedure then tentative discharge on Thursday if stable  -Transport TBD      CM will follow      LEENA Silverman/CRM

## 2020-10-28 NOTE — PROGRESS NOTES
[address] [facility]  Hospitalist Group                                                                                          Hospitalist Progress Note  Samantha Barrera DO  Answering service: [phone] from in house phone        Date of Service:  10/28/2020  NAME:  Marshall Schmid  :  1950  MRN:  [id number]      Admission Summary:   69 y.o. female,  who has history of chronic systolic heart failure status post ICD, chronic COPD on home oxygen 2 L, hypertension, hyperlipidemia, anticoagulation with Eliquis who presents with fall 2 weeks back and left sided weakness. She reports that her left arm felt funny and also she was having weakness in the left arm. It is not painful. And she did not seek medical attention. Given persistence of symptoms, EMS was called and to take her to the local emergency room. At the ER there work-up revealed that she has lung mass as well as brain tumors. She was given IV Decadron and transferred to this facility for neurosurgical evaluation. She was also noted to have a dislocated left shoulder. There was report of lung mass which is right suprahilar with progression into the mediastinum. There is also report of 1.2 cm right frontal mass with 5 cm right to left shift. There is no report of seizures. Interval history / Subjective:   F/U left sided weakness. Patient seen and examined. Left upper extremity  strength with improvement. Plans for EUS with biopsy today. Hypoglycemic overnight. Assessment & Plan:     Brain masses, consistent with metastatic disease, suspect lung primary:  Cerebral edema secondary to above:  Left-sided weakness: mild improvement  -MRI brain done and demonstrated intracranial metastases, largest 1.3 x 1.3 cm, with surrounding edema. Osseous metastases in right frontal, parietal, C2 body  -Neurosurgery consulted. No surgical benefit. Will need outpatient radiation oncology, possibly in [place].  CM to arrange.   -Continue steroids- will weaned to q8 x 3 days, then BID, Continue Keppra  -Seizure precautions  -Neuro checks    Lung mass with erosion into mediastinum, suspicious for carcinoma:  -Pulmonary consulted. Plans for EUS/endobronchial biopsy 10/28  --remain off Plavix x5 days, Eliquis x2 days and formal cardiac clearance    Moderate right pleural effusion:   -respiratory status stable, s/p thoracentesis 10/27 with approximately 300 ml removed. Based on proteins, appears transudative     Postobstructive infiltrate RUL  -Seen on CT  -no unusual cough, fever  -s/p Ceftriaxone    UTI, Proteus:  -continue Ceftriaxone for 3 days    Diabetes type 2 with hyperglycemia   -Hba1c 6.1, will hold lantus, continue SSI, restart amaryl on discharge     Chronic COPD with chronic hypoxic respiratory failure, not in exacerbation  -Continue home oxygen, AA nebs    Chronic congestive heart failure, presumed systolic given patient has ICD and is on Aldactone  -Continued home medications     Clavicular fracture:  Shoulder dislocation ruled out:  -Appreciate orthopedic surgery. No evidence of shoulder dislocation. Can wear sling for comfort       Chronic anticoagulation with Eliquis for ??  -The patient does not know why she is on chronic anticoagulation  -Hold as planning for procedures    H/o CAD  -Hold plavix today if planned for biopsies.     Hypertension  -Continue with Coreg    Prior tobacco use     Diabetic diet    PT/OT      Code status: full. AMD filled out  DVT prophylaxis: SCDs    Care Plan discussed with: Patient/Family  Anticipated Disposition: Home w/Family  Anticipated Discharge: Potential discharge 10/29, will need close outpatient follow up near her home.  Have contacted family and asked them to return call to discuss care      Hospital Problems  Date Reviewed: 10/28/2020          Codes Class Noted POA    * (Principal) Brain metastases ([facility] [address] ICD-10-CM: C79.31  ICD-9-CM: 198.3  10/22/2020 Yes                Review of Systems:     Negative unless stated above    Vital Signs:    Last 24hrs VS reviewed since prior progress note. Most recent are:  Visit Vitals  /63 (BP 1 Location: Right arm, BP Patient Position: At rest;Sitting)   Pulse 60   Temp 97.3 °F (36.3 °C)   Resp 14   Wt 56.6 kg (124 lb 12.5 oz)   SpO2 100%   BMI 22.82 kg/m²         Intake/Output Summary (Last 24 hours) at 10/28/2020 1147  Last data filed at 10/27/2020 1900  Gross per 24 hour   Intake    Output 2000 ml   Net -2000 ml        Physical Examination:             Constitutional:  No acute distress, cooperative, pleasant    ENT:  Oral mucosa moist, oropharynx benign. Resp:  CTA bilaterally. No wheezing/rhonchi/rales. No accessory muscle use   CV:  Regular rhythm, normal rate, no murmurs, gallops, rubs    GI:  Soft, non distended, non tender. normoactive bowel sounds, no hepatosplenomegaly     Musculoskeletal:  No edema, warm, 2+ pulses throughout    Neurologic:  Some left upper extremity  strength, alert and oriented             Data Review:    Review and/or order of clinical lab test  Review and/or order of tests in the radiology section of CPT  Review and/or order of tests in the medicine section of CPT      Labs:     No results for input(s): WBC, HGB, HCT, PLT, HGBEXT, HCTEXT, PLTEXT, HGBEXT, HCTEXT, PLTEXT in the last 72 hours. Recent Labs     10/28/20  0019 10/26/20  0409   * 136   K 4.9 4.8   CL 96* 103   CO2 30 29   BUN 56* 45*   CREA 1.56* 1.52*   * 181*   CA 8.3* 8.6     No results for input(s): ALT, AP, TBIL, TBILI, TP, ALB, GLOB, GGT, AML, LPSE in the last 72 hours. No lab exists for component: SGOT, GPT, AMYP, HLPSE  No results for input(s): INR, PTP, APTT, INREXT, INREXT in the last 72 hours. No results for input(s): FE, TIBC, PSAT, FERR in the last 72 hours. No results found for: FOL, RBCF   No results for input(s): PH, PCO2, PO2 in the last 72 hours.   No results for input(s): CPK, CKNDX, TROIQ in the last 72 hours.    No lab exists for component: CPKMB  No results found for: CHOL, CHOLX, CHLST, CHOLV, HDL, HDLP, LDL, LDLC, DLDLP, TGLX, [place], HD, [facility]  Lab Results   Component Value Date/Time    Glucose (POC) 99 10/28/2020 10:44 AM    Glucose (POC) 119 (H) 10/28/2020 08:26 AM    Glucose (POC) 63 (L) 10/28/2020 07:30 AM    Glucose (POC) 346 (H) 10/27/2020 10:22 PM    Glucose (POC) 247 (H) 10/27/2020 04:41 PM     Lab Results   Component Value Date/Time    Color YELLOW/STRAW 10/22/2020 09:15 AM    Appearance TURBID (A) 10/22/2020 09:15 AM    Specific gravity 1.012 10/22/2020 09:15 AM    pH (UA) 5.5 10/22/2020 09:15 AM    Protein Negative 10/22/2020 09:15 AM    Glucose Negative 10/22/2020 09:15 AM    Ketone Negative 10/22/2020 09:15 AM    Bilirubin Negative 10/22/2020 09:15 AM    Urobilinogen 0.2 10/22/2020 09:15 AM    Nitrites Positive (A) 10/22/2020 09:15 AM    Leukocyte Esterase LARGE (A) 10/22/2020 09:15 AM    Epithelial cells FEW 10/22/2020 09:15 AM    Bacteria 4+ (A) 10/22/2020 09:15 AM    WBC  10/22/2020 09:15 AM    RBC 0-5 10/22/2020 09:15 AM         Medications Reviewed:     Current Facility-Administered Medications   Medication Dose Route Frequency    lidocaine-EPINEPHrine (XYLOCAINE) 1 %-1:100,000 injection        dexAMETHasone (DECADRON) tablet 4 mg  4 mg Oral Q8H    levETIRAcetam (KEPPRA) tablet 500 mg  500 mg Oral Q12H    albuterol-ipratropium (DUO-NEB) 2.5 MG-0.5 MG/3 ML  3 mL Nebulization Q4H PRN    melatonin tablet 3 mg  3 mg Oral QHS    sodium chloride (NS) flush 5-40 mL  5-40 mL IntraVENous Q8H    sodium chloride (NS) flush 5-40 mL  5-40 mL IntraVENous PRN    acetaminophen (TYLENOL) tablet 650 mg  650 mg Oral Q6H PRN    Or    acetaminophen (TYLENOL) suppository 650 mg  650 mg Rectal Q6H PRN    polyethylene glycol (MIRALAX) packet 17 g  17 g Oral DAILY PRN    ondansetron (ZOFRAN ODT) tablet 4 mg  4 mg Oral Q8H PRN    Or    ondansetron (ZOFRAN) injection 4 mg  4 mg IntraVENous Q6H PRN    famotidine (PEPCID) tablet 20 mg  20 mg Oral DAILY    atorvastatin (LIPITOR) tablet 20 mg  20 mg Oral QHS    carvediloL (COREG) tablet 6.25 mg  6.25 mg Oral BID WITH MEALS    furosemide (LASIX) tablet 40 mg  40 mg Oral DAILY    lisinopriL (PRINIVIL, ZESTRIL) tablet 5 mg  5 mg Oral DAILY    sertraline (ZOLOFT) tablet 25 mg  25 mg Oral QPM    spironolactone (ALDACTONE) tablet 25 mg  25 mg Oral DAILY    topiramate (TOPAMAX) tablet 100 mg  100 mg Oral BID    glucose chewable tablet 16 g  4 Tab Oral PRN    glucagon (GLUCAGEN) injection 1 mg  1 mg IntraMUSCular PRN    dextrose 10% infusion 0-250 mL  0-250 mL IntraVENous PRN    insulin lispro (HUMALOG) injection   SubCUTAneous AC&HS     ______________________________________________________________________  EXPECTED LENGTH OF STAY: 4d 21h  ACTUAL LENGTH OF STAY:          [address] [facility],

## 2020-10-28 NOTE — PROGRESS NOTES
Problem: Pressure Injury - Risk of  Goal: *Prevention of pressure injury  Description: Document Elio Scale and appropriate interventions in the flowsheet. Outcome: Progressing Towards Goal  Note: Pressure Injury Interventions:  Sensory Interventions: Check visual cues for pain, Discuss PT/OT consult with provider, Float heels, Keep linens dry and wrinkle-free, Maintain/enhance activity level, Monitor skin under medical devices, Turn and reposition approx. every two hours (pillows and wedges if needed), Assess changes in LOC, Avoid rigorous massage over bony prominences    Moisture Interventions: Assess need for specialty bed, Absorbent underpads, Internal/External urinary devices, Maintain skin hydration (lotion/cream), Minimize layers, Moisture barrier, Limit adult briefs    Activity Interventions: Increase time out of bed, Pressure redistribution bed/mattress(bed type), PT/OT evaluation    Mobility Interventions: Float heels, HOB 30 degrees or less, Pressure redistribution bed/mattress (bed type), PT/OT evaluation, Turn and reposition approx. every two hours(pillow and wedges)    Nutrition Interventions: Document food/fluid/supplement intake    Friction and Shear Interventions: Feet elevated on foot rest, Lift sheet, Lift team/patient mobility team, Minimize layers                Problem: Falls - Risk of  Goal: *Absence of Falls  Description: Document Edgard Fall Risk and appropriate interventions in the flowsheet.   Outcome: Progressing Towards Goal  Note: Fall Risk Interventions:  Mobility Interventions: Communicate number of staff needed for ambulation/transfer, Bed/chair exit alarm, OT consult for ADLs, Patient to call before getting OOB, PT Consult for mobility concerns, PT Consult for assist device competence, Utilize walker, cane, or other assistive device, Utilize gait belt for transfers/ambulation         Medication Interventions: Bed/chair exit alarm, Evaluate medications/consider consulting pharmacy, Patient to call before getting OOB, Teach patient to arise slowly    Elimination Interventions: Bed/chair exit alarm, Call light in reach, Patient to call for help with toileting needs, Stay With Me (per policy), Toileting schedule/hourly rounds, Toilet paper/wipes in reach    History of Falls Interventions: Door open when patient unattended, Bed/chair exit alarm, Evaluate medications/consider consulting pharmacy, Investigate reason for fall, Room close to nurse's station         Problem: Brain Tumor Day 3 to Discharge  Goal: Activity/Safety  Outcome: Progressing Towards Goal  Goal: Medications  Outcome: Progressing Towards Goal     Problem: Brain Tumor Discharge Outcomes  Goal: *Neurologic stability  Outcome: Progressing Towards Goal

## 2020-10-28 NOTE — PROGRESS NOTES
Pulmonary, Critical Care, and Sleep Medicine~Progress Note    Name: Miranda Mahmood MRN: [id number]   : 1950 Hospital: [address]   Date: 10/28/2020 9:02 AM Admission: 10/22/2020     Impression Plan   1. Acute on chronic hypoxic resp failure   2. Right pleural effusion, appears more exudative   3. 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 Hepatic and renal lesions   5. 1.2 cm lesion in the right frontal lobe; NS involved   6. COPD, on home O2 at 2lpm  7. A fib, on eliquis at home   8. CAD, on plavix at home  9. Smoker   10. SAVANNAH on CKD? 1. Plan for EBUS this afternoon; discussed with patient and she was in agreement. Per Dr Jaime Canchola  2. Right thoracentesis for today. discussed with patient and she was in agreement on 10/27  3. plavix and eliquis held since 10/22   4. Follow pleural fluid labs    5. O2 titration above 90%          Daily Progression:    S/p thoracentesis with 310ml removed. Post tap image showed improvement in the small pleural effusion     I have reviewed the labs and previous days notes. Pertinent items are noted in HPI. OBJECTIVE:     Vital Signs:       Visit Vitals  /63 (BP 1 Location: Right arm, BP Patient Position: At rest;Sitting)   Pulse 60   Temp 97.3 °F (36.3 °C)   Resp 14   Wt 56.6 kg (124 lb 12.5 oz)   SpO2 100%   BMI 22.82 kg/m²      Temp (24hrs), Av °F (36.7 °C), Min:97.3 °F (36.3 °C), Max:98.4 °F (36.9 °C)     Intake/Output:     Last shift: No intake/output data recorded.     Last 3 shifts: 10/26 1901 - 10/28 0700  In: -   Out: 3750 [Urine:3750]          Intake/Output Summary (Last 24 hours) at 10/28/2020 1034  Last data filed at 10/27/2020 1900  Gross per 24 hour   Intake    Output 2000 ml   Net -2000 ml       Physical Exam:                                        Exam Findings Other   General: No resp distress noted, appears stated age    [de-identified]:  No ulcers, JVD not elevated, no cervical LAD    Chest: No pectus deformity, normal chest rise b/l    HEART:  RRR, no murmurs/rubs/gallops    Lungs:  CTA b/l, no rhonchi/crackles/wheeze, diminished BS at bases    ABD: Soft/NT, non rigid mildly distended    EXT: No cyanosis/clubbing/edema, normal peripheral pulses    Skin: No rashes or ulcers, no mottling    Neuro: A/O x 3        Medications:  Current Facility-Administered Medications   Medication Dose Route Frequency    dexAMETHasone (DECADRON) tablet 4 mg  4 mg Oral Q8H    levETIRAcetam (KEPPRA) tablet 500 mg  500 mg Oral Q12H    albuterol-ipratropium (DUO-NEB) 2.5 MG-0.5 MG/3 ML  3 mL Nebulization Q4H PRN    melatonin tablet 3 mg  3 mg Oral QHS    sodium chloride (NS) flush 5-40 mL  5-40 mL IntraVENous Q8H    sodium chloride (NS) flush 5-40 mL  5-40 mL IntraVENous PRN    acetaminophen (TYLENOL) tablet 650 mg  650 mg Oral Q6H PRN    Or    acetaminophen (TYLENOL) suppository 650 mg  650 mg Rectal Q6H PRN    polyethylene glycol (MIRALAX) packet 17 g  17 g Oral DAILY PRN    ondansetron (ZOFRAN ODT) tablet 4 mg  4 mg Oral Q8H PRN    Or    ondansetron (ZOFRAN) injection 4 mg  4 mg IntraVENous Q6H PRN    famotidine (PEPCID) tablet 20 mg  20 mg Oral DAILY    atorvastatin (LIPITOR) tablet 20 mg  20 mg Oral QHS    carvediloL (COREG) tablet 6.25 mg  6.25 mg Oral BID WITH MEALS    furosemide (LASIX) tablet 40 mg  40 mg Oral DAILY    lisinopriL (PRINIVIL, ZESTRIL) tablet 5 mg  5 mg Oral DAILY    sertraline (ZOLOFT) tablet 25 mg  25 mg Oral QPM    spironolactone (ALDACTONE) tablet 25 mg  25 mg Oral DAILY    topiramate (TOPAMAX) tablet 100 mg  100 mg Oral BID    glucose chewable tablet 16 g  4 Tab Oral PRN    glucagon (GLUCAGEN) injection 1 mg  1 mg IntraMUSCular PRN    dextrose 10% infusion 0-250 mL  0-250 mL IntraVENous PRN    insulin lispro (HUMALOG) injection   SubCUTAneous AC&HS       Labs:  ABG No results for input(s): PHI, PCO2I, PO2I, HCO3I, SO2I, FIO2I in the last 72 hours. CBC No results for input(s): WBC, HGB, HCT, PLT, MCV, MCH, HGBEXT, HCTEXT, PLTEXT, HGBEXT, HCTEXT, PLTEXT in the last 72 hours.      Metabolic  Panel Recent Labs     10/28/20  0019 10/26/20  0409   * 136   K 4.9 4.8   CL 96* 103   CO2 30 29   * 181*   BUN 56* 45*   CREA 1.56* 1.52*   CA 8.3* 8.6        Pertinent Labs                Barrett Claudean Saunders, PA-C  10/28/2020

## 2020-10-28 NOTE — ROUTINE PROCESS
Bedside shift change report given to Candi Collins (oncoming nurse) by Alan Monique RN (offgoing nurse). Report included the following information SBAR, Kardex, ED Summary, Intake/Output, MAR, Cardiac Rhythm NSR/Paced and Dual Neuro Assessment. Tuesday 7/31

## 2020-10-28 NOTE — DIABETES MGMT
[facility]  CLINICAL NURSE SPECIALIST CONSULT  PROGRAM FOR DIABETES HEALTH    FOLLOW UP  NOTE    Presentation   Briana Willson is a 69 y.o. female admitted s/p fall about 2 weeks ago that she did not seek treatment at the time. She now c/o left sided weakness. Upon initial work up left shoulder was found to be dislocated. Initial diagnostic testing also revealed lung mass with brain mets. She is for a thoracentesis Wednesday. SAVANNAH vs CKD? GFR 64. Cr+ 1.52. HX:PMH: DM2-A1C 6.1%/ COPD with home 02/ HTN/HLD/HF w/ ICD/depression    DX: CT chest; MRI brain ; for thoracentesis today     Current clinical course has been complicated by steroid induced hyperglycemia. Diabetes: Patient has known Type 2 diabetes, treated with Amaryl  PTA. Family history unknown for diabetes. Consulted by Provider for advanced diabetes nursing assessment and care, specifically related to   [] Transitioning off Real Gong   [x] Inpatient management strategy  [x] Home management assessment  [] Survival skill education    Diabetes-related medical history  Acute complications  Steroid induced hyperglycemia  Neurological complications  denies  Microvascular disease  Nephropathy ? SAVANNAH? Macrovascular disease  CAD  Other associated conditions     HF/HTN/HLD/depression    Diabetes medication history  Drug class Currently in use Discontinued Never used   Biguanide      DDP-4 inhibitor       Sulfonylurea Amaryl 2mg daily     Thiazolidinedione      GLP-1 RA      SGLT-2 inhibitors      Basal insulin      Fixed Dose  Combinations      Bolus insulin        Subjective   Had hypoglycemia this morning, 63mg/dl. Steroid regimen tapered to PO with dose reduction.        Patient reports the following home diabetes self-care practices:  Eating pattern  [] \"its alright\"  Physical activity pattern-none    Monitoring pattern-once daily; \"it varies\"  *  Taking medications pattern  [x] Consistent administration  [x] Affordable    Objective   Physical exam  General Trying to stay awake, oriented/ill-appearing. Minimally Conversant and cooperative. Vital Signs   Visit Vitals  /63 (BP 1 Location: Right arm, BP Patient Position: At rest;Sitting)   Pulse 60   Temp 97.3 °F (36.3 °C)   Resp 14   Wt 56.6 kg (124 lb 12.5 oz)   SpO2 100%   BMI 22.82 kg/m²     Skin  Warm and dry. Heart   Regular rate and rhythm. No murmurs, rubs or gallops  Lungs  Clear to auscultation without rales or rhonchi  Extremities No foot wounds    Diabetic foot exam:    Left Foot     Visual Exam: normal  ;overgrown toenails   Pulse DP: 2+ (normal)   Filament test: normal sensation      Right Foot   Visual Exam: normal ;overgrown toenails   Pulse DP: 2+ (normal)   Filament test: normal sensation     DP & PT pulses +2. Laboratory  Lab Results   Component Value Date/Time    Hemoglobin A1c 6.1 (H) 10/22/2020 09:00 AM     No results found for: LDL, LDLC, DLDLP  Lab Results   Component Value Date/Time    Creatinine 1.56 (H) 10/28/2020 12:19 AM     Lab Results   Component Value Date/Time    Sodium 132 (L) 10/28/2020 12:19 AM    Potassium 4.9 10/28/2020 12:19 AM    Chloride 96 (L) 10/28/2020 12:19 AM    CO2 30 10/28/2020 12:19 AM    Anion gap 6 10/28/2020 12:19 AM    Glucose 253 (H) 10/28/2020 12:19 AM    BUN 56 (H) 10/28/2020 12:19 AM    Creatinine 1.56 (H) 10/28/2020 12:19 AM    BUN/Creatinine ratio 36 (H) 10/28/2020 12:19 AM    GFR est AA 40 (L) 10/28/2020 12:19 AM    GFR est non-AA 33 (L) 10/28/2020 12:19 AM    Calcium 8.3 (L) 10/28/2020 12:19 AM    Bilirubin, total 0.5 10/21/2020 05:35 PM    Alk.  phosphatase 93 10/21/2020 05:35 PM    Protein, total 7.1 10/21/2020 05:35 PM    Albumin 3.5 10/21/2020 05:35 PM    Globulin 3.6 10/21/2020 05:35 PM    A-G Ratio 1.0 10/21/2020 05:35 PM    ALT (SGPT) 8 10/21/2020 05:35 PM     Lab Results   Component Value Date/Time    ALT (SGPT) 8 10/21/2020 05:35 PM       Factors affecting BG pattern  Factor Dose Comments   Nutrition:  Carb-controlled meals 60 grams/meal      Drugs:  Steroids; Decadron 12mg/24hrs Decadron insulin dosing    >8mg dexamethosone = 0.4units/kg   6mg   dexamethosone = 0.3units/kg   4mg   dexamethosone = 0.2units/kg   2mg   dexamethosone = 0.1units/kg    Pain       Blood glucose pattern        Assessment and Plan   Nursing Diagnosis Risk for unstable blood glucose pattern   Nursing Intervention Domain 5256 Decision-making Support   Nursing Interventions Examined current inpatient diabetes control   Explored factors facilitating and impeding inpatient management  Identified self-management practices impeding diabetes control  Explored corrective strategies with patient and responsible inpatient provider   Informed patient of rational for insulin strategy while hospitalized     Evaluation   Ms. Zepeda, has well controlled  Type 2 diabetes along with  predominant co-morbidities that impact her overall health. She is oxygen dependant with COPD. With this admission she was found to have lung masses as well as brain masses on MRI/CT scans. She was initiated on a Decadron steroid regimen. Subsequently she has had significant hyperglycemia. It is appropriate that basal insulin be initiated in order to control the hyperglycemia associated with Decadron. Had hypoglycemia this morning, 63mg/dl. Overnight BG went from 346 to 63mg/gl. Steroid dose tapered started yesterday. Conferred with Dr. Rossana Alejo re: insulin adjustment. Lantus insulin discontinued. Inpatient blood glucose management has been impacted by  [x] Kidney dysfunction -SAVANNAH on CKD? [x] Erratic meal consumption  [x] Glucocorticoid use-Decadron 4mg q8yutly  Recommendations     2. CONTINUE Corrective insulin for BG >200-  [x] Normal sensitivity     Diabetes Management Team to sign off at this point as patient's blood glucose remains stable. Please re-consult us if patient needs change. Thank you for including us in their care. Discharge Planning   1.  Conferred with Dr. Richelle Maher re: discharge plan. 2. Re-start Amaryl at PTA dosing. Billing Code(s)   I personally reviewed chart, notes, data and current medications in the medical record, and examined the patient at bedside before making care recommendations. Thank you for including us in their care. I spent 20 minutes in direct patient care today for this patient.   Time includes chart review, face to face with patient and collaboration with interdisciplinary care team.      Viviane Magallon, CNS  Program for Diabetes Health  Access via [address] [place] [address]  [phone]

## 2020-10-28 NOTE — PROCEDURES
Pre anesthesia assessment was performed. History and physical on the chart. Informed consent for bronchoscopy with general anesthesia was obtained from the patient. The proposed procedure and possible alternatives including the option of no procedure were discussed. The benefits of the proposed procedure and its alternatives were also discussed. The nature and probability of risks of the proposed procedure and its alternatives were discussed. After our discussion, the patient gave informed consent to undergo the procedure and wished to proceed.     A time out was performed prior to the start of procedure and the procedure was verified in the presence of bronchoscopist, RN and anesthesiologist. ASA assessment documented by anesthesiologist. Patients heart rate, BP, respiratory rate and oxygen saturations were monitored during the procedure. RSI was performed and patient was intubated by anesthesiologist.     Assistant: RT Wendy. Tawanna Chung RN.     Inspection Bronchoscopy: After patient was adequately sedated the diagnostic bronchoscope was introduced through the ETT and advanced to the donald. The left and right tracheobronchial tree was examined upto sub segmental level. Right upper lobe apical segment mass infiltrating the right lateral wall of the trachea. EBUS and TBNA of station 4R LN: The EBUS was inserted and advanced to trachea. RYAN was intubated and the bronchoscope was retracted and US was activated. The vascular structure was identified. The 4R LN was identified. The mass was planner and more superficial and the LN was behind the septa. TBNA 25G was passed and both sampled at the same time. Initial MARY - malignant cells seen possibly squamous cells. More samples obtained from 22G TBNA x 2 performed. Patient tolerated the procedure.  No immediate complications.     Sample collected:  -EBUS assisted TBNA station 4R LN / right paratracheal mass; 25G TBNA x 3, 22G TBNA x 3.     Complication: None

## 2020-10-28 NOTE — PROGRESS NOTES
Spoke to patient's sister. Sister expressed concerns that discharge to home is not a good option. Scotty Recio has asked the patient to come and live with her but she has declined. Scotty Recio stated the patient's home is unclean, overcrowded and that she would not be getting the best care there. Information was passed to case management.   Will advise MD.

## 2020-10-28 NOTE — PROGRESS NOTES
Problem: Mobility Impaired (Adult and Pediatric)  Goal: *Acute Goals and Plan of Care (Insert Text)  Description:   FUNCTIONAL STATUS PRIOR TO ADMISSION: Pt is a limited historian - reports that she was essentially bed bound at home over the last several weeks. Prior to that, she ambulated very short distances with RW. Lives with family who provide assist as needed. HOME SUPPORT PRIOR TO ADMISSION: The patient lived with family. 2L O2 at baseline    Physical Therapy Goals  Initiated 10/26/2020  1. Patient will move from supine to sit and sit to supine , scoot up and down, and roll side to side in bed with minimal assistance/contact guard assist within 7 day(s). 2.  Patient will transfer from bed to chair and chair to bed with moderate assistance  using the least restrictive device within 7 day(s). 3.  Patient will perform sit to stand with moderate assistance  within 7 day(s). 4.  Patient will ambulate with moderate assistance  for 10 feet with the least restrictive device within 7 day(s). 5.  Patient will ascend/descend 3 stairs with handrail(s) with minimal assistance  within 7 day(s). 6.  Patient will improve Hitchcock Balance score by 7 points within 7 days. Outcome: Progressing Towards Goal     PHYSICAL THERAPY TREATMENT  Patient: Tamy Huggins (25 y.o. female)  Date: 10/28/2020  Diagnosis: Brain metastases ([address] [C79.31]   Brain metastases ([address]  Procedure(s) (LRB):  ENDOSCOPIC BRONCHOSCOPY ULTRASOUND (EBUS) (N/A) Day of Surgery  Precautions: Fall, Skin(LUE NWB (L clavicular fx))  Chart, physical therapy assessment, plan of care and goals were reviewed.     ASSESSMENT  Patient continues with skilled PT services and is progressing towards goals - note significant improvements this date compared to initial evaluation in alertness, command following, SOB, and ability to participate, however remains most limited by limited use of L UE (2* clavicle fx), generalized weakness, impaired balance, and decreased tolerance to sustained activity leading to increased falls risk and dependency from baseline level. Worked on repeated sit<>stands from various surfaces with mod A initially, improving to min A x1-2 with cues for fwd weight shift and appropriate CHRISTINE. Able to obtain full upright posture this date. Progressed to taking several steps to chair and then fwd/retro amb using WBQC. Required constant min A 1-2 for steadying, sequencing, and correction of posterior leans/LOBs. Remained up in recliner at end of session, NAD. Given substantial progress in acute session, feel that she is now an excellent rehab candidate to maximize indep, QOL, and caregiver training - however note plan is for home with family to expedite oncology tx. Therefore, recommend 1-2 person assist with all transfers/ADLs, gait belt (will issue), and use of WBQC/wheelchair (reports that she has both at home), and HHPT. Very high falls risk. Current Level of Function Impacting Discharge (mobility/balance): up to mod A for transfers; 2 person assist for ADLs; L UE sling 2* clavicle fx; high falls risk    Other factors to consider for discharge: high falls risk; will need assist of 1-2 for mobility and ADLs. PLAN :  Patient continues to benefit from skilled intervention to address the above impairments. Continue treatment per established plan of care. to address goals. Recommendation for discharge: (in order for the patient to meet his/her long term goals)  Therapy 3 hours per day 5-7 days per week ; if this is not an option, then recommend 1-2 person assist with all ADLs/mobiliy, HHPT    This discharge recommendation:  Has been made in collaboration with the attending provider and/or case management    IF patient discharges home will need the following DME: patient owns DME required for discharge (reports that she has a W/c and quad cane)       SUBJECTIVE:   Patient stated I have one of those.  (when quad cane introduced)    OBJECTIVE DATA SUMMARY:   Critical Behavior:  Neurologic State: Alert  Orientation Level: Oriented X4  Cognition: Follows commands, Appropriate for age attention/concentration, Other (comment)(question memory)  Safety/Judgement: Awareness of environment, Fall prevention, Home safety  Functional Mobility Training:  Bed Mobility:     Supine to Sit: Minimum assistance  Scooting: Contact guard assistance    Transfers:  Sit to Stand: Moderate assistance(progressing to min A x1-2)  Stand to Sit: Minimum assistance  Bed to Chair: Assist x2; Moderate assistance     Balance:  Sitting: Impaired; Without support  Sitting - Static: Fair (occasional)  Sitting - Dynamic: Fair (occasional)  Standing: Impaired; With support  Standing - Static: Fair;Constant support  Standing - Dynamic : Poor;Constant support  Ambulation/Gait Training:  Distance (ft): 8 Feet (ft)  Assistive Device: Gait belt;Cane, quad  Ambulation - Level of Assistance: Assist x2;Minimal assistance  Gait Abnormalities: Altered arm swing;Decreased step clearance;Trunk sway increased; Shuffling gait  Base of Support: Narrowed  Speed/Cecile: Slow;Shuffled       Activity Tolerance:   Fair, desaturates with exertion and requires oxygen and observed SOB with activity  Please refer to the flowsheet for vital signs taken during this treatment. After treatment patient left in no apparent distress:   Sitting in chair, Call bell within reach and Bed / chair alarm activated    COMMUNICATION/COLLABORATION:   The patients plan of care was discussed with: Occupational therapist, Registered nurse and Physician.      Monster Johnson PT, DPT   Time Calculation: 25 mins

## 2020-10-28 NOTE — PROGRESS NOTES
Neurosurgery    Recommend steroid taper as follows - decadron 4 mg q8h x 3 days then 2 mg q8h x 3 days then stay at 2mg bid until seen by radiation oncology. We are not recommending surgery for this patient. The brain mets should be treated with radiation. If lung pathology comes back as small cell lung cancer then she would need WBRT. If it comes back as non-small cell lung cancer, then she could possibly be considered for focused beam radiation to those areas. Patient lives near [facility]. Her closest radiation center is likely [facility] in [facility]. She could follow-up with them as an outpatient. We can also have her see Dr. Fernando Mahmood outpatient to be evaluated for gamma knife. Dr. Ezequiel Frost and Dr. Fernando Mahmood can work together to formulate the best approach to treat the brain mets.     Rahul Fulton NP

## 2020-10-28 NOTE — PERIOP NOTES
TRANSFER - IN REPORT:    Verbal report received from Raissa Jesuselin at [phone] on [facility]  being received from [phone] for ordered procedure      Report consisted of patients Situation, Background, Assessment and   Recommendations(SBAR). Information from the following report(s) SBAR, MAR, Cardiac Rhythm NSR and Pre Procedure Checklist was reviewed with the receiving nurse. Opportunity for questions and clarification was provided. Assessment completed upon patients arrival to unit and care assumed.   __________________________________  TRANSFER - OUT REPORT:    Verbal report given to Raissa Jesuselin at [address] on [facility]  being transferred to [phone] for routine post - op       Report consisted of patients Situation, Background, Assessment,   Recommendations(SBAR), and Post-Procedure Instructions. Information from the following report(s) SBAR, Procedure Summary, MAR and Cardiac Rhythm NSR was reviewed with the receiving nurse. Lines:   Peripheral IV 10/22/20 Left Wrist (Active)   Site Assessment Clean, dry, & intact 10/28/20 1320   Phlebitis Assessment 0 10/28/20 1320   Infiltration Assessment 0 10/28/20 1320   Dressing Status Clean, dry, & intact 10/28/20 1320   Dressing Type Transparent 10/28/20 1320   Hub Color/Line Status Capped 10/28/20 1320   Action Taken Open ports on tubing capped 10/28/20 1200   Alcohol Cap Used Yes 10/28/20 1320        Opportunity for questions and clarification was provided.       Patient transported with:   O2 @ 2 liters  Registered Nurse

## 2020-10-28 NOTE — PERIOP NOTES
Initial RN admission and assessment performed and documented in Endoscopy navigator. Patient evaluated by anesthesia in pre-procedure holding. All procedural vital signs, airway assessment, and level of consciousness information monitored and recorded by anesthesia staff on the anesthesia record. Report received from CRNA post procedure. Endoscope was pre-cleaned at bedside immediately following procedure by Kina Hanna

## 2020-10-28 NOTE — PROGRESS NOTES
Problem: Self Care Deficits Care Plan (Adult)  Goal: *Acute Goals and Plan of Care (Insert Text)  Description: FUNCTIONAL STATUS PRIOR TO ADMISSION: Patient required maximum assistance for basic and instrumental ADLs. At baseline patient uses 2 liters of supplemental oxygen. Patient is a poor historian but appears to have been bed bound for a while living with son and daughter in law who are reportedly home at all times. She could not remember the last time she walked and did not remember recent fall, but did report use of RW when she was mobile. She was rolling for bedpan prior to admission, was dependent for all IADLs, and assisted with dressing/grooming/upper body bathing as able. Patient reports watching TV all day. HOME SUPPORT: The patient lived with son and daughter in law. Occupational Therapy Goals  Initiated 10/27/2020   1. Patient will perform self-feeding with supervision/set-up within 7 day(s). 2.  Patient will perform upper body and lower body bathing using compensatory techniques PRN with minimal assistance/contact guard assist within 7 day(s). 3.  Patient will perform upper body dressing and lower body dressing using compensatory techniques PRN with minimal assistance/contact guard assist within 7 day(s). 4.  Patient will perform rolling for bedpan with supervision/set-up within 7 day(s). 5.  Patient will perform all aspects of toileting with minimal assistance/contact guard assist within 7 day(s). 6.  Patient will participate in upper extremity therapeutic exercise/activities with modified independence for 5 minutes within 7 day(s). 7.  Patient will perform bed mobility to EOB in preparation for standing/seated ADLs with minimal assistance in 7 days.            Outcome: Progressing Towards Goal     OCCUPATIONAL THERAPY TREATMENT  Patient: Mildred Bello (80 y.o. female)  Date: 10/28/2020  Diagnosis: Brain metastases ([address] [C79.31]   Brain metastases ([address]  Procedure(s) (LRB):  ENDOSCOPIC BRONCHOSCOPY ULTRASOUND (EBUS) (N/A) Day of Surgery  Precautions: Fall, Skin(LUE NWB (L clavicular fx))  Chart, occupational therapy assessment, plan of care, and goals were reviewed. ASSESSMENT  Patient continues with skilled OT services and is progressing towards goals. Patient demonstrated significant improvement in functional mobility (decreased posterior lean with stand), LUE movement, endurance (significantly less SOB with activity), and activity tolerance this session. Patient continues to be limited by LUE weakness and dysmetria d/t clavicle fracture and CVA (has sling for comfort, NWB), impaired standing balance, cognition (memory), and decreased endurance and strength globally. Despite progress, patient requiring mod to max of 2 to complete basic toileting task d/t impaired functional reach and fair to poor static standing balance. Patient is highly motivated to work with therapy to increase time OOB at home, build strength and endurance, complete self care independently, and increase quality of life. Based on performance on this date, improvement in symptoms, and patient goals, she would benefit from IPR stay to increase quality of life, functional mobility, address medical management, caregiver training, and safety at home. She has demonstrated good rehabilitation potential and motivation. At this time patient requiring physical assist of 2 for standing ADLs and safety with mobility.  Patient reporting she does not have consistent assist of 2 at home; therefore, presenting significant safety risk at home d/t lack of appropriate assist.          Current Level of Function Impacting Discharge (ADLs): min to max A for upper body ADLs, max to total A lower body ADLs    Other factors to consider for discharge: family assist at home but not sure how consistent, bed level at admission but motivation to be OOB, potential for rehab, home with O2, and PMH          PLAN :  Patient continues to benefit from skilled intervention to address the above impairments. Continue treatment per established plan of care. to address goals. Recommend with staff: Recommend with nursing patient to complete as able in order to maintain strength, endurance and independence: upper body ADLs with supervision/setup, OOB to chair 3x/day and mobilizing to the [facility] or toileting with 2 assist. Thank you for your assistance. Recommend next OT session: LB ADLs, chair ADLs, therapeutic exercise, standing balance     Recommendation for discharge: (in order for the patient to meet his/her long term goals)  Therapy 3 hours per day 5-7 days per week vs SNF     If d/c home, recommending intensive HHOT/PT to maximize independence with self care, increase time OOB, ensure functional mobility, home safety and fall prevention, and train caregivers on functional transfers and respite options. Also recommending 24/7 physical assist of 2 for all ADLs, IADLs, functional mobility, and transfers. This discharge recommendation:  Has not yet been discussed the attending provider and/or case management    IF patient discharges home will need the following DME: bedside commode        SUBJECTIVE:   Patient stated I want to get out of bed.  when asked goals for d/c home     OBJECTIVE DATA SUMMARY:   Cognitive/Behavioral Status:  Neurologic State: Alert  Orientation Level: Oriented X4  Cognition: Follows commands; Appropriate for age attention/concentration; Other (comment)(question memory)  Perception: Appears intact  Perseveration: No perseveration noted  Safety/Judgement: Awareness of environment; Fall prevention;Home safety    Functional Mobility and Transfers for ADLs:  Bed Mobility:  Supine to Sit: Minimum assistance  Scooting: Contact guard assistance    Transfers:  Sit to Stand:  Moderate assistance  Functional Transfers  Toilet Transfer : Maximum assistance;Assist x2(for balance )  Cues: Physical assistance;Verbal cues provided  Adaptive Equipment: Bedside commode  Bed to Chair: Maximum assistance;Assist x2    Balance:  Sitting: Intact  Sitting - Static: Fair (occasional)  Sitting - Dynamic: Fair (occasional)  Standing: Impaired; Without support  Standing - Static: Fair;Constant support  Standing - Dynamic : Poor;Constant support    ADL Intervention:  Lower Body Dressing Assistance  Protective Undergarmet: Total assistance (dependent)  Leg Crossed Method Used: No  Position Performed: Standing  Cues: Physical assistance;Don;Doff     Toileting  Toileting Assistance: Total assistance(dependent)(max A to stand, 2nd assist for hygiene )  Bladder Hygiene: Total assistance (dependent)  Bowel Hygiene: Total assistance (dependent)  Clothing Management: Total assistance (dependent)  Cues: Physical assistance for pants down;Physical assistance for pants up; Tactile cues provided;Verbal cues provided;Visual cues provided  Adaptive Equipment: Grab bars; Other (comment)([facility])    Cognitive Retraining  Problem Solving: Identifying the task; Identifying the problem;General alternative solution  Executive Functions: Executing cognitive plans  Organizing/Sequencing: Three step sequence  Attention to Task: Multi-task  Maintains Attention For (Time): 1 minute  Following Commands: Awareness of environment; Follows two step commands/directions  Safety/Judgement: Awareness of environment; Fall prevention;Home safety  Cues: Verbal cues provided     Therapeutic Exercises:   Instructed to move UE/LE as much as possible when in bed, demonstrated 2 leg lifts bilaterally     Pain:  none    Activity Tolerance:   Good and requires rest breaks, significantly less SOB this session but still some wheezing, vitals stable   Please refer to the flowsheet for vital signs taken during this treatment.     After treatment patient left in no apparent distress:   Sitting in chair, Call bell within reach, Bed / chair alarm activated, and working with PT    COMMUNICATION/COLLABORATION:   The patients plan of care was discussed with: Physical therapist, Registered nurse, and Rehabilitation technician. Patient was educated regarding Her deficit(s) of balance, L UE functional, coordination, strength as this relates to Her diagnosis of fall, CVA, L clavicle fracture, brain mets. She demonstrated Fair understanding as evidenced by verbalization. Patient educated on BEFAST and demonstrated good understanding as evidenced by verbalization. Regarding student involvement in patient care:  A student participated in this treatment session. Per CMS Medicare statements and AOTA guidelines I certify that the following was true:  1. I was present and directly observed the entire session. 2. I made all skilled judgments and clinical decisions regarding care. 3. I am the practitioner responsible for assessment, treatment, and documentation.     EBENEZER Mallory   Time Calculation: 27 mins

## 2020-10-29 LAB
BASOPHILS # BLD: 0 K/UL (ref 0–0.1)
BASOPHILS NFR BLD: 0 % (ref 0–1)
DIFFERENTIAL METHOD BLD: ABNORMAL
EOSINOPHIL # BLD: 0 K/UL (ref 0–0.4)
EOSINOPHIL NFR BLD: 0 % (ref 0–7)
ERYTHROCYTE [DISTWIDTH] IN BLOOD BY AUTOMATED COUNT: 13.2 % (ref 11.5–14.5)
GLUCOSE BLD STRIP.AUTO-MCNC: 149 MG/DL (ref 65–100)
GLUCOSE BLD STRIP.AUTO-MCNC: 181 MG/DL (ref 65–100)
GLUCOSE BLD STRIP.AUTO-MCNC: 267 MG/DL (ref 65–100)
GLUCOSE BLD STRIP.AUTO-MCNC: 323 MG/DL (ref 65–100)
HCT VFR BLD AUTO: 34.1 % (ref 35–47)
HGB BLD-MCNC: 10.3 G/DL (ref 11.5–16)
IMM GRANULOCYTES # BLD AUTO: 0.1 K/UL (ref 0–0.04)
IMM GRANULOCYTES NFR BLD AUTO: 1 % (ref 0–0.5)
LYMPHOCYTES # BLD: 0.6 K/UL (ref 0.8–3.5)
LYMPHOCYTES NFR BLD: 6 % (ref 12–49)
MAGNESIUM SERPL-MCNC: 2.5 MG/DL (ref 1.6–2.4)
MCH RBC QN AUTO: 30.1 PG (ref 26–34)
MCHC RBC AUTO-ENTMCNC: 30.2 G/DL (ref 30–36.5)
MCV RBC AUTO: 99.7 FL (ref 80–99)
MONOCYTES # BLD: 1 K/UL (ref 0–1)
MONOCYTES NFR BLD: 10 % (ref 5–13)
NEUTS SEG # BLD: 8.2 K/UL (ref 1.8–8)
NEUTS SEG NFR BLD: 83 % (ref 32–75)
NRBC # BLD: 0 K/UL (ref 0–0.01)
NRBC BLD-RTO: 0 PER 100 WBC
PHOSPHATE SERPL-MCNC: 6.4 MG/DL (ref 2.6–4.7)
PLATELET # BLD AUTO: 175 K/UL (ref 150–400)
PLATELET COMMENTS,PCOM: ABNORMAL
PMV BLD AUTO: 10.9 FL (ref 8.9–12.9)
RBC # BLD AUTO: 3.42 M/UL (ref 3.8–5.2)
RBC MORPH BLD: ABNORMAL
SERVICE CMNT-IMP: ABNORMAL
WBC # BLD AUTO: 9.9 K/UL (ref 3.6–11)

## 2020-10-29 PROCEDURE — 74011250636 HC RX REV CODE- 250/636: Performed by: INTERNAL MEDICINE

## 2020-10-29 PROCEDURE — 82962 GLUCOSE BLOOD TEST: CPT

## 2020-10-29 PROCEDURE — 74011250637 HC RX REV CODE- 250/637: Performed by: INTERNAL MEDICINE

## 2020-10-29 PROCEDURE — P9047 ALBUMIN (HUMAN), 25%, 50ML: HCPCS | Performed by: NURSE PRACTITIONER

## 2020-10-29 PROCEDURE — 83735 ASSAY OF MAGNESIUM: CPT

## 2020-10-29 PROCEDURE — 85025 COMPLETE CBC W/AUTO DIFF WBC: CPT

## 2020-10-29 PROCEDURE — 74011250637 HC RX REV CODE- 250/637: Performed by: HOSPITALIST

## 2020-10-29 PROCEDURE — 84100 ASSAY OF PHOSPHORUS: CPT

## 2020-10-29 PROCEDURE — 74011250637 HC RX REV CODE- 250/637: Performed by: NURSE PRACTITIONER

## 2020-10-29 PROCEDURE — 36415 COLL VENOUS BLD VENIPUNCTURE: CPT

## 2020-10-29 PROCEDURE — 74011250636 HC RX REV CODE- 250/636: Performed by: NURSE PRACTITIONER

## 2020-10-29 PROCEDURE — 65660000000 HC RM CCU STEPDOWN

## 2020-10-29 PROCEDURE — 74011636637 HC RX REV CODE- 636/637: Performed by: HOSPITALIST

## 2020-10-29 PROCEDURE — 77030038269 HC DRN EXT URIN PURWCK BARD -A

## 2020-10-29 RX ORDER — ALBUMIN HUMAN 250 G/1000ML
25 SOLUTION INTRAVENOUS ONCE
Status: COMPLETED | OUTPATIENT
Start: 2020-10-29 | End: 2020-10-29

## 2020-10-29 RX ADMIN — CARVEDILOL 6.25 MG: 6.25 TABLET, FILM COATED ORAL at 17:25

## 2020-10-29 RX ADMIN — Medication 10 ML: at 21:40

## 2020-10-29 RX ADMIN — ALBUMIN (HUMAN) 25 G: 0.25 INJECTION, SOLUTION INTRAVENOUS at 01:35

## 2020-10-29 RX ADMIN — ATORVASTATIN CALCIUM 20 MG: 20 TABLET, FILM COATED ORAL at 21:40

## 2020-10-29 RX ADMIN — FUROSEMIDE 40 MG: 40 TABLET ORAL at 08:50

## 2020-10-29 RX ADMIN — ACETAMINOPHEN 650 MG: 325 TABLET ORAL at 03:54

## 2020-10-29 RX ADMIN — TOPIRAMATE 100 MG: 100 TABLET, FILM COATED ORAL at 08:49

## 2020-10-29 RX ADMIN — FAMOTIDINE 20 MG: 20 TABLET ORAL at 08:48

## 2020-10-29 RX ADMIN — Medication 10 ML: at 07:00

## 2020-10-29 RX ADMIN — SPIRONOLACTONE 25 MG: 25 TABLET ORAL at 08:50

## 2020-10-29 RX ADMIN — INSULIN LISPRO 2 UNITS: 100 INJECTION, SOLUTION INTRAVENOUS; SUBCUTANEOUS at 12:08

## 2020-10-29 RX ADMIN — CARVEDILOL 6.25 MG: 6.25 TABLET, FILM COATED ORAL at 08:49

## 2020-10-29 RX ADMIN — LEVETIRACETAM 500 MG: 500 TABLET, FILM COATED ORAL at 07:00

## 2020-10-29 RX ADMIN — Medication 10 ML: at 17:27

## 2020-10-29 RX ADMIN — TOPIRAMATE 100 MG: 100 TABLET, FILM COATED ORAL at 17:25

## 2020-10-29 RX ADMIN — INSULIN LISPRO 2 UNITS: 100 INJECTION, SOLUTION INTRAVENOUS; SUBCUTANEOUS at 08:47

## 2020-10-29 RX ADMIN — APIXABAN 2.5 MG: 2.5 TABLET, FILM COATED ORAL at 18:57

## 2020-10-29 RX ADMIN — DEXAMETHASONE 4 MG: 4 TABLET ORAL at 01:05

## 2020-10-29 RX ADMIN — DEXAMETHASONE 4 MG: 4 TABLET ORAL at 08:49

## 2020-10-29 RX ADMIN — DEXAMETHASONE 4 MG: 4 TABLET ORAL at 17:25

## 2020-10-29 RX ADMIN — LISINOPRIL 5 MG: 10 TABLET ORAL at 08:49

## 2020-10-29 RX ADMIN — Medication 3 MG: at 21:40

## 2020-10-29 RX ADMIN — INSULIN LISPRO 5 UNITS: 100 INJECTION, SOLUTION INTRAVENOUS; SUBCUTANEOUS at 17:25

## 2020-10-29 RX ADMIN — LEVETIRACETAM 500 MG: 500 TABLET, FILM COATED ORAL at 18:57

## 2020-10-29 RX ADMIN — SERTRALINE HYDROCHLORIDE 25 MG: 50 TABLET ORAL at 17:26

## 2020-10-29 RX ADMIN — INSULIN LISPRO 4 UNITS: 100 INJECTION, SOLUTION INTRAVENOUS; SUBCUTANEOUS at 22:42

## 2020-10-29 NOTE — PROGRESS NOTES
[facility] Adult  Hospitalist Group                                                                                          Hospitalist Progress Note  Aye Wang MD  Answering service: [phone] OR [phone] from in house phone        Date of Service:  10/29/2020  NAME:  Jose Montoya  :  1950  MRN:  [id number]      Admission Summary:   69 y.o. female,  who has history of chronic systolic heart failure status post ICD, chronic COPD on home oxygen 2 L, hypertension, hyperlipidemia, anticoagulation with Eliquis who presents with fall 2 weeks back and left sided weakness. She reports that her left arm felt funny and also she was having weakness in the left arm. It is not painful. And she did not seek medical attention. Given persistence of symptoms, EMS was called and to take her to the local emergency room. At the ER there work-up revealed that she has lung mass as well as brain tumors. She was given IV Decadron and transferred to this facility for neurosurgical evaluation. She was also noted to have a dislocated left shoulder. There was report of lung mass which is right suprahilar with progression into the mediastinum. There is also report of 1.2 cm right frontal mass with 5 cm right to left shift. There is no report of seizures. Interval history / Subjective:   F/U left sided weakness. Patient seen and examined. Left upper extremity  strength with improvement. Plans for s/p EUS with biopsy . Also talked to sister   Likely discharge tomorrow AM.  Case management working on DME     Assessment & Plan:     Brain masses, consistent with metastatic disease, suspect lung primary:  Cerebral edema secondary to above:  Left-sided weakness: mild improvement  -MRI brain done and demonstrated intracranial metastases, largest 1.3 x 1.3 cm, with surrounding edema. Osseous metastases in right frontal, parietal, C2 body  -Neurosurgery consulted. No surgical benefit.  Will need outpatient radiation oncology, possibly in [place]. CM to arrange.   -Continue steroids- will weaned to q8 x 3 days, then BID, Continue Keppra  -Seizure precautions  -Neuro checks    Lung mass with erosion into mediastinum, suspicious for carcinoma:  -Pulmonary consulted. Plans for EUS/endobronchial biopsy 10/28  --remain off Plavix x5 days, Eliquis x2 days and formal cardiac clearance    Moderate right pleural effusion:   -respiratory status stable, s/p thoracentesis 10/27 with approximately 300 ml removed. Based on proteins, appears transudative     Postobstructive infiltrate RUL  -Seen on CT  -no unusual cough, fever  -s/p Ceftriaxone    UTI, Proteus:  -continue Ceftriaxone for 3 days    Diabetes type 2 with hyperglycemia   -Hba1c 6.1, will hold lantus, continue SSI, restart amaryl on discharge     Chronic COPD with chronic hypoxic respiratory failure, not in exacerbation  -Continue home oxygen, AA nebs    Chronic congestive heart failure, presumed systolic given patient has ICD and is on Aldactone  -Continued home medications     Clavicular fracture:  Shoulder dislocation ruled out:  -Appreciate orthopedic surgery. No evidence of shoulder dislocation. Can wear sling for comfort       Chronic anticoagulation with Eliquis for ??  -The patient does not know why she is on chronic anticoagulation  -initially held as planning for procedures  Restarted home dose. As recommended by pulmonology    H/o CAD  -Hold plavix today if planned for biopsies.     Hypertension  -Continue with Coreg    Prior tobacco use     Diabetic diet    PT/OT      Code status: full. AMD filled out  DVT prophylaxis: SCDs    Care Plan discussed with: Patient/Family  Anticipated Disposition: Home w/Family  Anticipated Discharge: Potential discharge 10/29, will need close outpatient follow up near her home.  Have contacted family and asked them to return call to discuss care      Hospital Problems  Date Reviewed: 10/28/2020          Codes Class Noted POA    * (Principal) Brain metastases ([facility] [address] ICD-10-CM: C79.31  ICD-9-CM: 198.3  10/22/2020 Yes                Review of Systems:     Negative unless stated above    Vital Signs:    Last 24hrs VS reviewed since prior progress note. Most recent are:  Visit Vitals  BP (!) 92/44 (BP 1 Location: Right arm, BP Patient Position: At rest)   Pulse 70   Temp 98.2 °F (36.8 °C)   Resp 21   Wt 56.5 kg (124 lb 9 oz)   SpO2 99%   BMI 22.78 kg/m²         Intake/Output Summary (Last 24 hours) at 10/29/2020 1643  Last data filed at 10/29/2020 0000  Gross per 24 hour   Intake    Output 600 ml   Net -600 ml        Physical Examination:             Constitutional:  No acute distress, cooperative, pleasant    ENT:  Oral mucosa moist, oropharynx benign. Resp:  CTA bilaterally. No wheezing/rhonchi/rales. No accessory muscle use   CV:  Regular rhythm, normal rate, no murmurs, gallops, rubs    GI:  Soft, non distended, non tender. normoactive bowel sounds, no hepatosplenomegaly     Musculoskeletal:  No edema, warm, 2+ pulses throughout    Neurologic:  Some left upper extremity  strength, alert and oriented             Data Review:    Review and/or order of clinical lab test  Review and/or order of tests in the radiology section of CPT  Review and/or order of tests in the medicine section of CPT      Labs:     Recent Labs     10/29/20  0153   WBC 9.9   HGB 10.3*   HCT 34.1*        Recent Labs     10/29/20  0153 10/28/20  0019   NA  --  132*   K  --  4.9   CL  --  96*   CO2  --  30   BUN  --  56*   CREA  --  1.56*   GLU  --  253*   CA  --  8.3*   MG 2.5*  --    PHOS 6.4*  --      No results for input(s): ALT, AP, TBIL, TBILI, TP, ALB, GLOB, GGT, AML, LPSE in the last 72 hours. No lab exists for component: SGOT, GPT, AMYP, HLPSE  No results for input(s): INR, PTP, APTT, INREXT, INREXT in the last 72 hours. No results for input(s): FE, TIBC, PSAT, FERR in the last 72 hours.    No results found for: FOL, RBCF   No results for input(s): PH, PCO2, PO2 in the last 72 hours. No results for input(s): CPK, CKNDX, TROIQ in the last 72 hours.     No lab exists for component: CPKMB  No results found for: CHOL, CHOLX, CHLST, CHOLV, HDL, HDLP, LDL, LDLC, DLDLP, TGLX, TRIGL, TRIGP, CHHD, CHHDX  Lab Results   Component Value Date/Time    Glucose (POC) 267 (H) 10/29/2020 04:23 PM    Glucose (POC) 181 (H) 10/29/2020 11:17 AM    Glucose (POC) 149 (H) 10/29/2020 07:39 AM    Glucose (POC) 274 (H) 10/28/2020 08:52 PM    Glucose (POC) 89 10/28/2020 05:01 PM     Lab Results   Component Value Date/Time    Color YELLOW/STRAW 10/22/2020 09:15 AM    Appearance TURBID (A) 10/22/2020 09:15 AM    Specific gravity 1.012 10/22/2020 09:15 AM    pH (UA) 5.5 10/22/2020 09:15 AM    Protein Negative 10/22/2020 09:15 AM    Glucose Negative 10/22/2020 09:15 AM    Ketone Negative 10/22/2020 09:15 AM    Bilirubin Negative 10/22/2020 09:15 AM    Urobilinogen 0.2 10/22/2020 09:15 AM    Nitrites Positive (A) 10/22/2020 09:15 AM    Leukocyte Esterase LARGE (A) 10/22/2020 09:15 AM    Epithelial cells FEW 10/22/2020 09:15 AM    Bacteria 4+ (A) 10/22/2020 09:15 AM    WBC  10/22/2020 09:15 AM    RBC 0-5 10/22/2020 09:15 AM         Medications Reviewed:     Current Facility-Administered Medications   Medication Dose Route Frequency    dexAMETHasone (DECADRON) tablet 4 mg  4 mg Oral Q8H    levETIRAcetam (KEPPRA) tablet 500 mg  500 mg Oral Q12H    albuterol-ipratropium (DUO-NEB) 2.5 MG-0.5 MG/3 ML  3 mL Nebulization Q4H PRN    melatonin tablet 3 mg  3 mg Oral QHS    sodium chloride (NS) flush 5-40 mL  5-40 mL IntraVENous Q8H    sodium chloride (NS) flush 5-40 mL  5-40 mL IntraVENous PRN    acetaminophen (TYLENOL) tablet 650 mg  650 mg Oral Q6H PRN    Or    acetaminophen (TYLENOL) suppository 650 mg  650 mg Rectal Q6H PRN    polyethylene glycol (MIRALAX) packet 17 g  17 g Oral DAILY PRN    ondansetron (ZOFRAN ODT) tablet 4 mg  4 mg Oral Q8H PRN    Or    [facility]ron [facility]) injection 4 mg  4 mg IntraVENous Q6H PRN    famotidine (PEPCID) tablet 20 mg  20 mg Oral DAILY    atorvastatin (LIPITOR) tablet 20 mg  20 mg Oral QHS    carvediloL (COREG) tablet 6.25 mg  6.25 mg Oral BID WITH MEALS    furosemide (LASIX) tablet 40 mg  40 mg Oral DAILY    lisinopriL (PRINIVIL, ZESTRIL) tablet 5 mg  5 mg Oral DAILY    sertraline (ZOLOFT) tablet 25 mg  25 mg Oral QPM    spironolactone (ALDACTONE) tablet 25 mg  25 mg Oral DAILY    topiramate (TOPAMAX) tablet 100 mg  100 mg Oral BID    glucose chewable tablet 16 g  4 Tab Oral PRN    glucagon (GLUCAGEN) injection 1 mg  1 mg IntraMUSCular PRN    dextrose 10% infusion 0-250 mL  0-250 mL IntraVENous PRN    insulin lispro (HUMALOG) injection   SubCUTAneous AC&HS     ______________________________________________________________________  EXPECTED LENGTH OF STAY: 4d 21h  ACTUAL LENGTH OF STAY:          7                 Freddy Gardner MD

## 2020-10-29 NOTE — ANESTHESIA POSTPROCEDURE EVALUATION
Post-Anesthesia Evaluation and Assessment    Patient: Shay Live MRN: [id number]  SSN: [id number]    YOB: 1950  Age: 69 y.o. Sex: female      I have evaluated the patient and they are stable and ready for discharge from the PACU. Cardiovascular Function/Vital Signs  Visit Vitals  BP (!) 97/45 (BP 1 Location: Left arm, BP Patient Position: At rest)   Pulse 67   Temp 36.7 °C (98.1 °F)   Resp 20   Wt 56.5 kg (124 lb 9 oz)   SpO2 100%   BMI 22.78 kg/m²       Patient is status post General anesthesia for Procedure(s):  ENDOSCOPIC BRONCHOSCOPY ULTRASOUND (EBUS)  BRONCHOSCOPY  TRANSBRONCHIAL BIOPSY. Nausea/Vomiting: None    Postoperative hydration reviewed and adequate. Pain:  Pain Scale 1: Numeric (0 - 10) (10/29/20 0552)  Pain Intensity 1: 0 (10/29/20 0552)   Managed    Neurological Status:   Neuro  Neurologic State: Alert (10/28/20 2000)  Orientation Level: Oriented X4 (10/28/20 2000)  Cognition: Follows commands; Appropriate for age attention/concentration (10/28/20 2000)  Speech: Clear (10/28/20 2000)  Assessment L Pupil: Brisk;Round (10/28/20 2000)  Size L Pupil (mm): 2 (10/28/20 2000)  Assessment R Pupil: Brisk;Round (10/28/20 2000)  Size R Pupil (mm): 2 (10/28/20 2000)  LUE Motor Response:  unequal R greater than L (10/28/20 2000)  LLE Motor Response: Purposeful (10/28/20 2000)  RUE Motor Response: Purposeful (10/28/20 2000)  RLE Motor Response: Purposeful (10/28/20 2000)   At baseline    Mental Status, Level of Consciousness: Alert and  oriented to person, place, and time    Pulmonary Status:   O2 Device: Nasal cannula (10/28/20 1818)   Adequate oxygenation and airway patent    Complications related to anesthesia: None    Post-anesthesia assessment completed. No concerns    Signed By: Yaa Crowder MD     October 29, 2020              Procedure(s):  ENDOSCOPIC BRONCHOSCOPY ULTRASOUND (EBUS)  BRONCHOSCOPY  TRANSBRONCHIAL BIOPSY.     general    <BSHSIANPOST>    INITIAL Post-op Vital signs:   Vitals Value Taken Time   BP 97/45 10/29/2020  5:52 AM   Temp 36.7 °C (98.1 °F) 10/29/2020  5:52 AM   Pulse 67 10/29/2020  5:52 AM   Resp 20 10/29/2020  5:52 AM   SpO2 100 % 10/29/2020  1:45 AM

## 2020-10-29 NOTE — PROGRESS NOTES
Occupational Therapy     Chart reviewed. Patient BP was 85/41 supine, pre-activity. RN aware and asked to defer activity this morning. Will f/u later as able and appropriate. Thank you.      Marian Wilcox, OTS

## 2020-10-29 NOTE — ANESTHESIA PREPROCEDURE EVALUATION
Relevant Problems   No relevant active problems       Anesthetic History   No history of anesthetic complications            Review of Systems / Medical History  Patient summary reviewed, nursing notes reviewed and pertinent labs reviewed    Pulmonary    COPD: moderate               Neuro/Psych         Psychiatric history     Cardiovascular    Hypertension      CHF        Exercise tolerance: <4 METS     GI/Hepatic/Renal  Within defined limits              Endo/Other    Diabetes    Cancer (lung)     Other Findings              Physical Exam    Airway  Mallampati: II  TM Distance: 4 - 6 cm  Neck ROM: normal range of motion   Mouth opening: Normal     Cardiovascular  Regular rate and rhythm,  S1 and S2 normal,  no murmur, click, rub, or gallop             Dental    Dentition: Edentulous     Pulmonary      Decreased breath sounds      Prolonged expiration     Abdominal  GI exam deferred       Other Findings            Anesthetic Plan    ASA: 3  Anesthesia type: general          Induction: Intravenous  Anesthetic plan and risks discussed with: Patient

## 2020-10-29 NOTE — PROGRESS NOTES
Hematology-Oncology Progress Note      Lynn Gonzales  1950  [id number]  10/29/2020      Subjective:     Awake and alert. No new complaints. Somewhat depressed, no sob or pain,      Allergies: Patient has no known allergies.   Current Facility-Administered Medications   Medication Dose Route Frequency Provider Last Rate Last Dose    dexAMETHasone (DECADRON) tablet 4 mg  4 mg Oral Q8H Samantha Burgess M, DO   4 mg at 10/29/20 0849    levETIRAcetam (KEPPRA) tablet 500 mg  500 mg Oral Q12H Nicole Angel, NP   500 mg at 10/29/20 0700    albuterol-ipratropium (DUO-NEB) 2.5 MG-0.5 MG/3 ML  3 mL Nebulization Q4H PRN Josselin Hernadez M, DO        melatonin tablet 3 mg  3 mg Oral QHS Samantha Burgess M, DO   3 mg at 10/26/20 2259    sodium chloride (NS) flush 5-40 mL  5-40 mL IntraVENous Q8H Roly Hill MD   10 mL at 10/29/20 0700    sodium chloride (NS) flush 5-40 mL  5-40 mL IntraVENous PRN Musa Hill MD        acetaminophen (TYLENOL) tablet 650 mg  650 mg Oral Q6H PRN Stuart Chaves MD   650 mg at 10/29/20 0354    Or    acetaminophen (TYLENOL) suppository 650 mg  650 mg Rectal Q6H PRN Musa Hill MD        polyethylene glycol (MIRALAX) packet 17 g  17 g Oral DAILY PRN Musa Hill MD        ondansetron (ZOFRAN ODT) tablet 4 mg  4 mg Oral Q8H PRN Musa Hill MD        Or    ondansetron (ZOFRAN) injection 4 mg  4 mg IntraVENous Q6H PRN Musa Hill MD        famotidine (PEPCID) tablet 20 mg  20 mg Oral DAILY Musa Hill MD   20 mg at 10/29/20 0848    atorvastatin (LIPITOR) tablet 20 mg  20 mg Oral QHS Musa Hill MD   20 mg at 10/28/20 2234    carvediloL (COREG) tablet 6.25 mg  6.25 mg Oral BID WITH MEALS Stuart Chaves MD   6.25 mg at 10/29/20 0849    furosemide (LASIX) tablet 40 mg  40 mg Oral DAILY Stuart Chaves MD   40 mg at 10/29/20 0850    lisinopriL (PRINIVIL, ZESTRIL) tablet 5 mg  5 mg Oral DAILY Sergio Jean Hercules MD   5 mg at 10/29/20 4796    sertraline (ZOLOFT) tablet 25 mg  25 mg Oral QPM Adair Mora MD   25 mg at 10/28/20 1821    spironolactone (ALDACTONE) tablet 25 mg  25 mg Oral DAILY Adair Mora MD   25 mg at 10/29/20 0850    topiramate (TOPAMAX) tablet 100 mg  100 mg Oral BID Adair Mora MD   100 mg at 10/29/20 0849    glucose chewable tablet 16 g  4 Tab Oral PRN Adair Mora MD        glucagon (GLUCAGEN) injection 1 mg  1 mg IntraMUSCular PRN Adair Mora MD        dextrose 10% infusion 0-250 mL  0-250 mL IntraVENous SAMMY Mora  mL/hr at 10/28/20 0734 125 mL at 10/28/20 0734    insulin lispro (HUMALOG) injection   SubCUTAneous AC&HS Adair Mora MD   2 Units at 10/29/20 0847     Objective:     Patient Vitals for the past 24 hrs:   BP Temp Pulse Resp SpO2 Weight   10/29/20 1109 (!) 85/41        10/29/20 1025 (!) 98/45 97.9 °F (36.6 °C) 71 24 100 %    10/29/20 0847 (!) 114/53  81      10/29/20 0552 (!) 97/45 98.1 °F (36.7 °C) 67 20     10/29/20 0422 (!) 105/47     56.5 kg (124 lb 9 oz)   10/29/20 0145 (!) 83/40 98.6 °F (37 °C) 63 21 100 %    10/29/20 0115 (!) 76/38        10/28/20 2311 (!) 95/47        10/28/20 2230 (!) 83/49        10/28/20 2212 (!) 96/39 98.1 °F (36.7 °C) 60 20 100 %    10/28/20 1821 (!) 110/51  64      10/28/20 1818 (!) 110/51 97.8 °F (36.6 °C) 60 19 100 %    10/28/20 1610 (!) 123/48 97.5 °F (36.4 °C) 60 19 100 %    10/28/20 1518 107/84  66 17 100 %    10/28/20 1512 (!) 113/53  61 18 100 %    10/28/20 1507 112/60  65 18 100 %    10/28/20 1502 (!) 115/54  65 20 100 %    10/28/20 1459 (!) 118/58  63 20 100 %    10/28/20 1457 (!) 132/98  62 24 100 %    10/28/20 1320 135/64  60 15 100 %        Gen: NAD  HEENT: PERRL, Sclerae anicteric  Cv: RRR without m/r/g  Pulm: aerating well  Abd: NABS, NTND,  Ext: No c/c/e    Available labs reviewed:  Labs:    Recent Results (from the past 24 hour(s))   GLUCOSE, POC    Collection Time: 10/28/20  5:01 PM   Result Value Ref Range    Glucose (POC) 89 65 - 100 mg/dL    Performed by Alfa Paul    GLUCOSE, POC    Collection Time: 10/28/20  8:52 PM   Result Value Ref Range    Glucose (POC) 274 (H) 65 - 100 mg/dL    Performed by Francisco Lu    CBC WITH AUTOMATED DIFF    Collection Time: 10/29/20  1:53 AM   Result Value Ref Range    WBC 9.9 3.6 - 11.0 K/uL    RBC 3.42 (L) 3.80 - 5.20 M/uL    HGB 10.3 (L) 11.5 - 16.0 g/dL    HCT 34.1 (L) 35.0 - 47.0 %    MCV 99.7 (H) 80.0 - 99.0 FL    MCH 30.1 26.0 - 34.0 PG    MCHC 30.2 30.0 - 36.5 g/dL    RDW 13.2 11.5 - 14.5 %    PLATELET 255 338 - 924 K/uL    MPV 10.9 8.9 - 12.9 FL    NRBC 0.0 0  WBC    ABSOLUTE NRBC 0.00 0.00 - 0.01 K/uL    NEUTROPHILS 83 (H) 32 - 75 %    LYMPHOCYTES 6 (L) 12 - 49 %    MONOCYTES 10 5 - 13 %    EOSINOPHILS 0 0 - 7 %    BASOPHILS 0 0 - 1 %    IMMATURE GRANULOCYTES 1 (H) 0.0 - 0.5 %    ABS. NEUTROPHILS 8.2 (H) 1.8 - 8.0 K/UL    ABS. LYMPHOCYTES 0.6 (L) 0.8 - 3.5 K/UL    ABS. MONOCYTES 1.0 0.0 - 1.0 K/UL    ABS. EOSINOPHILS 0.0 0.0 - 0.4 K/UL    ABS. BASOPHILS 0.0 0.0 - 0.1 K/UL    ABS. IMM. GRANS. 0.1 (H) 0.00 - 0.04 K/UL    DF SMEAR SCANNED      PLATELET COMMENTS Large Platelets      RBC COMMENTS MACROCYTOSIS  1+       MAGNESIUM    Collection Time: 10/29/20  1:53 AM   Result Value Ref Range    Magnesium 2.5 (H) 1.6 - 2.4 mg/dL   PHOSPHORUS    Collection Time: 10/29/20  1:53 AM   Result Value Ref Range    Phosphorus 6.4 (H) 2.6 - 4.7 MG/DL   GLUCOSE, POC    Collection Time: 10/29/20  7:39 AM   Result Value Ref Range    Glucose (POC) 149 (H) 65 - 100 mg/dL    Performed by Janee Frazier    GLUCOSE, POC    Collection Time: 10/29/20 11:17 AM   Result Value Ref Range    Glucose (POC) 181 (H) 65 - 100 mg/dL    Performed by Marques Waggoner and Evita     70 y/o woman with a history of tobacco abuse, admitted with left arm weakness.  Noted to have hilar mass, multilpe CNS lesions, liver lesions concerning, but not diagnostic of stage IV lung CA. 1. Suspected lung cancer: awating results of EBUS/FOB biopsy done on 10/27. .. . she has at least 6 brain mets with edema and needs to be seen by N-Surg soon as well. Rad onc is aware of pt .     Peter Ferro MD

## 2020-10-29 NOTE — PROGRESS NOTES
Bedside and Verbal shift change report given to 81Ben Collins RN (oncoming nurse) by Malcolm Zheng RN (offgoing nurse). Report included the following information SBAR, Kardex, Intake/Output, MAR, Recent Results and Cardiac Rhythm NSR.

## 2020-10-29 NOTE — PROGRESS NOTES
Pulmonary, Critical Care, and Sleep Medicine~Progress Note    Name: Nick Gtz MRN: [id number]   : 1950 [facility]   Date: 10/29/2020 9:02 AM Admission: 10/22/2020     Impression Plan   1. Acute on chronic hypoxic resp failure   2. Right pleural effusion, appears more exudative   3. 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 Hepatic and renal lesions   5. 1.2 cm lesion in the right frontal lobe; NS involved   6. COPD, on home O2 at 2lpm  7. A fib, on eliquis at home   8. CAD, on plavix at home  9. Smoker   10. SAVANNAH on CKD? 1. Noted EBUS yesterday concerning findings. Path still pending. onc is following   2. Right thoracentesis for today. 3. plavix and eliquis held since 10/22; they can be restarted from a pulmonary aspect. Defer to primary team   4. Follow pleural fluid labs    5. O2 titration above 90%          Daily Progression:    Breathing ok. Tolerated the procedure well. No hemoptysis     S/p thoracentesis with 310ml removed. Post tap image showed improvement in the small pleural effusion     I have reviewed the labs and previous days notes. Pertinent items are noted in HPI. OBJECTIVE:     Vital Signs:       Visit Vitals  BP (!) 114/53   Pulse 81   Temp 98.1 °F (36.7 °C)   Resp 20   Wt 56.5 kg (124 lb 9 oz)   SpO2 100%   BMI 22.78 kg/m²      Temp (24hrs), Av.9 °F (36.6 °C), Min:97.3 °F (36.3 °C), Max:98.6 °F (37 °C)     Intake/Output:     Last shift: No intake/output data recorded.     Last 3 shifts: 10/27 1901 - 10/29 0700  In: 500 [I.V.:500]  Out: 800 [Urine:800]          Intake/Output Summary (Last 24 hours) at 10/29/2020 0931  Last data filed at 10/29/2020 0000  Gross per 24 hour   Intake 500 ml   Output 600 ml   Net -100 ml       Physical Exam:                                        Exam Findings Other   General: No resp distress noted, appears stated age HEENT:  No ulcers, JVD not elevated, no cervical LAD    Chest: No pectus deformity, normal chest rise b/l    HEART:  RRR, no murmurs/rubs/gallops    Lungs:  CTA b/l, no rhonchi/crackles/wheeze, diminished BS at bases    ABD: Soft/NT, non rigid mildly distended    EXT: No cyanosis/clubbing/edema, normal peripheral pulses    Skin: No rashes or ulcers, no mottling    Neuro: A/O x 3        Medications:  Current Facility-Administered Medications   Medication Dose Route Frequency    dexAMETHasone (DECADRON) tablet 4 mg  4 mg Oral Q8H    levETIRAcetam (KEPPRA) tablet 500 mg  500 mg Oral Q12H    albuterol-ipratropium (DUO-NEB) 2.5 MG-0.5 MG/3 ML  3 mL Nebulization Q4H PRN    melatonin tablet 3 mg  3 mg Oral QHS    sodium chloride (NS) flush 5-40 mL  5-40 mL IntraVENous Q8H    sodium chloride (NS) flush 5-40 mL  5-40 mL IntraVENous PRN    acetaminophen (TYLENOL) tablet 650 mg  650 mg Oral Q6H PRN    Or    acetaminophen (TYLENOL) suppository 650 mg  650 mg Rectal Q6H PRN    polyethylene glycol (MIRALAX) packet 17 g  17 g Oral DAILY PRN    ondansetron (ZOFRAN ODT) tablet 4 mg  4 mg Oral Q8H PRN    Or    ondansetron (ZOFRAN) injection 4 mg  4 mg IntraVENous Q6H PRN    famotidine (PEPCID) tablet 20 mg  20 mg Oral DAILY    atorvastatin (LIPITOR) tablet 20 mg  20 mg Oral QHS    carvediloL (COREG) tablet 6.25 mg  6.25 mg Oral BID WITH MEALS    furosemide (LASIX) tablet 40 mg  40 mg Oral DAILY    lisinopriL (PRINIVIL, ZESTRIL) tablet 5 mg  5 mg Oral DAILY    sertraline (ZOLOFT) tablet 25 mg  25 mg Oral QPM    spironolactone (ALDACTONE) tablet 25 mg  25 mg Oral DAILY    topiramate (TOPAMAX) tablet 100 mg  100 mg Oral BID    glucose chewable tablet 16 g  4 Tab Oral PRN    glucagon (GLUCAGEN) injection 1 mg  1 mg IntraMUSCular PRN    dextrose 10% infusion 0-250 mL  0-250 mL IntraVENous PRN    insulin lispro (HUMALOG) injection   SubCUTAneous AC&HS       Labs:  RUTH ANN No results for input(s): PHI, PCO2I, PO2I, HCO3I, SO2I, FIO2I in the last 72 hours.      CBC Recent Labs     10/29/20  0153   WBC 9.9   HGB 10.3*   HCT 34.1*      MCV 99.7*   MCH 13.7        Metabolic  Panel Recent Labs     10/29/20  0153 10/28/20  0019   NA  --  132*   K  --  4.9   CL  --  96*   CO2  --  30   GLU  --  253*   BUN  --  56*   CREA  --  1.56*   CA  --  8.3*   MG 2.5*  --    PHOS 6.4*  --         Pertinent Labs                Sven Conde PA-C  10/29/2020

## 2020-10-29 NOTE — PROGRESS NOTES
Transition of Care Plan:        -RUR 15%  -Palliative Consulted, patient plans to pursue progressive therapy  -PT recommend SNF vs Home Health (1-2 person Heavy Assist), getting options.  -Likely Home with Home Health & DME's  -Referral sent via fax to [facility], faxed clinicals [phone], pending   -[facility] approved bedside commode, and will deliver to [facility] today   -FU Appointment scheduled:  Dr. Sapphire Taylor November 10 @ 2PM   -Left Message for Dr. Rosa Duran, Oncologist office for appointment with patient.   -Procedure then tentative discharge on Thursday if stable  -Transport TBD    CM placed call to Emergency Contact Ish Tucker to discuss discharge. Still no answer, left message. CM called patients sister Camilla Sheikh who advised she can transport patient at discharge. CM sent referral to [facility] for bedside commode, pending. [facility] medical is OON. Referral sent to [place] [id number]  ANITHA received update that patient has been approved for bedside commode and will be delivered to patients room today. CM spoke with patients daughter in law who advised that patient previously used [facility]. ANITHA spoke with representative who provided fax contact to send clinicals.  Referral is pending for home health with [address] will follow      LEENA De Oliveira/CECE

## 2020-10-29 NOTE — PROGRESS NOTES
Problem: Brain Tumor Discharge Outcomes  Goal: *Neurologic stability  Outcome: Progressing Towards Goal  Goal: *Progress independence mobility/activities (eg: Mobility precautions)  Outcome: Progressing Towards Goal  Goal: *Adequate oxygenation  Outcome: Progressing Towards Goal  Goal: *Tolerates nutrition therapy  Outcome: Progressing Towards Goal

## 2020-10-29 NOTE — PROGRESS NOTES
Problem: Pressure Injury - Risk of  Goal: *Prevention of pressure injury  Description: Document Elio Scale and appropriate interventions in the flowsheet. Outcome: Progressing Towards Goal  Note: Pressure Injury Interventions:  Sensory Interventions: Assess changes in LOC, Check visual cues for pain, Discuss PT/OT consult with provider, Float heels, Keep linens dry and wrinkle-free, Maintain/enhance activity level, Minimize linen layers, Turn and reposition approx. every two hours (pillows and wedges if needed)    Moisture Interventions: Absorbent underpads, Apply protective barrier, creams and emollients, Assess need for specialty bed, Check for incontinence Q2 hours and as needed, Internal/External urinary devices, Limit adult briefs, Maintain skin hydration (lotion/cream), Minimize layers    Activity Interventions: Increase time out of bed, Pressure redistribution bed/mattress(bed type), PT/OT evaluation    Mobility Interventions: Float heels, HOB 30 degrees or less, Pressure redistribution bed/mattress (bed type), PT/OT evaluation, Turn and reposition approx. every two hours(pillow and wedges)    Nutrition Interventions: Document food/fluid/supplement intake    Friction and Shear Interventions: Lift sheet, Minimize layers, Lift team/patient mobility team                Problem: Patient Education: Go to Patient Education Activity  Goal: Patient/Family Education  Outcome: Progressing Towards Goal     Problem: Breathing Pattern - Ineffective  Goal: *Absence of hypoxia  Outcome: Progressing Towards Goal  Goal: *Use of effective breathing techniques  Outcome: Progressing Towards Goal     Problem: Falls - Risk of  Goal: *Absence of Falls  Description: Document Edgard Fall Risk and appropriate interventions in the flowsheet.   Outcome: Progressing Towards Goal  Note: Fall Risk Interventions:  Mobility Interventions: Bed/chair exit alarm, Communicate number of staff needed for ambulation/transfer, Mechanical lift, OT consult for ADLs, Patient to call before getting OOB, PT Consult for mobility concerns, PT Consult for assist device competence         Medication Interventions: Bed/chair exit alarm, Evaluate medications/consider consulting pharmacy, Patient to call before getting OOB, Teach patient to arise slowly    Elimination Interventions: Bed/chair exit alarm, Call light in reach, Elevated toilet seat, Stay With Me (per policy), Toilet paper/wipes in reach, Toileting schedule/hourly rounds    History of Falls Interventions: Bed/chair exit alarm, Consult care management for discharge planning, Door open when patient unattended, Evaluate medications/consider consulting pharmacy, Investigate reason for fall, Room close to nurse's station, Assess for delayed presentation/identification of injury for 48 hrs (comment for end date), Vital signs minimum Q4HRs X 24 hrs (comment for end date)         Problem: Patient Education: Go to Patient Education Activity  Goal: Patient/Family Education  Outcome: Progressing Towards Goal     Problem: Patient Education:  Go to Education Activity  Goal: Patient/Family Education  Outcome: Progressing Towards Goal     Problem: Brain Tumor Day 3 to Discharge  Goal: Activity/Safety  Outcome: Progressing Towards Goal  Goal: Nutrition/Diet  Outcome: Progressing Towards Goal  Goal: Discharge Planning  Outcome: Progressing Towards Goal  Goal: Medications  Outcome: Progressing Towards Goal  Goal: Treatments/Interventions/Procedures  Outcome: Progressing Towards Goal  Goal: Progressive Mobility and Function  Outcome: Progressing Towards Goal  Goal: Psychosocial  Outcome: Progressing Towards Goal     Problem: Brain Tumor Discharge Outcomes  Goal: *Neurologic stability  Outcome: Progressing Towards Goal  Goal: *Progress independence mobility/activities (eg: Mobility precautions)  Outcome: Progressing Towards Goal  Goal: *Adequate oxygenation  Outcome: Progressing Towards Goal  Goal: *Tolerates nutrition therapy  Outcome: Progressing Towards Goal  Goal: *Verbalizes understanding and describes medication purposes and frequencies  Outcome: Progressing Towards Goal  Goal: *Verbalizes understanding of type and use of pain medication  Outcome: Progressing Towards Goal  Goal: *Describes home care/support arrangements established based on need  Outcome: Progressing Towards Goal  Goal: *Describes available resources and support systems  Outcome: Progressing Towards Goal     Problem: Patient Education: Go to Patient Education Activity  Goal: Patient/Family Education  Outcome: Progressing Towards Goal

## 2020-10-30 LAB
GLUCOSE BLD STRIP.AUTO-MCNC: 185 MG/DL (ref 65–100)
GLUCOSE BLD STRIP.AUTO-MCNC: 253 MG/DL (ref 65–100)
GLUCOSE BLD STRIP.AUTO-MCNC: 290 MG/DL (ref 65–100)
GLUCOSE BLD STRIP.AUTO-MCNC: 354 MG/DL (ref 65–100)
SERVICE CMNT-IMP: ABNORMAL

## 2020-10-30 PROCEDURE — 74011250637 HC RX REV CODE- 250/637: Performed by: NURSE PRACTITIONER

## 2020-10-30 PROCEDURE — 74011636637 HC RX REV CODE- 636/637: Performed by: HOSPITALIST

## 2020-10-30 PROCEDURE — 74011636637 HC RX REV CODE- 636/637: Performed by: INTERNAL MEDICINE

## 2020-10-30 PROCEDURE — 74011250637 HC RX REV CODE- 250/637: Performed by: HOSPITALIST

## 2020-10-30 PROCEDURE — 74011250637 HC RX REV CODE- 250/637: Performed by: INTERNAL MEDICINE

## 2020-10-30 PROCEDURE — 82962 GLUCOSE BLOOD TEST: CPT

## 2020-10-30 PROCEDURE — 74011250636 HC RX REV CODE- 250/636: Performed by: INTERNAL MEDICINE

## 2020-10-30 PROCEDURE — 77030038269 HC DRN EXT URIN PURWCK BARD -A

## 2020-10-30 PROCEDURE — 65660000000 HC RM CCU STEPDOWN

## 2020-10-30 RX ORDER — INSULIN LISPRO 100 [IU]/ML
2 INJECTION, SOLUTION INTRAVENOUS; SUBCUTANEOUS
Status: DISCONTINUED | OUTPATIENT
Start: 2020-10-30 | End: 2020-11-04

## 2020-10-30 RX ORDER — DEXAMETHASONE 4 MG/1
4 TABLET ORAL EVERY 12 HOURS
Status: DISCONTINUED | OUTPATIENT
Start: 2020-10-30 | End: 2020-11-05

## 2020-10-30 RX ADMIN — FAMOTIDINE 20 MG: 20 TABLET ORAL at 08:26

## 2020-10-30 RX ADMIN — APIXABAN 2.5 MG: 2.5 TABLET, FILM COATED ORAL at 08:27

## 2020-10-30 RX ADMIN — LEVETIRACETAM 500 MG: 500 TABLET, FILM COATED ORAL at 06:02

## 2020-10-30 RX ADMIN — FUROSEMIDE 40 MG: 40 TABLET ORAL at 08:27

## 2020-10-30 RX ADMIN — Medication 10 ML: at 21:46

## 2020-10-30 RX ADMIN — APIXABAN 2.5 MG: 2.5 TABLET, FILM COATED ORAL at 17:15

## 2020-10-30 RX ADMIN — INSULIN LISPRO 2 UNITS: 100 INJECTION, SOLUTION INTRAVENOUS; SUBCUTANEOUS at 16:55

## 2020-10-30 RX ADMIN — INSULIN LISPRO 7 UNITS: 100 INJECTION, SOLUTION INTRAVENOUS; SUBCUTANEOUS at 12:55

## 2020-10-30 RX ADMIN — Medication 3 MG: at 21:46

## 2020-10-30 RX ADMIN — DEXAMETHASONE 4 MG: 4 TABLET ORAL at 21:46

## 2020-10-30 RX ADMIN — TOPIRAMATE 100 MG: 100 TABLET, FILM COATED ORAL at 18:00

## 2020-10-30 RX ADMIN — TOPIRAMATE 100 MG: 100 TABLET, FILM COATED ORAL at 08:27

## 2020-10-30 RX ADMIN — INSULIN LISPRO 5 UNITS: 100 INJECTION, SOLUTION INTRAVENOUS; SUBCUTANEOUS at 16:55

## 2020-10-30 RX ADMIN — SERTRALINE HYDROCHLORIDE 25 MG: 50 TABLET ORAL at 17:15

## 2020-10-30 RX ADMIN — DEXAMETHASONE 4 MG: 4 TABLET ORAL at 02:40

## 2020-10-30 RX ADMIN — ATORVASTATIN CALCIUM 20 MG: 20 TABLET, FILM COATED ORAL at 21:46

## 2020-10-30 RX ADMIN — Medication 10 ML: at 06:02

## 2020-10-30 RX ADMIN — Medication 10 ML: at 16:56

## 2020-10-30 RX ADMIN — INSULIN LISPRO 2 UNITS: 100 INJECTION, SOLUTION INTRAVENOUS; SUBCUTANEOUS at 12:56

## 2020-10-30 RX ADMIN — INSULIN LISPRO 3 UNITS: 100 INJECTION, SOLUTION INTRAVENOUS; SUBCUTANEOUS at 22:43

## 2020-10-30 RX ADMIN — CARVEDILOL 6.25 MG: 6.25 TABLET, FILM COATED ORAL at 08:27

## 2020-10-30 RX ADMIN — LISINOPRIL 5 MG: 10 TABLET ORAL at 08:27

## 2020-10-30 RX ADMIN — SPIRONOLACTONE 25 MG: 25 TABLET ORAL at 08:27

## 2020-10-30 RX ADMIN — INSULIN LISPRO 2 UNITS: 100 INJECTION, SOLUTION INTRAVENOUS; SUBCUTANEOUS at 08:26

## 2020-10-30 RX ADMIN — LEVETIRACETAM 500 MG: 500 TABLET, FILM COATED ORAL at 18:03

## 2020-10-30 RX ADMIN — DEXAMETHASONE 4 MG: 4 TABLET ORAL at 08:27

## 2020-10-30 NOTE — PROGRESS NOTES
Unsuccessful in attempt to draw scheduled labs this am X 2 different RNs. Notified on call hospitalist, he advised that being pt is scheduled to d/c soon, order for an art stick is not  necessary and to allow her physician to make the decision further. Passed along in report to oncoming nurse.

## 2020-10-30 NOTE — PROGRESS NOTES
Pulmonary, Critical Care, and Sleep Medicine~Progress Note    Name: Shay Live MRN: [id number]   : 1950 Hospital: [address]   Date: 10/30/2020 9:02 AM Admission: 10/22/2020     Impression Plan   1. Acute on chronic hypoxic resp failure   2. Right pleural effusion, appears more exudative   3. 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 Hepatic and renal lesions   5. 1.2 cm lesion in the right frontal lobe; NS involved   6. COPD, on home O2 at 2lpm  7. A fib, on eliquis at home   8. CAD, on plavix at home  9. Smoker   10. SAVANNAH on CKD? 1. Noted EBUS findings were concerning. Path still pending. onc is following   2. eliquis has been restarted   3. Pleural fluid without neoplasia findings    4. O2 titration above 90%  5. Follow up chest x-ray in 1-2 wks to monitor for reaccumulation of pleural fluid  6. Discussed with hospitalist    7. PRN over the weekend if she stays          Daily Progression:    Stable. S/p thoracentesis with 310ml removed. Post tap image showed improvement in the small pleural effusion     I have reviewed the labs and previous days notes. Pertinent items are noted in HPI. OBJECTIVE:     Vital Signs:       Visit Vitals  BP (!) 107/51 (BP 1 Location: Right arm, BP Patient Position: At rest)   Pulse 60   Temp 97.6 °F (36.4 °C)   Resp 17   Ht 5' 2.01\" (1.575 m)   Wt 56.5 kg (124 lb 9 oz)   SpO2 100%   BMI 22.78 kg/m²      Temp (24hrs), Av.7 °F (36.5 °C), Min:97.4 °F (36.3 °C), Max:98.2 °F (36.8 °C)     Intake/Output:     Last shift: No intake/output data recorded.     Last 3 shifts: 10/28 1901 - 10/30 0700  In: -   Out: 7679 [Urine:1575]          Intake/Output Summary (Last 24 hours) at 10/30/2020 1030  Last data filed at 10/30/2020 0400  Gross per 24 hour   Intake    Output 975 ml   Net -975 ml       Physical Exam:                                        Exam Findings Other   General: No resp distress noted, appears stated age    [de-identified]:  No ulcers, JVD not elevated, no cervical LAD    Chest: No pectus deformity, normal chest rise b/l    HEART:  No visible thrills     Lungs:  diminished BS at bases    ABD: Soft/NT, non rigid mildly distended    EXT: No cyanosis/clubbing/edema, normal peripheral pulses    Skin: No rashes or ulcers, no mottling    Neuro: A/O x 3        Medications:  Current Facility-Administered Medications   Medication Dose Route Frequency    apixaban (ELIQUIS) tablet 2.5 mg  2.5 mg Oral BID    dexAMETHasone (DECADRON) tablet 4 mg  4 mg Oral Q8H    levETIRAcetam (KEPPRA) tablet 500 mg  500 mg Oral Q12H    albuterol-ipratropium (DUO-NEB) 2.5 MG-0.5 MG/3 ML  3 mL Nebulization Q4H PRN    melatonin tablet 3 mg  3 mg Oral QHS    sodium chloride (NS) flush 5-40 mL  5-40 mL IntraVENous Q8H    sodium chloride (NS) flush 5-40 mL  5-40 mL IntraVENous PRN    acetaminophen (TYLENOL) tablet 650 mg  650 mg Oral Q6H PRN    Or    acetaminophen (TYLENOL) suppository 650 mg  650 mg Rectal Q6H PRN    polyethylene glycol (MIRALAX) packet 17 g  17 g Oral DAILY PRN    ondansetron (ZOFRAN ODT) tablet 4 mg  4 mg Oral Q8H PRN    Or    ondansetron (ZOFRAN) injection 4 mg  4 mg IntraVENous Q6H PRN    famotidine (PEPCID) tablet 20 mg  20 mg Oral DAILY    atorvastatin (LIPITOR) tablet 20 mg  20 mg Oral QHS    carvediloL (COREG) tablet 6.25 mg  6.25 mg Oral BID WITH MEALS    furosemide (LASIX) tablet 40 mg  40 mg Oral DAILY    lisinopriL (PRINIVIL, ZESTRIL) tablet 5 mg  5 mg Oral DAILY    sertraline (ZOLOFT) tablet 25 mg  25 mg Oral QPM    spironolactone (ALDACTONE) tablet 25 mg  25 mg Oral DAILY    topiramate (TOPAMAX) tablet 100 mg  100 mg Oral BID    glucose chewable tablet 16 g  4 Tab Oral PRN    glucagon (GLUCAGEN) injection 1 mg  1 mg IntraMUSCular PRN    dextrose 10% infusion 0-250 mL  0-250 mL IntraVENous PRN    insulin lispro (HUMALOG) injection SubCUTAneous AC&HS       Labs:  ABG No results for input(s): PHI, PCO2I, PO2I, HCO3I, SO2I, FIO2I in the last 72 hours.      CBC Recent Labs     10/29/20  0153   WBC 9.9   HGB 10.3*   HCT 34.1*      MCV 99.7*   MCH 10.5        Metabolic  Panel Recent Labs     10/29/20  0153 10/28/20  0019   NA  --  132*   K  --  4.9   CL  --  96*   CO2  --  30   GLU  --  253*   BUN  --  56*   CREA  --  1.56*   CA  --  8.3*   MG 2.5*  --    PHOS 6.4*  --         Pertinent Labs                Sven Starr PA-C  10/30/2020

## 2020-10-30 NOTE — PROGRESS NOTES
Bedside and Verbal shift change report given to [address] [place] [address] (oncoming nurse) by [address] (offgoing nurse). Report included the following information SBAR, Kardex, MAR, Med Rec Status, Quality Measures and Dual Neuro Assessment.

## 2020-10-30 NOTE — PROGRESS NOTES
Bedside and Verbal shift change report given to Soledad RN (oncoming nurse) by Jacqueline Garibay (offgoing nurse). Report included the following information SBAR, Kardex, MAR, Cardiac Rhythm NSR, Quality Measures and Dual Neuro Assessment.

## 2020-10-30 NOTE — PROGRESS NOTES
Problem: Pressure Injury - Risk of  Goal: *Prevention of pressure injury  Description: Document Elio Scale and appropriate interventions in the flowsheet. Outcome: Progressing Towards Goal  Note: Pressure Injury Interventions:  Sensory Interventions: Assess changes in LOC, Assess need for specialty bed, Check visual cues for pain, Discuss PT/OT consult with provider, Keep linens dry and wrinkle-free, Minimize linen layers, Monitor skin under medical devices, Turn and reposition approx. every two hours (pillows and wedges if needed)    Moisture Interventions: Absorbent underpads, Internal/External urinary devices, Limit adult briefs, Minimize layers, Maintain skin hydration (lotion/cream), Check for incontinence Q2 hours and as needed    Activity Interventions: Increase time out of bed, Pressure redistribution bed/mattress(bed type), PT/OT evaluation, Assess need for specialty bed    Mobility Interventions: Assess need for specialty bed, HOB 30 degrees or less, Pressure redistribution bed/mattress (bed type), PT/OT evaluation, Turn and reposition approx. every two hours(pillow and wedges)    Nutrition Interventions: Document food/fluid/supplement intake    Friction and Shear Interventions: Apply protective barrier, creams and emollients, Feet elevated on foot rest, HOB 30 degrees or less, Minimize layers                Problem: Patient Education: Go to Patient Education Activity  Goal: Patient/Family Education  Outcome: Progressing Towards Goal     Problem: Breathing Pattern - Ineffective  Goal: *Absence of hypoxia  Outcome: Progressing Towards Goal  Goal: *Use of effective breathing techniques  Outcome: Progressing Towards Goal     Problem: Falls - Risk of  Goal: *Absence of Falls  Description: Document Edgard Fall Risk and appropriate interventions in the flowsheet.   Outcome: Progressing Towards Goal  Note: Fall Risk Interventions:  Mobility Interventions: Bed/chair exit alarm, Communicate number of staff needed for ambulation/transfer, OT consult for ADLs, Patient to call before getting OOB, PT Consult for mobility concerns, PT Consult for assist device competence, Strengthening exercises (ROM-active/passive)         Medication Interventions: Evaluate medications/consider consulting pharmacy, Patient to call before getting OOB, Teach patient to arise slowly    Elimination Interventions: Bed/chair exit alarm, Call light in reach, Patient to call for help with toileting needs, Stay With Me (per policy), Toileting schedule/hourly rounds    History of Falls Interventions: Bed/chair exit alarm, Evaluate medications/consider consulting pharmacy, Investigate reason for fall, Room close to nurse's station, Utilize gait belt for transfer/ambulation, Vital signs minimum Q4HRs X 24 hrs (comment for end date)         Problem: Patient Education: Go to Patient Education Activity  Goal: Patient/Family Education  Outcome: Progressing Towards Goal     Problem: Patient Education:  Go to Education Activity  Goal: Patient/Family Education  Outcome: Progressing Towards Goal     Problem: Brain Tumor Day 3 to Discharge  Goal: Activity/Safety  Outcome: Progressing Towards Goal  Goal: Nutrition/Diet  Outcome: Progressing Towards Goal  Goal: Discharge Planning  Outcome: Progressing Towards Goal  Goal: Medications  Outcome: Progressing Towards Goal  Goal: Treatments/Interventions/Procedures  Outcome: Progressing Towards Goal  Goal: Progressive Mobility and Function  Outcome: Progressing Towards Goal  Goal: Psychosocial  Outcome: Progressing Towards Goal     Problem: Brain Tumor Discharge Outcomes  Goal: *Neurologic stability  Outcome: Progressing Towards Goal  Goal: *Progress independence mobility/activities (eg: Mobility precautions)  Outcome: Progressing Towards Goal  Goal: *Adequate oxygenation  Outcome: Progressing Towards Goal  Goal: *Tolerates nutrition therapy  Outcome: Progressing Towards Goal  Goal: *Verbalizes understanding and describes medication purposes and frequencies  Outcome: Progressing Towards Goal  Goal: *Verbalizes understanding of type and use of pain medication  Outcome: Progressing Towards Goal  Goal: *Describes home care/support arrangements established based on need  Outcome: Progressing Towards Goal  Goal: *Describes available resources and support systems  Outcome: Progressing Towards Goal     Problem: Patient Education: Go to Patient Education Activity  Goal: Patient/Family Education  Outcome: Progressing Towards Goal     Problem: Nutrition Deficit  Goal: *Optimize nutritional status  Outcome: Progressing Towards Goal

## 2020-10-30 NOTE — PROGRESS NOTES
Comprehensive Nutrition Assessment      Comprehensive Nutrition Assessment    Type and Reason for Visit: Initial, RD nutrition re-screen/LOS    Nutrition Recommendations/Plan:   1. Continue Consistent Carb diet  2. Monitor BG, weight, PO intake  3. Add Glucerna BID for added kcal/protein in preparation for potential treatment. Nutrition Assessment:       Pt admitted for Brain metastases ([facility] [address] [C79.31], multiple CNS lesions, liver lesions, hilar mass. Pt w PMHx: tobacco abuse, DM, COPD, HTN, HF, Cholecystectomy, defibrillator. Pt screened for LOS. Note pt may possibly d/c today. Noted BG have been significantly elevated. Pt on Consistent Carb diet with A1c 6.1. Likely related to decadron. No wt hx in chart. Pt on low end of normal for age. No PO documented in Flow sheets. No reported n/v, chew/swallow difficulties, or c/d. Pt will need higher kcal/pro intake if planns to do treatment. Attached coupon to d/c instructions. No data found. Wt Readings from Last 30 Encounters:   10/29/20 56.5 kg (124 lb 9 oz)   10/21/20 51.7 kg (114 lb)      Malnutrition Assessment:  Malnutrition Status: At risk for malnutrition (specify)    Context:              Estimated Daily Nutrient Needs:  Energy (kcal):  1350  Protein (g):  57-68 (1.0-1.2g/kg)       Fluid (ml/day):  1500 for elderly    Nutrition Related Findings:       Eliquis, Lipitor, Decadron, Pepcid, Lasix, Humalog, Keppra, Aldactone.      Wounds:    None       Current Nutrition Therapies:  DIET ONE TIME MESSAGE  DIET DIABETIC CONSISTENT CARB Regular  DIET ONE TIME MESSAGE    Anthropometric Measures:  · Height:  5' 2.01\" (157.5 cm)  · Current Body Wt:  56.5 kg (124 lb 9 oz)   · Admission Body Wt:  115 lb 15.4 oz    · Usual Body Wt:        · Ideal Body Wt:  110 lbs:  113.2 %   · Adjusted Body Weight:   ; Weight Adjustment for: No adjustment   · Adjusted BMI:       · BMI Category:  Normal weight (BMI 22.0-24.9) age over 72       Nutrition Diagnosis:   · Altered nutrition-related lab values related to endocrine dysfunction as evidenced by lab values(BG elevated.  A1c 6.1, on steroids.)      Nutrition Interventions:   Food and/or Nutrient Delivery: Continue current diet  Nutrition Education and Counseling: No recommendations at this time  Coordination of Nutrition Care: Interdisciplinary rounds, Continue to monitor while inpatient    Goals:  pt will consume >50% of meals with BG < 180 mg/dL within 5-7 days       Nutrition Monitoring and Evaluation:   Behavioral-Environmental Outcomes: None identified  Food/Nutrient Intake Outcomes: Food and nutrient intake  Physical Signs/Symptoms Outcomes: Biochemical data, Weight    Discharge Planning:    Continue current diet, Recommend pursue outpatient diabetes education     Electronically signed by Shlomo Phillips RD on 10/30/2020    Contact: [phone]

## 2020-10-30 NOTE — PROGRESS NOTES
Video appt with Dr. Barb Nunez made for Wed 11/04 at 10:15 to discuss gamma knife radiosurgery to brain mets. Office will be in touch to send patient link for appointment next week. Final pathology will direct whether or not the patient receives WBRT vs gamma knife.     Soraida Hernandez NP

## 2020-10-30 NOTE — PROGRESS NOTES
[facility] Adult  Hospitalist Group                                                                                          Hospitalist Progress Note  Lj Maurer MD  Answering service: [phone] OR [phone] from in house phone        Date of Service:  10/30/2020  NAME:  Tk Muhammad  :  1950  MRN:  [id number]      Admission Summary:   69 y.o. female,  who has history of chronic systolic heart failure status post ICD, chronic COPD on home oxygen 2 L, hypertension, hyperlipidemia, anticoagulation with Eliquis who presents with fall 2 weeks back and left sided weakness. She reports that her left arm felt funny and also she was having weakness in the left arm. It is not painful. And she did not seek medical attention. Given persistence of symptoms, EMS was called and to take her to the local emergency room. At the ER there work-up revealed that she has lung mass as well as brain tumors. She was given IV Decadron and transferred to this facility for neurosurgical evaluation. She was also noted to have a dislocated left shoulder. There was report of lung mass which is right suprahilar with progression into the mediastinum. There is also report of 1.2 cm right frontal mass with 5 cm right to left shift. There is no report of seizures. Interval history / Subjective:   F/U left sided weakness. Patient seen and examined. Left upper extremity  strength with improvement. Plans for s/p EUS with biopsy . Also talked to sister   Biopsy result still pending  D/w oncology. Need to have biopsy result before discharge recommended radiation oncology to see patient before discharge. Sent a message to Dr. Wendy Humphries via perfect serve.       Assessment & Plan:     Brain masses, consistent with metastatic disease, suspect lung primary:  Cerebral edema secondary to above:  Left-sided weakness: mild improvement  -MRI brain done and demonstrated intracranial metastases, largest 1.3 x 1.3 cm, with surrounding edema. Osseous metastases in right frontal, parietal, C2 body  -Neurosurgery consulted. No surgical benefit. Will need outpatient radiation oncology, possibly in [place]. CM to arrange.   -Continue steroids- will weaned to q8 x 3 days, then BID, Continue Keppra  -Seizure precautions  -Neuro checks    Lung mass with erosion into mediastinum, suspicious for carcinoma:  -Pulmonary consulted. Plans for EUS/endobronchial biopsy 10/28  --started on eliquis  Not sure if patient was taking plavix. D/w pharmacy as well. Moderate right pleural effusion:   -respiratory status stable, s/p thoracentesis 10/27 with approximately 300 ml removed. Based on proteins, appears transudative     Postobstructive infiltrate RUL  -Seen on CT  -no unusual cough, fever  -s/p Ceftriaxone    UTI, Proteus:  -continue Ceftriaxone for 3 days    Diabetes type 2 with hyperglycemia   -Hba1c 6.1, will hold lantus, continue SSI, restart amaryl on discharge     Chronic COPD with chronic hypoxic respiratory failure, not in exacerbation  -Continue home oxygen, AA nebs    Chronic congestive heart failure, presumed systolic given patient has ICD and is on Aldactone  -Continued home medications     Clavicular fracture:  Shoulder dislocation ruled out:  -Appreciate orthopedic surgery. No evidence of shoulder dislocation. Can wear sling for comfort       Chronic anticoagulation with Eliquis for ??  -The patient does not know why she is on chronic anticoagulation  -initially held as planning for procedures  Restarted home dose. As recommended by pulmonology    H/o CAD  -not sure if patient was taking plavix. D/w pharmacy      Hypertension  -Continue with Coreg    Prior tobacco use     Diabetic diet    PT/OT      Code status: full. AMD filled out  DVT prophylaxis: SCDs    Care Plan discussed with: Patient/Family  Anticipated Disposition: Home w/Family  Anticipated Discharge:, will need close outpatient follow up near her home. Hospital Problems  Date Reviewed: 10/28/2020          Codes Class Noted POA    * (Principal) Brain metastases [facility]) ICD-10-CM: C79.31  ICD-9-CM: 198.3  10/22/2020 Yes                Review of Systems:     Negative unless stated above    Vital Signs:    Last 24hrs VS reviewed since prior progress note. Most recent are:  Visit Vitals  BP (!) 94/37 (BP 1 Location: Right arm, BP Patient Position: At rest)   Pulse 65   Temp 97.7 °F (36.5 °C)   Resp 19   Ht 5' 2.01\" (1.575 m)   Wt 56.5 kg (124 lb 9 oz)   SpO2 100%   BMI 22.78 kg/m²         Intake/Output Summary (Last 24 hours) at 10/30/2020 1741  Last data filed at 10/30/2020 1439  Gross per 24 hour   Intake    Output 1575 ml   Net -1575 ml        Physical Examination:             Constitutional:  No acute distress, cooperative, pleasant    ENT:  Oral mucosa moist, oropharynx benign. Resp:  CTA bilaterally. No wheezing/rhonchi/rales. No accessory muscle use   CV:  Regular rhythm, normal rate, no murmurs, gallops, rubs    GI:  Soft, non distended, non tender. normoactive bowel sounds, no hepatosplenomegaly     Musculoskeletal:  No edema, warm, 2+ pulses throughout    Neurologic:  Some left upper extremity  strength, alert and oriented             Data Review:    Review and/or order of clinical lab test  Review and/or order of tests in the radiology section of CPT  Review and/or order of tests in the medicine section of CPT      Labs:     Recent Labs     10/29/20  0153   WBC 9.9   HGB 10.3*   HCT 34.1*        Recent Labs     10/29/20  0153 10/28/20  0019   NA  --  132*   K  --  4.9   CL  --  96*   CO2  --  30   BUN  --  56*   CREA  --  1.56*   GLU  --  253*   CA  --  8.3*   MG 2.5*  --    PHOS 6.4*  --      No results for input(s): ALT, AP, TBIL, TBILI, TP, ALB, GLOB, GGT, AML, LPSE in the last 72 hours. No lab exists for component: SGOT, GPT, AMYP, HLPSE  No results for input(s): INR, PTP, APTT, INREXT, INREXT in the last 72 hours.    No results for input(s): FE, TIBC, PSAT, FERR in the last 72 hours. No results found for: FOL, RBCF   No results for input(s): PH, PCO2, PO2 in the last 72 hours. No results for input(s): CPK, CKNDX, TROIQ in the last 72 hours.     No lab exists for component: CPKMB  No results found for: CHOL, CHOLX, CHLST, CHOLV, HDL, HDLP, LDL, LDLC, DLDLP, TGLX, TRIGL, TRIGP, CHHD, CHHDX  Lab Results   Component Value Date/Time    Glucose (POC) 290 (H) 10/30/2020 04:25 PM    Glucose (POC) 354 (H) 10/30/2020 11:10 AM    Glucose (POC) 185 (H) 10/30/2020 07:55 AM    Glucose (POC) 323 (H) 10/29/2020 10:12 PM    Glucose (POC) 267 (H) 10/29/2020 04:23 PM     Lab Results   Component Value Date/Time    Color YELLOW/STRAW 10/22/2020 09:15 AM    Appearance TURBID (A) 10/22/2020 09:15 AM    Specific gravity 1.012 10/22/2020 09:15 AM    pH (UA) 5.5 10/22/2020 09:15 AM    Protein Negative 10/22/2020 09:15 AM    Glucose Negative 10/22/2020 09:15 AM    Ketone Negative 10/22/2020 09:15 AM    Bilirubin Negative 10/22/2020 09:15 AM    Urobilinogen 0.2 10/22/2020 09:15 AM    Nitrites Positive (A) 10/22/2020 09:15 AM    Leukocyte Esterase LARGE (A) 10/22/2020 09:15 AM    Epithelial cells FEW 10/22/2020 09:15 AM    Bacteria 4+ (A) 10/22/2020 09:15 AM    WBC  10/22/2020 09:15 AM    RBC 0-5 10/22/2020 09:15 AM         Medications Reviewed:     Current Facility-Administered Medications   Medication Dose Route Frequency    insulin lispro (HUMALOG) injection 2 Units  2 Units SubCUTAneous TIDAC    dexAMETHasone (DECADRON) tablet 4 mg  4 mg Oral Q12H    apixaban (ELIQUIS) tablet 2.5 mg  2.5 mg Oral BID    levETIRAcetam (KEPPRA) tablet 500 mg  500 mg Oral Q12H    albuterol-ipratropium (DUO-NEB) 2.5 MG-0.5 MG/3 ML  3 mL Nebulization Q4H PRN    melatonin tablet 3 mg  3 mg Oral QHS    sodium chloride (NS) flush 5-40 mL  5-40 mL IntraVENous Q8H    sodium chloride (NS) flush 5-40 mL  5-40 mL IntraVENous PRN    acetaminophen (TYLENOL) tablet 650 mg  650 mg Oral Q6H PRN    Or    acetaminophen (TYLENOL) suppository 650 mg  650 mg Rectal Q6H PRN    polyethylene glycol (MIRALAX) packet 17 g  17 g Oral DAILY PRN    ondansetron (ZOFRAN ODT) tablet 4 mg  4 mg Oral Q8H PRN    Or    ondansetron (ZOFRAN) injection 4 mg  4 mg IntraVENous Q6H PRN    famotidine (PEPCID) tablet 20 mg  20 mg Oral DAILY    atorvastatin (LIPITOR) tablet 20 mg  20 mg Oral QHS    carvediloL (COREG) tablet 6.25 mg  6.25 mg Oral BID WITH MEALS    furosemide (LASIX) tablet 40 mg  40 mg Oral DAILY    lisinopriL (PRINIVIL, ZESTRIL) tablet 5 mg  5 mg Oral DAILY    sertraline (ZOLOFT) tablet 25 mg  25 mg Oral QPM    spironolactone (ALDACTONE) tablet 25 mg  25 mg Oral DAILY    topiramate (TOPAMAX) tablet 100 mg  100 mg Oral BID    glucose chewable tablet 16 g  4 Tab Oral PRN    glucagon (GLUCAGEN) injection 1 mg  1 mg IntraMUSCular PRN    dextrose 10% infusion 0-250 mL  0-250 mL IntraVENous PRN    insulin lispro (HUMALOG) injection   SubCUTAneous AC&HS     ______________________________________________________________________  EXPECTED LENGTH OF STAY: 4d 21h  ACTUAL LENGTH OF STAY:          8                 Rebeca Foy MD

## 2020-10-30 NOTE — CONSULTS
Images reviewed and discussed with Dr. Gen Mcneil. I have also discussed her case with my partner, Dr. Michele Sparks, who will be involved with Dr. Gen Mcneil if [facility] is deemed appropriate. Dr. Kay Hugo and I have discussed, and the decision was made that [address] [place] [address] will wait to see Ms. Ck Thomas once we know what final pathology shows to determine best treatment recommendation. Will follow closely. Please alert me if I need to see her urgently or if there are any changes. Please send patient home with contact phone number [phone] for Radiation Oncology at [facility] (Dr. Elías Douglass) or [phone] for Radiation Oncology at [facility] (Dr. Ayde Goldsmith). Thank you.

## 2020-10-30 NOTE — PROGRESS NOTES
Transition of Care Plan:        -RUR 15%  -Likely Home with [address] DME's  -[facility] 11/2   -DME delivered (bedside commode)  -FU Appointment scheduled:  Dr. Damián Sharp & Oncology November 10 @ 2PM   -Left Message for Dr. Piotr Malcolm, Oncologist office for appointment with patient.   -[facility] will transport, please call [phone] to advise time for     CM will follow    LEENA Jane/CRM

## 2020-10-30 NOTE — PROGRESS NOTES
Hematology-Oncology Progress Note      Annis Cushing  1950  [id number]  10/30/2020      Subjective:     Awake and alert. No new complaints. Allergies: Patient has no known allergies.   Current Facility-Administered Medications   Medication Dose Route Frequency Provider Last Rate Last Dose    insulin lispro (HUMALOG) injection 2 Units  2 Units SubCUTAneous TIDAC Terrence Renee MD        dexAMETHasone (DECADRON) tablet 4 mg  4 mg Oral Q12H Terrence Renee MD        apixaban (ELIQUIS) tablet 2.5 mg  2.5 mg Oral BID Terrence Renee MD   2.5 mg at 10/30/20 0827    levETIRAcetam (KEPPRA) tablet 500 mg  500 mg Oral Q12H Carline Gip, NP   500 mg at 10/30/20 0602    albuterol-ipratropium (DUO-NEB) 2.5 MG-0.5 MG/3 ML  3 mL Nebulization Q4H PRN Nedolly Melvin M, DO        melatonin tablet 3 mg  3 mg Oral QHS Samantha Burgess M, DO   3 mg at 10/29/20 2140    sodium chloride (NS) flush 5-40 mL  5-40 mL IntraVENous Q8H Saul Hill MD   10 mL at 10/30/20 0602    sodium chloride (NS) flush 5-40 mL  5-40 mL IntraVENous PRN Saul Hill MD        acetaminophen (TYLENOL) tablet 650 mg  650 mg Oral Q6H PRN Danae Rust MD   650 mg at 10/29/20 0354    Or    acetaminophen (TYLENOL) suppository 650 mg  650 mg Rectal Q6H PRN Saul Hill MD        polyethylene glycol (MIRALAX) packet 17 g  17 g Oral DAILY PRN Saul Hill MD        ondansetron (ZOFRAN ODT) tablet 4 mg  4 mg Oral Q8H PRN Saul Hill MD        Or    ondansetron (ZOFRAN) injection 4 mg  4 mg IntraVENous Q6H PRN Saul Hill MD        famotidine (PEPCID) tablet 20 mg  20 mg Oral DAILY Danae Rust MD   20 mg at 10/30/20 8864    atorvastatin (LIPITOR) tablet 20 mg  20 mg Oral QHS Danae Rust MD   20 mg at 10/29/20 2140    carvediloL (COREG) tablet 6.25 mg  6.25 mg Oral BID WITH MEALS Danae Rust MD   6.25 mg at 10/30/20 0827    furosemide (LASIX) tablet 40 mg  40 mg Oral DAILY Mira Shanks MD   40 mg at 10/30/20 0827    lisinopriL (PRINIVIL, ZESTRIL) tablet 5 mg  5 mg Oral DAILY Ezra STOKES MD   5 mg at 10/30/20 0827    sertraline (ZOLOFT) tablet 25 mg  25 mg Oral QPM Mira Shanks MD   25 mg at 10/29/20 1726    spironolactone (ALDACTONE) tablet 25 mg  25 mg Oral DAILY Mira Shanks MD   25 mg at 10/30/20 0827    topiramate (TOPAMAX) tablet 100 mg  100 mg Oral BID Mira Shanks MD   100 mg at 10/30/20 0827    glucose chewable tablet 16 g  4 Tab Oral PRN Mira Shanks MD        glucagon (GLUCAGEN) injection 1 mg  1 mg IntraMUSCular PRN Mira Shanks MD        dextrose 10% infusion 0-250 mL  0-250 mL IntraVENous PRN Mira Shanks  mL/hr at 10/28/20 0734 125 mL at 10/28/20 0734    insulin lispro (HUMALOG) injection   SubCUTAneous AC&HS Mira Shanks MD   2 Units at 10/30/20 6567     Objective:     Patient Vitals for the past 24 hrs:   BP Temp Pulse Resp SpO2 Height   10/30/20 1025 (!) 107/51 97.6 °F (36.4 °C) 60 17 100 %    10/30/20 0826 (!) 111/54  70      10/30/20 0821      5' 2.01\" (1.575 m)   10/30/20 0602 (!) 109/91 97.4 °F (36.3 °C) 60 15 100 %    10/30/20 0200 (!) 106/42 97.4 °F (36.3 °C) 61 16 100 %    10/29/20 2206 (!) 116/47 97.6 °F (36.4 °C) 60 21 100 %    10/29/20 1802 (!) 100/46 97.7 °F (36.5 °C) 83 20 99 %    10/29/20 1725 (!) 115/47  79      10/29/20 1358 (!) 92/44 98.2 °F (36.8 °C) 70 21 99 %        Gen: NAD  HEENT: PERRL, Sclerae anicteric  Cv: RRR without m/r/g  Pulm: aerating well  Abd: NABS, NTND,  Ext: No c/c/e    Available labs reviewed:  Labs:    Recent Results (from the past 24 hour(s))   GLUCOSE, POC    Collection Time: 10/29/20  4:23 PM   Result Value Ref Range    Glucose (POC) 267 (H) 65 - 100 mg/dL    Performed by Ginger Colin    GLUCOSE, POC    Collection Time: 10/29/20 10:12 PM   Result Value Ref Range    Glucose (POC) 323 (H) 65 - 100 mg/dL Performed by Ansley Ochoa    GLUCOSE, POC    Collection Time: 10/30/20  7:55 AM   Result Value Ref Range    Glucose (POC) 185 (H) 65 - 100 mg/dL    Performed by Maureen Pettit [id number], POC    Collection Time: 10/30/20 11:10 AM   Result Value Ref Range    Glucose (POC) 354 (H) 65 - 100 mg/dL    Performed by Neto Torres and Plan     68 y/o woman with a history of tobacco abuse, admitted with left arm weakness. Noted to have hilar mass, multilpe CNS lesions, liver lesions concerning, but not diagnostic of stage IV lung CA. 1. Suspected lung cancer: awating results of EBUS/FOB biopsy done on 10/27. .. . she has at least 6 brain mets with edema and needs to be seen by radiation therapy for official visit prior to discharge,, if this is non small cell than gamma knife to brain is planned, if small cell than whole brain is planned,,, xrt needs to done prior to attempting systemic Rx.           Pete Siddiqui MD

## 2020-10-31 LAB
BACTERIA SPEC CULT: NORMAL
GLUCOSE BLD STRIP.AUTO-MCNC: 101 MG/DL (ref 65–100)
GLUCOSE BLD STRIP.AUTO-MCNC: 143 MG/DL (ref 65–100)
GLUCOSE BLD STRIP.AUTO-MCNC: 146 MG/DL (ref 65–100)
GLUCOSE BLD STRIP.AUTO-MCNC: 251 MG/DL (ref 65–100)
GRAM STN SPEC: NORMAL
GRAM STN SPEC: NORMAL
SERVICE CMNT-IMP: ABNORMAL
SERVICE CMNT-IMP: NORMAL

## 2020-10-31 PROCEDURE — 74011250637 HC RX REV CODE- 250/637: Performed by: HOSPITALIST

## 2020-10-31 PROCEDURE — 82962 GLUCOSE BLOOD TEST: CPT

## 2020-10-31 PROCEDURE — 74011250637 HC RX REV CODE- 250/637: Performed by: INTERNAL MEDICINE

## 2020-10-31 PROCEDURE — 74011636637 HC RX REV CODE- 636/637: Performed by: HOSPITALIST

## 2020-10-31 PROCEDURE — 74011250637 HC RX REV CODE- 250/637: Performed by: NURSE PRACTITIONER

## 2020-10-31 PROCEDURE — 74011250636 HC RX REV CODE- 250/636: Performed by: INTERNAL MEDICINE

## 2020-10-31 PROCEDURE — 65660000000 HC RM CCU STEPDOWN

## 2020-10-31 PROCEDURE — 74011636637 HC RX REV CODE- 636/637: Performed by: INTERNAL MEDICINE

## 2020-10-31 RX ORDER — SODIUM CHLORIDE 9 MG/ML
75 INJECTION, SOLUTION INTRAVENOUS CONTINUOUS
Status: DISCONTINUED | OUTPATIENT
Start: 2020-10-31 | End: 2020-11-04

## 2020-10-31 RX ADMIN — INSULIN LISPRO 2 UNITS: 100 INJECTION, SOLUTION INTRAVENOUS; SUBCUTANEOUS at 08:25

## 2020-10-31 RX ADMIN — SODIUM CHLORIDE 75 ML/HR: 900 INJECTION, SOLUTION INTRAVENOUS at 15:25

## 2020-10-31 RX ADMIN — INSULIN LISPRO 2 UNITS: 100 INJECTION, SOLUTION INTRAVENOUS; SUBCUTANEOUS at 17:07

## 2020-10-31 RX ADMIN — DEXAMETHASONE 4 MG: 4 TABLET ORAL at 08:23

## 2020-10-31 RX ADMIN — Medication 10 ML: at 06:00

## 2020-10-31 RX ADMIN — APIXABAN 2.5 MG: 2.5 TABLET, FILM COATED ORAL at 08:24

## 2020-10-31 RX ADMIN — ATORVASTATIN CALCIUM 20 MG: 20 TABLET, FILM COATED ORAL at 22:25

## 2020-10-31 RX ADMIN — ACETAMINOPHEN 650 MG: 325 TABLET ORAL at 10:03

## 2020-10-31 RX ADMIN — LEVETIRACETAM 500 MG: 500 TABLET, FILM COATED ORAL at 19:00

## 2020-10-31 RX ADMIN — Medication 10 ML: at 22:26

## 2020-10-31 RX ADMIN — FAMOTIDINE 20 MG: 20 TABLET ORAL at 08:23

## 2020-10-31 RX ADMIN — APIXABAN 2.5 MG: 2.5 TABLET, FILM COATED ORAL at 17:06

## 2020-10-31 RX ADMIN — Medication 3 MG: at 22:25

## 2020-10-31 RX ADMIN — TOPIRAMATE 100 MG: 100 TABLET, FILM COATED ORAL at 08:23

## 2020-10-31 RX ADMIN — INSULIN LISPRO 5 UNITS: 100 INJECTION, SOLUTION INTRAVENOUS; SUBCUTANEOUS at 08:25

## 2020-10-31 RX ADMIN — SERTRALINE HYDROCHLORIDE 25 MG: 50 TABLET ORAL at 17:06

## 2020-10-31 RX ADMIN — SODIUM CHLORIDE 250 ML: 900 INJECTION, SOLUTION INTRAVENOUS at 11:47

## 2020-10-31 RX ADMIN — DEXAMETHASONE 4 MG: 4 TABLET ORAL at 22:25

## 2020-10-31 RX ADMIN — CARVEDILOL 6.25 MG: 6.25 TABLET, FILM COATED ORAL at 08:24

## 2020-10-31 RX ADMIN — TOPIRAMATE 100 MG: 100 TABLET, FILM COATED ORAL at 17:06

## 2020-10-31 RX ADMIN — INSULIN LISPRO 2 UNITS: 100 INJECTION, SOLUTION INTRAVENOUS; SUBCUTANEOUS at 17:06

## 2020-10-31 RX ADMIN — LEVETIRACETAM 500 MG: 500 TABLET, FILM COATED ORAL at 06:37

## 2020-10-31 RX ADMIN — Medication 10 ML: at 15:25

## 2020-10-31 NOTE — PROGRESS NOTES
[facility]  Hospitalist Group                                                                                          Hospitalist Progress Note  Jamari Calvert MD  Answering service: [phone] OR [phone] from in house phone        Date of Service:  10/31/2020  NAME:  Nick Gtz  :  1950  MRN:  [id number]      Admission Summary:   69 y.o. female,  who has history of chronic systolic heart failure status post ICD, chronic COPD on home oxygen 2 L, hypertension, hyperlipidemia, anticoagulation with Eliquis who presents with fall 2 weeks back and left sided weakness. She reports that her left arm felt funny and also she was having weakness in the left arm. It is not painful. And she did not seek medical attention. Given persistence of symptoms, EMS was called and to take her to the local emergency room. At the ER there work-up revealed that she has lung mass as well as brain tumors. She was given IV Decadron and transferred to this facility for neurosurgical evaluation. She was also noted to have a dislocated left shoulder. There was report of lung mass which is right suprahilar with progression into the mediastinum. There is also report of 1.2 cm right frontal mass with 5 cm right to left shift. There is no report of seizures. Interval history / Subjective:   F/U left sided weakness. Patient seen and examined. Left upper extremity  strength with improvement. Plans for s/p EUS with biopsy . Also talked to sister previously  Discharged was cancelled as patinet was having very low BP 74/36. Started on IV fluids   BP medications were held as well   Talked with Dr. Marissa Oconnor. [id number] [facility] discharging patient . Mentioned that radiation oncology office will contact patient on Monday regarding appointment. Needs gk.       Assessment & Plan:     Brain masses, consistent with metastatic disease, suspect lung primary:  Cerebral edema secondary to above:  Left-sided weakness: mild improvement  -MRI brain done and demonstrated intracranial metastases, largest 1.3 x 1.3 cm, with surrounding edema. Osseous metastases in right frontal, parietal, C2 body  -Neurosurgery consulted. No surgical benefit. Will need outpatient radiation oncology, possibly in [facility]. CM to arrange.   -Continue steroids- will weaned to q8 x 3 days, then BID, Continue Keppra  -Seizure precautions  -Neuro checks  As per [facility]     Please send patient home with contact phone number [phone] for Radiation Oncology at [facility] (Dr. Demetri Villatoro) or [phone] for Radiation Oncology at [facility] (Dr. Aaron Peck). Thank you. Lung mass with erosion into mediastinum, suspicious for carcinoma:  -Pulmonary consulted. Plans for EUS/endobronchial biopsy 10/28  --started on eliquis  Not sure if patient was taking plavix. D/w pharmacy as well. Moderate right pleural effusion:   -respiratory status stable, s/p thoracentesis 10/27 with approximately 300 ml removed. Based on proteins, appears transudative     Postobstructive infiltrate RUL  -Seen on CT  -no unusual cough, fever  -s/p Ceftriaxone    UTI, Proteus:  -continue Ceftriaxone for 3 days    Diabetes type 2 with hyperglycemia   -Hba1c 6.1, will hold lantus, continue SSI, restart amaryl on discharge     Chronic COPD with chronic hypoxic respiratory failure, not in exacerbation  -Continue home oxygen, AA nebs    Chronic congestive heart failure, presumed systolic given patient has ICD and is on Aldactone  -Continued home medications     Clavicular fracture:  Shoulder dislocation ruled out:  -Appreciate orthopedic surgery. No evidence of shoulder dislocation. Can wear sling for comfort       Chronic anticoagulation with Eliquis for ??  -The patient does not know why she is on chronic anticoagulation  -initially held as planning for procedures  Restarted home dose. As recommended by pulmonology    H/o CAD  -not sure if patient was taking plavix.    D/w pharmacy    Hypertension  -Continue with Coreg    Prior tobacco use     Diabetic diet    PT/OT      Code status: full. AMD filled out  DVT prophylaxis: SCDs    Care Plan discussed with: Patient/Family  Anticipated Disposition: Home w/Family  Anticipated Discharge:, will need close outpatient follow up near her home. Hospital Problems  Date Reviewed: 10/28/2020          Codes Class Noted POA    * (Principal) Brain metastases [facility]) ICD-10-CM: C79.31  ICD-9-CM: 198.3  10/22/2020 Yes                Review of Systems:     Negative unless stated above    Vital Signs:    Last 24hrs VS reviewed since prior progress note. Most recent are:  Visit Vitals  BP (!) 108/51   Pulse 66   Temp 98.1 °F (36.7 °C)   Resp 14   Ht 5' 2.01\" (1.575 m)   Wt 52.2 kg (115 lb)   SpO2 100%   BMI 21.03 kg/m²         Intake/Output Summary (Last 24 hours) at 10/31/2020 1801  Last data filed at 10/31/2020 9166  Gross per 24 hour   Intake    Output 700 ml   Net -700 ml        Physical Examination:             Constitutional:  No acute distress, cooperative, pleasant    ENT:  Oral mucosa moist, oropharynx benign. Resp:  CTA bilaterally. No wheezing/rhonchi/rales. No accessory muscle use   CV:  Regular rhythm, normal rate, no murmurs, gallops, rubs    GI:  Soft, non distended, non tender. normoactive bowel sounds, no hepatosplenomegaly     Musculoskeletal:  No edema, warm, 2+ pulses throughout    Neurologic:  Some left upper extremity  strength, alert and oriented             Data Review:    Review and/or order of clinical lab test  Review and/or order of tests in the radiology section of CPT  Review and/or order of tests in the medicine section of CPT      Labs:     Recent Labs     10/29/20  0153   WBC 9.9   HGB 10.3*   HCT 34.1*        Recent Labs     10/29/20  0153   MG 2.5*   PHOS 6.4*     No results for input(s): ALT, AP, TBIL, TBILI, TP, ALB, GLOB, GGT, AML, LPSE in the last 72 hours.     No lab exists for component: SGOT, GPT, AMYP, HLPSE  No results for input(s): INR, PTP, APTT, INREXT, INREXT in the last 72 hours. No results for input(s): FE, TIBC, PSAT, FERR in the last 72 hours. No results found for: FOL, RBCF   No results for input(s): PH, PCO2, PO2 in the last 72 hours. No results for input(s): CPK, CKNDX, TROIQ in the last 72 hours.     No lab exists for component: CPKMB  No results found for: CHOL, CHOLX, CHLST, CHOLV, HDL, HDLP, LDL, LDLC, DLDLP, TGLX, TRIGL, TRIGP, CHHD, CHHDX  Lab Results   Component Value Date/Time    Glucose (POC) 146 (H) 10/31/2020 04:49 PM    Glucose (POC) 143 (H) 10/31/2020 12:05 PM    Glucose (POC) 251 (H) 10/31/2020 07:22 AM    Glucose (POC) 253 (H) 10/30/2020 09:32 PM    Glucose (POC) 290 (H) 10/30/2020 04:25 PM     Lab Results   Component Value Date/Time    Color YELLOW/STRAW 10/22/2020 09:15 AM    Appearance TURBID (A) 10/22/2020 09:15 AM    Specific gravity 1.012 10/22/2020 09:15 AM    pH (UA) 5.5 10/22/2020 09:15 AM    Protein Negative 10/22/2020 09:15 AM    Glucose Negative 10/22/2020 09:15 AM    Ketone Negative 10/22/2020 09:15 AM    Bilirubin Negative 10/22/2020 09:15 AM    Urobilinogen 0.2 10/22/2020 09:15 AM    Nitrites Positive (A) 10/22/2020 09:15 AM    Leukocyte Esterase LARGE (A) 10/22/2020 09:15 AM    Epithelial cells FEW 10/22/2020 09:15 AM    Bacteria 4+ (A) 10/22/2020 09:15 AM    WBC  10/22/2020 09:15 AM    RBC 0-5 10/22/2020 09:15 AM         Medications Reviewed:     Current Facility-Administered Medications   Medication Dose Route Frequency    0.9% sodium chloride infusion  75 mL/hr IntraVENous CONTINUOUS    insulin lispro (HUMALOG) injection 2 Units  2 Units SubCUTAneous TIDAC    dexAMETHasone (DECADRON) tablet 4 mg  4 mg Oral Q12H    apixaban (ELIQUIS) tablet 2.5 mg  2.5 mg Oral BID    levETIRAcetam (KEPPRA) tablet 500 mg  500 mg Oral Q12H    albuterol-ipratropium (DUO-NEB) 2.5 MG-0.5 MG/3 ML  3 mL Nebulization Q4H PRN    melatonin tablet 3 mg  3 mg Oral QHS    sodium chloride (NS) flush 5-40 mL  5-40 mL IntraVENous Q8H    sodium chloride (NS) flush 5-40 mL  5-40 mL IntraVENous PRN    acetaminophen (TYLENOL) tablet 650 mg  650 mg Oral Q6H PRN    Or    acetaminophen (TYLENOL) suppository 650 mg  650 mg Rectal Q6H PRN    polyethylene glycol (MIRALAX) packet 17 g  17 g Oral DAILY PRN    ondansetron (ZOFRAN ODT) tablet 4 mg  4 mg Oral Q8H PRN    Or    ondansetron (ZOFRAN) injection 4 mg  4 mg IntraVENous Q6H PRN    famotidine (PEPCID) tablet 20 mg  20 mg Oral DAILY    atorvastatin (LIPITOR) tablet 20 mg  20 mg Oral QHS    [Held by provider] carvediloL (COREG) tablet 6.25 mg  6.25 mg Oral BID WITH MEALS    [Held by provider] furosemide (LASIX) tablet 40 mg  40 mg Oral DAILY    [Held by provider] lisinopriL (PRINIVIL, ZESTRIL) tablet 5 mg  5 mg Oral DAILY    sertraline (ZOLOFT) tablet 25 mg  25 mg Oral QPM    [Held by provider] spironolactone (ALDACTONE) tablet 25 mg  25 mg Oral DAILY    topiramate (TOPAMAX) tablet 100 mg  100 mg Oral BID    glucose chewable tablet 16 g  4 Tab Oral PRN    glucagon (GLUCAGEN) injection 1 mg  1 mg IntraMUSCular PRN    dextrose 10% infusion 0-250 mL  0-250 mL IntraVENous PRN    insulin lispro (HUMALOG) injection   SubCUTAneous AC&HS     ______________________________________________________________________  EXPECTED LENGTH OF STAY: 4d 21h  ACTUAL LENGTH OF STAY:          9                 Jordin Zarate MD

## 2020-10-31 NOTE — PROGRESS NOTES
Bedside shift change report given to Nura Dobbs RN (oncoming nurse) by Mihaela Collins RN (offgoing nurse). Report included the following information SBAR, Kardex, Intake/Output, MAR, Med Rec Status, Cardiac Rhythm NSR, Quality Measures and Dual Neuro Assessment.

## 2020-10-31 NOTE — PROGRESS NOTES
Problem: Pressure Injury - Risk of  Goal: *Prevention of pressure injury  Description: Document Elio Scale and appropriate interventions in the flowsheet. Outcome: Progressing Towards Goal  Note: Pressure Injury Interventions:  Sensory Interventions: Assess changes in LOC, Assess need for specialty bed, Avoid rigorous massage over bony prominences    Moisture Interventions: Absorbent underpads, Apply protective barrier, creams and emollients, Assess need for specialty bed    Activity Interventions: Assess need for specialty bed, Chair cushion, Increase time out of bed    Mobility Interventions: Assess need for specialty bed, Chair cushion, Float heels    Nutrition Interventions: Document food/fluid/supplement intake    Friction and Shear Interventions: Feet elevated on foot rest, Foam dressings/transparent film/skin sealants, Apply protective barrier, creams and emollients                Problem: Falls - Risk of  Goal: *Absence of Falls  Description: Document [facility] Fall Risk and appropriate interventions in the flowsheet.   Outcome: Progressing Towards Goal  Note: Fall Risk Interventions:  Mobility Interventions: Bed/chair exit alarm         Medication Interventions: Bed/chair exit alarm    Elimination Interventions: Bed/chair exit alarm    History of Falls Interventions: Bed/chair exit alarm         Problem: Patient Education: Go to Patient Education Activity  Goal: Patient/Family Education  Outcome: Progressing Towards Goal     Problem: Brain Tumor Discharge Outcomes  Goal: *Neurologic stability  Outcome: Progressing Towards Goal  Goal: *Progress independence mobility/activities (eg: Mobility precautions)  Outcome: Progressing Towards Goal  Goal: *Adequate oxygenation  Outcome: Progressing Towards Goal  Goal: *Tolerates nutrition therapy  Outcome: Progressing Towards Goal  Goal: *Verbalizes understanding and describes medication purposes and frequencies  Outcome: Progressing Towards Goal  Goal: *Verbalizes understanding of type and use of pain medication  Outcome: Progressing Towards Goal  Goal: *Describes home care/support arrangements established based on need  Outcome: Progressing Towards Goal  Goal: *Describes available resources and support systems  Outcome: Progressing Towards Goal     Problem: Patient Education: Go to Patient Education Activity  Goal: Patient/Family Education  Outcome: Progressing Towards Goal     Problem: Nutrition Deficit  Goal: *Optimize nutritional status  Outcome: Progressing Towards Goal

## 2020-10-31 NOTE — PROGRESS NOTES
Hematology-Oncology Progress Note      Nghia Andrade  1950  [id number]  10/31/2020      Subjective:     Awake and alert. No new complaints. Allergies: Patient has no known allergies.   Current Facility-Administered Medications   Medication Dose Route Frequency Provider Last Rate Last Dose    insulin lispro (HUMALOG) injection 2 Units  2 Units SubCUTAneous Sejal Edge Sahra Mcgovern MD   2 Units at 10/31/20 0825    dexAMETHasone (DECADRON) tablet 4 mg  4 mg Oral Q12H Sahra Mcgovern MD   4 mg at 10/31/20 3763    apixaban (ELIQUIS) tablet 2.5 mg  2.5 mg Oral BID Sahra Mcgovern MD   2.5 mg at 10/31/20 0824    levETIRAcetam (KEPPRA) tablet 500 mg  500 mg Oral Q12H Jamia Jones NP   500 mg at 10/31/20 0807    albuterol-ipratropium (DUO-NEB) 2.5 MG-0.5 MG/3 ML  3 mL Nebulization Q4H PRN Maxcine Ajay MANRIQUE DO        melatonin tablet 3 mg  3 mg Oral QHS Samantha Burgess DO   3 mg at 10/30/20 2146    sodium chloride (NS) flush 5-40 mL  5-40 mL IntraVENous Q8H Edgar Mann MD   10 mL at 10/31/20 0600    sodium chloride (NS) flush 5-40 mL  5-40 mL IntraVENous PRN Tiffanie Hill MD        acetaminophen (TYLENOL) tablet 650 mg  650 mg Oral Q6H PRN Edgar Mann MD   650 mg at 10/29/20 0354    Or    acetaminophen (TYLENOL) suppository 650 mg  650 mg Rectal Q6H PRN Tiffanie Hill MD        polyethylene glycol (MIRALAX) packet 17 g  17 g Oral DAILY PRN Tiffanie Hill MD        ondansetron (ZOFRAN ODT) tablet 4 mg  4 mg Oral Q8H PRN Tiffanie Hill MD        Or    ondansetron (ZOFRAN) injection 4 mg  4 mg IntraVENous Q6H PRN Tiffanie Hill MD        famotidine (PEPCID) tablet 20 mg  20 mg Oral DAILY Tiffanie Hill MD   20 mg at 10/31/20 0238    atorvastatin (LIPITOR) tablet 20 mg  20 mg Oral QHS Edgar Mann MD   20 mg at 10/30/20 5083    carvediloL (COREG) tablet 6.25 mg  6.25 mg Oral BID WITH MEALS Edgar Mann MD   6.25 mg at 10/31/20 6594    furosemide (LASIX) tablet 40 mg  40 mg Oral DAILY Chuy Woodall MD   Stopped at 10/31/20 0900    lisinopriL (PRINIVIL, ZESTRIL) tablet 5 mg  5 mg Oral DAILY Chuy Woodall MD   Stopped at 10/31/20 0900    sertraline (ZOLOFT) tablet 25 mg  25 mg Oral QPM Vivian STOKES MD   25 mg at 10/30/20 1715    spironolactone (ALDACTONE) tablet 25 mg  25 mg Oral DAILY Chuy Woodall MD   Stopped at 10/31/20 0900    topiramate (TOPAMAX) tablet 100 mg  100 mg Oral BID Chuy Woodall MD   100 mg at 10/31/20 5522    glucose chewable tablet 16 g  4 Tab Oral PRN Chuy Woodall MD        glucagon (GLUCAGEN) injection 1 mg  1 mg IntraMUSCular PRN Chuy Woodall MD        dextrose 10% infusion 0-250 mL  0-250 mL IntraVENous PRN Chuy Woodall  mL/hr at 10/28/20 0734 125 mL at 10/28/20 0734    insulin lispro (HUMALOG) injection   SubCUTAneous AC&HS Chuy Woodall MD   5 Units at 10/31/20 0825     Objective:     Patient Vitals for the past 24 hrs:   BP Temp Pulse Resp SpO2 Weight   10/31/20 0601 (!) 92/58 98 °F (36.7 °C) 72 20 100 %    10/31/20 0543     96 %    10/31/20 0151 (!) 93/43 96.9 °F (36.1 °C) 61 18 100 % 52.2 kg (115 lb)   10/30/20 2152 (!) 96/37 97.6 °F (36.4 °C) 67 22 100 %    10/30/20 1804 (!) 97/23 98.2 °F (36.8 °C) 73 17 99 %    10/30/20 1350 (!) 94/37 97.7 °F (36.5 °C) 65 19 100 %    10/30/20 1025 (!) 107/51 97.6 °F (36.4 °C) 60 17 100 %        Gen: NAD  Resp no distress  Psych normal    Available labs reviewed:  Labs:    Recent Results (from the past 24 hour(s))   GLUCOSE, POC    Collection Time: 10/30/20 11:10 AM   Result Value Ref Range    Glucose (POC) 354 (H) 65 - 100 mg/dL    Performed by Delmis Mcguire    GLUCOSE, POC    Collection Time: 10/30/20  4:25 PM   Result Value Ref Range    Glucose (POC) 290 (H) 65 - 100 mg/dL    Performed by Delmis Mcguire    GLUCOSE, POC    Collection Time: 10/30/20  9:32 PM   Result Value Ref Range    Glucose (POC) 253 (H) 65 - 100 mg/dL    Performed by Rad Adam POC    Collection Time: 10/31/20  7:22 AM   Result Value Ref Range    Glucose (POC) 251 (H) 65 - 100 mg/dL    Performed by Bill Smith     68 y/o woman with a history of tobacco abuse, admitted with left arm weakness. Noted to have hilar mass, multilpe CNS lesions, liver lesions concerning, but not diagnostic of stage IV lung CA. 1. 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 .. she has at least 6 brain mets with edema, Rad onc aware of patient, xrt needs to done prior to attempting systemic Rx. Please call with any questions. If patient still here on Monday will have service f/u.   If discharged, please make f/u with Dr. Mehreen Richardson MD

## 2020-11-01 ENCOUNTER — APPOINTMENT (OUTPATIENT)
Dept: GENERAL RADIOLOGY | Age: 70
DRG: 054 | End: 2020-11-01
Attending: INTERNAL MEDICINE
Payer: MEDICARE

## 2020-11-01 LAB
GLUCOSE BLD STRIP.AUTO-MCNC: 227 MG/DL (ref 65–100)
GLUCOSE BLD STRIP.AUTO-MCNC: 233 MG/DL (ref 65–100)
GLUCOSE BLD STRIP.AUTO-MCNC: 310 MG/DL (ref 65–100)
GLUCOSE BLD STRIP.AUTO-MCNC: 432 MG/DL (ref 65–100)
GLUCOSE BLD STRIP.AUTO-MCNC: 436 MG/DL (ref 65–100)
SERVICE CMNT-IMP: ABNORMAL

## 2020-11-01 PROCEDURE — 74011250637 HC RX REV CODE- 250/637: Performed by: INTERNAL MEDICINE

## 2020-11-01 PROCEDURE — 82962 GLUCOSE BLOOD TEST: CPT

## 2020-11-01 PROCEDURE — 74011250636 HC RX REV CODE- 250/636: Performed by: INTERNAL MEDICINE

## 2020-11-01 PROCEDURE — 74011250637 HC RX REV CODE- 250/637: Performed by: HOSPITALIST

## 2020-11-01 PROCEDURE — 74011636637 HC RX REV CODE- 636/637: Performed by: NURSE PRACTITIONER

## 2020-11-01 PROCEDURE — 74011636637 HC RX REV CODE- 636/637: Performed by: HOSPITALIST

## 2020-11-01 PROCEDURE — 71045 X-RAY EXAM CHEST 1 VIEW: CPT

## 2020-11-01 PROCEDURE — 74011250637 HC RX REV CODE- 250/637: Performed by: NURSE PRACTITIONER

## 2020-11-01 PROCEDURE — 65660000000 HC RM CCU STEPDOWN

## 2020-11-01 PROCEDURE — 74011636637 HC RX REV CODE- 636/637: Performed by: INTERNAL MEDICINE

## 2020-11-01 RX ORDER — INSULIN LISPRO 100 [IU]/ML
6 INJECTION, SOLUTION INTRAVENOUS; SUBCUTANEOUS ONCE
Status: COMPLETED | OUTPATIENT
Start: 2020-11-01 | End: 2020-11-01

## 2020-11-01 RX ORDER — FAMOTIDINE 20 MG/1
20 TABLET, FILM COATED ORAL DAILY
Qty: 30 TAB | Refills: 0 | Status: SHIPPED | OUTPATIENT
Start: 2020-11-02 | End: 2020-11-11

## 2020-11-01 RX ORDER — FUROSEMIDE 40 MG/1
40 TABLET ORAL DAILY
Status: DISCONTINUED | OUTPATIENT
Start: 2020-11-01 | End: 2020-11-11 | Stop reason: HOSPADM

## 2020-11-01 RX ORDER — DEXAMETHASONE 4 MG/1
4 TABLET ORAL 2 TIMES DAILY WITH MEALS
Qty: 30 TAB | Refills: 0 | Status: SHIPPED | OUTPATIENT
Start: 2020-11-01 | End: 2020-11-11

## 2020-11-01 RX ORDER — CLOPIDOGREL BISULFATE 75 MG/1
75 TABLET ORAL DAILY
Status: DISCONTINUED | OUTPATIENT
Start: 2020-11-02 | End: 2020-11-11 | Stop reason: HOSPADM

## 2020-11-01 RX ORDER — LEVETIRACETAM 500 MG/1
500 TABLET ORAL EVERY 12 HOURS
Qty: 60 TAB | Refills: 0 | Status: SHIPPED | OUTPATIENT
Start: 2020-11-01 | End: 2020-11-11

## 2020-11-01 RX ADMIN — FAMOTIDINE 20 MG: 20 TABLET ORAL at 08:40

## 2020-11-01 RX ADMIN — Medication 10 ML: at 06:35

## 2020-11-01 RX ADMIN — LEVETIRACETAM 500 MG: 500 TABLET, FILM COATED ORAL at 06:35

## 2020-11-01 RX ADMIN — INSULIN LISPRO 3 UNITS: 100 INJECTION, SOLUTION INTRAVENOUS; SUBCUTANEOUS at 08:48

## 2020-11-01 RX ADMIN — ATORVASTATIN CALCIUM 20 MG: 20 TABLET, FILM COATED ORAL at 21:17

## 2020-11-01 RX ADMIN — INSULIN LISPRO 2 UNITS: 100 INJECTION, SOLUTION INTRAVENOUS; SUBCUTANEOUS at 12:58

## 2020-11-01 RX ADMIN — ACETAMINOPHEN 650 MG: 325 TABLET ORAL at 14:01

## 2020-11-01 RX ADMIN — Medication 3 MG: at 21:22

## 2020-11-01 RX ADMIN — INSULIN LISPRO 2 UNITS: 100 INJECTION, SOLUTION INTRAVENOUS; SUBCUTANEOUS at 08:48

## 2020-11-01 RX ADMIN — TOPIRAMATE 100 MG: 100 TABLET, FILM COATED ORAL at 08:40

## 2020-11-01 RX ADMIN — TOPIRAMATE 100 MG: 100 TABLET, FILM COATED ORAL at 17:00

## 2020-11-01 RX ADMIN — INSULIN LISPRO 3 UNITS: 100 INJECTION, SOLUTION INTRAVENOUS; SUBCUTANEOUS at 12:58

## 2020-11-01 RX ADMIN — SERTRALINE HYDROCHLORIDE 25 MG: 50 TABLET ORAL at 17:01

## 2020-11-01 RX ADMIN — DEXAMETHASONE 4 MG: 4 TABLET ORAL at 08:40

## 2020-11-01 RX ADMIN — Medication 10 ML: at 21:20

## 2020-11-01 RX ADMIN — INSULIN LISPRO 6 UNITS: 100 INJECTION, SOLUTION INTRAVENOUS; SUBCUTANEOUS at 21:16

## 2020-11-01 RX ADMIN — APIXABAN 2.5 MG: 2.5 TABLET, FILM COATED ORAL at 08:40

## 2020-11-01 RX ADMIN — APIXABAN 2.5 MG: 2.5 TABLET, FILM COATED ORAL at 17:00

## 2020-11-01 RX ADMIN — DEXAMETHASONE 4 MG: 4 TABLET ORAL at 20:52

## 2020-11-01 RX ADMIN — LEVETIRACETAM 500 MG: 500 TABLET, FILM COATED ORAL at 19:11

## 2020-11-01 NOTE — PROGRESS NOTES
[address] [facility] Adult  Hospitalist Group                                                                                          Hospitalist Progress Note  Dawna Trevino MD  Answering service: [phone] OR [phone] from in house phone        Date of Service:  2020  NAME:  Brady Rayo  :  1950  MRN:  [id number]      Admission Summary:   69 y.o. female,  who has history of chronic systolic heart failure status post ICD, chronic COPD on home oxygen 2 L, hypertension, hyperlipidemia, anticoagulation with Eliquis who presents with fall 2 weeks back and left sided weakness. She reports that her left arm felt funny and also she was having weakness in the left arm. It is not painful. And she did not seek medical attention. Given persistence of symptoms, EMS was called and to take her to the local emergency room. At the ER there work-up revealed that she has lung mass as well as brain tumors. She was given IV Decadron and transferred to this facility for neurosurgical evaluation. She was also noted to have a dislocated left shoulder. There was report of lung mass which is right suprahilar with progression into the mediastinum. There is also report of 1.2 cm right frontal mass with 5 cm right to left shift. There is no report of seizures. Interval history / Subjective:   F/U left sided weakness. Patient seen and examined. Left upper extremity  strength with improvement. Plans for s/p EUS with biopsy . Also talked to sister previously  Discharged was cancelled as patinet was having very low BP 74/36. Started on IV fluids   BP medications were held as well   Talked with Dr. Richardson Mark. [id number] [facility] discharging patient . Mentioned that radiation oncology office will contact patient on Monday regarding appointment. Needs gk. I talked to sister in detail and answered all her questions.  I personally gave her number radiation oncology and advised to contact office as well in case she dont get a call. She understood and agreed with plan and was very appreciative   Discharge was cancelled today as patient desaturating to 80s. reconsulted cardiology. Need input from pulmonology as well. Assessment & Plan:     Brain masses, consistent with metastatic disease, suspect lung primary:  Cerebral edema secondary to above:  Left-sided weakness: mild improvement  -MRI brain done and demonstrated intracranial metastases, largest 1.3 x 1.3 cm, with surrounding edema. Osseous metastases in right frontal, parietal, C2 body  -Neurosurgery consulted. No surgical benefit. Will need outpatient radiation oncology, possibly in [place]. CM to arrange.   -Continue steroids- will weaned to q8 x 3 days, then BID, Continue Keppra  -Seizure precautions  -Neuro checks  As per pritesh  Please send patient home with contact phone number [phone] for Radiation Oncology at [address] [place] [address] (Dr. Maria Fernanda Maciel) or [phone] for Radiation Oncology at [facility] (Dr. Kala Ferguson). Thank you    Acute hypoxic respiratory failure  New onset  Patient desaturating  Restarted lasix BP somewhat better  Cardiology re consulted for meds adjustment  Pulmonology following     .    Lung mass with erosion into mediastinum, suspicious for carcinoma:  -Pulmonary consulted. Plans for EUS/endobronchial biopsy 10/28  --started on eliquis  Not sure if patient was taking plavix. D/w pharmacy as well. Moderate right pleural effusion:   -respiratory status stable, s/p thoracentesis 10/27 with approximately 300 ml removed.  Based on proteins, appears transudative     Postobstructive infiltrate RUL  -Seen on CT  -no unusual cough, fever  -s/p Ceftriaxone    UTI, Proteus:  -continue Ceftriaxone for 3 days    Diabetes type 2 with hyperglycemia   -Hba1c 6.1, will hold lantus, continue SSI, restart amaryl on discharge     Chronic COPD with chronic hypoxic respiratory failure, not in exacerbation  -Continue home oxygen, AA nebs    Chronic congestive heart failure, presumed systolic given patient has ICD and is on Aldactone  -Continued home medications     Clavicular fracture:  Shoulder dislocation ruled out:  -Appreciate orthopedic surgery. No evidence of shoulder dislocation. Can wear sling for comfort       Chronic anticoagulation with Eliquis for ??  -The patient does not know why she is on chronic anticoagulation  -initially held as planning for procedures  Restarted home dose. As recommended by pulmonology    H/o CAD  -not sure if patient was taking plavix. D/w pharmacy      Hypertension  -Continue with Coreg    Prior tobacco use     Diabetic diet    PT/OT      Code status: full. AMD filled out  DVT prophylaxis: SCDs    Care Plan discussed with: Patient/Family  Anticipated Disposition: Home w/Family  Anticipated Discharge:, will need close outpatient follow up near her home. Hospital Problems  Date Reviewed: 10/28/2020          Codes Class Noted POA    * (Principal) Brain metastases [facility]) ICD-10-CM: C79.31  ICD-9-CM: 198.3  10/22/2020 Yes                Review of Systems:     Negative unless stated above    Vital Signs:    Last 24hrs VS reviewed since prior progress note. Most recent are:  Visit Vitals  BP (!) 125/54 (BP 1 Location: Right arm, BP Patient Position: At rest)   Pulse 94   Temp 97.5 °F (36.4 °C)   Resp 22   Ht 5' 2.01\" (1.575 m)   Wt 52.2 kg (115 lb 1.3 oz)   SpO2 100%   BMI 21.04 kg/m²         Intake/Output Summary (Last 24 hours) at 11/1/2020 1839  Last data filed at 11/1/2020 8109  Gross per 24 hour   Intake    Output 1000 ml   Net -1000 ml        Physical Examination:             Constitutional:  No acute distress, cooperative, pleasant    ENT:  Oral mucosa moist, oropharynx benign. Resp:  CTA bilaterally. No wheezing/rhonchi/rales. No accessory muscle use   CV:  Regular rhythm, normal rate, no murmurs, gallops, rubs    GI:  Soft, non distended, non tender.  normoactive bowel sounds, no hepatosplenomegaly     Musculoskeletal:  No edema, warm, 2+ pulses throughout    Neurologic:  Some left upper extremity  strength, alert and oriented             Data Review:    Review and/or order of clinical lab test  Review and/or order of tests in the radiology section of CPT  Review and/or order of tests in the medicine section of CPT      Labs:     No results for input(s): WBC, HGB, HCT, PLT, HGBEXT, HCTEXT, PLTEXT, HGBEXT, HCTEXT, PLTEXT in the last 72 hours. No results for input(s): NA, K, CL, CO2, BUN, CREA, GLU, CA, MG, PHOS, URICA in the last 72 hours. No results for input(s): ALT, AP, TBIL, TBILI, TP, ALB, GLOB, GGT, AML, LPSE in the last 72 hours. No lab exists for component: SGOT, GPT, AMYP, HLPSE  No results for input(s): INR, PTP, APTT, INREXT, INREXT in the last 72 hours. No results for input(s): FE, TIBC, PSAT, FERR in the last 72 hours. No results found for: FOL, RBCF   No results for input(s): PH, PCO2, PO2 in the last 72 hours. No results for input(s): CPK, CKNDX, TROIQ in the last 72 hours.     No lab exists for component: CPKMB  No results found for: CHOL, CHOLX, CHLST, CHOLV, HDL, HDLP, LDL, LDLC, DLDLP, TGLX, TRIGL, TRIGP, CHHD, CHHDX  Lab Results   Component Value Date/Time    Glucose (POC) 233 (H) 11/01/2020 11:54 AM    Glucose (POC) 227 (H) 11/01/2020 07:48 AM    Glucose (POC) 101 (H) 10/31/2020 11:25 PM    Glucose (POC) 146 (H) 10/31/2020 04:49 PM    Glucose (POC) 143 (H) 10/31/2020 12:05 PM     Lab Results   Component Value Date/Time    Color YELLOW/STRAW 10/22/2020 09:15 AM    Appearance TURBID (A) 10/22/2020 09:15 AM    Specific gravity 1.012 10/22/2020 09:15 AM    pH (UA) 5.5 10/22/2020 09:15 AM    Protein Negative 10/22/2020 09:15 AM    Glucose Negative 10/22/2020 09:15 AM    Ketone Negative 10/22/2020 09:15 AM    Bilirubin Negative 10/22/2020 09:15 AM    Urobilinogen 0.2 10/22/2020 09:15 AM    Nitrites Positive (A) 10/22/2020 09:15 AM    Leukocyte Esterase LARGE (A) 10/22/2020 09:15 AM    Epithelial cells FEW 10/22/2020 09:15 AM    Bacteria 4+ (A) 10/22/2020 09:15 AM    WBC  10/22/2020 09:15 AM    RBC 0-5 10/22/2020 09:15 AM         Medications Reviewed:     Current Facility-Administered Medications   Medication Dose Route Frequency    furosemide (LASIX) tablet 40 mg  40 mg Oral DAILY    [START ON 11/2/2020] clopidogreL (PLAVIX) tablet 75 mg  75 mg Oral DAILY    0.9% sodium chloride infusion  75 mL/hr IntraVENous CONTINUOUS    insulin lispro (HUMALOG) injection 2 Units  2 Units SubCUTAneous TIDAC    dexAMETHasone (DECADRON) tablet 4 mg  4 mg Oral Q12H    apixaban (ELIQUIS) tablet 2.5 mg  2.5 mg Oral BID    levETIRAcetam (KEPPRA) tablet 500 mg  500 mg Oral Q12H    albuterol-ipratropium (DUO-NEB) 2.5 MG-0.5 MG/3 ML  3 mL Nebulization Q4H PRN    melatonin tablet 3 mg  3 mg Oral QHS    sodium chloride (NS) flush 5-40 mL  5-40 mL IntraVENous Q8H    sodium chloride (NS) flush 5-40 mL  5-40 mL IntraVENous PRN    acetaminophen (TYLENOL) tablet 650 mg  650 mg Oral Q6H PRN    Or    acetaminophen (TYLENOL) suppository 650 mg  650 mg Rectal Q6H PRN    polyethylene glycol (MIRALAX) packet 17 g  17 g Oral DAILY PRN    ondansetron (ZOFRAN ODT) tablet 4 mg  4 mg Oral Q8H PRN    Or    ondansetron (ZOFRAN) injection 4 mg  4 mg IntraVENous Q6H PRN    famotidine (PEPCID) tablet 20 mg  20 mg Oral DAILY    atorvastatin (LIPITOR) tablet 20 mg  20 mg Oral QHS    [Held by provider] carvediloL (COREG) tablet 6.25 mg  6.25 mg Oral BID WITH MEALS    [Held by provider] lisinopriL (PRINIVIL, ZESTRIL) tablet 5 mg  5 mg Oral DAILY    sertraline (ZOLOFT) tablet 25 mg  25 mg Oral QPM    [Held by provider] spironolactone (ALDACTONE) tablet 25 mg  25 mg Oral DAILY    topiramate (TOPAMAX) tablet 100 mg  100 mg Oral BID    glucose chewable tablet 16 g  4 Tab Oral PRN    glucagon (GLUCAGEN) injection 1 mg  1 mg IntraMUSCular PRN    dextrose 10% infusion 0-250 mL  0-250 mL IntraVENous PRN    insulin lispro (HUMALOG) injection SubCUTAneous AC&HS     ______________________________________________________________________  EXPECTED LENGTH OF STAY: 4d 21h  ACTUAL LENGTH OF STAY:          Gabriel Bach MD

## 2020-11-01 NOTE — PROGRESS NOTES
Problem: Pressure Injury - Risk of  Goal: *Prevention of pressure injury  Description: Document Elio Scale and appropriate interventions in the flowsheet. Outcome: Progressing Towards Goal  Note: Pressure Injury Interventions:  Sensory Interventions: Assess changes in LOC, Assess need for specialty bed, Avoid rigorous massage over bony prominences    Moisture Interventions: Absorbent underpads, Apply protective barrier, creams and emollients, Assess need for specialty bed    Activity Interventions: Assess need for specialty bed    Mobility Interventions: Assess need for specialty bed, Chair cushion, Float heels    Nutrition Interventions: Document food/fluid/supplement intake    Friction and Shear Interventions: Apply protective barrier, creams and emollients, Feet elevated on foot rest, Foam dressings/transparent film/skin sealants                Problem: Falls - Risk of  Goal: *Absence of Falls  Description: Document Edmonia Morning Fall Risk and appropriate interventions in the flowsheet.   Outcome: Progressing Towards Goal  Note: Fall Risk Interventions:  Mobility Interventions: Bed/chair exit alarm         Medication Interventions: Bed/chair exit alarm    Elimination Interventions: Bed/chair exit alarm    History of Falls Interventions: Bed/chair exit alarm         Problem: Brain Tumor Day 3 to Discharge  Goal: Activity/Safety  Outcome: Progressing Towards Goal  Goal: Nutrition/Diet  Outcome: Progressing Towards Goal  Goal: Discharge Planning  Outcome: Progressing Towards Goal  Goal: Medications  Outcome: Progressing Towards Goal  Goal: Treatments/Interventions/Procedures  Outcome: Progressing Towards Goal  Goal: Progressive Mobility and Function  Outcome: Progressing Towards Goal  Goal: Psychosocial  Outcome: Progressing Towards Goal     Problem: Brain Tumor Discharge Outcomes  Goal: *Neurologic stability  Outcome: Progressing Towards Goal  Goal: *Progress independence mobility/activities (eg: Mobility precautions)  Outcome: Progressing Towards Goal  Goal: *Adequate oxygenation  Outcome: Progressing Towards Goal  Goal: *Tolerates nutrition therapy  Outcome: Progressing Towards Goal  Goal: *Verbalizes understanding and describes medication purposes and frequencies  Outcome: Progressing Towards Goal  Goal: *Verbalizes understanding of type and use of pain medication  Outcome: Progressing Towards Goal  Goal: *Describes home care/support arrangements established based on need  Outcome: Progressing Towards Goal  Goal: *Describes available resources and support systems  Outcome: Progressing Towards Goal

## 2020-11-01 NOTE — PROGRESS NOTES
CM again gave patient second Medicare IM Letter which informed patient of the right to appeal the discharge. Patient signed the original which was given to the patient and a copy was placed on the chart.

## 2020-11-01 NOTE — PROGRESS NOTES
Problem: Pressure Injury - Risk of  Goal: *Prevention of pressure injury  Description: Document Elio Scale and appropriate interventions in the flowsheet. Outcome: Progressing Towards Goal  Note: Pressure Injury Interventions:  Sensory Interventions: Assess changes in LOC, Assess need for specialty bed, Discuss PT/OT consult with provider    Moisture Interventions: Apply protective barrier, creams and emollients, Check for incontinence Q2 hours and as needed, Internal/External urinary devices    Activity Interventions: Assess need for specialty bed, Increase time out of bed, Pressure redistribution bed/mattress(bed type), PT/OT evaluation    Mobility Interventions: Assess need for specialty bed, HOB 30 degrees or less, Pressure redistribution bed/mattress (bed type), PT/OT evaluation    Nutrition Interventions: Document food/fluid/supplement intake, Discuss nutritional consult with provider    Friction and Shear Interventions: Apply protective barrier, creams and emollients, HOB 30 degrees or less, Lift sheet                Problem: Breathing Pattern - Ineffective  Goal: *Absence of hypoxia  Outcome: Progressing Towards Goal     Problem: Falls - Risk of  Goal: *Absence of Falls  Description: Document Edgard Fall Risk and appropriate interventions in the flowsheet.   Outcome: Progressing Towards Goal  Note: Fall Risk Interventions:  Mobility Interventions: Bed/chair exit alarm, Communicate number of staff needed for ambulation/transfer, PT Consult for mobility concerns         Medication Interventions: Bed/chair exit alarm, Evaluate medications/consider consulting pharmacy, Patient to call before getting OOB    Elimination Interventions: Bed/chair exit alarm, Call light in reach, Patient to call for help with toileting needs    History of Falls Interventions: Bed/chair exit alarm, Consult care management for discharge planning, Door open when patient unattended, Room close to nurse's station         Problem: Brain Tumor Day 1  Goal: Discharge Planning  Outcome: Progressing Towards Goal

## 2020-11-01 NOTE — PROGRESS NOTES
The ending of Daylight Saving Time occurred at 0200 hrs. Documentation of patient care and medications administered is done with respect to the time change. Bedside shift change report given to Kalli Howard RN (oncoming nurse) by Sharonda Monk RN (offgoing nurse). Report included the following information SBAR, Kardex, Intake/Output, MAR, Med Rec Status, Cardiac Rhythm NSR, Quality Measures and Dual Neuro Assessment.

## 2020-11-01 NOTE — DISCHARGE SUMMARY
Inpatient hospitalist discharge summary                Brief Overview    PATIENT ID: Nghia Andrade    MRN: [id number]     YOB: 1950    Admitting Provider: Justin Salgado MD    Discharging Provider: Francisco Day MD   To contact this individual call [phone] and ask the  to page. If unavailable ask to be transferred the Adult Hospitalist Department. PCP at discharge: John Sanchez [phone] [address] / [address] [place], [place] [id number]    Admission date: 10/22/2020  Date of Discharge: 11/01/20    Chief complaint:   Chief Complaint   Patient presents with    Transfer Of Care     Patient Active Problem List   Diagnosis Code    Current episode of major depressive disorder without prior episode F32.9    Chronic bronchitis (HCC) J42    Contusion, shoulder and upper arm, multiple sites, initial encounter S40.019A, S40.029A    Brain metastases ([facility] [address] C79.31         Discharge diagnosis, hospital course/plan:  69 y. o. female,  who has history of chronic systolic heart failure status post ICD, chronic COPD on home oxygen 2 L, hypertension, hyperlipidemia, anticoagulation with Eliquis who presents with fall 2 weeks back and left sided weakness.  She reports that her left arm felt funny and also she was having weakness in the left arm.  It is not painful.  And she did not seek medical attention.  Given persistence of symptoms, EMS was called and to take her to the local emergency room.  At the ER there work-up revealed that she has lung mass as well as brain tumors.  She was given IV Decadron and transferred to this facility for neurosurgical evaluation. She was also noted to have a dislocated left shoulder. Cain Valentin was report of lung mass which is right suprahilar with progression into the mediastinum. Cain Valentin is also report of 1.2 cm right frontal mass with 5 cm right to left shift. There is no report of seizures. BP better today.  Patient with no symptoms. Wants to go home   I talked to sister in detail and answered all her questions. I personally gave her number radiation oncology and advised to contact office as well in case she dont get a call. She understood and agreed with plan and was very appreciative     Brain masses, consistent with metastatic disease, suspect lung primary:  Cerebral edema secondary to above:  Left-sided weakness: mild improvement  -MRI brain done and demonstrated intracranial metastases, largest 1.3 x 1.3 cm, with surrounding edema. Osseous metastases in right frontal, parietal, C2 body  -Neurosurgery consulted. No surgical benefit. Will need outpatient radiation oncology, possibly in [address]. CM to arrange.   -Continue steroids- will weaned to q8 x 3 days, then BID, Continue Keppra until seen by radiation oncology   As per pritesh  Please send patient home with contact phone number [phone] for Radiation Oncology at [facility] (Dr. Laura Mast) or [phone] for Radiation Oncology at [facility] (Dr. Sada Baxter). Thank you. Talked with Dr. Saad Baxter. [id number] Helen Carmichael discharging patient . Mentioned that radiation oncology office will contact patient on Monday regarding appointment. Needs gk.      Lung mass with erosion into mediastinum, suspicious for carcinoma:  -Pulmonary consulted. Plans for EUS/endobronchial biopsy 10/28  --started on eliquis  Not sure if patient was taking plavix. D/w pharmacy as well.        Moderate right pleural effusion:   -respiratory status stable, s/p thoracentesis 10/27 with approximately 300 ml removed.  Based on proteins, appears transudative      Postobstructive infiltrate RUL  -Seen on CT  -no unusual cough, fever  -s/p Ceftriaxone     UTI, Proteus:  -continue Ceftriaxone for 3 days     Diabetes type 2 with hyperglycemia   -continue hiome regimen      Chronic COPD with chronic hypoxic respiratory failure, not in exacerbation  -Continue home oxygen, AA nebs     Chronic congestive heart failure, presumed systolic given patient has ICD and is on Aldactone  -Continued home medications     Clavicular fracture:  Shoulder dislocation ruled out:  -Appreciate orthopedic surgery. No evidence of shoulder dislocation. Can wear sling for comfort       Chronic anticoagulation with Eliquis for ??  -The patient does not know why she is on chronic anticoagulation  -initially held as planning for procedures  Restarted home dose. As recommended by pulmonology     H/o CAD  -not sure if patient was taking plavix. D/w pharmacy      Hypertension  -Continue with Coreg     Prior tobacco use     Diabetic diet     On the date of discharge, diagnostic face to face encounter was performed. Patient was hemodynamically stable, offering no new complaints. Denies any shortness of breath at rest, no fevers or chills, no diarrhea or constipation. Patient is agreeable for discharge. Patient understood and verbalized the understanding of the discharge plan. Patient was advised to seek medical help/ care or return to ED, if symptoms recur, worsen or new symptoms develop. Discharge Disposition:  Acute Facility     Discharge activity:  Activity as tolerated    Code status at discharge:  Full Code     Outpatient follow up:  Please follow up with your PCP in one week  In addition follow up with oncology, radiation oncology , cardiology, pulmonology       Future appointments-  Future Appointments   Date Time Provider Veronica Giles   11/10/2020  2:00 PM [facility] RAD ONC THERAPY [facility]     Follow-up Information     Follow up With Specialties Details Why Contact Info    Hermine Goltz, MD Radiation Oncology Schedule an appointment as soon as possible for a visit in 1 week to discuss radiation for brain mets [address] [place] [address] Mike Calixto MD Neurosurgery Go on 11/4/2020 at 10:15 for video appt to discuss radiation to brain mets.  Office will send you the link prior to the appointment [address] [facility] [address]  [address] [facility]  [phone]      Ghada Womack MD Radiation Oncology  Call at [phone] [facility]  [address] [facility]      Joeline Canavan, MD Internal Medicine, Pulmonary Disease In 1 week  [address]  [address]  [address] Ankit Elisabeth Mccurdy MD Cardiology In 1 week  [address] [facility]  [address]  [address] [facility]      Severo Roan, MD Hematology and Oncology, Hematology, Oncology In 1 week  [address] [facility] [address] [facility]      Bonita Bernabe MD Neurosurgery In 1 week  [address] [facility] [address] [facility]      Jeni Davalos NP Nurse Practitioner   [facility]  [address]  [phone]      Jeni Davalos NP Nurse Practitioner On 11/2/2020  [address]  [facility]  [phone]            Operative procedures performed:  Procedure(s):  ENDOSCOPIC BRONCHOSCOPY ULTRASOUND (EBUS)  BRONCHOSCOPY  TRANSBRONCHIAL BIOPSY    Consults:  IP CONSULT TO NEUROSURGERY  IP CONSULT TO PULMONOLOGY  IP CONSULT TO ONCOLOGY  IP CONSULT TO RADIATION ONCOLOGY  IP CONSULT TO ORTHOPEDIC SURGERY  IP CONSULT TO CARDIOLOGY    Procedures:   Procedure(s):  ENDOSCOPIC BRONCHOSCOPY ULTRASOUND (EBUS)  BRONCHOSCOPY  TRANSBRONCHIAL BIOPSY    Diet:  DIET ONE TIME MESSAGE  DIET ONE TIME MESSAGE  DIET DIABETIC CONSISTENT CARB    Pertinent test results:  Xr Shoulder Lt Ap/lat Min 2 V    Result Date: 10/22/2020  EXAM: XR SHOULDER LT AP/LAT MIN 2 V INDICATION: glenohumeral dislocation? COMPARISON: October 21, 2020. FINDINGS: Three views of the left shoulder demonstrate nondisplaced distal clavicle fracture, unchanged. There is no new fracture, dislocation or other acute abnormality. There is diffuse osteopenia. IMPRESSION: Unchanged nondisplaced distal left clavicle fracture.  No evidence of shoulder dislocation. Xr Shoulder Lt Ap/lat Min 2 V    Result Date: 10/22/2020  EXAM: XR SHOULDER LT AP/LAT MIN 2 V CLINICAL INDICATION/HISTORY: left arm weakness -Additional: None COMPARISON: None TECHNIQUE: 3 views of the left wrist _______________ FINDINGS: BONES: Nondisplaced fracture of the distal clavicle. No aggressive appearing osseous lytic or blastic lesion. SOFT TISSUES: Unremarkable. _______________     IMPRESSION: Nondisplaced fracture of the distal clavicle. Mri Brain W Wo Cont    Result Date: 10/23/2020  EXAM:  MRI BRAIN W WO CONT INDICATION:    brain mass COMPARISON:  CT head 10/21/2020. CONTRAST: 10 ml Dotarem. TECHNIQUE:  Multiplanar multisequence acquisition without and with contrast of the brain. FINDINGS: Evaluation is significantly limited by motion. There is overall no midline shift or herniation. There are multiple enhancing intracranial lesions as below: A 13 x 13 mm enhancing lesion in the right posterior frontal lobe involving the precentral gyrus (series 1400 image 189) with moderate surrounding confluent edema within the right frontoparietal white matter. A 6 mm enhancing lesion in the right parietal lobe (series 1400 image 166) with mild surrounding edema. A 9 mm ring-enhancing lesion in the left frontal lobe (series 1400 image 176) with mild surrounding edema. A 10 mm enhancing lesion in the right superior thalamus (series 1400 image 150) with mild surrounding edema. An 8 mm ring-enhancing lesion in the right ventral thalamus (series 1400 image 143) with mild surrounding edema. A 1.1 x 1.0 cm enhancing lesion in the left inferior cerebellum (series 1400 image 83) without significant surrounding edema. There is overall no midline shift or herniation. Generalized parenchymal volume loss with commensurate dilation of the sulci and ventricular system. There is a punctate acute to subacute infarct in the left occipital periventricular white matter (series 3 image 11).  Small chronic infarct in the left corona radiata. Patchy T2/FLAIR hyperintensity in the jayne, consistent with mild chronic microvascular ischemic disease. There are multiple small chronic microhemorrhages in the bilateral cerebellum, as well as multiple scattered supratentorial microhemorrhages. There is no cerebellar tonsillar herniation. Expected arterial flow-voids are present. The paranasal sinuses, mastoid air cells, and middle ears are clear. The orbital contents are within normal limits with bilateral lens implants. There is a T1 hypointense lesion in the right parietal bone (series 9 image 9), right frontal bone (series 8 image 11), and at the base of C2 (series 9 image 15). IMPRESSION: 1. Six intracranial metastases, largest in the right posterior frontal lobe involving the precentral gyrus measuring 1.3 x 1.3 cm, with moderate surrounding edema. Varying degrees of edema surrounding the remaining metastases. Overall no midline shift or herniation. 2. Osseous metastases in the right frontal bone, right parietal bone, and C2 body. 3. Punctate acute to subacute infarct in the left occipital periventricular white matter. 4. Generalized parenchymal volume loss and mild chronic microvascular ischemic disease. Small chronic infarct in the left corona radiata. Multiple scattered chronic supratentorial and infratentorial microhemorrhages. 23X     Ct Head Wo Cont    Result Date: 10/22/2020  EXAM: CT head INDICATION: Weakness. Altered mental status. COMPARISON: None. TECHNIQUE: Axial CT imaging of the head was performed without intravenous contrast. Standard multiplanar coronal and sagittal reformatted images were obtained and are included in interpretation. One or more dose reduction techniques were used on this CT: automated exposure control, adjustment of the mAs and/or kVp according to patient size, and iterative reconstruction techniques.   The specific techniques used on this CT exam have been documented in the patient's electronic medical record. Digital Imaging and Communications in Medicine (DICOM) format image data are available to nonaffiliated external healthcare facilities or entities on a secure, media free, reciprocally searchable basis with patient authorization for at least a 12-month period after this study. _______________ FINDINGS: BRAIN AND POSTERIOR FOSSA: Multifocal metastatic disease with 1.2 cm lesion in the high right frontal lobe and marked associated edema. Additional smaller anterior right parietal 5 mm, left frontal 4 mm and right basal ganglia 6 mm metastatic lesions with associated edema. No evidence of acute parenchymal hemorrhage. 5 mm right to left midline shift. No hydrocephalus. EXTRA-AXIAL SPACES AND MENINGES: There are no abnormal extra-axial fluid collections. CALVARIUM: Intact. SINUSES: Clear. OTHER: None. _______________     IMPRESSION: Multifocal metastatic disease as detailed above in the bifrontal, right parietal and basal ganglia. Further characterization with MRI is recommended. Note: Preliminary report sent to the Emergency Department by the radial at the time of the study. Ct Chest Wo Cont    Addendum Date: 10/22/2020    Addendum: Nondisplaced fracture of the left distal clavicle. Old fracture of the right mid clavicle with nonunion. Result Date: 10/22/2020  EXAM: CT Chest INDICATION: Pain. COMPARISON: None. TECHNIQUE: Axial CT imaging from the thoracic inlet through the diaphragm without intravenous contrast. Multiplanar reformations were generated. One or more dose reduction techniques were used on this CT: automated exposure control, adjustment of the mAs and/or kVp according to patient size, and iterative reconstruction techniques. The specific techniques used on this CT exam have been documented in the patient's electronic medical record.   Digital Imaging and Communications in Medicine (DICOM) format image data are available to nonaffiliated external healthcare facilities or entities on a secure, media free, reciprocally searchable basis with patient authorization for at least a 12-month period after this study. _______________ FINDINGS: LUNGS: Centrilobular emphysema. Right hilar/suprahilar 5.8 x 5.7 cm spiculated mass with infiltration and attenuation of right upper lobe main stem bronchus. Possible post obstructive infiltrate in the right upper lobe. PLEURA: Moderate right pleural effusion. AIRWAY: Unremarkable. MEDIASTINUM: Left chest wall AICD/pacemaker. Aorta is normal caliber with scattered vascular calcifications. LYMPH NODES: Pretracheal and subcarinal adenopathy measuring up to 1.7 cm. UPPER ABDOMEN: Nodular thickening of the left adrenal gland without discrete nodule. Cholecystectomy. Atrophic right kidney. 1.2 cm soft tissue nodule posterior medial to the upper left kidney, new from prior exam. OSSEOUS STRUCTURES: T3 vertebra plana and associated kyphosis _______________     IMPRESSION: Ill-defined, spiculated right hilar/suprahilar mass measuring  5.8 x 5.7 cm with possible postobstructive right upper infiltrate. Mass infiltrates and attenuates the right mainstem bronchus. Mediastinal adenopathy concerning for local metastatic disease. Moderate right pleural effusion. 1.2 cm soft tissue nodule posterior medial to the upper left kidney, new from prior exam. Concerning for metastatic disease. T3 vertebra plana and associated kyphosis Note: Preliminary report sent to the Emergency Department by teleradiology at the time of the study. Ct Abd Pelv W Cont    Result Date: 10/22/2020  EXAM: CT ABD PELV W CONT INDICATION: new diagnosis of lung cancer. r/o liver mets COMPARISON: Chest CT from October 21, 2020 CONTRAST: 100 mL of Isovue-370. TECHNIQUE: Following the uneventful intravenous administration of contrast, thin axial images were obtained through the abdomen and pelvis. Coronal and sagittal reconstructions were generated. Oral contrast was administered.  CT dose reduction was achieved through use of a standardized protocol tailored for this examination and automatic exposure control for dose modulation. FINDINGS: LOWER THORAX: Moderately large right pleural effusion. Bilateral dependent atelectasis. Calcified granulomata in the left lower lobe. Previously seen right upper lobe lung mass is not imaged on this exam. LIVER: Multiple low-attenuation liver lesions with indistinct margins, measuring up to 1.7 cm. BILIARY TREE: Gallbladder is surgically absent. CBD is not dilated. SPLEEN: within normal limits. PANCREAS: No mass or ductal dilatation. ADRENALS: Unremarkable. KIDNEYS: Atrophic right kidney. 4 mm nonobstructing left renal calculus. Bilateral renal vascular calcifications. 1.7 cm irregular exophytic mass arising from the lower pole of the left kidney. Two small, circumscribed indeterminate masses are present in the left pararenal space, measuring 1.2 cm and 0.9 cm; these appear separate from the left kidney. STOMACH: Small hiatal hernia. SMALL BOWEL: No dilatation or wall thickening. COLON: No dilatation or wall thickening. APPENDIX: Normal. PERITONEUM: No ascites or pneumoperitoneum. RETROPERITONEUM: No lymphadenopathy or aortic aneurysm. REPRODUCTIVE ORGANS: Unremarkable. URINARY BLADDER: Decompressed. No calculus. BONES: No destructive bone lesion. Mild chronic endplate deformities at L2 and L3. ABDOMINAL WALL: No mass or hernia. ADDITIONAL COMMENTS: N/A     IMPRESSION: 1. Multiple low-attenuation liver lesions are compatible with metastatic disease. 2. Indeterminate left renal mass and indeterminate left pararenal masses. 3. Moderately large right pleural effusion. Bilateral dependent atelectasis. 4. Incidental findings as detailed above. Xr Chest Port    Result Date: 10/27/2020  Indication: Status post thoracentesis Comparison: None Portable exam of the chest obtained at 1444 is negative for pneumothorax following right thoracentesis.  Right upper lobe mass is again demonstrated. Impression: No pneumothorax following thoracentesis. Xr Chest Port    Result Date: 10/22/2020  Operative EXAM: XR CHEST PORT CLINICAL INDICATION/HISTORY: left arm weakness -Additional: None COMPARISON: 6/8/2020 TECHNIQUE: Portable frontal view of the chest _______________ FINDINGS: SUPPORT DEVICES: None. HEART AND MEDIASTINUM: Cardiomediastinal silhouette within normal limits. LUNGS AND PLEURAL SPACES: Limited study secondary to kyphosis and head overlying the upper lung apices. Right perihilar consolidative mass. Right pleural effusion with adjacent atelectasis. No pneumothorax. _______________     IMPRESSION: Limited study as above. Right perihilar consolidative mass. Right pleural effusion with adjacent atelectasis. Us Thoracentesis Rt Ndl W Image    Result Date: 10/27/2020  PROCEDURE: Thoracentesis INDICATION: Pleural effusion OPERATING PHYSICIAN: Ruben Coyle M.D. ESTIMATED BLOOD LOSS: None SPECIMENS REMOVED: 310 mL of pleural fluid FLOURO TIME: None COMPLICATIONS: None immediate. Procedure and findings: The risks and benefits of the procedure were discussed with the patient. Written consent was obtained. Preliminary ultrasound imaging of the right chest demonstrated a large pleural effusion. An appropriate site for thoracentesis was marked on the skin. The patient was prepped and draped in sterile fashion. 1% lidocaine was injected locally. A dermatotomy was made. A thoracentesis catheter was then inserted into the pleural space using trocar technique. Approximately 310 ml of red-tinged fluid was obtained. The catheter was then removed. The patient tolerated the procedure well. There were no immediate complications. IMPRESSION: Successful ultrasound guided right thoracentesis yielding approximately 310 ml of fluid. Postprocedure scanning demonstrated no significant residual fluid collection.        Recent Results (from the past 168 hour(s))   GLUCOSE, POC    Collection Time: 10/25/20 12:01 PM   Result Value Ref Range    Glucose (POC) 283 (H) 65 - 100 mg/dL    Performed by Dusty Moreno    GLUCOSE, POC    Collection Time: 10/25/20  4:43 PM   Result Value Ref Range    Glucose (POC) 201 (H) 65 - 100 mg/dL    Performed by Milind KEARNEY    GLUCOSE, POC    Collection Time: 10/25/20  9:11 PM   Result Value Ref Range    Glucose (POC) 228 (H) 65 - 100 mg/dL    Performed by Mary Becket    METABOLIC PANEL, BASIC    Collection Time: 10/26/20  4:09 AM   Result Value Ref Range    Sodium 136 136 - 145 mmol/L    Potassium 4.8 3.5 - 5.1 mmol/L    Chloride 103 97 - 108 mmol/L    CO2 29 21 - 32 mmol/L    Anion gap 4 (L) 5 - 15 mmol/L    Glucose 181 (H) 65 - 100 mg/dL    BUN 45 (H) 6 - 20 MG/DL    Creatinine 1.52 (H) 0.55 - 1.02 MG/DL    BUN/Creatinine ratio 30 (H) 12 - 20      GFR est AA 41 (L) >60 ml/min/1.73m2    GFR est non-AA 34 (L) >60 ml/min/1.73m2    Calcium 8.6 8.5 - 10.1 MG/DL   SAMPLES BEING HELD    Collection Time: 10/26/20  4:09 AM   Result Value Ref Range    SAMPLES BEING HELD 1lav     COMMENT        Add-on orders for these samples will be processed based on acceptable specimen integrity and analyte stability, which may vary by analyte.    GLUCOSE, POC    Collection Time: 10/26/20  7:56 AM   Result Value Ref Range    Glucose (POC) 291 (H) 65 - 100 mg/dL    Performed by Ronni Romero    GLUCOSE, POC    Collection Time: 10/26/20 11:23 AM   Result Value Ref Range    Glucose (POC) 413 (H) 65 - 100 mg/dL    Performed by Ronni Romero    GLUCOSE, POC    Collection Time: 10/26/20 11:24 AM   Result Value Ref Range    Glucose (POC) 366 (H) 65 - 100 mg/dL    Performed by Ronni Romero    GLUCOSE, POC    Collection Time: 10/26/20  4:25 PM   Result Value Ref Range    Glucose (POC) 186 (H) 65 - 100 mg/dL    Performed by Javier Hagan    GLUCOSE, POC    Collection Time: 10/26/20  9:04 PM   Result Value Ref Range    Glucose (POC) 266 (H) 65 - 100 mg/dL    Performed by Alexandria Gomez, POC    Collection Time: 10/27/20  8:45 AM   Result Value Ref Range    Glucose (POC) 166 (H) 65 - 100 mg/dL    Performed by Teresa Villagran    GLUCOSE, POC    Collection Time: 10/27/20 11:30 AM   Result Value Ref Range    Glucose (POC) 228 (H) 65 - 100 mg/dL    Performed by Teresa Villagran    GLUCOSE, FLUID    Collection Time: 10/27/20  2:20 PM   Result Value Ref Range    Fluid Type: PLEURAL FLUID      Glucose, body fld. 231 MG/DL   CELL COUNT, BODY FLUID    Collection Time: 10/27/20  2:20 PM   Result Value Ref Range    BODY FLUID TYPE PLEURAL FLUID      FLUID COLOR PINK      FLUID APPEARANCE HAZY      FLUID RBC CT. >100 (H) 0 /cu mm    FLUID NUCLEATED CELLS 557 /cu mm    FLD NEUTROPHILS 5 (A) NRRE %    FLD LYMPHS 10 (A) NRRE %    FLD MONO/MACROPHAGES 85 (A) NRRE %   PH, FLUID    Collection Time: 10/27/20  2:20 PM   Result Value Ref Range    FLUID TYPE(15) PLEURAL FLUID      FLUID PH 6.8     PROTEIN TOTAL, FLUID    Collection Time: 10/27/20  2:20 PM   Result Value Ref Range    Fluid Type: PLEURAL FLUID      Protein total, body fld. 2.7 g/dL   LDH, BODY FLUID    Collection Time: 10/27/20  2:20 PM   Result Value Ref Range    Fluid Type: PLEURAL FLUID      LD, body fld.  124 U/L   CULTURE, BODY FLUID W GRAM STAIN    Collection Time: 10/27/20  2:30 PM    Specimen: Pleural Fluid   Result Value Ref Range    Special Requests: NO SPECIAL REQUESTS      GRAM STAIN 1+ WBCS SEEN      GRAM STAIN NO ORGANISMS SEEN      Culture result: NO GROWTH 4 DAYS     GLUCOSE, POC    Collection Time: 10/27/20  4:41 PM   Result Value Ref Range    Glucose (POC) 247 (H) 65 - 100 mg/dL    Performed by Sam Padilla, POC    Collection Time: 10/27/20 10:22 PM   Result Value Ref Range    Glucose (POC) 346 (H) 65 - 100 mg/dL    Performed by Bon Barajas    METABOLIC PANEL, BASIC    Collection Time: 10/28/20 12:19 AM   Result Value Ref Range    Sodium 132 (L) 136 - 145 mmol/L    Potassium 4.9 3.5 - 5.1 mmol/L    Chloride 96 (L) 97 - 108 mmol/L CO2 30 21 - 32 mmol/L    Anion gap 6 5 - 15 mmol/L    Glucose 253 (H) 65 - 100 mg/dL    BUN 56 (H) 6 - 20 MG/DL    Creatinine 1.56 (H) 0.55 - 1.02 MG/DL    BUN/Creatinine ratio 36 (H) 12 - 20      GFR est AA 40 (L) >60 ml/min/1.73m2    GFR est non-AA 33 (L) >60 ml/min/1.73m2    Calcium 8.3 (L) 8.5 - 10.1 MG/DL   GLUCOSE, POC    Collection Time: 10/28/20  7:30 AM   Result Value Ref Range    Glucose (POC) 63 (L) 65 - 100 mg/dL    Performed by Dhruv Yang    GLUCOSE, POC    Collection Time: 10/28/20  8:26 AM   Result Value Ref Range    Glucose (POC) 119 (H) 65 - 100 mg/dL    Performed by Louis Kraft    GLUCOSE, POC    Collection Time: 10/28/20 10:44 AM   Result Value Ref Range    Glucose (POC) 99 65 - 100 mg/dL    Performed by Louis Kraft    GLUCOSE, POC    Collection Time: 10/28/20  5:01 PM   Result Value Ref Range    Glucose (POC) 89 65 - 100 mg/dL    Performed by Louis Kraft    GLUCOSE, POC    Collection Time: 10/28/20  8:52 PM   Result Value Ref Range    Glucose (POC) 274 (H) 65 - 100 mg/dL    Performed by Rae Goodman    CBC WITH AUTOMATED DIFF    Collection Time: 10/29/20  1:53 AM   Result Value Ref Range    WBC 9.9 3.6 - 11.0 K/uL    RBC 3.42 (L) 3.80 - 5.20 M/uL    HGB 10.3 (L) 11.5 - 16.0 g/dL    HCT 34.1 (L) 35.0 - 47.0 %    MCV 99.7 (H) 80.0 - 99.0 FL    MCH 30.1 26.0 - 34.0 PG    MCHC 30.2 30.0 - 36.5 g/dL    RDW 13.2 11.5 - 14.5 %    PLATELET 819 250 - 147 K/uL    MPV 10.9 8.9 - 12.9 FL    NRBC 0.0 0  WBC    ABSOLUTE NRBC 0.00 0.00 - 0.01 K/uL    NEUTROPHILS 83 (H) 32 - 75 %    LYMPHOCYTES 6 (L) 12 - 49 %    MONOCYTES 10 5 - 13 %    EOSINOPHILS 0 0 - 7 %    BASOPHILS 0 0 - 1 %    IMMATURE GRANULOCYTES 1 (H) 0.0 - 0.5 %    ABS. NEUTROPHILS 8.2 (H) 1.8 - 8.0 K/UL    ABS. LYMPHOCYTES 0.6 (L) 0.8 - 3.5 K/UL    ABS. MONOCYTES 1.0 0.0 - 1.0 K/UL    ABS. EOSINOPHILS 0.0 0.0 - 0.4 K/UL    ABS. BASOPHILS 0.0 0.0 - 0.1 K/UL    ABS. IMM.  GRANS. 0.1 (H) 0.00 - 0.04 K/UL    DF SMEAR SCANNED PLATELET COMMENTS Large Platelets      RBC COMMENTS MACROCYTOSIS  1+       MAGNESIUM    Collection Time: 10/29/20  1:53 AM   Result Value Ref Range    Magnesium 2.5 (H) 1.6 - 2.4 mg/dL   PHOSPHORUS    Collection Time: 10/29/20  1:53 AM   Result Value Ref Range    Phosphorus 6.4 (H) 2.6 - 4.7 MG/DL   GLUCOSE, POC    Collection Time: 10/29/20  7:39 AM   Result Value Ref Range    Glucose (POC) 149 (H) 65 - 100 mg/dL    Performed by Malcolm Ramirez    GLUCOSE, POC    Collection Time: 10/29/20 11:17 AM   Result Value Ref Range    Glucose (POC) 181 (H) 65 - 100 mg/dL    Performed by Ruy Evans    GLUCOSE, POC    Collection Time: 10/29/20  4:23 PM   Result Value Ref Range    Glucose (POC) 267 (H) 65 - 100 mg/dL    Performed by Eduin Luciano    GLUCOSE, POC    Collection Time: 10/29/20 10:12 PM   Result Value Ref Range    Glucose (POC) 323 (H) 65 - 100 mg/dL    Performed by Malcolm Ramirez    GLUCOSE, POC    Collection Time: 10/30/20  7:55 AM   Result Value Ref Range    Glucose (POC) 185 (H) 65 - 100 mg/dL    Performed by Maureen Pettit [id number], POC    Collection Time: 10/30/20 11:10 AM   Result Value Ref Range    Glucose (POC) 354 (H) 65 - 100 mg/dL    Performed by Kirk Hayward    GLUCOSE, POC    Collection Time: 10/30/20  4:25 PM   Result Value Ref Range    Glucose (POC) 290 (H) 65 - 100 mg/dL    Performed by Kirk Hayward    GLUCOSE, POC    Collection Time: 10/30/20  9:32 PM   Result Value Ref Range    Glucose (POC) 253 (H) 65 - 100 mg/dL    Performed by Malcolm Ramirez    GLUCOSE, POC    Collection Time: 10/31/20  7:22 AM   Result Value Ref Range    Glucose (POC) 251 (H) 65 - 100 mg/dL    Performed by Malcolm Ramirez    GLUCOSE, POC    Collection Time: 10/31/20 12:05 PM   Result Value Ref Range    Glucose (POC) 143 (H) 65 - 100 mg/dL    Performed by Patrick KEARNEY    GLUCOSE, POC    Collection Time: 10/31/20  4:49 PM   Result Value Ref Range    Glucose (POC) 146 (H) 65 - 100 mg/dL    Performed by Von Ferrer GLUCOSE, POC    Collection Time: 10/31/20 11:25 PM   Result Value Ref Range    Glucose (POC) 101 (H) 65 - 100 mg/dL    Performed by Tyler Fuller, POC    Collection Time: 11/01/20  7:48 AM   Result Value Ref Range    Glucose (POC) 227 (H) 65 - 100 mg/dL    Performed by Roxi Ha            Physical Exam on Discharge:    Discharge condition: fair    Vital signs:   Patient Vitals for the past 12 hrs:   Temp Pulse Resp BP SpO2   11/01/20 1015 97.7 °F (36.5 °C) 77 20 (!) 108/55 99 %   11/01/20 0909     100 %   11/01/20 0552 97 °F (36.1 °C) 64 19 (!) 126/48 100 %   11/01/20 0213 97.8 °F (36.6 °C) 67 20 (!) 106/49 100 %       Visit Vitals  BP (!) 108/55 (BP 1 Location: Right arm, BP Patient Position: At rest)   Pulse 77   Temp 97.7 °F (36.5 °C)   Resp 20   Ht 5' 2.01\" (1.575 m)   Wt 52.2 kg (115 lb 1.3 oz)   SpO2 99%   BMI 21.04 kg/m²     General:  Alert, cooperative, no distress, appears stated age. Head:  Normocephalic, without obvious abnormality, atraumatic. Lungs:   Clear to auscultation bilaterally. Chest wall:  No tenderness or deformity. Heart:  Regular rate and rhythm, S1, S2 normal, no murmur, click, rub or gallop. Abdomen:   Soft, non-tender. Bowel sounds normal. No masses,  No organomegaly. Current Discharge Medication List      START taking these medications    Details   dexAMETHasone (DECADRON) 4 mg tablet Take 4 mg by mouth two (2) times daily (with meals). Qty: 30 Tab, Refills: 0      famotidine (PEPCID) 20 mg tablet Take 1 Tab by mouth daily. Qty: 30 Tab, Refills: 0      levETIRAcetam (KEPPRA) 500 mg tablet Take 1 Tab by mouth every twelve (12) hours every twelve (12) hours. Qty: 60 Tab, Refills: 0         CONTINUE these medications which have CHANGED    Details   apixaban (ELIQUIS) 2.5 mg tablet Take 1 Tab by mouth two (2) times a day.   Qty: 60 Tab, Refills: 0         CONTINUE these medications which have NOT CHANGED    Details   atorvastatin (LIPITOR) 20 mg tablet atorvastatin 20 mg tablet   TAKE 1 TABLET BY MOUTH ONCE DAILY FOR 90 DAYS      potassium chloride SR (KLOR-CON 10) 10 mEq tablet Take 1 tablet by mouth once daily  Qty: 30 Tab, Refills: 0      glimepiride (AMARYL) 2 mg tablet Take 1 tablet by mouth once daily for 90 days  Qty: 90 Tab, Refills: 0      topiramate (TOPAMAX) 100 mg tablet Take 100 mg by mouth two (2) times a day. traZODone (DESYREL) 50 mg tablet trazodone 50 mg tablet   TAKE 2 TO 3 TABLETS BY MOUTH EVERY DAY AT BEDTIME AS NEEDED      clopidogrel bisulfate (PLAVIX PO) Plavix      cyclobenzaprine-TENS electrode 10 mg cmpk cyclobenzaprine 10 mg tablet   Take 1 tablet twice a day by oral route as needed for 90 days. sertraline (ZOLOFT) 25 mg tablet Take 1 tablet by mouth once daily  Qty: 30 Tab, Refills: 0    Associated Diagnoses: Current moderate episode of major depressive disorder without prior episode (HCC)         STOP taking these medications       spironolactone (ALDACTONE) 25 mg tablet Comments:   Reason for Stopping:         carvediloL (COREG) 6.25 mg tablet Comments:   Reason for Stopping:         furosemide (LASIX) 40 mg tablet Comments:   Reason for Stopping:         lisinopriL (PRINIVIL, ZESTRIL) 5 mg tablet Comments:   Reason for Stopping:         metoprolol succinate (TOPROL-XL) 25 mg XL tablet Comments:   Reason for Stopping:         simvastatin (ZOCOR) 10 mg tablet Comments:   Reason for Stopping:         celecoxib (CeleBREX) 200 mg capsule Comments:   Reason for Stopping:             Held BOP medications due to low BP.  Need to follow PCP regarding restart of BP meds     Total time spent on discharge planning, counseling and co-ordination of care:   35 minutes    Jessica Salazar MD  11/01/20  10:09 AM

## 2020-11-02 ENCOUNTER — HOSPITAL ENCOUNTER (INPATIENT)
Dept: RADIATION THERAPY | Age: 70
Discharge: HOME | DRG: 054 | End: 2020-11-02
Attending: INTERNAL MEDICINE
Payer: MEDICARE

## 2020-11-02 LAB
ANION GAP SERPL CALC-SCNC: 2 MMOL/L (ref 5–15)
BASOPHILS # BLD: 0 K/UL (ref 0–0.1)
BASOPHILS NFR BLD: 0 % (ref 0–1)
BUN SERPL-MCNC: 60 MG/DL (ref 6–20)
BUN/CREAT SERPL: 41 (ref 12–20)
CALCIUM SERPL-MCNC: 8.9 MG/DL (ref 8.5–10.1)
CHLORIDE SERPL-SCNC: 104 MMOL/L (ref 97–108)
CO2 SERPL-SCNC: 30 MMOL/L (ref 21–32)
CREAT SERPL-MCNC: 1.46 MG/DL (ref 0.55–1.02)
DIFFERENTIAL METHOD BLD: ABNORMAL
EOSINOPHIL # BLD: 0 K/UL (ref 0–0.4)
EOSINOPHIL NFR BLD: 0 % (ref 0–7)
ERYTHROCYTE [DISTWIDTH] IN BLOOD BY AUTOMATED COUNT: 13.2 % (ref 11.5–14.5)
GLUCOSE BLD STRIP.AUTO-MCNC: 104 MG/DL (ref 65–100)
GLUCOSE BLD STRIP.AUTO-MCNC: 260 MG/DL (ref 65–100)
GLUCOSE BLD STRIP.AUTO-MCNC: 285 MG/DL (ref 65–100)
GLUCOSE BLD STRIP.AUTO-MCNC: 294 MG/DL (ref 65–100)
GLUCOSE BLD STRIP.AUTO-MCNC: 43 MG/DL (ref 65–100)
GLUCOSE BLD STRIP.AUTO-MCNC: 43 MG/DL (ref 65–100)
GLUCOSE SERPL-MCNC: 272 MG/DL (ref 65–100)
HCT VFR BLD AUTO: 37 % (ref 35–47)
HGB BLD-MCNC: 10.8 G/DL (ref 11.5–16)
IMM GRANULOCYTES # BLD AUTO: 0.1 K/UL (ref 0–0.04)
IMM GRANULOCYTES NFR BLD AUTO: 1 % (ref 0–0.5)
LYMPHOCYTES # BLD: 0.7 K/UL (ref 0.8–3.5)
LYMPHOCYTES NFR BLD: 5 % (ref 12–49)
MCH RBC QN AUTO: 30.2 PG (ref 26–34)
MCHC RBC AUTO-ENTMCNC: 29.2 G/DL (ref 30–36.5)
MCV RBC AUTO: 103.4 FL (ref 80–99)
MONOCYTES # BLD: 0.8 K/UL (ref 0–1)
MONOCYTES NFR BLD: 6 % (ref 5–13)
NEUTS SEG # BLD: 11.4 K/UL (ref 1.8–8)
NEUTS SEG NFR BLD: 88 % (ref 32–75)
NRBC # BLD: 0 K/UL (ref 0–0.01)
NRBC BLD-RTO: 0 PER 100 WBC
PLATELET # BLD AUTO: 182 K/UL (ref 150–400)
PMV BLD AUTO: 10.4 FL (ref 8.9–12.9)
POTASSIUM SERPL-SCNC: 5.6 MMOL/L (ref 3.5–5.1)
RBC # BLD AUTO: 3.58 M/UL (ref 3.8–5.2)
RBC MORPH BLD: ABNORMAL
SERVICE CMNT-IMP: ABNORMAL
SODIUM SERPL-SCNC: 136 MMOL/L (ref 136–145)
WBC # BLD AUTO: 13 K/UL (ref 3.6–11)

## 2020-11-02 PROCEDURE — 85025 COMPLETE CBC W/AUTO DIFF WBC: CPT

## 2020-11-02 PROCEDURE — 74011250637 HC RX REV CODE- 250/637: Performed by: INTERNAL MEDICINE

## 2020-11-02 PROCEDURE — 97530 THERAPEUTIC ACTIVITIES: CPT

## 2020-11-02 PROCEDURE — 36415 COLL VENOUS BLD VENIPUNCTURE: CPT

## 2020-11-02 PROCEDURE — 74011250637 HC RX REV CODE- 250/637: Performed by: NURSE PRACTITIONER

## 2020-11-02 PROCEDURE — 74011250637 HC RX REV CODE- 250/637: Performed by: HOSPITALIST

## 2020-11-02 PROCEDURE — 74011000258 HC RX REV CODE- 258: Performed by: INTERNAL MEDICINE

## 2020-11-02 PROCEDURE — 74011250636 HC RX REV CODE- 250/636: Performed by: INTERNAL MEDICINE

## 2020-11-02 PROCEDURE — 80048 BASIC METABOLIC PNL TOTAL CA: CPT

## 2020-11-02 PROCEDURE — 74011636637 HC RX REV CODE- 636/637: Performed by: INTERNAL MEDICINE

## 2020-11-02 PROCEDURE — 97535 SELF CARE MNGMENT TRAINING: CPT

## 2020-11-02 PROCEDURE — 82962 GLUCOSE BLOOD TEST: CPT

## 2020-11-02 PROCEDURE — 65660000000 HC RM CCU STEPDOWN

## 2020-11-02 PROCEDURE — 74011636637 HC RX REV CODE- 636/637: Performed by: HOSPITALIST

## 2020-11-02 RX ADMIN — TOPIRAMATE 100 MG: 100 TABLET, FILM COATED ORAL at 09:30

## 2020-11-02 RX ADMIN — FAMOTIDINE 20 MG: 20 TABLET ORAL at 09:30

## 2020-11-02 RX ADMIN — INSULIN LISPRO 2 UNITS: 100 INJECTION, SOLUTION INTRAVENOUS; SUBCUTANEOUS at 09:31

## 2020-11-02 RX ADMIN — Medication 10 ML: at 07:15

## 2020-11-02 RX ADMIN — APIXABAN 2.5 MG: 2.5 TABLET, FILM COATED ORAL at 09:31

## 2020-11-02 RX ADMIN — CEFTRIAXONE SODIUM 1 G: 1 INJECTION, POWDER, FOR SOLUTION INTRAMUSCULAR; INTRAVENOUS at 09:32

## 2020-11-02 RX ADMIN — FUROSEMIDE 40 MG: 40 TABLET ORAL at 09:30

## 2020-11-02 RX ADMIN — Medication 16 G: at 22:22

## 2020-11-02 RX ADMIN — SERTRALINE HYDROCHLORIDE 25 MG: 50 TABLET ORAL at 18:17

## 2020-11-02 RX ADMIN — LEVETIRACETAM 500 MG: 500 TABLET, FILM COATED ORAL at 18:17

## 2020-11-02 RX ADMIN — INSULIN LISPRO 2 UNITS: 100 INJECTION, SOLUTION INTRAVENOUS; SUBCUTANEOUS at 18:17

## 2020-11-02 RX ADMIN — INSULIN LISPRO 5 UNITS: 100 INJECTION, SOLUTION INTRAVENOUS; SUBCUTANEOUS at 11:24

## 2020-11-02 RX ADMIN — INSULIN LISPRO 3 UNITS: 100 INJECTION, SOLUTION INTRAVENOUS; SUBCUTANEOUS at 09:34

## 2020-11-02 RX ADMIN — CLOPIDOGREL BISULFATE 75 MG: 75 TABLET ORAL at 09:31

## 2020-11-02 RX ADMIN — INSULIN LISPRO 5 UNITS: 100 INJECTION, SOLUTION INTRAVENOUS; SUBCUTANEOUS at 18:16

## 2020-11-02 RX ADMIN — APIXABAN 2.5 MG: 2.5 TABLET, FILM COATED ORAL at 18:17

## 2020-11-02 RX ADMIN — LEVETIRACETAM 500 MG: 500 TABLET, FILM COATED ORAL at 06:11

## 2020-11-02 RX ADMIN — DEXAMETHASONE 4 MG: 4 TABLET ORAL at 21:58

## 2020-11-02 RX ADMIN — TOPIRAMATE 100 MG: 100 TABLET, FILM COATED ORAL at 18:17

## 2020-11-02 RX ADMIN — Medication 10 ML: at 13:59

## 2020-11-02 RX ADMIN — AZITHROMYCIN MONOHYDRATE 500 MG: 500 INJECTION, POWDER, LYOPHILIZED, FOR SOLUTION INTRAVENOUS at 10:19

## 2020-11-02 RX ADMIN — INSULIN LISPRO 2 UNITS: 100 INJECTION, SOLUTION INTRAVENOUS; SUBCUTANEOUS at 11:23

## 2020-11-02 RX ADMIN — ATORVASTATIN CALCIUM 20 MG: 20 TABLET, FILM COATED ORAL at 21:58

## 2020-11-02 RX ADMIN — DEXAMETHASONE 4 MG: 4 TABLET ORAL at 09:30

## 2020-11-02 NOTE — PROGRESS NOTES
Hematology-Oncology Progress Note      Javan Carreon  1950  [id number]  11/2/2020      Subjective:     Awake  But lethargic,, says she is easily winded    Allergies: Patient has no known allergies.   Current Facility-Administered Medications   Medication Dose Route Frequency Provider Last Rate Last Dose    cefTRIAXone (ROCEPHIN) 1 g in 0.9% sodium chloride (MBP/ADV) 50 mL  1 g IntraVENous Q24H Rod Schulte  mL/hr at 11/02/20 0932 1 g at 11/02/20 0932    azithromycin (ZITHROMAX) 500 mg in 0.9% sodium chloride (MBP/ADV) 250 mL  500 mg IntraVENous Q24H Piotr Lemons  mL/hr at 11/02/20 1019 500 mg at 11/02/20 1019    furosemide (LASIX) tablet 40 mg  40 mg Oral DAILY Piotr Lemons MD   40 mg at 11/02/20 0930    clopidogreL (PLAVIX) tablet 75 mg  75 mg Oral DAILY Piotr Lemons MD   75 mg at 11/02/20 0931    0.9% sodium chloride infusion  75 mL/hr IntraVENous CONTINUOUS Piotr Lemons MD 75 mL/hr at 10/31/20 1525 75 mL/hr at 10/31/20 1525    insulin lispro (HUMALOG) injection 2 Units  2 Units SubCUTAneous Alfonzo Gordon MD   2 Units at 11/02/20 1123    dexAMETHasone (DECADRON) tablet 4 mg  4 mg Oral Q12H Piotr Lemons MD   4 mg at 11/02/20 0930    apixaban (ELIQUIS) tablet 2.5 mg  2.5 mg Oral BID Piotr Lemons MD   2.5 mg at 11/02/20 0931    levETIRAcetam (KEPPRA) tablet 500 mg  500 mg Oral Q12H Mei Roper, NP   500 mg at 11/02/20 7605    albuterol-ipratropium (DUO-NEB) 2.5 MG-0.5 MG/3 ML  3 mL Nebulization Q4H PRN Coni Heath M, DO        melatonin tablet 3 mg  3 mg Oral QHS Samantha Burgess, DO   3 mg at 11/01/20 2122    sodium chloride (NS) flush 5-40 mL  5-40 mL IntraVENous Q8H Melody STOKES MD   10 mL at 11/02/20 0715    sodium chloride (NS) flush 5-40 mL  5-40 mL IntraVENous PRN Watson Stovall MD        acetaminophen (TYLENOL) tablet 650 mg  650 mg Oral Q6H PRN Watson Stovall MD   650 mg at 11/01/20 1401    Or    acetaminophen (TYLENOL) suppository 650 mg 650 mg Rectal Q6H PRN Margi Hill MD        polyethylene glycol (MIRALAX) packet 17 g  17 g Oral DAILY PRN Margi Hill MD        ondansetron (ZOFRAN ODT) tablet 4 mg  4 mg Oral Q8H PRN Margi Hill MD        Or    ondansetron (ZOFRAN) injection 4 mg  4 mg IntraVENous Q6H PRN Margi Hill MD        famotidine (PEPCID) tablet 20 mg  20 mg Oral DAILY Jj Douglass MD   20 mg at 11/02/20 0930    atorvastatin (LIPITOR) tablet 20 mg  20 mg Oral QHS Jj Douglass MD   20 mg at 11/01/20 2117    [Held by provider] carvediloL (COREG) tablet 6.25 mg  6.25 mg Oral BID WITH MEALS Margi Hill MD   6.25 mg at 10/31/20 0986    [Held by provider] lisinopriL (PRINIVIL, ZESTRIL) tablet 5 mg  5 mg Oral DAILY Jj Douglass MD   Stopped at 10/31/20 0900    sertraline (ZOLOFT) tablet 25 mg  25 mg Oral QPM Jj Douglass MD   25 mg at 11/01/20 1701    [Held by provider] spironolactone (ALDACTONE) tablet 25 mg  25 mg Oral DAILY Margi Hill MD   Stopped at 10/31/20 0900    topiramate (TOPAMAX) tablet 100 mg  100 mg Oral BID Jj Douglass MD   100 mg at 11/02/20 0930    glucose chewable tablet 16 g  4 Tab Oral PRN Jj Douglass MD        glucagon (GLUCAGEN) injection 1 mg  1 mg IntraMUSCular PRN Jj Dogulass MD        dextrose 10% infusion 0-250 mL  0-250 mL IntraVENous PRN Jj Douglass  mL/hr at 10/28/20 0734 125 mL at 10/28/20 0734    insulin lispro (HUMALOG) injection   SubCUTAneous AC&HS Jj Douglass MD   5 Units at 11/02/20 1124     Objective:     Patient Vitals for the past 24 hrs:   BP Temp Pulse Resp SpO2   11/02/20 1020 (!) 123/58 97.5 °F (36.4 °C) 95 17 100 %   11/02/20 0930 (!) 130/59  75     11/02/20 0614 134/75 97.5 °F (36.4 °C) 84 19 100 %   11/01/20 2200 (!) 131/49 97.3 °F (36.3 °C) 87 20 100 %   11/01/20 2050   89     11/01/20 1841 138/67 98.4 °F (36.9 °C) 96 20 100 %   11/01/20 1356 (!) 125/54 97.5 °F (36.4 °C) 94 22 100 %       Gen: NAD  Resp no distress  Psych normal  Ext. No edema    Available labs reviewed:  Labs:    Recent Results (from the past 24 hour(s))   GLUCOSE, POC    Collection Time: 11/01/20 11:54 AM   Result Value Ref Range    Glucose (POC) 233 (H) 65 - 100 mg/dL    Performed by Max Amaro, POC    Collection Time: 11/01/20  8:58 PM   Result Value Ref Range    Glucose (POC) 436 (H) 65 - 100 mg/dL    Performed by Hilbert Kanner    GLUCOSE, POC    Collection Time: 11/01/20  9:00 PM   Result Value Ref Range    Glucose (POC) 432 (H) 65 - 100 mg/dL    Performed by Hilbert Kanner    GLUCOSE, POC    Collection Time: 11/01/20 11:10 PM   Result Value Ref Range    Glucose (POC) 310 (H) 65 - 100 mg/dL    Performed by Hilbert Kanner    GLUCOSE, POC    Collection Time: 11/02/20  9:01 AM   Result Value Ref Range    Glucose (POC) 260 (H) 65 - 100 mg/dL    Performed by [place], POC    Collection Time: 11/02/20 11:05 AM   Result Value Ref Range    Glucose (POC) 294 (H) 65 - 100 mg/dL    Performed by Micaela Hilton     70 y/o woman with a history of tobacco abuse, admitted with left arm weakness. Noted to have hilar mass, multilpe CNS lesions, liver lesions concerning, but not diagnostic of stage IV lung CA. 1. 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 .. she has at least 6 brain mets with edema, Rad onc aware of patient, xrt needs to done prior to attempting systemic Rx.     Her performance status is poor and she would not be elligible for chemo at present as a result,,, hopefully she will \"rally\",, needs to get the brain xrt done before we can do anything further      Claudette Lundberg MD

## 2020-11-02 NOTE — PROGRESS NOTES
Bedside shift change report given to Sabrina Monaco (oncoming nurse) by Howard Boone (offgoing nurse). Report included the following information SBAR, Kardex and Quality Measures.

## 2020-11-02 NOTE — PROGRESS NOTES
Problem: Pain  Goal: *Control of Pain  Outcome: Progressing Towards Goal     Problem: Pressure Injury - Risk of  Goal: *Prevention of pressure injury  Description: Document Elio Scale and appropriate interventions in the flowsheet.   Outcome: Progressing Towards Goal  Note: Pressure Injury Interventions:  Sensory Interventions: Assess changes in LOC, Check visual cues for pain, Keep linens dry and wrinkle-free    Moisture Interventions: Absorbent underpads, Apply protective barrier, creams and emollients, Minimize layers, Check for incontinence Q2 hours and as needed    Activity Interventions: Increase time out of bed, PT/OT evaluation    Mobility Interventions: Assess need for specialty bed, PT/OT evaluation, Pressure redistribution bed/mattress (bed type)    Nutrition Interventions: Document food/fluid/supplement intake    Friction and Shear Interventions: Apply protective barrier, creams and emollients, Feet elevated on foot rest, Lift sheet, Minimize layers                Problem: Patient Education: Go to Patient Education Activity  Goal: Patient/Family Education  Outcome: Progressing Towards Goal

## 2020-11-02 NOTE — PROGRESS NOTES
Pulmonary, Critical Care, and Sleep Medicine~Progress Note    Name: Joe Horowitz MRN: [id number]   : 1950 Hospital: [facility]   Date: 2020 9:02 AM Admission: 10/22/2020     Impression Plan   1. Acute on chronic hypoxic resp failure   2. Right pleural effusion, appears more exudative   3. 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 Hepatic and renal lesions   5. 1.2 cm lesion in the right frontal lobe; NS involved   6. COPD, on home O2 at 2lpm  7. A fib, on eliquis at home   8. CAD, on plavix at home  9. Smoker   10. SAVANNAH on CKD? 1. eliquis  2. Pleural fluid without neoplasia findings    3. O2 titration above 90%  4. Follow up chest x-ray in 1-2 wks to monitor for reaccumulation of pleural fluid  5. Discussed with hospitalist    6. Now on zithromax/rocephin for evolving right base infiltrate that is presumed infectious          Daily Progression:    Stable, but looks miserable     S/p thoracentesis with 310ml removed. Post tap image showed improvement in the small pleural effusion     I have reviewed the labs and previous days notes. Pertinent items are noted in HPI. OBJECTIVE:     Vital Signs:       Visit Vitals  BP (!) 130/59   Pulse 75   Temp 97.5 °F (36.4 °C)   Resp 19   Ht 5' 2.01\" (1.575 m)   Wt 52.2 kg (115 lb 1.3 oz)   SpO2 100%   BMI 21.04 kg/m²      Temp (24hrs), Av.7 °F (36.5 °C), Min:97.3 °F (36.3 °C), Max:98.4 °F (36.9 °C)     Intake/Output:     Last shift: No intake/output data recorded.     Last 3 shifts: 10/31 1901 -  0700  In: -   Out: 1850 [Urine:1850]          Intake/Output Summary (Last 24 hours) at 2020 1017  Last data filed at 2020  Gross per 24 hour   Intake    Output 850 ml   Net -850 ml       Physical Exam:                                        Exam Findings Other   General: No resp distress noted, appears stated age    [de-identified]:  No ulcers, JVD not elevated, no cervical LAD    Chest: No pectus deformity, normal chest rise b/l    HEART:  No visible thrills     Lungs:  Normal expansion     ABD: Soft/NT, non rigid mildly distended    EXT: No cyanosis/clubbing/edema, normal peripheral pulses    Skin: No rashes or ulcers, no mottling    Neuro: A/O x 3        Medications:  Current Facility-Administered Medications   Medication Dose Route Frequency    cefTRIAXone (ROCEPHIN) 1 g in 0.9% sodium chloride (MBP/ADV) 50 mL  1 g IntraVENous Q24H    azithromycin (ZITHROMAX) 500 mg in 0.9% sodium chloride (MBP/ADV) 250 mL  500 mg IntraVENous Q24H    furosemide (LASIX) tablet 40 mg  40 mg Oral DAILY    clopidogreL (PLAVIX) tablet 75 mg  75 mg Oral DAILY    0.9% sodium chloride infusion  75 mL/hr IntraVENous CONTINUOUS    insulin lispro (HUMALOG) injection 2 Units  2 Units SubCUTAneous TIDAC    dexAMETHasone (DECADRON) tablet 4 mg  4 mg Oral Q12H    apixaban (ELIQUIS) tablet 2.5 mg  2.5 mg Oral BID    levETIRAcetam (KEPPRA) tablet 500 mg  500 mg Oral Q12H    albuterol-ipratropium (DUO-NEB) 2.5 MG-0.5 MG/3 ML  3 mL Nebulization Q4H PRN    melatonin tablet 3 mg  3 mg Oral QHS    sodium chloride (NS) flush 5-40 mL  5-40 mL IntraVENous Q8H    sodium chloride (NS) flush 5-40 mL  5-40 mL IntraVENous PRN    acetaminophen (TYLENOL) tablet 650 mg  650 mg Oral Q6H PRN    Or    acetaminophen (TYLENOL) suppository 650 mg  650 mg Rectal Q6H PRN    polyethylene glycol (MIRALAX) packet 17 g  17 g Oral DAILY PRN    ondansetron (ZOFRAN ODT) tablet 4 mg  4 mg Oral Q8H PRN    Or    ondansetron (ZOFRAN) injection 4 mg  4 mg IntraVENous Q6H PRN    famotidine (PEPCID) tablet 20 mg  20 mg Oral DAILY    atorvastatin (LIPITOR) tablet 20 mg  20 mg Oral QHS    [Held by provider] carvediloL (COREG) tablet 6.25 mg  6.25 mg Oral BID WITH MEALS    [Held by provider] lisinopriL (PRINIVIL, ZESTRIL) tablet 5 mg  5 mg Oral DAILY    sertraline (ZOLOFT) tablet 25 mg  25 mg Oral QPM    [Held by provider] spironolactone (ALDACTONE) tablet 25 mg  25 mg Oral DAILY    topiramate (TOPAMAX) tablet 100 mg  100 mg Oral BID    glucose chewable tablet 16 g  4 Tab Oral PRN    glucagon (GLUCAGEN) injection 1 mg  1 mg IntraMUSCular PRN    dextrose 10% infusion 0-250 mL  0-250 mL IntraVENous PRN    insulin lispro (HUMALOG) injection   SubCUTAneous AC&HS       Labs:  ABG No results for input(s): PHI, PCO2I, PO2I, HCO3I, SO2I, FIO2I in the last 72 hours. CBC No results for input(s): WBC, HGB, HCT, PLT, MCV, MCH, HGBEXT, HCTEXT, PLTEXT, HGBEXT, HCTEXT, PLTEXT in the last 72 hours. Metabolic  Panel No results for input(s): NA, K, CL, CO2, GLU, BUN, CREA, CA, MG, PHOS, ALB, TBIL, ALT, INR, INREXT, INREXT in the last 72 hours.     No lab exists for component: SGOT     Pertinent Labs                Vince Shannon PA-C  11/2/2020

## 2020-11-02 NOTE — PROGRESS NOTES
Bedside shift change report given to Davion George (oncoming nurse) by Adair Tomas (offgoing nurse). Report included the following information SBAR, Kardex, Intake/Output, Quality Measures and Dual Neuro Assessment.

## 2020-11-02 NOTE — PROGRESS NOTES
YAO Plan:  · Anticipated discharge: TBD;   · SNF provider list sent to DIL; ANITHA to follow-up for choice  · RUR: 22%- MOD    CM contacted pt's RAFAEL, Leola Jaquez (ph#: [phone]), to discuss recommendation for SNF at discharge; RAFAEL agreeable. Daughter request for provider list be sent to [email or web address] list sent. CM to follow-up tomorrow AM to get choice; RAFAEL reports she has to work at 1200PM and would be unable to talk then. CM will continue to follow.      Gerda Chadwick RN, BSN  Care Management Department

## 2020-11-02 NOTE — PROGRESS NOTES
Patient discharged to home. Patient moved from wheelchair to front passenger seat of car. Patient on 02 and having increasing difficulty with breathing \" I cant catch my breath\". 02 sats in the 80's with minimal movement. Family concerned about 1.5 hour drive back to patients home. Supervisor called to hold bed. Dr Irma Kat contacted. Discharge canceled.

## 2020-11-02 NOTE — PROGRESS NOTES
Problem: Self Care Deficits Care Plan (Adult)  Goal: *Acute Goals and Plan of Care (Insert Text)  Description: FUNCTIONAL STATUS PRIOR TO ADMISSION: Patient required maximum assistance for basic and instrumental ADLs. At baseline patient uses 2 liters of supplemental oxygen. Patient is a poor historian but appears to have been bed bound for a while living with son and daughter in law who are reportedly home at all times. She could not remember the last time she walked and did not remember recent fall, but did report use of RW when she was mobile. She was rolling for bedpan prior to admission, was dependent for all IADLs, and assisted with dressing/grooming/upper body bathing as able. Patient reports watching TV all day. HOME SUPPORT: The patient lived with son and daughter in law. Occupational Therapy Goals  Initiated 10/27/2020   1. Patient will perform self-feeding with supervision/set-up within 7 day(s). 2.  Patient will perform upper body and lower body bathing using compensatory techniques PRN with minimal assistance/contact guard assist within 7 day(s). 3.  Patient will perform upper body dressing and lower body dressing using compensatory techniques PRN with minimal assistance/contact guard assist within 7 day(s). 4.  Patient will perform rolling for bedpan with supervision/set-up within 7 day(s). 5.  Patient will perform all aspects of toileting with minimal assistance/contact guard assist within 7 day(s). 6.  Patient will participate in upper extremity therapeutic exercise/activities with modified independence for 5 minutes within 7 day(s). 7.  Patient will perform bed mobility to EOB in preparation for standing/seated ADLs with minimal assistance in 7 days.            Outcome: Not Progressing Towards Goal   OCCUPATIONAL THERAPY TREATMENT  Patient: Dione Paige (40 y.o. female)  Date: 11/2/2020  Diagnosis: Brain metastases ([address] [C79.31]   Brain metastases ([address]  Procedure(s) (LRB):  ENDOSCOPIC BRONCHOSCOPY ULTRASOUND (EBUS) (N/A)  BRONCHOSCOPY (N/A)  TRANSBRONCHIAL BIOPSY (N/A) 5 Days Post-Op  Precautions: Fall, Skin(LUE NWB (L clavicular fx))  Chart, occupational therapy assessment, plan of care, and goals were reviewed. ASSESSMENT  Patient continues with skilled OT services and is not progressing towards goals. Patient presents with increased SOB and dyspnea upon exertion this session as noted during EOB ADLs. Patient continues to be limited by respiratory status (now on 5L O2), cardiopulmonary endurance, global debility, weakness (L>R), activity tolerance, and impaired balance. Patient remains motivated to get out of bed; however, patient now requiring max A x1-2 for all mobility as well as total A for bed level toileting (decline from previous session). Patient is not safe to to return home at this time d/t deficits mentioned above and would benefit from SNF rehab to increase endurance and ensure safety with all functional mobility and ADLs. May benefit from palliative consult to discuss goals of care and effective d/c planning to maximize safety for all parties. Current Level of Function Impacting Discharge (ADLs): max to total A all lower body ADLs, mod to max A upper body ADLs     Other factors to consider for discharge: below PLOF, increased need for oxygen (now 5L), clavicle fracture, does not have 24/7 assist at home, PMH, fall risk, risk of skin breakdown           PLAN :  Patient continues to benefit from skilled intervention to address the above impairments. Continue treatment per established plan of care. to address goals. Recommend with staff: encourage rolling for bedpan, assist with ADLs with set up, and bed in chair position 3x/day. Thank you!      Recommend next OT session: EOB ADLs, standing tolerance, UE therapeutic exercise     Recommendation for discharge: (in order for the patient to meet his/her long term goals)  Therapy up to 5 days/week in SNF setting    If d/c home, will require DME below, intensive HHOT, caregiver training for medical management (skin checks, medications, etc), and physical assist of 2-3 for all ADLs, IADLs, and functional mobility. 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  in response to questioning pain     OBJECTIVE DATA SUMMARY:   Cognitive/Behavioral Status:  Neurologic State: Alert  Orientation Level: Oriented X4  Cognition: Appropriate for age attention/concentration; Follows commands;Decreased attention/concentration  Perception: Appears intact  Perseveration: No perseveration noted  Safety/Judgement: Decreased insight into deficits;Home safety; Fall prevention;Decreased awareness of need for assistance    Functional Mobility and Transfers for ADLs:  Bed Mobility:  Rolling: Moderate assistance  Supine to Sit: Maximum assistance  Sit to Supine: Maximum assistance;Assist x2  Scooting: Maximum assistance;Assist x2    Transfers:  Sit to Stand: Assist x2;Maximum assistance was not able to reach full stand     Balance:  Sitting: Impaired  Sitting - Static: Fair (occasional)  Sitting - Dynamic: Fair (occasional)  Standing: Impaired; With support  Standing - Static: Poor;Constant support  Standing - Dynamic : Poor;Constant support    ADL Intervention:  Feeding  Food to Mouth: Set-up observed     Lower Body Bathing  Lower Body : Maximum assistance(set up for anterior thighs )  Position Performed: Seated edge of bed    Upper Body Dressing Assistance  Orthotics(Brace): Total assistance (dependent)(L UE sling )  Hospital Gown: Maximum assistance     Lower Body Dressing Assistance  Protective Undergarmet:  Total assistance (dependent)  Position Performed: Supine  Cues: Physical assistance;Don;Doff    Toileting  Toileting Assistance: Total assistance(dependent)  Bladder Hygiene: Total assistance (dependent)  Bowel Hygiene: Total assistance (dependent)  Clothing Management: Total assistance (dependent)  Cues: Physical assistance for pants down;Physical assistance for pants up    Cognitive Retraining  Safety/Judgement: Decreased insight into deficits;Home safety; Fall prevention;Decreased awareness of need for assistance    Pain:  Pain reported but unable to localize     Activity Tolerance:   Poor, desaturates with exertion and requires oxygen, requires frequent rest breaks, and observed SOB with activity   HR increased to 136 with EOB activity     Please refer to the flowsheet for vital signs taken during this treatment. After treatment patient left in no apparent distress:   Supine in bed, Call bell within reach, Bed / chair alarm activated, Side rails x 3, and eating lunch    COMMUNICATION/COLLABORATION:   The patients plan of care was discussed with: Physical therapist and Registered nurse. Patient was educated regarding Her deficit(s) of decreased activity tolerance, weakness, pain as this relates to Her diagnosis of brain mets, L clavicle fracture, respiratory failure. She demonstrated guarded understanding as evidenced by decreased interaction and verbalization throughout session. Patient and/or family was verbally educated on the BE FAST acronym for signs/symptoms of CVA and TIA. BE FAST was written on patient's communication board  for visual education and reinforcement. All questions answered with patient indicating fair understanding. Regarding student involvement in patient care:  A student participated in this treatment session. Per CMS Medicare statements and AOTA guidelines I certify that the following was true:  1. I was present and directly observed the entire session. 2. I made all skilled judgments and clinical decisions regarding care.   3. I am the practitioner responsible for assessment, treatment, and documentation.       Dagmar Alvarado, OTS  Time Calculation: 29 mins

## 2020-11-02 NOTE — PROGRESS NOTES
[facility] Adult  Hospitalist Group                                                                                          Hospitalist Progress Note  Rebeca Foy MD  Answering service: [phone] OR [phone] from in house phone        Date of Service:  2020  NAME:  Sarai Vidales  :  1950  MRN:  [id number]      Admission Summary:   69 y.o. female,  who has history of chronic systolic heart failure status post ICD, chronic COPD on home oxygen 2 L, hypertension, hyperlipidemia, anticoagulation with Eliquis who presents with fall 2 weeks back and left sided weakness. She reports that her left arm felt funny and also she was having weakness in the left arm. It is not painful. And she did not seek medical attention. Given persistence of symptoms, EMS was called and to take her to the local emergency room. At the ER there work-up revealed that she has lung mass as well as brain tumors. She was given IV Decadron and transferred to this facility for neurosurgical evaluation. She was also noted to have a dislocated left shoulder. There was report of lung mass which is right suprahilar with progression into the mediastinum. There is also report of 1.2 cm right frontal mass with 5 cm right to left shift. There is no report of seizures. Interval history / Subjective:   F/U left sided weakness. Patient seen and examined. Left upper extremity  strength with improvement. Plans for s/p EUS with biopsy . Also talked to sister previously  Discharged was cancelled as patinet was having very low BP 74/36. Started on IV fluids   BP medications were held as well   : Talked with Dr. Kala Ferguson. [id number] [facility] discharging patient . Mentioned that radiation oncology office will contact patient on Monday regarding appointment. Needs gk. I talked to sister in detail and answered all her questions.  I personally gave her number radiation oncology and advised to contact office as well in case she dont get a call. She understood and agreed with plan and was very appreciative   Discharge was cancelled today as patient desaturating to 80s. reconsulted cardiology. Need input from pulmonology as well. 11/2: patient with no complaints. At my time of interview she was on 5 liters saturating 100%. I decreased to 2 liters . Updated nursing staff. Started on abx today for possible PNA      Assessment & Plan:     Brain masses, consistent with metastatic disease, suspect lung primary:  Cerebral edema secondary to above:  Left-sided weakness: mild improvement  -MRI brain done and demonstrated intracranial metastases, largest 1.3 x 1.3 cm, with surrounding edema. Osseous metastases in right frontal, parietal, C2 body  -Neurosurgery consulted. No surgical benefit. Will need outpatient radiation oncology, possibly in [place]. CM to arrange.   -Continue steroids- will weaned to q8 x 3 days, then BID, Continue Keppra  -Seizure precautions  -Neuro checks  - radiation oncology following     Acute hypoxic respiratory failure,   New onset  Patient desaturating now stable   Restarted lasix BP somewhat better  Cardiology re consulted for meds adjustment  Pulmonology following, on 2 liters      Lung mass with erosion into mediastinum, suspicious for carcinoma:  -Pulmonary consulted. Plans for EUS/endobronchial biopsy 10/28  --started on eliquis and plavix as per recommndatins from pulmonology     Moderate right pleural effusion: s/p thoracentesis  -respiratory status stable, s/p thoracentesis 10/27 with approximately 300 ml removed.  Based on proteins, appears transudative     Postobstructive infiltrate RUL  -Seen on CT  -no unusual cough, fever  -s/p Ceftriaxone    UTI, Proteus:  -continue Ceftriaxone for 3 days    Diabetes type 2 with hyperglycemia   -Hba1c 6.1, will hold lantus, continue SSI, restart amaryl on discharge     Chronic COPD with chronic hypoxic respiratory failure, not in exacerbation  - home oxygen 2 liters, AA nebs    Chronic congestive heart failure, presumed systolic given patient has ICD and is on Aldactone  -Continued home medications     Clavicular fracture:  Shoulder dislocation ruled out:  -Appreciate orthopedic surgery. No evidence of shoulder dislocation. Can wear sling for comfort       Chronic anticoagulation with Eliquis for ??  -The patient does not know why she is on chronic anticoagulation  -initially held as planning for procedures  Restarted home dose. As recommended by pulmonology    H/o CAD  -not sure if patient was taking plavix. D/w pharmacy      Hypertension  -Continue with Coreg    Prior tobacco use     Diabetic diet    PT/OT      Code status: full. AMD filled out  DVT prophylaxis: SCDs    Care Plan discussed with: Patient/Family  Anticipated Disposition: Home w/Family  Anticipated Discharge:, will need close outpatient follow up near her home. Hospital Problems  Date Reviewed: 10/28/2020          Codes Class Noted POA    * (Principal) Brain metastases [facility]) ICD-10-CM: C79.31  ICD-9-CM: 198.3  10/22/2020 Yes                Review of Systems:     Negative unless stated above    Vital Signs:    Last 24hrs VS reviewed since prior progress note. Most recent are:  Visit Vitals  BP (!) 130/57 (BP 1 Location: Right arm, BP Patient Position: At rest)   Pulse 90   Temp 98.1 °F (36.7 °C)   Resp 22   Ht 5' 2.01\" (1.575 m)   Wt 52.2 kg (115 lb 1.3 oz)   SpO2 100%   BMI 21.04 kg/m²         Intake/Output Summary (Last 24 hours) at 11/2/2020 1517  Last data filed at 11/1/2020 2031  Gross per 24 hour   Intake    Output 850 ml   Net -850 ml        Physical Examination:             Constitutional:  No acute distress, cooperative, pleasant    ENT:  Oral mucosa moist, oropharynx benign. Resp:  CTA bilaterally. No wheezing/rhonchi/rales. No accessory muscle use   CV:  Regular rhythm, normal rate, no murmurs, gallops, rubs    GI:  Soft, non distended, non tender.  normoactive bowel sounds, no hepatosplenomegaly     Musculoskeletal:  No edema, warm, 2+ pulses throughout    Neurologic:  Some left upper extremity  strength, alert and oriented             Data Review:    Review and/or order of clinical lab test  Review and/or order of tests in the radiology section of CPT  Review and/or order of tests in the medicine section of CPT      Labs:     Recent Labs     11/02/20  1132   WBC 13.0*   HGB 10.8*   HCT 37.0        Recent Labs     11/02/20  1132      K 5.6*      CO2 30   BUN 60*   CREA 1.46*   *   CA 8.9     No results for input(s): ALT, AP, TBIL, TBILI, TP, ALB, GLOB, GGT, AML, LPSE in the last 72 hours. No lab exists for component: SGOT, GPT, AMYP, HLPSE  No results for input(s): INR, PTP, APTT, INREXT, INREXT in the last 72 hours. No results for input(s): FE, TIBC, PSAT, FERR in the last 72 hours. No results found for: FOL, RBCF   No results for input(s): PH, PCO2, PO2 in the last 72 hours. No results for input(s): CPK, CKNDX, TROIQ in the last 72 hours.     No lab exists for component: CPKMB  No results found for: CHOL, CHOLX, CHLST, CHOLV, HDL, HDLP, LDL, LDLC, DLDLP, TGLX, TRIGL, TRIGP, CHHD, CHHDX  Lab Results   Component Value Date/Time    Glucose (POC) 294 (H) 11/02/2020 11:05 AM    Glucose (POC) 260 (H) 11/02/2020 09:01 AM    Glucose (POC) 310 (H) 11/01/2020 11:10 PM    Glucose (POC) 432 (H) 11/01/2020 09:00 PM    Glucose (POC) 436 (H) 11/01/2020 08:58 PM     Lab Results   Component Value Date/Time    Color YELLOW/STRAW 10/22/2020 09:15 AM    Appearance TURBID (A) 10/22/2020 09:15 AM    Specific gravity 1.012 10/22/2020 09:15 AM    pH (UA) 5.5 10/22/2020 09:15 AM    Protein Negative 10/22/2020 09:15 AM    Glucose Negative 10/22/2020 09:15 AM    Ketone Negative 10/22/2020 09:15 AM    Bilirubin Negative 10/22/2020 09:15 AM    Urobilinogen 0.2 10/22/2020 09:15 AM    Nitrites Positive (A) 10/22/2020 09:15 AM    Leukocyte Esterase LARGE (A) 10/22/2020 09:15 AM Epithelial cells FEW 10/22/2020 09:15 AM    Bacteria 4+ (A) 10/22/2020 09:15 AM    WBC  10/22/2020 09:15 AM    RBC 0-5 10/22/2020 09:15 AM         Medications Reviewed:     Current Facility-Administered Medications   Medication Dose Route Frequency    cefTRIAXone (ROCEPHIN) 1 g in 0.9% sodium chloride (MBP/ADV) 50 mL  1 g IntraVENous Q24H    azithromycin (ZITHROMAX) 500 mg in 0.9% sodium chloride (MBP/ADV) 250 mL  500 mg IntraVENous Q24H    furosemide (LASIX) tablet 40 mg  40 mg Oral DAILY    clopidogreL (PLAVIX) tablet 75 mg  75 mg Oral DAILY    0.9% sodium chloride infusion  75 mL/hr IntraVENous CONTINUOUS    insulin lispro (HUMALOG) injection 2 Units  2 Units SubCUTAneous TIDAC    dexAMETHasone (DECADRON) tablet 4 mg  4 mg Oral Q12H    apixaban (ELIQUIS) tablet 2.5 mg  2.5 mg Oral BID    levETIRAcetam (KEPPRA) tablet 500 mg  500 mg Oral Q12H    albuterol-ipratropium (DUO-NEB) 2.5 MG-0.5 MG/3 ML  3 mL Nebulization Q4H PRN    melatonin tablet 3 mg  3 mg Oral QHS    sodium chloride (NS) flush 5-40 mL  5-40 mL IntraVENous Q8H    sodium chloride (NS) flush 5-40 mL  5-40 mL IntraVENous PRN    acetaminophen (TYLENOL) tablet 650 mg  650 mg Oral Q6H PRN    Or    acetaminophen (TYLENOL) suppository 650 mg  650 mg Rectal Q6H PRN    polyethylene glycol (MIRALAX) packet 17 g  17 g Oral DAILY PRN    ondansetron (ZOFRAN ODT) tablet 4 mg  4 mg Oral Q8H PRN    Or    ondansetron (ZOFRAN) injection 4 mg  4 mg IntraVENous Q6H PRN    famotidine (PEPCID) tablet 20 mg  20 mg Oral DAILY    atorvastatin (LIPITOR) tablet 20 mg  20 mg Oral QHS    [Held by provider] carvediloL (COREG) tablet 6.25 mg  6.25 mg Oral BID WITH MEALS    [Held by provider] lisinopriL (PRINIVIL, ZESTRIL) tablet 5 mg  5 mg Oral DAILY    sertraline (ZOLOFT) tablet 25 mg  25 mg Oral QPM    spironolactone (ALDACTONE) tablet 25 mg  25 mg Oral DAILY    topiramate (TOPAMAX) tablet 100 mg  100 mg Oral BID    glucose chewable tablet 16 g  4 Tab Oral PRN    glucagon (GLUCAGEN) injection 1 mg  1 mg IntraMUSCular PRN    dextrose 10% infusion 0-250 mL  0-250 mL IntraVENous PRN    insulin lispro (HUMALOG) injection   SubCUTAneous AC&HS     ______________________________________________________________________  EXPECTED LENGTH OF STAY: 4d 21h  ACTUAL LENGTH OF STAY:          Roby Johnson MD

## 2020-11-02 NOTE — PROGRESS NOTES
Problem: Mobility Impaired (Adult and Pediatric)  Goal: *Acute Goals and Plan of Care (Insert Text)  Description:   FUNCTIONAL STATUS PRIOR TO ADMISSION: Pt is a limited historian - reports that she was essentially bed bound at home over the last several weeks. Prior to that, she ambulated very short distances with RW. Lives with family who provide assist as needed. HOME SUPPORT PRIOR TO ADMISSION: The patient lived with family. 2L O2 at baseline    Physical Therapy Goals  Initiated 10/26/2020; carryover 11/02/2020  1. Patient will move from supine to sit and sit to supine , scoot up and down, and roll side to side in bed with minimal assistance/contact guard assist within 7 day(s). 2.  Patient will transfer from bed to chair and chair to bed with moderate assistance  using the least restrictive device within 7 day(s). 3.  Patient will perform sit to stand with moderate assistance  within 7 day(s). 4.  Patient will ambulate with moderate assistance  for 10 feet with the least restrictive device within 7 day(s). 5.  Patient will ascend/descend 3 stairs with handrail(s) with minimal assistance  within 7 day(s). 6.  Patient will improve Hitchcock Balance score by 7 points within 7 days.       11/2/2020 1530 by Juan Mcghee, PT  Outcome: Progressing Towards Goal    PHYSICAL THERAPY TREATMENT: WEEKLY REASSESSMENT  Patient: Nghia Andrade (50 y.o. female)  Date: 11/2/2020  Primary Diagnosis: Brain metastases ([facility] [address] [C79.31]  Procedure(s) (LRB):  ENDOSCOPIC BRONCHOSCOPY ULTRASOUND (EBUS) (N/A)  BRONCHOSCOPY (N/A)  TRANSBRONCHIAL BIOPSY (N/A) 5 Days Post-Op   Precautions:   Fall, Skin(LUE NWB (L clavicular fx))      ASSESSMENT  Patient continues with skilled PT services and is marginally at best progressing towards goals and remains most limited by decreased cardiopulmonary tolerance to activity (currently on 5L), global weakness, impaired balance, lethargy, diffuse pain, and limited use of L UE (2* clavicle fx) leading to increased dependency and falls risk from baseline level. Overall, she required significant more assist today compared to last session - up to max A x2 with increased time for bed mobility, initial sitting balance, and sit<>stand attempt. Unable to remain standing >5seconds prior to sitting and unable to adjust CHRISTINE once up. Fatigued extremely quickly - HR up to 130s and audible breath sounds; prolonged recovery. Noted to be soiled - ultimately assisted back to bed (2* cardiopulmonary fatigue) to finish pericare and brief change. Left with HOB elevated at end of session, NAD. At this time, she is far below her baseline level and requiring extensive assist x2 - given vast fluctuations, strongly recommend discharge to SNF level rehab. If this is not an option, then recommend home with HHPT and assist x2 for all mobility . Nato Pererahew Patient's progression toward goals since last assessment: no goals met; decline since last visit; high falls risk;     Current Level of Function Impacting Discharge (mobility/balance): max A x2    Other factors to consider for discharge: unsure level of SUP/assist available at home          PLAN :  Goals have been updated based on progression since last assessment. Patient continues to benefit from skilled intervention to address the above impairments. Recommendations and Planned Interventions: bed mobility training, transfer training, gait training, therapeutic exercises, neuromuscular re-education, patient and family training/education, and therapeutic activities      Frequency/Duration: Patient will be followed by physical therapy:  5 times a week to address goals.     Recommendation for discharge: (in order for the patient to meet his/her long term goals)  Therapy up to 5 days/week in SNF setting or intensive home health therapy program and assist x2 for all transfers and ADLs    This discharge recommendation:  A follow-up discussion with the attending provider and/or case management is planned    IF patient discharges home will need the following DME: to be determined (TBD)         SUBJECTIVE:   Patient stated all over.  (when asked what hurt)    OBJECTIVE DATA SUMMARY:   HISTORY:    Past Medical History:   Diagnosis Date    Chronic back pain     Chronic obstructive pulmonary disease ([facility] [address]     Diabetes ([facility] [address]     Fall in home     Heart failure ([facility] [address]     Hypertension     Ill-defined condition     home 02 2LNC     Past Surgical History:   Procedure Laterality Date    CARDIAC SURG PROCEDURE UNLIST      internal cardiac defibrillator    HX CHOLECYSTECTOMY      HX CYST REMOVAL      HX PACEMAKER  08/08/2016    [employer]     HX TUBAL LIGATION         Personal factors and/or comorbidities impacting plan of care: PMHx    Home Situation  Home Environment: Private residence  # Steps to Enter: 3  Rails to Enter: Yes  One/Two Story Residence: One story  Living Alone: No  Support Systems: Family member(s)  Patient Expects to be Discharged to[de-identified] Rehabilitation facility  Current DME Used/Available at Home: Walker, rolling, Wheelchair, Oxygen, portable(2L NC at baseline)  Tub or Shower Type: (bathes in bed with family A)    EXAMINATION/PRESENTATION/DECISION MAKING:   Critical Behavior:  Neurologic State: Alert  Orientation Level: Oriented X4  Cognition: Appropriate for age attention/concentration, Follows commands, Decreased attention/concentration  Safety/Judgement: Decreased insight into deficits, Home safety, Fall prevention, Decreased awareness of need for assistance  Hearing: Auditory  Auditory Impairment: None  Functional Mobility:  Bed Mobility:  Rolling:  Moderate assistance  Supine to Sit: Maximum assistance  Sit to Supine: Maximum assistance;Assist x2  Scooting: Maximum assistance;Assist x2  Transfers:  Sit to Stand: Assist x2;Maximum assistance  Stand to Sit: Maximum assistance;Assist x2        Balance:   Sitting: Impaired  Sitting - Static: Fair (occasional)  Sitting - Dynamic: Fair (occasional)  Standing: Impaired; With support  Standing - Static: Poor;Constant support  Standing - Dynamic : Poor;Constant support    Activity Tolerance:   Poor, desaturates with exertion and requires oxygen, requires frequent rest breaks, and observed SOB with activity    After treatment patient left in no apparent distress:   Supine in bed, Call bell within reach, Bed / chair alarm activated, and Side rails x 3    COMMUNICATION/EDUCATION:   The patients plan of care was discussed with: Occupational therapist and Registered nurse. Fall prevention education was provided and the patient/caregiver indicated understanding. and Patient/family have participated as able in goal setting and plan of care.     Thank you for this referral.  Tigre Pate, PT, DPT   Time Calculation: 27 mins

## 2020-11-03 LAB
ANION GAP SERPL CALC-SCNC: 8 MMOL/L (ref 5–15)
BASOPHILS # BLD: 0 K/UL (ref 0–0.1)
BASOPHILS NFR BLD: 0 % (ref 0–1)
BUN SERPL-MCNC: 48 MG/DL (ref 6–20)
BUN/CREAT SERPL: 46 (ref 12–20)
CALCIUM SERPL-MCNC: 7.8 MG/DL (ref 8.5–10.1)
CHLORIDE SERPL-SCNC: 108 MMOL/L (ref 97–108)
CO2 SERPL-SCNC: 23 MMOL/L (ref 21–32)
CREAT SERPL-MCNC: 1.05 MG/DL (ref 0.55–1.02)
DIFFERENTIAL METHOD BLD: ABNORMAL
EOSINOPHIL # BLD: 0 K/UL (ref 0–0.4)
EOSINOPHIL NFR BLD: 0 % (ref 0–7)
ERYTHROCYTE [DISTWIDTH] IN BLOOD BY AUTOMATED COUNT: 13.3 % (ref 11.5–14.5)
GLUCOSE BLD STRIP.AUTO-MCNC: 168 MG/DL (ref 65–100)
GLUCOSE BLD STRIP.AUTO-MCNC: 372 MG/DL (ref 65–100)
GLUCOSE BLD STRIP.AUTO-MCNC: 92 MG/DL (ref 65–100)
GLUCOSE SERPL-MCNC: 310 MG/DL (ref 65–100)
HCT VFR BLD AUTO: 36.1 % (ref 35–47)
HGB BLD-MCNC: 11 G/DL (ref 11.5–16)
IMM GRANULOCYTES # BLD AUTO: 0.1 K/UL (ref 0–0.04)
IMM GRANULOCYTES NFR BLD AUTO: 1 % (ref 0–0.5)
LYMPHOCYTES # BLD: 0.4 K/UL (ref 0.8–3.5)
LYMPHOCYTES NFR BLD: 3 % (ref 12–49)
MCH RBC QN AUTO: 31.1 PG (ref 26–34)
MCHC RBC AUTO-ENTMCNC: 30.5 G/DL (ref 30–36.5)
MCV RBC AUTO: 102 FL (ref 80–99)
MONOCYTES # BLD: 0.3 K/UL (ref 0–1)
MONOCYTES NFR BLD: 2 % (ref 5–13)
NEUTS SEG # BLD: 13.6 K/UL (ref 1.8–8)
NEUTS SEG NFR BLD: 94 % (ref 32–75)
NRBC # BLD: 0 K/UL (ref 0–0.01)
NRBC BLD-RTO: 0 PER 100 WBC
PLATELET # BLD AUTO: 176 K/UL (ref 150–400)
PMV BLD AUTO: 10.5 FL (ref 8.9–12.9)
POTASSIUM SERPL-SCNC: 4.6 MMOL/L (ref 3.5–5.1)
RBC # BLD AUTO: 3.54 M/UL (ref 3.8–5.2)
RBC MORPH BLD: ABNORMAL
SERVICE CMNT-IMP: ABNORMAL
SERVICE CMNT-IMP: ABNORMAL
SERVICE CMNT-IMP: NORMAL
SODIUM SERPL-SCNC: 139 MMOL/L (ref 136–145)
WBC # BLD AUTO: 14.4 K/UL (ref 3.6–11)

## 2020-11-03 PROCEDURE — 94640 AIRWAY INHALATION TREATMENT: CPT

## 2020-11-03 PROCEDURE — 74011250637 HC RX REV CODE- 250/637: Performed by: HOSPITALIST

## 2020-11-03 PROCEDURE — 80048 BASIC METABOLIC PNL TOTAL CA: CPT

## 2020-11-03 PROCEDURE — 97530 THERAPEUTIC ACTIVITIES: CPT

## 2020-11-03 PROCEDURE — 85025 COMPLETE CBC W/AUTO DIFF WBC: CPT

## 2020-11-03 PROCEDURE — 74011250636 HC RX REV CODE- 250/636: Performed by: INTERNAL MEDICINE

## 2020-11-03 PROCEDURE — 77010033678 HC OXYGEN DAILY

## 2020-11-03 PROCEDURE — 74011250637 HC RX REV CODE- 250/637: Performed by: NURSE PRACTITIONER

## 2020-11-03 PROCEDURE — 82962 GLUCOSE BLOOD TEST: CPT

## 2020-11-03 PROCEDURE — 74011636637 HC RX REV CODE- 636/637: Performed by: INTERNAL MEDICINE

## 2020-11-03 PROCEDURE — 65660000000 HC RM CCU STEPDOWN

## 2020-11-03 PROCEDURE — 74011636637 HC RX REV CODE- 636/637: Performed by: HOSPITALIST

## 2020-11-03 PROCEDURE — 74011000250 HC RX REV CODE- 250: Performed by: INTERNAL MEDICINE

## 2020-11-03 PROCEDURE — 74011250637 HC RX REV CODE- 250/637: Performed by: INTERNAL MEDICINE

## 2020-11-03 PROCEDURE — 36415 COLL VENOUS BLD VENIPUNCTURE: CPT

## 2020-11-03 PROCEDURE — 51798 US URINE CAPACITY MEASURE: CPT

## 2020-11-03 PROCEDURE — 74011000258 HC RX REV CODE- 258: Performed by: INTERNAL MEDICINE

## 2020-11-03 PROCEDURE — 94760 N-INVAS EAR/PLS OXIMETRY 1: CPT

## 2020-11-03 RX ADMIN — LEVETIRACETAM 500 MG: 500 TABLET, FILM COATED ORAL at 18:32

## 2020-11-03 RX ADMIN — ATORVASTATIN CALCIUM 20 MG: 20 TABLET, FILM COATED ORAL at 21:21

## 2020-11-03 RX ADMIN — FAMOTIDINE 20 MG: 20 TABLET ORAL at 10:39

## 2020-11-03 RX ADMIN — DEXAMETHASONE 4 MG: 4 TABLET ORAL at 21:21

## 2020-11-03 RX ADMIN — LEVETIRACETAM 500 MG: 500 TABLET, FILM COATED ORAL at 07:14

## 2020-11-03 RX ADMIN — FUROSEMIDE 40 MG: 40 TABLET ORAL at 10:40

## 2020-11-03 RX ADMIN — Medication 3 MG: at 21:23

## 2020-11-03 RX ADMIN — TOPIRAMATE 100 MG: 100 TABLET, FILM COATED ORAL at 18:34

## 2020-11-03 RX ADMIN — APIXABAN 2.5 MG: 2.5 TABLET, FILM COATED ORAL at 10:40

## 2020-11-03 RX ADMIN — CEFTRIAXONE SODIUM 1 G: 1 INJECTION, POWDER, FOR SOLUTION INTRAMUSCULAR; INTRAVENOUS at 10:41

## 2020-11-03 RX ADMIN — CLOPIDOGREL BISULFATE 75 MG: 75 TABLET ORAL at 10:39

## 2020-11-03 RX ADMIN — DEXAMETHASONE 4 MG: 4 TABLET ORAL at 10:39

## 2020-11-03 RX ADMIN — Medication 10 ML: at 06:00

## 2020-11-03 RX ADMIN — SERTRALINE HYDROCHLORIDE 25 MG: 50 TABLET ORAL at 18:35

## 2020-11-03 RX ADMIN — Medication 10 ML: at 06:01

## 2020-11-03 RX ADMIN — APIXABAN 2.5 MG: 2.5 TABLET, FILM COATED ORAL at 18:34

## 2020-11-03 RX ADMIN — IPRATROPIUM BROMIDE AND ALBUTEROL SULFATE 3 ML: .5; 3 SOLUTION RESPIRATORY (INHALATION) at 14:32

## 2020-11-03 RX ADMIN — AZITHROMYCIN MONOHYDRATE 500 MG: 500 INJECTION, POWDER, LYOPHILIZED, FOR SOLUTION INTRAVENOUS at 11:19

## 2020-11-03 RX ADMIN — INSULIN LISPRO 7 UNITS: 100 INJECTION, SOLUTION INTRAVENOUS; SUBCUTANEOUS at 12:35

## 2020-11-03 RX ADMIN — INSULIN LISPRO 2 UNITS: 100 INJECTION, SOLUTION INTRAVENOUS; SUBCUTANEOUS at 18:35

## 2020-11-03 RX ADMIN — Medication 10 ML: at 18:52

## 2020-11-03 RX ADMIN — INSULIN LISPRO 2 UNITS: 100 INJECTION, SOLUTION INTRAVENOUS; SUBCUTANEOUS at 12:38

## 2020-11-03 RX ADMIN — TOPIRAMATE 100 MG: 100 TABLET, FILM COATED ORAL at 10:39

## 2020-11-03 RX ADMIN — Medication 10 ML: at 21:21

## 2020-11-03 NOTE — PROGRESS NOTES
ANITHA spoke with Yousif Esteban at [facility]. She informed this CM that should pt need radiation while she is in the SNF, the facility would have to incur the costs of the treatment. She said that this would be cost prohibitive for any facility to accept this pt. ANITHA sent Dr. Radha Steele (Hem/Onc)a [employer] message informing him of this. He recommended that CM seek out the advise radiation oncology. CM will follow.  Prabhu Rizvi

## 2020-11-03 NOTE — PROGRESS NOTES
[facility] [facility] Adult  Hospitalist Group                                                                                          Hospitalist Progress Note  Cheel Caban MD  Answering service: [phone] OR [phone] from in house phone        Date of Service:  11/3/2020  NAME:  Briana Willson  :  1950  MRN:  [id number]      Admission Summary:   69 y.o. female,  who has history of chronic systolic heart failure status post ICD, chronic COPD on home oxygen 2 L, hypertension, hyperlipidemia, anticoagulation with Eliquis who presents with fall 2 weeks back and left sided weakness. She reports that her left arm felt funny and also she was having weakness in the left arm. It is not painful. And she did not seek medical attention. Given persistence of symptoms, EMS was called and to take her to the local emergency room. At the ER there work-up revealed that she has lung mass as well as brain tumors. She was given IV Decadron and transferred to this facility for neurosurgical evaluation. She was also noted to have a dislocated left shoulder. There was report of lung mass which is right suprahilar with progression into the mediastinum. There is also report of 1.2 cm right frontal mass with 5 cm right to left shift. There is no report of seizures. Interval history / Subjective:   F/U left sided weakness. Patient seen and examined. Left upper extremity  strength with improvement. Plans for s/p EUS with biopsy . Also talked to sister previously  Discharged was cancelled as patinet was having very low BP 74/36. Started on IV fluids   BP medications were held as well   : Talked with Dr. Saba Samuels. [id number] [facility] discharging patient . Mentioned that radiation oncology office will contact patient on Monday regarding appointment. Needs gk. I talked to sister in detail and answered all her questions.  I personally gave her number radiation oncology and advised to contact office as well in case she dont get a call. She understood and agreed with plan and was very appreciative   Discharge was cancelled today as patient desaturating to 80s. reconsulted cardiology. Need input from pulmonology as well. 11/2: patient with no complaints. At my time of interview she was on 5 liters saturating 100%. I decreased to 2 liters . Updated nursing staff. Started on abx today for possible PNA     11/3: today she is on 2 liters of oxygen saturating >95%. Waiting for placement      Assessment & Plan:     Brain masses, consistent with metastatic disease, suspect lung primary:  Cerebral edema secondary to above:  Left-sided weakness: mild improvement  -MRI brain done and demonstrated intracranial metastases, largest 1.3 x 1.3 cm, with surrounding edema. Osseous metastases in right frontal, parietal, C2 body  -Neurosurgery consulted. No surgical benefit. Will need outpatient radiation oncology, possibly in [place]. CM to arrange.   -Continue steroids- will weaned to q8 x 3 days, then BID, Continue Keppra  -Seizure precautions  -Neuro checks  - radiation oncology following     Acute hypoxic respiratory failure, improved   New onset  Patient desaturating now stable   Restarted lasix BP somewhat better  Cardiology re consulted for meds adjustment  Pulmonology following, on 2 liters which is baseline      Lung mass with erosion into mediastinum, suspicious for carcinoma:  -Pulmonary consulted. Plans for EUS/endobronchial biopsy 10/28  --started on eliquis and plavix as per recommndatins from pulmonology     Moderate right pleural effusion: s/p thoracentesis  -respiratory status stable, s/p thoracentesis 10/27 with approximately 300 ml removed.  Based on proteins, appears transudative     Postobstructive infiltrate RUL  -Seen on CT  -no unusual cough, fever  -s/p Ceftriaxone    UTI, Proteus:  -continue Ceftriaxone for 3 days    Diabetes type 2 with hyperglycemia   -Hba1c 6.1, will hold lantus, continue SSI, restart amaryl on discharge     Chronic COPD with chronic hypoxic respiratory failure, not in exacerbation  - home oxygen 2 liters, AA nebs    Chronic congestive heart failure, presumed systolic given patient has ICD and is on Aldactone  -Continued home medications     Clavicular fracture:  Shoulder dislocation ruled out:  -Appreciate orthopedic surgery. No evidence of shoulder dislocation. Can wear sling for comfort       Chronic anticoagulation with Eliquis for ??  -The patient does not know why she is on chronic anticoagulation  -initially held as planning for procedures  Restarted home dose. As recommended by pulmonology    H/o CAD  -not sure if patient was taking plavix. D/w pharmacy      Hypertension  -Continue with Coreg    Prior tobacco use     Diabetic diet    PT/OT      Code status: full. AMD filled out  DVT prophylaxis: SCDs    Care Plan discussed with: Patient/Family  Anticipated Disposition: snf       Hospital Problems  Date Reviewed: 10/28/2020          Codes Class Noted POA    * (Principal) Brain metastases ([facility] [address] ICD-10-CM: C79.31  ICD-9-CM: 198.3  10/22/2020 Yes                Review of Systems:     Negative unless stated above    Vital Signs:    Last 24hrs VS reviewed since prior progress note. Most recent are:  Visit Vitals  /75 (BP 1 Location: Left arm, BP Patient Position: At rest)   Pulse 85   Temp 97.6 °F (36.4 °C)   Resp 21   Ht 5' 2.01\" (1.575 m)   Wt 51.7 kg (113 lb 15.7 oz)   SpO2 100%   BMI 20.84 kg/m²       No intake or output data in the 24 hours ending 11/03/20 1535     Physical Examination:             Constitutional:  No acute distress, cooperative,    ENT:  Oral mucosa moist, oropharynx benign. Resp:  CTA bilaterally. No wheezing/rhonchi/rales. No accessory muscle use   CV:  Regular rhythm, normal rate, no murmurs, gallops, rubs    GI:  Soft, non distended, non tender.  normoactive bowel sounds, no hepatosplenomegaly     Musculoskeletal:  No edema, warm, 2+ pulses throughout    Neurologic: Some left upper extremity  strength, alert and oriented             Data Review:    Review and/or order of clinical lab test  Review and/or order of tests in the radiology section of CPT  Review and/or order of tests in the medicine section of CPT      Labs:     Recent Labs     11/03/20  1333 11/02/20  1132   WBC 14.4* 13.0*   HGB 11.0* 10.8*   HCT 36.1 37.0    182     Recent Labs     11/03/20  1333 11/02/20  1132    136   K 4.6 5.6*    104   CO2 23 30   BUN 48* 60*   CREA 1.05* 1.46*   * 272*   CA 7.8* 8.9     No results for input(s): ALT, AP, TBIL, TBILI, TP, ALB, GLOB, GGT, AML, LPSE in the last 72 hours. No lab exists for component: SGOT, GPT, AMYP, HLPSE  No results for input(s): INR, PTP, APTT, INREXT, INREXT in the last 72 hours. No results for input(s): FE, TIBC, PSAT, FERR in the last 72 hours. No results found for: FOL, RBCF   No results for input(s): PH, PCO2, PO2 in the last 72 hours. No results for input(s): CPK, CKNDX, TROIQ in the last 72 hours.     No lab exists for component: CPKMB  No results found for: CHOL, CHOLX, CHLST, CHOLV, HDL, HDLP, LDL, LDLC, DLDLP, TGLX, TRIGL, TRIGP, CHHD, CHHDX  Lab Results   Component Value Date/Time    Glucose (POC) 372 (H) 11/03/2020 10:55 AM    Glucose (POC) 104 (H) 11/02/2020 10:38 PM    Glucose (POC) 43 (LL) 11/02/2020 10:04 PM    Glucose (POC) 43 (LL) 11/02/2020 10:03 PM    Glucose (POC) 285 (H) 11/02/2020 05:57 PM     Lab Results   Component Value Date/Time    Color YELLOW/STRAW 10/22/2020 09:15 AM    Appearance TURBID (A) 10/22/2020 09:15 AM    Specific gravity 1.012 10/22/2020 09:15 AM    pH (UA) 5.5 10/22/2020 09:15 AM    Protein Negative 10/22/2020 09:15 AM    Glucose Negative 10/22/2020 09:15 AM    Ketone Negative 10/22/2020 09:15 AM    Bilirubin Negative 10/22/2020 09:15 AM    Urobilinogen 0.2 10/22/2020 09:15 AM    Nitrites Positive (A) 10/22/2020 09:15 AM    Leukocyte Esterase LARGE (A) 10/22/2020 09:15 AM Epithelial cells FEW 10/22/2020 09:15 AM    Bacteria 4+ (A) 10/22/2020 09:15 AM    WBC  10/22/2020 09:15 AM    RBC 0-5 10/22/2020 09:15 AM         Medications Reviewed:     Current Facility-Administered Medications   Medication Dose Route Frequency    cefTRIAXone (ROCEPHIN) 1 g in 0.9% sodium chloride (MBP/ADV) 50 mL  1 g IntraVENous Q24H    azithromycin (ZITHROMAX) 500 mg in 0.9% sodium chloride (MBP/ADV) 250 mL  500 mg IntraVENous Q24H    [Held by provider] furosemide (LASIX) tablet 40 mg  40 mg Oral DAILY    clopidogreL (PLAVIX) tablet 75 mg  75 mg Oral DAILY    0.9% sodium chloride infusion  75 mL/hr IntraVENous CONTINUOUS    insulin lispro (HUMALOG) injection 2 Units  2 Units SubCUTAneous TIDAC    dexAMETHasone (DECADRON) tablet 4 mg  4 mg Oral Q12H    apixaban (ELIQUIS) tablet 2.5 mg  2.5 mg Oral BID    levETIRAcetam (KEPPRA) tablet 500 mg  500 mg Oral Q12H    albuterol-ipratropium (DUO-NEB) 2.5 MG-0.5 MG/3 ML  3 mL Nebulization Q4H PRN    melatonin tablet 3 mg  3 mg Oral QHS    sodium chloride (NS) flush 5-40 mL  5-40 mL IntraVENous Q8H    sodium chloride (NS) flush 5-40 mL  5-40 mL IntraVENous PRN    acetaminophen (TYLENOL) tablet 650 mg  650 mg Oral Q6H PRN    Or    acetaminophen (TYLENOL) suppository 650 mg  650 mg Rectal Q6H PRN    polyethylene glycol (MIRALAX) packet 17 g  17 g Oral DAILY PRN    ondansetron (ZOFRAN ODT) tablet 4 mg  4 mg Oral Q8H PRN    Or    ondansetron (ZOFRAN) injection 4 mg  4 mg IntraVENous Q6H PRN    famotidine (PEPCID) tablet 20 mg  20 mg Oral DAILY    atorvastatin (LIPITOR) tablet 20 mg  20 mg Oral QHS    carvediloL (COREG) tablet 6.25 mg  6.25 mg Oral BID WITH MEALS    [Held by provider] lisinopriL (PRINIVIL, ZESTRIL) tablet 5 mg  5 mg Oral DAILY    sertraline (ZOLOFT) tablet 25 mg  25 mg Oral QPM    [Held by provider] spironolactone (ALDACTONE) tablet 25 mg  25 mg Oral DAILY    topiramate (TOPAMAX) tablet 100 mg  100 mg Oral BID    glucose chewable tablet 16 g  4 Tab Oral PRN    glucagon (GLUCAGEN) injection 1 mg  1 mg IntraMUSCular PRN    dextrose 10% infusion 0-250 mL  0-250 mL IntraVENous PRN    insulin lispro (HUMALOG) injection   SubCUTAneous AC&HS     ______________________________________________________________________  EXPECTED LENGTH OF STAY: 4d 21h  ACTUAL LENGTH OF STAY:          12                 Armida Napoles MD

## 2020-11-03 NOTE — PROGRESS NOTES
YAO-     CM spoke with pt's daughter in law, Licha Miller ([phone]) to offer choice in SNF's. She requested that [facility] be sent to [facility]. ANITHA sent referrals to both facilities via Daryn Steve.  Carline Yeung

## 2020-11-03 NOTE — PROGRESS NOTES
Problem: Self Care Deficits Care Plan (Adult)  Goal: *Acute Goals and Plan of Care (Insert Text)  Description: FUNCTIONAL STATUS PRIOR TO ADMISSION: Patient required maximum assistance for basic and instrumental ADLs. At baseline patient uses 2 liters of supplemental oxygen. Patient is a poor historian but appears to have been bed bound for a while living with son and daughter in law who are reportedly home at all times. She could not remember the last time she walked and did not remember recent fall, but did report use of RW when she was mobile. She was rolling for bedpan prior to admission, was dependent for all IADLs, and assisted with dressing/grooming/upper body bathing as able. Patient reports watching TV all day. HOME SUPPORT: The patient lived with son and daughter in law. Occupational Therapy Goals  Weekly Re-Assessment 11/3/2020, goals modified below  Initiated 10/27/2020   1. Patient will perform self-feeding with supervision/set-up within 7 day(s). MET/DISCONTINUED 11/3  2. Patient will perform upper body and lower body bathing using compensatory techniques PRN with minimal assistance/contact guard assist within 7 day(s). DOWNGRADED to Mod A 11/3  3. Patient will perform upper body dressing and lower body dressing using compensatory techniques PRN with minimal assistance/contact guard assist within 7 day(s). DOWNGRADED to Mod A 11/3  4. Patient will perform rolling for bedpan with supervision/set-up within 7 day(s). UPGRADED to transfer to [facility] with Min A 11/3  5. Patient will perform all aspects of toileting with minimal assistance/contact guard assist within 7 day(s). DOWNGRADED to Mod A 11/3  6. Patient will participate in upper extremity therapeutic exercise/activities with modified independence for 5 minutes within 7 day(s). MODIFIED to SPV 11/3  7. Patient will perform bed mobility to EOB in preparation for standing/seated ADLs with minimal assistance in 7 days.  MET, UPGRADED to SBA 11/3          Outcome: Progressing Towards Goal     OCCUPATIONAL THERAPY TREATMENT/WEEKLY RE-ASSESSMENT  Patient: Brady Rayo (38 y.o. female)  Date: 11/3/2020  Diagnosis: Brain metastases ([facility] [C79.31]   Brain metastases ([facility]  Procedure(s) (LRB):  ENDOSCOPIC BRONCHOSCOPY ULTRASOUND (EBUS) (N/A)  BRONCHOSCOPY (N/A)  TRANSBRONCHIAL BIOPSY (N/A) 6 Days Post-Op  Precautions: Fall, Skin(LUE NWB (L clavicular fx))  Chart, occupational therapy assessment, plan of care, and goals were reviewed. ASSESSMENT  Patient continues with skilled OT services and is slowly progressing towards goals however remains limited by global debility, generalized pain, L clavicle fx, flat affect with decreased engagement in functional activity, and decreased distal lower body access. Patient with fluctuating progress this week, improved sitting & standing balance with ability to advance OOB to the chair this session. Considering her current functional levels, continue to recommend d/c to SNF rehab to maximize safety & functional independence prior to returning home. Current Level of Function Impacting Discharge (ADLs): setup-Max A for upper body ADLs, Max-Total A for lower body ADLs, and Min-Mod A x1-2 for brief mobility    Other factors to consider for discharge: fall risk, physical assist of 1-2, PMH, PLOF, oncology plan         PLAN :  Goals have been updated based on progression since last assessment. Patient continues to benefit from skilled intervention to address the above impairments. Continue to follow patient 5 times a week to address goals. Recommend with staff: Recommend with nursing patient to complete as able in order to maintain strength, endurance and independence: ADLs with assist, OOB to chair 3x/day and mobilizing to the [facility] for toileting with 2 assist. Thank you for your assistance.      Recommend next OT session: toileting on BSC, lower body ADLs, strengthening    Recommendation for discharge: (in order for the patient to meet his/her long term goals)  Therapy up to 5 days/week in SNF setting    This discharge recommendation:  Has been made in collaboration with the attending provider and/or case management    IF patient discharges home will need the following DME: BSC, wheelchair, hospital bed, mechanical lift, physical assist of 2-3 at the least       SUBJECTIVE:   Patient stated Mmmmhm.  limited verbalizations this sessions    OBJECTIVE DATA SUMMARY:   Cognitive/Behavioral Status:  Neurologic State: Alert  Orientation Level: Oriented X4  Cognition: Appropriate for age attention/concentration; Follows commands  Perception: Appears intact  Perseveration: No perseveration noted  Safety/Judgement: Awareness of environment;Decreased awareness of need for assistance;Decreased awareness of need for safety;Decreased insight into deficits    Functional Mobility and Transfers for ADLs:  Bed Mobility:  Supine to Sit: Minimum assistance  Sit to Supine: (in chair)  Scooting: Contact guard assistance;Minimum assistance; Additional time(seated)    Transfers:  Sit to Stand: Minimum assistance;Assist x2     Bed to Chair: Minimum assistance; Moderate assistance    Balance:  Sitting: Impaired  Sitting - Static: Good (unsupported)  Sitting - Dynamic: Fair (occasional)  Standing: Impaired; With support  Standing - Static: Fair;Constant support  Standing - Dynamic : Fair;Constant support    ADL Intervention:  Feeding  Feeding Assistance: Set-up  Container Management: Set-up  Cutting Food: Independent  Utensil Management: Independent  Food to Mouth: Independent  Drink to Mouth: Set-up     Cognitive Retraining  Safety/Judgement: Awareness of environment;Decreased awareness of need for assistance;Decreased awareness of need for safety;Decreased insight into deficits    Therapeutic Exercises:   Sit<>stand with Min A x2, transfer to chair with Min-Mod A x2, LUE held against gait belt for comfort.  Declined other ADLs/functional activity this session    Pain:  Generalized pain reported, no change    Activity Tolerance:   Fair    After treatment patient left in no apparent distress:   Sitting in chair, Call bell within reach, and Bed / chair alarm activated    COMMUNICATION/COLLABORATION:   The patients plan of care was discussed with: Physical therapist and Registered nurse.      LIZETH Healy, OTR/L  Time Calculation: 12 mins

## 2020-11-03 NOTE — PROGRESS NOTES
Pulmonary, Critical Care, and Sleep Medicine~Progress Note    Name: Miranda Mahmood MRN: [id number]   : 1950 Hospital: [address]   Date: 11/3/2020 9:02 AM Admission: 10/22/2020     Impression Plan   1. Acute on chronic hypoxic resp failure   2. Right pleural effusion, appears more exudative   3. 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 Hepatic and renal lesions   5. 1.2 cm lesion in the right frontal lobe; NS involved   6. COPD, on home O2 at 2lpm  7. A fib, on eliquis at home   8. CAD, on plavix at home  9. Smoker   10. SAVANNAH on CKD? 1. eliquis  2. Pleural fluid without neoplasia findings    3. O2 titration above 90%  4. Follow up chest x-ray in 1-2 wks to monitor for reaccumulation of pleural fluid  5. Now on zithromax/rocephin for evolving right base infiltrate that is presumed infectious     Poor prognosis. Functional status is limiting to her treatment options. Daily Progression:    Weak. Didn't do as well with therapy yesterday. Reportedly she has essentially been bedbound for several weeks. I have reviewed the labs and previous days notes. Pertinent items are noted in HPI. OBJECTIVE:     Vital Signs:       Visit Vitals  BP (!) 143/60 (BP 1 Location: Right arm, BP Patient Position: At rest)   Pulse 93   Temp 98.3 °F (36.8 °C)   Resp 22   Ht 5' 2.01\" (1.575 m)   Wt 51.7 kg (113 lb 15.7 oz)   SpO2 100%   BMI 20.84 kg/m²      Temp (24hrs), Av °F (36.7 °C), Min:97.5 °F (36.4 °C), Max:98.3 °F (36.8 °C)     Intake/Output:     Last shift: No intake/output data recorded.     Last 3 shifts:  1901 -  0700  In: -   Out: 850 [Urine:850]        No intake or output data in the 24 hours ending 20 0840    Physical Exam:                                        Exam Findings Other   General: No resp distress noted, appears stated age    HEENT:  No ulcers, JVD not elevated, no cervical LAD    Chest: No pectus deformity, normal chest rise b/l    HEART:  No visible thrills     Lungs:  Normal expansion     ABD: Soft/NT, non rigid mildly distended    EXT: No cyanosis/clubbing/edema, normal peripheral pulses    Skin: No rashes or ulcers, no mottling    Neuro: A/O x 3        Medications:  Current Facility-Administered Medications   Medication Dose Route Frequency    cefTRIAXone (ROCEPHIN) 1 g in 0.9% sodium chloride (MBP/ADV) 50 mL  1 g IntraVENous Q24H    azithromycin (ZITHROMAX) 500 mg in 0.9% sodium chloride (MBP/ADV) 250 mL  500 mg IntraVENous Q24H    furosemide (LASIX) tablet 40 mg  40 mg Oral DAILY    clopidogreL (PLAVIX) tablet 75 mg  75 mg Oral DAILY    0.9% sodium chloride infusion  75 mL/hr IntraVENous CONTINUOUS    insulin lispro (HUMALOG) injection 2 Units  2 Units SubCUTAneous TIDAC    dexAMETHasone (DECADRON) tablet 4 mg  4 mg Oral Q12H    apixaban (ELIQUIS) tablet 2.5 mg  2.5 mg Oral BID    levETIRAcetam (KEPPRA) tablet 500 mg  500 mg Oral Q12H    albuterol-ipratropium (DUO-NEB) 2.5 MG-0.5 MG/3 ML  3 mL Nebulization Q4H PRN    melatonin tablet 3 mg  3 mg Oral QHS    sodium chloride (NS) flush 5-40 mL  5-40 mL IntraVENous Q8H    sodium chloride (NS) flush 5-40 mL  5-40 mL IntraVENous PRN    acetaminophen (TYLENOL) tablet 650 mg  650 mg Oral Q6H PRN    Or    acetaminophen (TYLENOL) suppository 650 mg  650 mg Rectal Q6H PRN    polyethylene glycol (MIRALAX) packet 17 g  17 g Oral DAILY PRN    ondansetron (ZOFRAN ODT) tablet 4 mg  4 mg Oral Q8H PRN    Or    ondansetron (ZOFRAN) injection 4 mg  4 mg IntraVENous Q6H PRN    famotidine (PEPCID) tablet 20 mg  20 mg Oral DAILY    atorvastatin (LIPITOR) tablet 20 mg  20 mg Oral QHS    carvediloL (COREG) tablet 6.25 mg  6.25 mg Oral BID WITH MEALS    [Held by provider] lisinopriL (PRINIVIL, ZESTRIL) tablet 5 mg  5 mg Oral DAILY    sertraline (ZOLOFT) tablet 25 mg  25 mg Oral QPM    spironolactone (ALDACTONE) tablet 25 mg  25 mg Oral DAILY    topiramate (TOPAMAX) tablet 100 mg  100 mg Oral BID    glucose chewable tablet 16 g  4 Tab Oral PRN    glucagon (GLUCAGEN) injection 1 mg  1 mg IntraMUSCular PRN    dextrose 10% infusion 0-250 mL  0-250 mL IntraVENous PRN    insulin lispro (HUMALOG) injection   SubCUTAneous AC&HS       Labs:  ABG No results for input(s): PHI, PCO2I, PO2I, HCO3I, SO2I, FIO2I in the last 72 hours.      CBC Recent Labs     11/02/20  1132   WBC 13.0*   HGB 10.8*   HCT 37.0      .4*   MCH 18.1        Metabolic  Panel Recent Labs     11/02/20  1132      K 5.6*      CO2 30   *   BUN 60*   CREA 1.46*   CA 8.9        Pertinent [id number] [place], [place]  11/3/2020

## 2020-11-03 NOTE — PROGRESS NOTES
Hematology-Oncology Progress Note      Radha Cuellar  1950  [id number]  11/3/2020      Subjective:     Patient drowsy, but arousable. Denies pain or dyspnea. Hypotension noted yesterday and improved today. Allergies: Patient has no known allergies.   Current Facility-Administered Medications   Medication Dose Route Frequency Provider Last Rate Last Dose    cefTRIAXone (ROCEPHIN) 1 g in 0.9% sodium chloride (MBP/ADV) 50 mL  1 g IntraVENous Q24H Rod Schulte  mL/hr at 11/02/20 0932 1 g at 11/02/20 0932    azithromycin (ZITHROMAX) 500 mg in 0.9% sodium chloride (MBP/ADV) 250 mL  500 mg IntraVENous Q24H Damaso Caldwell  mL/hr at 11/02/20 1019 500 mg at 11/02/20 1019    furosemide (LASIX) tablet 40 mg  40 mg Oral DAILY Damaso Caldwell MD   40 mg at 11/02/20 0930    clopidogreL (PLAVIX) tablet 75 mg  75 mg Oral DAILY Damaso Caldwell MD   75 mg at 11/02/20 0931    0.9% sodium chloride infusion  75 mL/hr IntraVENous CONTINUOUS Damaso Caldwell MD 75 mL/hr at 10/31/20 1525 75 mL/hr at 10/31/20 1525    insulin lispro (HUMALOG) injection 2 Units  2 Units SubCUTAneous Dimitris Jade MD   2 Units at 11/02/20 1817    dexAMETHasone (DECADRON) tablet 4 mg  4 mg Oral Q12H Damaso Caldwell MD   4 mg at 11/02/20 2158    apixaban (ELIQUIS) tablet 2.5 mg  2.5 mg Oral BID Damaso Caldwell MD   2.5 mg at 11/02/20 1817    levETIRAcetam (KEPPRA) tablet 500 mg  500 mg Oral Q12H Ivonne Alonzo NP   500 mg at 11/03/20 0714    albuterol-ipratropium (DUO-NEB) 2.5 MG-0.5 MG/3 ML  3 mL Nebulization Q4H PRN Tino Fang M, DO        melatonin tablet 3 mg  3 mg Oral QHS Samantha Burgess DO   3 mg at 11/01/20 2122    sodium chloride (NS) flush 5-40 mL  5-40 mL IntraVENous Q8H Francois STOKES MD   10 mL at 11/03/20 0601    sodium chloride (NS) flush 5-40 mL  5-40 mL IntraVENous PRN Godfrey Hill MD        acetaminophen (TYLENOL) tablet 650 mg  650 mg Oral Q6H PRN Heydi Mock MD   650 mg at 11/01/20 1401    Or    acetaminophen (TYLENOL) suppository 650 mg  650 mg Rectal Q6H PRN Yoanna Hill MD        polyethylene glycol (MIRALAX) packet 17 g  17 g Oral DAILY PRN Yoanna Hill MD        ondansetron (ZOFRAN ODT) tablet 4 mg  4 mg Oral Q8H PRN Yoanna Hill MD        Or    ondansetron (ZOFRAN) injection 4 mg  4 mg IntraVENous Q6H PRN Yoanna Hill MD        famotidine (PEPCID) tablet 20 mg  20 mg Oral DAILY Lori Krueger MD   20 mg at 11/02/20 0930    atorvastatin (LIPITOR) tablet 20 mg  20 mg Oral QHS Lori Krueger MD   20 mg at 11/02/20 2158    carvediloL (COREG) tablet 6.25 mg  6.25 mg Oral BID WITH MEALS Erwin Quintana MD   6.25 mg at 10/31/20 2268    [Held by provider] lisinopriL (PRINIVIL, ZESTRIL) tablet 5 mg  5 mg Oral DAILY Lori Krueger MD   Stopped at 10/31/20 0900    sertraline (ZOLOFT) tablet 25 mg  25 mg Oral QPM Lori Krueger MD   25 mg at 11/02/20 1817    spironolactone (ALDACTONE) tablet 25 mg  25 mg Oral DAILY Lori Krueger MD   Stopped at 10/31/20 0900    topiramate (TOPAMAX) tablet 100 mg  100 mg Oral BID Lori Krueger MD   100 mg at 11/02/20 1817    glucose chewable tablet 16 g  4 Tab Oral PRN Lori Krueger MD   16 g at 11/02/20 2222    glucagon (GLUCAGEN) injection 1 mg  1 mg IntraMUSCular PRN Lori Krueger MD        dextrose 10% infusion 0-250 mL  0-250 mL IntraVENous PRN Lori Krueger  mL/hr at 10/28/20 0734 125 mL at 10/28/20 0734    insulin lispro (HUMALOG) injection   SubCUTAneous AC&HS Lroi Krueger MD   Stopped at 11/02/20 2200     Objective:     Patient Vitals for the past 24 hrs:   BP Temp Pulse Resp SpO2 Weight   11/03/20 0605 (!) 143/60 98.3 °F (36.8 °C) 93 22 100 %    11/03/20 0210 (!) 134/59 98.2 °F (36.8 °C) (!) 101 23  51.7 kg (113 lb 15.7 oz)   11/02/20 2215 (!) 133/48 98.3 °F (36.8 °C) 89 21 93 %    11/02/20 1817 (!) 130/57 97.6 °F (36.4 °C) 98 25 97 %  11/02/20 1357 (!) 130/57 98.1 °F (36.7 °C) 90 22 100 %    11/02/20 1020 (!) 123/58 97.5 °F (36.4 °C) 95 17 100 %    11/02/20 0930 (!) 130/59  75          Gen: NAD  HEENT: PERRL, Sclerae anicteric  Cv: RRR without m/r/g  Pulm: CTA bilaterally  Abd: NABS, NTND, No HSM  Ext: No c/c/e    Available labs reviewed:  Labs:    Recent Results (from the past 24 hour(s))   GLUCOSE, POC    Collection Time: 11/02/20  9:01 AM   Result Value Ref Range    Glucose (POC) 260 (H) 65 - 100 mg/dL    Performed by [id number] [place] [address], POC    Collection Time: 11/02/20 11:05 AM   Result Value Ref Range    Glucose (POC) 294 (H) 65 - 100 mg/dL    Performed by Haile Wills    CBC WITH AUTOMATED DIFF    Collection Time: 11/02/20 11:32 AM   Result Value Ref Range    WBC 13.0 (H) 3.6 - 11.0 K/uL    RBC 3.58 (L) 3.80 - 5.20 M/uL    HGB 10.8 (L) 11.5 - 16.0 g/dL    HCT 37.0 35.0 - 47.0 %    .4 (H) 80.0 - 99.0 FL    MCH 30.2 26.0 - 34.0 PG    MCHC 29.2 (L) 30.0 - 36.5 g/dL    RDW 13.2 11.5 - 14.5 %    PLATELET 645 871 - 989 K/uL    MPV 10.4 8.9 - 12.9 FL    NRBC 0.0 0  WBC    ABSOLUTE NRBC 0.00 0.00 - 0.01 K/uL    NEUTROPHILS 88 (H) 32 - 75 %    LYMPHOCYTES 5 (L) 12 - 49 %    MONOCYTES 6 5 - 13 %    EOSINOPHILS 0 0 - 7 %    BASOPHILS 0 0 - 1 %    IMMATURE GRANULOCYTES 1 (H) 0.0 - 0.5 %    ABS. NEUTROPHILS 11.4 (H) 1.8 - 8.0 K/UL    ABS. LYMPHOCYTES 0.7 (L) 0.8 - 3.5 K/UL    ABS. MONOCYTES 0.8 0.0 - 1.0 K/UL    ABS. EOSINOPHILS 0.0 0.0 - 0.4 K/UL    ABS. BASOPHILS 0.0 0.0 - 0.1 K/UL    ABS. IMM.  GRANS. 0.1 (H) 0.00 - 0.04 K/UL    DF SMEAR SCANNED      RBC COMMENTS MACROCYTOSIS  1+       METABOLIC PANEL, BASIC    Collection Time: 11/02/20 11:32 AM   Result Value Ref Range    Sodium 136 136 - 145 mmol/L    Potassium 5.6 (H) 3.5 - 5.1 mmol/L    Chloride 104 97 - 108 mmol/L    CO2 30 21 - 32 mmol/L    Anion gap 2 (L) 5 - 15 mmol/L    Glucose 272 (H) 65 - 100 mg/dL    BUN 60 (H) 6 - 20 MG/DL    Creatinine 1.46 (H) 0.55 - 1.02 MG/DL    BUN/Creatinine ratio 41 (H) 12 - 20      GFR est AA 43 (L) >60 ml/min/1.73m2    GFR est non-AA 35 (L) >60 ml/min/1.73m2    Calcium 8.9 8.5 - 10.1 MG/DL   GLUCOSE, POC    Collection Time: 11/02/20  5:57 PM   Result Value Ref Range    Glucose (POC) 285 (H) 65 - 100 mg/dL    Performed by [facility], POC    Collection Time: 11/02/20 10:03 PM   Result Value Ref Range    Glucose (POC) 43 (LL) 65 - 100 mg/dL    Performed by [address] [place] [address], POC    Collection Time: 11/02/20 10:04 PM   Result Value Ref Range    Glucose (POC) 43 (LL) 65 - 100 mg/dL    Performed by [address] [place] [address], POC    Collection Time: 11/02/20 10:38 PM   Result Value Ref Range    Glucose (POC) 104 (H) 65 - 100 mg/dL    Performed by Sin Gillespie and Plan     68 y/o woman with 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 1. Lung Ca: agree with plan for radiation therapy to the CNS and then if she improves, consideration for outpatient palliative oncolytic therapy. I have asked pathology to send off her tissue for [facility] to evaluate for  mutations and PDL-1. Case d/w Joseline Rai DIL by phone at [phone]. I've asked her to call our office for an appointment once radiation therapy is complete. We spoke about the average prognosis for someone in her situation and I explained that there is a range of outcomes ranging from weeks to years, but I was more concerned about her mother in law given her declining performance status. 2. Disposition: tentative plan is for transfer to SNF with transportation back for radiation therapy. BP improved today. Not a good candidate for discharge home. Thank you for allowing us to participate in the care of this very pleasant patient.     Abbey Lentz MD  Hematology/Oncology  Phone [phone]

## 2020-11-03 NOTE — PROGRESS NOTES
HYPOGLYCEMIC EPISODE DOCUMENTATION    Patient with hypoglycemic episode(s) at 2204(time) on 11/2/2020(date). BG value(s) pre-treatment 37    Was patient symptomatic?  [] yes, [x] no  Patient was treated with the following rescue medications/treatments: [] D50                [x] Glucose tablets                [] Glucagon                [] 4oz juice                [x] 6oz reg soda                [] 8oz low fat milk  BG value post-treatment: 104  Once BG treated and value greater than 80mg/dl, pt was provided with the following:  [x] snack  [] meal  Name of MD notified:Trenton Wang NP  The following orders were received: Continue to monitor

## 2020-11-03 NOTE — PROGRESS NOTES
Problem: Pain  Goal: *Control of Pain  Outcome: Progressing Towards Goal     Problem: Pressure Injury - Risk of  Goal: *Prevention of pressure injury  Description: Document Elio Scale and appropriate interventions in the flowsheet. Outcome: Progressing Towards Goal  Note: Pressure Injury Interventions:  Sensory Interventions: Minimize linen layers, Maintain/enhance activity level, Turn and reposition approx. every two hours (pillows and wedges if needed), Keep linens dry and wrinkle-free    Moisture Interventions: Check for incontinence Q2 hours and as needed, Internal/External urinary devices, Minimize layers    Activity Interventions: PT/OT evaluation, Increase time out of bed    Mobility Interventions: PT/OT evaluation, Turn and reposition approx. every two hours(pillow and wedges)    Nutrition Interventions: Document food/fluid/supplement intake, Offer support with meals,snacks and hydration    Friction and Shear Interventions: Lift sheet, Minimize layers, Transferring/repositioning devices                Problem: Patient Education: Go to Patient Education Activity  Goal: Patient/Family Education  Outcome: Progressing Towards Goal     Problem: Falls - Risk of  Goal: *Absence of Falls  Description: Document Edgard Fall Risk and appropriate interventions in the flowsheet. Outcome: Progressing Towards Goal  Note: Fall Risk Interventions:  Mobility Interventions: PT Consult for mobility concerns, PT Consult for assist device competence, Patient to call before getting OOB, Strengthening exercises (ROM-active/passive)         Medication Interventions: Patient to call before getting OOB, Teach patient to arise slowly    Elimination Interventions:  Toilet paper/wipes in reach, Call light in reach, Patient to call for help with toileting needs    History of Falls Interventions: Door open when patient unattended, Investigate reason for fall, Evaluate medications/consider consulting pharmacy         Problem: Patient Education: Go to Patient Education Activity  Goal: Patient/Family Education  Outcome: Progressing Towards Goal

## 2020-11-03 NOTE — PROGRESS NOTES
Problem: Mobility Impaired (Adult and Pediatric)  Goal: *Acute Goals and Plan of Care (Insert Text)  Description:   FUNCTIONAL STATUS PRIOR TO ADMISSION: Pt is a limited historian - reports that she was essentially bed bound at home over the last several weeks. Prior to that, she ambulated very short distances with RW. Lives with family who provide assist as needed. HOME SUPPORT PRIOR TO ADMISSION: The patient lived with family. 2L O2 at baseline    Physical Therapy Goals  Initiated 10/26/2020; carryover 11/02/2020  1. Patient will move from supine to sit and sit to supine , scoot up and down, and roll side to side in bed with minimal assistance/contact guard assist within 7 day(s). 2.  Patient will transfer from bed to chair and chair to bed with moderate assistance  using the least restrictive device within 7 day(s). 3.  Patient will perform sit to stand with moderate assistance  within 7 day(s). 4.  Patient will ambulate with moderate assistance  for 10 feet with the least restrictive device within 7 day(s). 5.  Patient will ascend/descend 3 stairs with handrail(s) with minimal assistance  within 7 day(s). 6.  Patient will improve Hitchcock Balance score by 7 points within 7 days. Outcome: Progressing Towards Goal     PHYSICAL THERAPY TREATMENT  Patient: Sindy Cason (20 y.o. female)  Date: 11/3/2020  Diagnosis: Brain metastases ([facility] [address] [C79.31]   Brain metastases ([facility] [address]  Procedure(s) (LRB):  ENDOSCOPIC BRONCHOSCOPY ULTRASOUND (EBUS) (N/A)  BRONCHOSCOPY (N/A)  TRANSBRONCHIAL BIOPSY (N/A) 6 Days Post-Op  Precautions: Fall, Skin(LUE NWB (L clavicular fx))  Chart, physical therapy assessment, plan of care and goals were reviewed.     ASSESSMENT  Patient continues with skilled PT services and is marginally progressing towards goals - remains most limited by decreased cardiopulmonary tolerance to activity (currently on 2L), global weakness, impaired balance, lethargy, diffuse pain, and limited use of L UE (2* clavicle fx) leading to increased dependency and falls risk from baseline level. Overall, she required less today compared to last session (continues to greatly fluctuate from day to day). Appears lethargic but alertness did improve with vestibular input and constant stimulation. She was able to mobilize to EOB with min A and increased time; demos good/fair sitting balance. She was able to complete sit<>stand with mod/min A and assist to obtain full upright posture. Able to take several steps over to chair with assist x2 for stability. Quick fatigue. Left in recliner, NAD, and set up for lunch. At this time, she is far below her baseline level and requiring assist x2 - given vast fluctuations, strongly recommend discharge to SNF level rehab. If this is not an option, then recommend home with HHPT and assist x2 for all mobility . Brandon Jose Current Level of Function Impacting Discharge (mobility/balance): mod/min A x2 for transfers today     Other factors to consider for discharge: high falls risk         PLAN :  Patient continues to benefit from skilled intervention to address the above impairments. Continue treatment per established plan of care. to address goals. Recommendation for discharge: (in order for the patient to meet his/her long term goals)  Therapy up to 5 days/week in SNF setting    This discharge recommendation:  A follow-up discussion with the attending provider and/or case management is planned    IF patient discharges home will need the following DME: reports that she has a wheelchair        SUBJECTIVE:   Patient stated [facility].     OBJECTIVE DATA SUMMARY:   Critical Behavior:  Neurologic State: Alert  Orientation Level: Oriented X4  Cognition: Appropriate for age attention/concentration, Follows commands  Safety/Judgement: Decreased insight into deficits, Home safety, Fall prevention, Decreased awareness of need for assistance  Functional Mobility Training:  Bed Mobility:  Supine to Sit: Minimum assistance     Transfers:  Sit to Stand: Minimum assistance;Assist x2  Stand to Sit: Minimum assistance;Assist x2  Bed to Chair: Minimum assistance; Moderate assistance     Balance:  Sitting: Impaired  Sitting - Static: Good (unsupported)  Sitting - Dynamic: Fair (occasional)  Standing: Impaired; With support  Standing - Static: Fair;Constant support  Standing - Dynamic : Fair;Constant support      Activity Tolerance:   Fair    After treatment patient left in no apparent distress:   Sitting in chair, Call bell within reach, and Bed / chair alarm activated    COMMUNICATION/COLLABORATION:   The patients plan of care was discussed with: Occupational therapist and Registered nurse.      Armida oHward PT, DPT   Time Calculation: 11 mins

## 2020-11-03 NOTE — ADT AUTH CERT NOTES
Patient Demographics Patient Name Julito Sanders  
[id number]  Sex Female    
1950  Address [id number] [place] [address] 1  
[facility] [place] [facility] [place]  Phone [phone] (Home) [phone] (Mobile) CSN:   
[id number] Admit Date:  Admit Time  Room  Bed Oct 22, 2020   3:15 AM  662 [[id number]]  01 [[id number]] Attending Providers Provider  Pager  From  To   
Ranjan Rosenbaum MD    10/22/20 Tramaine Phelan, DO   10/22/20  10/23/20 Ranjan Rosenbaum MD   10/23/20  10/23/20 Ban Haynes MD   10/23/20  10/24/20 Tramaine Phelan,    10/24/20  10/29/20 Charles Saab MD   10/29/20 Emergency Contact(s) Name  Relation  Home  Work  Mobile Chantal Barrientos  Daughter-in-Law  [phone] Merritt Presser  Sister  [phone] Utilization Reviews  
 
   
Medical Oncology [address] [place] [address] Day 11 (2020) by Cheikh Samson RN  
 
   
Review Entered  Review Status 2020 10:59  Completed   
   
Criteria Review Care Day: 11 Care Date: 2020 Level of Care: Intermediate Care Guideline Day 3 Level Of Care ( ) * Activity level acceptable   
(X) Floor to discharge 2020 10:57:24 EST by Jeanette Harmon tele bed ( ) * Complete discharge planning Clinical Status   
(X) * Pain and nausea absent or adequately managed 2020 10:57:24 EST by Helena Ringer   
  managed   
(X) * Temperature status acceptable 2020 10:57:24 EST by [place] Bar Ringer   
  97.3 87 20 100 131/49 5 l nc ( ) * No infection, or status acceptable ( ) * No neutropenia, or status acceptable ( ) * Abdominal status acceptable ( ) * Mucositis absent or adequately resolved ( ) * Diarrhea absent or adequately controlled ( ) * Blood cell count acceptable ( ) * Hematologic complications absent or stabilized ( ) * Neurologic status acceptable ( ) * Electrolyte status acceptable ( ) * Tumor lysis absent or resolved ( ) * Malignant effusions absent or adequately controlled ( ) * Pathologic fracture absent or stabilized ( ) * General Discharge Criteria met Interventions ( ) * Intake acceptable   
(X) * No inpatient interventions needed 11/2/2020 10:59:01 EST by Gauri Aguilaralog ssi qid,   
11/2/2020 10:58:50 EST by Gauri Jessica   
  Tyl 650 mg po q6 prn x1, Eliquis 2.5 mg po qd Liptiior 20 mg po qhs, Decadron 4 mg po q12, Pepcid 20 mg po qd, Humalog ssi qid, Humalog 2 u sc tid, Keppra 500 mg po q12, Melatonin 3 mg po qhs, zoloft 25 mg po qhs, Topamax 100 mg po bid,   
11/2/2020 10:59:25 EST by Gauri Jessica Subject: Additional Clinical Information IMPRESSION:   
Subtle increasing right basilar airspace disease. Persistent right suprahilar   
masslike density * Milestone Additional Notes 11/1/20 Admission Summary:   
69 y. o. female,  who has history of chronic systolic heart failure status post ICD, chronic COPD on home oxygen 2 L, hypertension, hyperlipidemia, anticoagulation with Eliquis who presents with fall 2 weeks back and left sided weakness.  She reports that her left arm felt funny and also she was having weakness in the left arm.  It is not painful.  And she did not seek medical attention.  Given persistence of symptoms, EMS was called and to take her to the local emergency room.  At the ER there work-up revealed that she has lung mass as well as brain tumors.  She was given IV Decadron and transferred to this facility for neurosurgical evaluation. She was also noted to have a dislocated left shoulder. David Duran was report of lung mass which is right suprahilar with progression into the mediastinum. David Duran is also report of 1.2 cm right frontal mass with 5 cm right to left shift. There is no report of seizures.   
    
  
Interval history / Subjective: F/U left sided weakness. Patient seen and examined.  Left upper extremity  strength with improvement. Plans for s/p EUS with biopsy . Also talked to sister previously Discharged was cancelled as balbina was having very low BP 74/36. Started on IV fluids BP medications were held as well Talked with Dr. Shipman Service. Marquise Villalobos discharging patient . Mentioned that radiation oncology office will contact patient on Monday regarding appointment. Needs gk. I talked to sister in detail and answered all her questions. I personally gave her number radiation oncology and advised to contact office as well in case she dont get a call. She understood and agreed with plan and was very appreciative    
Discharge was cancelled today as patient desaturating to 80s. reconsulted cardiology. Need input from pulmonology as well.   
    
Assessment & Plan:   
    
Brain masses, consistent with metastatic disease, suspect lung primary:   
Cerebral edema secondary to above:   
Left-sided weakness: mild improvement   
-MRI brain done and demonstrated intracranial metastases, largest 1.3 x 1.3 cm, with surrounding edema. Osseous metastases in right frontal, parietal, C2 body   
-Neurosurgery consulted. No surgical benefit. Will need outpatient radiation oncology, possibly in [place]. CM to arrange.   
-Continue steroids- will weaned to q8 x 3 days, then BID, Continue Keppra   
-Seizure precautions   
-Neuro checks As per [employer] Please send patient home with contact phone number [phone] for Radiation Oncology at [address] [place] [address] (Dr. Bhatt ) or [phone] for Radiation Oncology at [facility] (Dr. Shipman Service). Thank you   
    
Acute hypoxic respiratory failure New onset Patient desaturating Restarted lasix BP somewhat better Cardiology re consulted for meds adjustment Pulmonology following   
  .   
    
Lung mass with erosion into mediastinum, suspicious for carcinoma:   
-Pulmonary consulted.  Plans for EUS/endobronchial biopsy 10/28   
--started on eliquis Not sure if patient was taking plavix. D/w pharmacy as well.   
    
Moderate right pleural effusion:   
-respiratory status stable, s/p thoracentesis 10/27 with approximately 300 ml removed. Based on proteins, appears transudative   
    
Postobstructive infiltrate RUL   
-Seen on CT   
-no unusual cough, fever   
-s/p Ceftriaxone   
    
UTI, Proteus:   
-continue Ceftriaxone for 3 days   
    
Diabetes type 2 with hyperglycemia   
-Hba1c 6.1, will hold lantus, continue SSI, restart amaryl on discharge   
    
Chronic COPD with chronic hypoxic respiratory failure, not in exacerbation   
-Continue home oxygen, AA nebs   
    
Chronic congestive heart failure, presumed systolic given patient has ICD and is on Aldactone   
-Continued home medications   
    
Clavicular fracture:   
Shoulder dislocation ruled out:   
-Appreciate orthopedic surgery. No evidence of shoulder dislocation. Can wear sling for comfort   
    
Chronic anticoagulation with Eliquis for ??   
-The patient does not know why she is on chronic anticoagulation   
-initially held as planning for procedures Restarted home dose. As recommended by pulmonology   
    
H/o CAD   
-not sure if patient was taking plavix. D/w pharmacy   
    
Hypertension   
-Continue with Coreg   
    
Prior tobacco use   
    
Diabetic diet   
    
PT/OT   
    
    
Code status: full. AMD filled out DVT prophylaxis: SCDs   
    
Care Plan discussed with: Patient/Family Anticipated Disposition: Home w/Family Anticipated Discharge:, will need close outpatient follow up near her home. Constitutional: No acute distress, cooperative, pleasant    
ENT: Oral mucosa moist, oropharynx benign. Resp: CTA bilaterally. No wheezing/rhonchi/rales. No accessory muscle use CV: Regular rhythm, normal rate, no murmurs, gallops, rubs   
 GI: Soft, non distended, non tender.  normoactive bowel sounds, no hepatosplenomegaly   
 Musculoskeletal: No edema, warm, 2+ pulses throughout   
 Neurologic: Some left upper extremity  strength, alert and oriented   
                           
  
   
Medical Oncology [facility] - Care Day 6 (10/27/2020) by Xochilt Kirk RN  
 
   
Review Entered  Review Status 10/27/2020 10:28  Completed   
   
Criteria Review Care Day: 6 Care Date: 10/27/2020 Level of Care: Intermediate Care Guideline Day 3 Level Of Care ( ) * Activity level acceptable   
(X) Floor to discharge 10/27/2020 10:27:07 EDT by Paula Dorman [facility] bed ( ) * Complete discharge planning Clinical Status   
(X) * Pain and nausea absent or adequately managed 10/27/2020 10:27:07 EDT by Lindell Moritz   
  managed   
(X) * Temperature status acceptable 10/27/2020 10:27:07 EDT by Lindell Moritz   
  97.4 72 20 99 122/62 2 l nc ( ) * No infection, or status acceptable ( ) * No neutropenia, or status acceptable ( ) * Abdominal status acceptable ( ) * Mucositis absent or adequately resolved ( ) * Diarrhea absent or adequately controlled ( ) * Blood cell count acceptable ( ) * Hematologic complications absent or stabilized ( ) * Neurologic status acceptable ( ) * Electrolyte status acceptable ( ) * Tumor lysis absent or resolved ( ) * Malignant effusions absent or adequately controlled ( ) * Pathologic fracture absent or stabilized ( ) * General Discharge Criteria met Interventions ( ) * Intake acceptable   
(X) * No inpatient interventions needed 10/27/2020 10:28:39 EDT by Lindell Moritz   
  Tyl 650 mg po q6 prn x1, Decadron 4 mg iv q6, Pepcid 20 mg po qd Lasix 40 mg po qd, Humalog ssi qid, Keppra 500 mg iv q12, Aldactone 25 mg po qd Topamax 100 mg po bid,   
* Milestone Additional Notes 10/27 Impression: 1. Acute on chronic hypoxic resp failure 2. Right pleural effusion 3. Right hilar mass; 5.8cm. suspect SCLC; notable mediastinal adenopathy 4. Hepatic and renal lesions 5.  1.2 cm lesion in the right frontal lobe; NS involved 6. COPD, on home O2 at 2lpm   
7. A fib, on eliquis at home 8. CAD, on plavix at home 9. Smoker 10. SAVANNAH on CKD? Plan 
:   
1. Plan for EBUS in afternoon on Wed; discussed with patient and she was in agreement. Per Dr Oracio Doty 2. Right thoracentesis for today. discussed with patient and she was in agreement     
3. plavix and eliquis held since 10/22 4. Follow pleural fluid labs     
5. O2 titration above 90% 6. Discussed with attending

## 2020-11-03 NOTE — PROGRESS NOTES
Physical Therapy  11/3/2020    Order received for \"6 minute walk test, previously desaturated. \" Note patient seen today by therapy team and required min-mod Ax2 for small steps to recliner. Unsure if home O2 assessment appropriate given recommendation for SNF at d/c from treating therapist today, however will follow up on the matter tomorrow to determine needs. Thank you.     Ankita Perez, PT, DPT

## 2020-11-03 NOTE — CONSULTS
Assessment:  SAVANNAH: Bump in serum Cr from recent diuresis. Recent hypotension resolved. Holding [place]/MRA therapy    Metastatic Lung CA    Hyperkalemia    DM2: +hyperglycemia 2 to decadron    HTN: stable    Systolic CHF: Relatively compensated    Proteus UTI    Plan/Recommendations:  Would continue to hold Spironolactone given hyperkalemia  Hold Lasix temporarily-> watch resp status  Low K diet  Control hyperglycemia-> maybe contributing to hyperkalemia  Bladder scan  IV Abs  Strict I/Os if possible  Renally adjust new meds   Am labs    Discussed with patient and RN    Thanks for the consultation. Renal service will follow patient with you. Please contact me with any questions or concerns. Initial Consult note         Patient name: Lynn Win  MR no: [id number]  Date of admission: 10/22/2020  Date of consultation: 11/3/2020  Requested by: Dr. Gioia Bernheim  Reason for consult: SAVANNAH/Hyperkalemia    Patient seen and examined. History obtained from patient and chart review. Relevant labs, data and notes reviewed. HPI: Lynn Win is a 69 y.o. female with PMH significant for DM2, HTN,COPD, Metastatic Lung CA, Systolic CHF who presented back on 10/22/20 with ongoing left sided weakness s/p fall. ED w/u revealed lung mass with multiple brain lesions. Wide spread mets noted. Hospital course complicated by worsening serum Cr from 1.1mg/dl to current level of 1.4mg/dl. MAR shows patient was recently requiring Lasix. Was scheduled for discharge several days ago but developed hypotension/SOB.      PMH:  Past Medical History:   Diagnosis Date    Chronic back pain     Chronic obstructive pulmonary disease ([facility] [address]     Diabetes ([facility] [address]     Fall in home     Heart failure ([facility] [address]     Hypertension     Ill-defined condition     home 02 2LNC     PSH:  Past Surgical History:   Procedure Laterality Date    CARDIAC SURG PROCEDURE UNLIST      internal cardiac defibrillator    HX CHOLECYSTECTOMY  HX CYST REMOVAL      HX PACEMAKER  08/08/2016    [employer]     HX TUBAL LIGATION         Social history:   Social History     Tobacco Use    Smoking status: Former Smoker    Smokeless tobacco: Never Used    Tobacco comment: quit June 2020   Substance Use Topics    Alcohol use: Not Currently    Drug use: Never       Family history:  Not contributory    No Known Allergies    Current Facility-Administered Medications   Medication Dose Route Frequency Last Dose    cefTRIAXone (ROCEPHIN) 1 g in 0.9% sodium chloride (MBP/ADV) 50 mL  1 g IntraVENous Q24H 1 g at 11/03/20 1041    azithromycin (ZITHROMAX) 500 mg in 0.9% sodium chloride (MBP/ADV) 250 mL  500 mg IntraVENous Q24H 500 mg at 11/03/20 1119    furosemide (LASIX) tablet 40 mg  40 mg Oral DAILY 40 mg at 11/03/20 1040    clopidogreL (PLAVIX) tablet 75 mg  75 mg Oral DAILY 75 mg at 11/03/20 1039    0.9% sodium chloride infusion  75 mL/hr IntraVENous CONTINUOUS 75 mL/hr at 10/31/20 1525    insulin lispro (HUMALOG) injection 2 Units  2 Units SubCUTAneous TIDAC 2 Units at 11/02/20 1817    dexAMETHasone (DECADRON) tablet 4 mg  4 mg Oral Q12H 4 mg at 11/03/20 1039    apixaban (ELIQUIS) tablet 2.5 mg  2.5 mg Oral BID 2.5 mg at 11/03/20 1040    levETIRAcetam (KEPPRA) tablet 500 mg  500 mg Oral Q12H 500 mg at 11/03/20 0714    albuterol-ipratropium (DUO-NEB) 2.5 MG-0.5 MG/3 ML  3 mL Nebulization Q4H PRN      melatonin tablet 3 mg  3 mg Oral QHS 3 mg at 11/01/20 2122    sodium chloride (NS) flush 5-40 mL  5-40 mL IntraVENous Q8H 10 mL at 11/03/20 0601    sodium chloride (NS) flush 5-40 mL  5-40 mL IntraVENous PRN      acetaminophen (TYLENOL) tablet 650 mg  650 mg Oral Q6H  mg at 11/01/20 1401    Or    acetaminophen (TYLENOL) suppository 650 mg  650 mg Rectal Q6H PRN      polyethylene glycol (MIRALAX) packet 17 g  17 g Oral DAILY PRN      ondansetron (ZOFRAN ODT) tablet 4 mg  4 mg Oral Q8H PRN      Or    ondansetron (ZOFRAN) injection 4 mg  4 mg IntraVENous Q6H PRN      famotidine (PEPCID) tablet 20 mg  20 mg Oral DAILY 20 mg at 11/03/20 1039    atorvastatin (LIPITOR) tablet 20 mg  20 mg Oral QHS 20 mg at 11/02/20 2158    carvediloL (COREG) tablet 6.25 mg  6.25 mg Oral BID WITH MEALS 6.25 mg at 10/31/20 0824    [Held by provider] lisinopriL (PRINIVIL, ZESTRIL) tablet 5 mg  5 mg Oral DAILY Stopped at 10/31/20 0900    sertraline (ZOLOFT) tablet 25 mg  25 mg Oral QPM 25 mg at 11/02/20 1817    [Held by provider] spironolactone (ALDACTONE) tablet 25 mg  25 mg Oral DAILY Stopped at 10/31/20 0900    topiramate (TOPAMAX) tablet 100 mg  100 mg Oral  mg at 11/03/20 1039    glucose chewable tablet 16 g  4 Tab Oral PRN 16 g at 11/02/20 2222    glucagon (GLUCAGEN) injection 1 mg  1 mg IntraMUSCular PRN      dextrose 10% infusion 0-250 mL  0-250 mL IntraVENous  mL at 10/28/20 0734    insulin lispro (HUMALOG) injection   SubCUTAneous AC&HS Stopped at 11/02/20 2200       ROS (besides HPI):    General: No fever. + weight changes  ENT: No hearing loss or visual changes  Cardiovascular: No Chest pain  Pulmonary: + SOB  GI: No abdominal pain. No Nausea/Vomiting/Diarrhea. No blood in stool  : No blood in urine. No foamy or cloudy urine  Musculoskeletal: No joint swelling or redness. No morning stiffness  Endocrine: no cold or heat intolerance  Psych: denies anxiety or depression  Neuro: No light headedness or dizziness    Objective   Visit Vitals  BP (!) 123/99 (BP 1 Location: Right arm, BP Patient Position: At rest)   Pulse 71   Temp 97.7 °F (36.5 °C)   Resp 18   Ht 5' 2.01\" (1.575 m)   Wt 51.7 kg (113 lb 15.7 oz)   SpO2 100%   BMI 20.84 kg/m²       Physical Exam:    Gen: NAD/Frail    HEENT: AT/NC, EOMI, moist mucous membrane, no scleral icterus    Neck: no JVD, no cervical lymphadenopathy, no carotid bruit    Lungs/Chest wall: Decreased BS b/l bases    Cardiovascular: Normal S1/S2, normal rate, regular rhythm.      Abdomen: soft, NT, ND, BS+, no HSM    Ext: no clubbing or cyanosis. No edema    Skin: warm and dry.  No rashes    : no enciso    CNS: listless    Labs/Data:    Lab Results   Component Value Date/Time    Sodium 136 11/02/2020 11:32 AM    Potassium 5.6 (H) 11/02/2020 11:32 AM    Chloride 104 11/02/2020 11:32 AM    CO2 30 11/02/2020 11:32 AM    Anion gap 2 (L) 11/02/2020 11:32 AM    Glucose 272 (H) 11/02/2020 11:32 AM    BUN 60 (H) 11/02/2020 11:32 AM    Creatinine 1.46 (H) 11/02/2020 11:32 AM    BUN/Creatinine ratio 41 (H) 11/02/2020 11:32 AM    GFR est AA 43 (L) 11/02/2020 11:32 AM    GFR est non-AA 35 (L) 11/02/2020 11:32 AM    Calcium 8.9 11/02/2020 11:32 AM       Lab Results   Component Value Date/Time    WBC 13.0 (H) 11/02/2020 11:32 AM    HGB 10.8 (L) 11/02/2020 11:32 AM    HCT 37.0 11/02/2020 11:32 AM    PLATELET 027 17/36/5756 11:32 AM    .4 (H) 11/02/2020 11:32 AM       Urine analysis: 10/22/20 reviewed          No components found for: SPEP, UPEP  No results found for: PUQ, PROTU2, PROTU1, BJP1, CPE1, IMEL1, MET2  No results found for: MCACR, MCA1, MCA2, MCA3, MCAU, MCAU2, MCALPOCT    No intake or output data in the 24 hours ending 11/03/20 1218    Wt Readings from Last 3 Encounters:   11/03/20 51.7 kg (113 lb 15.7 oz)   10/21/20 51.7 kg (114 lb)       Signed by:  Emma Guzman MD  Nephrology and Hypertension  [facility]

## 2020-11-03 NOTE — PROGRESS NOTES
Bedside shift change report given to Talia RN (oncoming nurse) by Eric Cortez RN (offgoing nurse). Report included the following information SBAR, Kardex, Cardiac Rhythm NSR, Quality Measures and Dual Neuro Assessment.

## 2020-11-03 NOTE — PROGRESS NOTES
Problem: Pain  Goal: *Control of Pain  11/3/2020 0527 by Paolo Gtz  Outcome: Progressing Towards Goal  11/3/2020 0322 by Paolo Gtz  Outcome: Progressing Towards Goal     Problem: Patient Education: Go to Patient Education Activity  Goal: Patient/Family Education  Outcome: Progressing Towards Goal     Problem: Pressure Injury - Risk of  Goal: *Prevention of pressure injury  Description: Document Elio Scale and appropriate interventions in the flowsheet. 11/3/2020 0527 by Paolo Gtz  Outcome: Progressing Towards Goal  Note: Pressure Injury Interventions:  Sensory Interventions: Minimize linen layers, Maintain/enhance activity level, Turn and reposition approx. every two hours (pillows and wedges if needed), Keep linens dry and wrinkle-free    Moisture Interventions: Check for incontinence Q2 hours and as needed, Internal/External urinary devices, Minimize layers    Activity Interventions: PT/OT evaluation, Increase time out of bed    Mobility Interventions: PT/OT evaluation, Turn and reposition approx. every two hours(pillow and wedges)    Nutrition Interventions: Document food/fluid/supplement intake, Offer support with meals,snacks and hydration    Friction and Shear Interventions: Lift sheet, Minimize layers, Transferring/repositioning devices             11/3/2020 0322 by Paolo Gtz  Outcome: Progressing Towards Goal  Note: Pressure Injury Interventions:  Sensory Interventions: Minimize linen layers, Maintain/enhance activity level, Turn and reposition approx. every two hours (pillows and wedges if needed), Keep linens dry and wrinkle-free    Moisture Interventions: Check for incontinence Q2 hours and as needed, Internal/External urinary devices, Minimize layers    Activity Interventions: PT/OT evaluation, Increase time out of bed    Mobility Interventions: PT/OT evaluation, Turn and reposition approx.  every two hours(pillow and wedges)    Nutrition Interventions: Document food/fluid/supplement intake, Offer support with meals,snacks and hydration    Friction and Shear Interventions: Lift sheet, Minimize layers, Transferring/repositioning devices                Problem: Patient Education: Go to Patient Education Activity  Goal: Patient/Family Education  11/3/2020 0527 by Javed Mansfield  Outcome: Progressing Towards Goal  11/3/2020 0322 by Javed Mansfield  Outcome: Progressing Towards Goal     Problem: Falls - Risk of  Goal: *Absence of Falls  Description: Document Susana Leone Fall Risk and appropriate interventions in the flowsheet. 11/3/2020 0527 by Javed Mansfield  Outcome: Progressing Towards Goal  Note: Fall Risk Interventions:  Mobility Interventions: PT Consult for mobility concerns, PT Consult for assist device competence, Patient to call before getting OOB, Strengthening exercises (ROM-active/passive)         Medication Interventions: Patient to call before getting OOB, Teach patient to arise slowly    Elimination Interventions: Toilet paper/wipes in reach, Call light in reach, Patient to call for help with toileting needs    History of Falls Interventions: Door open when patient unattended, Investigate reason for fall, Evaluate medications/consider consulting pharmacy      11/3/2020 0322 by Javed Mansfield  Outcome: Progressing Towards Goal  Note: Fall Risk Interventions:  Mobility Interventions: PT Consult for mobility concerns, PT Consult for assist device competence, Patient to call before getting OOB, Strengthening exercises (ROM-active/passive)         Medication Interventions: Patient to call before getting OOB, Teach patient to arise slowly    Elimination Interventions:  Toilet paper/wipes in reach, Call light in reach, Patient to call for help with toileting needs    History of Falls Interventions: Door open when patient unattended, Investigate reason for fall, Evaluate medications/consider consulting pharmacy         Problem: Patient Education: Go to Patient Education Activity  Goal: Patient/Family Education  11/3/2020 0527 by Stefan Penn  Outcome: Progressing Towards Goal  11/3/2020 0322 by Stefan Penn  Outcome: Progressing Towards Goal

## 2020-11-04 ENCOUNTER — APPOINTMENT (OUTPATIENT)
Dept: CT IMAGING | Age: 70
DRG: 054 | End: 2020-11-04
Attending: RADIOLOGY
Payer: MEDICARE

## 2020-11-04 LAB
ALBUMIN SERPL-MCNC: 3 G/DL (ref 3.5–5)
ALBUMIN/GLOB SERPL: 0.9 {RATIO} (ref 1.1–2.2)
ALP SERPL-CCNC: 65 U/L (ref 45–117)
ALT SERPL-CCNC: 14 U/L (ref 12–78)
ANION GAP SERPL CALC-SCNC: 3 MMOL/L (ref 5–15)
AST SERPL-CCNC: 9 U/L (ref 15–37)
BASOPHILS # BLD: 0 K/UL (ref 0–0.1)
BASOPHILS NFR BLD: 0 % (ref 0–1)
BILIRUB SERPL-MCNC: 0.3 MG/DL (ref 0.2–1)
BUN SERPL-MCNC: 53 MG/DL (ref 6–20)
BUN/CREAT SERPL: 46 (ref 12–20)
CALCIUM SERPL-MCNC: 9 MG/DL (ref 8.5–10.1)
CHLORIDE SERPL-SCNC: 99 MMOL/L (ref 97–108)
CO2 SERPL-SCNC: 33 MMOL/L (ref 21–32)
CREAT SERPL-MCNC: 1.15 MG/DL (ref 0.55–1.02)
DIFFERENTIAL METHOD BLD: ABNORMAL
EOSINOPHIL # BLD: 0 K/UL (ref 0–0.4)
EOSINOPHIL NFR BLD: 0 % (ref 0–7)
ERYTHROCYTE [DISTWIDTH] IN BLOOD BY AUTOMATED COUNT: 13.2 % (ref 11.5–14.5)
GLOBULIN SER CALC-MCNC: 3.2 G/DL (ref 2–4)
GLUCOSE BLD STRIP.AUTO-MCNC: 138 MG/DL (ref 65–100)
GLUCOSE BLD STRIP.AUTO-MCNC: 211 MG/DL (ref 65–100)
GLUCOSE BLD STRIP.AUTO-MCNC: 239 MG/DL (ref 65–100)
GLUCOSE BLD STRIP.AUTO-MCNC: 284 MG/DL (ref 65–100)
GLUCOSE BLD STRIP.AUTO-MCNC: 289 MG/DL (ref 65–100)
GLUCOSE SERPL-MCNC: 250 MG/DL (ref 65–100)
HCT VFR BLD AUTO: 35.1 % (ref 35–47)
HGB BLD-MCNC: 10.6 G/DL (ref 11.5–16)
IMM GRANULOCYTES # BLD AUTO: 0 K/UL (ref 0–0.04)
IMM GRANULOCYTES NFR BLD AUTO: 0 % (ref 0–0.5)
LYMPHOCYTES # BLD: 0.5 K/UL (ref 0.8–3.5)
LYMPHOCYTES NFR BLD: 6 % (ref 12–49)
MAGNESIUM SERPL-MCNC: 2.3 MG/DL (ref 1.6–2.4)
MCH RBC QN AUTO: 30.2 PG (ref 26–34)
MCHC RBC AUTO-ENTMCNC: 30.2 G/DL (ref 30–36.5)
MCV RBC AUTO: 100 FL (ref 80–99)
MONOCYTES # BLD: 0.2 K/UL (ref 0–1)
MONOCYTES NFR BLD: 3 % (ref 5–13)
NEUTS SEG # BLD: 6.8 K/UL (ref 1.8–8)
NEUTS SEG NFR BLD: 91 % (ref 32–75)
NRBC # BLD: 0 K/UL (ref 0–0.01)
NRBC BLD-RTO: 0 PER 100 WBC
PHOSPHATE SERPL-MCNC: 3.7 MG/DL (ref 2.6–4.7)
PLATELET # BLD AUTO: 172 K/UL (ref 150–400)
PMV BLD AUTO: 10.6 FL (ref 8.9–12.9)
POTASSIUM SERPL-SCNC: 5.6 MMOL/L (ref 3.5–5.1)
PROT SERPL-MCNC: 6.2 G/DL (ref 6.4–8.2)
RBC # BLD AUTO: 3.51 M/UL (ref 3.8–5.2)
RBC MORPH BLD: ABNORMAL
SERVICE CMNT-IMP: ABNORMAL
SODIUM SERPL-SCNC: 135 MMOL/L (ref 136–145)
WBC # BLD AUTO: 7.5 K/UL (ref 3.6–11)

## 2020-11-04 PROCEDURE — 77334 RADIATION TREATMENT AID(S): CPT

## 2020-11-04 PROCEDURE — 74011636637 HC RX REV CODE- 636/637: Performed by: INTERNAL MEDICINE

## 2020-11-04 PROCEDURE — 84100 ASSAY OF PHOSPHORUS: CPT

## 2020-11-04 PROCEDURE — 74011250637 HC RX REV CODE- 250/637: Performed by: HOSPITALIST

## 2020-11-04 PROCEDURE — 77030038269 HC DRN EXT URIN PURWCK BARD -A

## 2020-11-04 PROCEDURE — 74011250637 HC RX REV CODE- 250/637: Performed by: INTERNAL MEDICINE

## 2020-11-04 PROCEDURE — 85025 COMPLETE CBC W/AUTO DIFF WBC: CPT

## 2020-11-04 PROCEDURE — 74011000258 HC RX REV CODE- 258: Performed by: INTERNAL MEDICINE

## 2020-11-04 PROCEDURE — 74011250637 HC RX REV CODE- 250/637: Performed by: NURSE PRACTITIONER

## 2020-11-04 PROCEDURE — 77010033678 HC OXYGEN DAILY

## 2020-11-04 PROCEDURE — 77290 THER RAD SIMULAJ FIELD CPLX: CPT

## 2020-11-04 PROCEDURE — 74011250636 HC RX REV CODE- 250/636: Performed by: INTERNAL MEDICINE

## 2020-11-04 PROCEDURE — 65660000000 HC RM CCU STEPDOWN

## 2020-11-04 PROCEDURE — 74011636637 HC RX REV CODE- 636/637: Performed by: HOSPITALIST

## 2020-11-04 PROCEDURE — 83735 ASSAY OF MAGNESIUM: CPT

## 2020-11-04 PROCEDURE — 82962 GLUCOSE BLOOD TEST: CPT

## 2020-11-04 PROCEDURE — 97530 THERAPEUTIC ACTIVITIES: CPT

## 2020-11-04 PROCEDURE — 77014 CT GUIDED THERAPY PLAN/PLACEMENT: CPT

## 2020-11-04 PROCEDURE — 36415 COLL VENOUS BLD VENIPUNCTURE: CPT

## 2020-11-04 PROCEDURE — 97116 GAIT TRAINING THERAPY: CPT

## 2020-11-04 PROCEDURE — 80053 COMPREHEN METABOLIC PANEL: CPT

## 2020-11-04 RX ORDER — INSULIN GLARGINE 100 [IU]/ML
15 INJECTION, SOLUTION SUBCUTANEOUS DAILY
Status: DISCONTINUED | OUTPATIENT
Start: 2020-11-04 | End: 2020-11-06

## 2020-11-04 RX ADMIN — APIXABAN 2.5 MG: 2.5 TABLET, FILM COATED ORAL at 17:28

## 2020-11-04 RX ADMIN — INSULIN LISPRO 5 UNITS: 100 INJECTION, SOLUTION INTRAVENOUS; SUBCUTANEOUS at 17:28

## 2020-11-04 RX ADMIN — FAMOTIDINE 20 MG: 20 TABLET ORAL at 08:41

## 2020-11-04 RX ADMIN — INSULIN LISPRO 3 UNITS: 100 INJECTION, SOLUTION INTRAVENOUS; SUBCUTANEOUS at 12:14

## 2020-11-04 RX ADMIN — TOPIRAMATE 100 MG: 100 TABLET, FILM COATED ORAL at 08:41

## 2020-11-04 RX ADMIN — ATORVASTATIN CALCIUM 20 MG: 20 TABLET, FILM COATED ORAL at 21:23

## 2020-11-04 RX ADMIN — LEVETIRACETAM 500 MG: 500 TABLET, FILM COATED ORAL at 18:10

## 2020-11-04 RX ADMIN — LEVETIRACETAM 500 MG: 500 TABLET, FILM COATED ORAL at 06:16

## 2020-11-04 RX ADMIN — Medication 10 ML: at 14:28

## 2020-11-04 RX ADMIN — Medication 3 MG: at 21:23

## 2020-11-04 RX ADMIN — DEXAMETHASONE 4 MG: 4 TABLET ORAL at 21:23

## 2020-11-04 RX ADMIN — CARVEDILOL 6.25 MG: 6.25 TABLET, FILM COATED ORAL at 08:41

## 2020-11-04 RX ADMIN — DEXAMETHASONE 4 MG: 4 TABLET ORAL at 08:40

## 2020-11-04 RX ADMIN — APIXABAN 2.5 MG: 2.5 TABLET, FILM COATED ORAL at 08:41

## 2020-11-04 RX ADMIN — ACETAMINOPHEN 650 MG: 325 TABLET ORAL at 18:10

## 2020-11-04 RX ADMIN — CLOPIDOGREL BISULFATE 75 MG: 75 TABLET ORAL at 08:40

## 2020-11-04 RX ADMIN — INSULIN GLARGINE 15 UNITS: 100 INJECTION, SOLUTION SUBCUTANEOUS at 11:02

## 2020-11-04 RX ADMIN — SERTRALINE HYDROCHLORIDE 25 MG: 50 TABLET ORAL at 17:28

## 2020-11-04 RX ADMIN — TOPIRAMATE 100 MG: 100 TABLET, FILM COATED ORAL at 17:28

## 2020-11-04 RX ADMIN — INSULIN LISPRO 2 UNITS: 100 INJECTION, SOLUTION INTRAVENOUS; SUBCUTANEOUS at 08:39

## 2020-11-04 RX ADMIN — AZITHROMYCIN MONOHYDRATE 500 MG: 500 INJECTION, POWDER, LYOPHILIZED, FOR SOLUTION INTRAVENOUS at 11:02

## 2020-11-04 RX ADMIN — Medication 10 ML: at 05:24

## 2020-11-04 RX ADMIN — INSULIN LISPRO 3 UNITS: 100 INJECTION, SOLUTION INTRAVENOUS; SUBCUTANEOUS at 08:38

## 2020-11-04 RX ADMIN — CARVEDILOL 6.25 MG: 6.25 TABLET, FILM COATED ORAL at 17:28

## 2020-11-04 RX ADMIN — CEFTRIAXONE SODIUM 1 G: 1 INJECTION, POWDER, FOR SOLUTION INTRAMUSCULAR; INTRAVENOUS at 08:37

## 2020-11-04 RX ADMIN — PATIROMER 8.4 G: 8.4 POWDER, FOR SUSPENSION ORAL at 10:48

## 2020-11-04 RX ADMIN — Medication 10 ML: at 21:23

## 2020-11-04 NOTE — PROGRESS NOTES
Problem: Pain  Goal: *Control of Pain  Outcome: Progressing Towards Goal     Problem: Pressure Injury - Risk of  Goal: *Prevention of pressure injury  Description: Document Elio Scale and appropriate interventions in the flowsheet. Outcome: Progressing Towards Goal  Note: Pressure Injury Interventions:  Sensory Interventions: Assess changes in LOC, Chair cushion, Check visual cues for pain, Float heels, Keep linens dry and wrinkle-free, Minimize linen layers    Moisture Interventions: Absorbent underpads, Apply protective barrier, creams and emollients, Internal/External urinary devices    Activity Interventions: Chair cushion, Increase time out of bed, PT/OT evaluation    Mobility Interventions: Assess need for specialty bed, PT/OT evaluation    Nutrition Interventions: Document food/fluid/supplement intake    Friction and Shear Interventions: Apply protective barrier, creams and emollients, Lift team/patient mobility team, Minimize layers                Problem: Falls - Risk of  Goal: *Absence of Falls  Description: Document Edgard Fall Risk and appropriate interventions in the flowsheet.   Outcome: Progressing Towards Goal  Note: Fall Risk Interventions:  Mobility Interventions: Assess mobility with egress test, Communicate number of staff needed for ambulation/transfer, Patient to call before getting OOB, PT Consult for mobility concerns    Mentation Interventions: Adequate sleep, hydration, pain control, Door open when patient unattended, Bed/chair exit alarm, More frequent rounding    Medication Interventions: Bed/chair exit alarm, Patient to call before getting OOB, Teach patient to arise slowly    Elimination Interventions: Bed/chair exit alarm, Call light in reach, Toileting schedule/hourly rounds    History of Falls Interventions: Bed/chair exit alarm, Door open when patient unattended, Investigate reason for fall

## 2020-11-04 NOTE — PROGRESS NOTES
Problem: Pressure Injury - Risk of  Goal: *Prevention of pressure injury  Description: Document Elio Scale and appropriate interventions in the flowsheet. Outcome: Progressing Towards Goal  Note: Pressure Injury Interventions:  Sensory Interventions: Avoid rigorous massage over bony prominences, Check visual cues for pain, Discuss PT/OT consult with provider, Float heels, Keep linens dry and wrinkle-free, Minimize linen layers, Monitor skin under medical devices, Pad between skin to skin, Turn and reposition approx. every two hours (pillows and wedges if needed)    Moisture Interventions: Absorbent underpads, Apply protective barrier, creams and emollients, Internal/External urinary devices, Minimize layers, Check for incontinence Q2 hours and as needed    Activity Interventions: PT/OT evaluation, Increase time out of bed    Mobility Interventions: HOB 30 degrees or less, Float heels, Turn and reposition approx. every two hours(pillow and wedges), PT/OT evaluation    Nutrition Interventions: Offer support with meals,snacks and hydration, Document food/fluid/supplement intake    Friction and Shear Interventions: Feet elevated on foot rest, Lift sheet, Lift team/patient mobility team, Transferring/repositioning devices, Minimize layers, Apply protective barrier, creams and emollients                Problem: Falls - Risk of  Goal: *Absence of Falls  Description: Document Edgard Fall Risk and appropriate interventions in the flowsheet.   Outcome: Progressing Towards Goal  Note: Fall Risk Interventions:  Mobility Interventions: Bed/chair exit alarm, Communicate number of staff needed for ambulation/transfer, OT consult for ADLs, Patient to call before getting OOB, PT Consult for assist device competence    Mentation Interventions: Adequate sleep, hydration, pain control, Bed/chair exit alarm, Door open when patient unattended, HELP ([address]) if available, Increase mobility, More frequent rounding, Toileting rounds, Update white board, Room close to nurse's station, Reorient patient    Medication Interventions: Assess postural VS orthostatic hypotension, Patient to call before getting OOB, Evaluate medications/consider consulting pharmacy, Bed/chair exit alarm, Teach patient to arise slowly    Elimination Interventions: Bed/chair exit alarm, Call light in reach, Patient to call for help with toileting needs, Stay With Me (per policy), Toilet paper/wipes in reach, Toileting schedule/hourly rounds    History of Falls Interventions: Evaluate medications/consider consulting pharmacy, Door open when patient unattended, Consult care management for discharge planning, Bed/chair exit alarm, Room close to nurse's station         Problem: Pain  Goal: *Control of Pain  Outcome: Progressing Towards Goal  Goal: *PALLIATIVE CARE:  Alleviation of Pain  Outcome: Progressing Towards Goal     Problem: Patient Education: Go to Patient Education Activity  Goal: Patient/Family Education  Outcome: Progressing Towards Goal

## 2020-11-04 NOTE — PROGRESS NOTES
Hematology-Oncology Progress Note      Joe Horowitz  1950  [id number]  11/4/2020      Subjective:     Awake and alert, but confused about date today: \"Its 3030\". Denies pain or dyspnea. Allergies: Patient has no known allergies.   Current Facility-Administered Medications   Medication Dose Route Frequency Provider Last Rate Last Dose    cefTRIAXone (ROCEPHIN) 1 g in 0.9% sodium chloride (MBP/ADV) 50 mL  1 g IntraVENous Q24H Sally Garcia  mL/hr at 11/03/20 1041 1 g at 11/03/20 1041    azithromycin (ZITHROMAX) 500 mg in 0.9% sodium chloride (MBP/ADV) 250 mL  500 mg IntraVENous Q24H Sally Garcia  mL/hr at 11/03/20 1119 500 mg at 11/03/20 1119    [Held by provider] furosemide (LASIX) tablet 40 mg  40 mg Oral DAILY Sally Garcia MD   40 mg at 11/03/20 1040    clopidogreL (PLAVIX) tablet 75 mg  75 mg Oral DAILY Sally Garcia MD   75 mg at 11/03/20 1039    0.9% sodium chloride infusion  75 mL/hr IntraVENous CONTINUOUS Sally Garcia MD 75 mL/hr at 10/31/20 1525 75 mL/hr at 10/31/20 1525    insulin lispro (HUMALOG) injection 2 Units  2 Units SubCUTAneous Aislinn Colorado MD   2 Units at 11/03/20 1835    dexAMETHasone (DECADRON) tablet 4 mg  4 mg Oral Q12H Sally Garcia MD   4 mg at 11/03/20 2121    apixaban (ELIQUIS) tablet 2.5 mg  2.5 mg Oral BID Sally Garcia MD   2.5 mg at 11/03/20 1834    levETIRAcetam (KEPPRA) tablet 500 mg  500 mg Oral Q12H Gabriela Mercado NP   500 mg at 11/04/20 0616    albuterol-ipratropium (DUO-NEB) 2.5 MG-0.5 MG/3 ML  3 mL Nebulization Q4H PRN Tieramark MANRIQUE, DO   3 mL at 11/03/20 1432    melatonin tablet 3 mg  3 mg Oral QHS Samantha Burgess M, DO   3 mg at 11/03/20 2123    sodium chloride (NS) flush 5-40 mL  5-40 mL IntraVENous Q8H Roly Hill MD   10 mL at 11/04/20 0524    sodium chloride (NS) flush 5-40 mL  5-40 mL IntraVENous PRN Laura Hill MD        acetaminophen (TYLENOL) tablet 650 mg  650 mg Oral Q6H PRN Aparna Daniels MD 650 mg at 11/01/20 1401    Or    acetaminophen (TYLENOL) suppository 650 mg  650 mg Rectal Q6H PRN Ingrid Hill MD        polyethylene glycol (MIRALAX) packet 17 g  17 g Oral DAILY PRN Ingrid Hill MD        ondansetron (ZOFRAN ODT) tablet 4 mg  4 mg Oral Q8H PRN Ingrid Hill MD        Or    ondansetron (ZOFRAN) injection 4 mg  4 mg IntraVENous Q6H PRN Ingrid Hill MD        famotidine (PEPCID) tablet 20 mg  20 mg Oral DAILY Maye Zabala MD   20 mg at 11/03/20 1039    atorvastatin (LIPITOR) tablet 20 mg  20 mg Oral QHS Maye Zabala MD   20 mg at 11/03/20 2121    carvediloL (COREG) tablet 6.25 mg  6.25 mg Oral BID WITH MEALS Dotty Montague MD   Stopped at 11/03/20 1850    [Held by provider] lisinopriL (PRINIVIL, ZESTRIL) tablet 5 mg  5 mg Oral DAILY Maye Zabala MD   Stopped at 10/31/20 0900    sertraline (ZOLOFT) tablet 25 mg  25 mg Oral QPM Maye Zabala MD   25 mg at 11/03/20 1835    [Held by provider] spironolactone (ALDACTONE) tablet 25 mg  25 mg Oral DAILY Maye Zabala MD   Stopped at 10/31/20 0900    topiramate (TOPAMAX) tablet 100 mg  100 mg Oral BID Maye Zabala MD   100 mg at 11/03/20 1834    glucose chewable tablet 16 g  4 Tab Oral PRN Maye Zabala MD   16 g at 11/02/20 2222    glucagon (GLUCAGEN) injection 1 mg  1 mg IntraMUSCular PRN Maye Zabala MD        dextrose 10% infusion 0-250 mL  0-250 mL IntraVENous PRN Maye Zabala  mL/hr at 10/28/20 0734 125 mL at 10/28/20 0734    insulin lispro (HUMALOG) injection   SubCUTAneous AC&HS Maye Zabala MD   Stopped at 11/03/20 1630     Objective:     Patient Vitals for the past 24 hrs:   BP Temp Pulse Resp SpO2   11/04/20 0759 (!) 141/62 98.2 °F (36.8 °C) 63 16 100 %   11/04/20 0551 (!) 109/47 98.4 °F (36.9 °C) 70 17 100 %   11/04/20 0149 130/62 97.6 °F (36.4 °C) 88 19 100 %   11/03/20 2147 (!) 120/49 97.6 °F (36.4 °C) 73 17 100 % 11/03/20 1800 98/75 98.1 °F (36.7 °C) 77 15 100 %   11/03/20 1432     100 %   11/03/20 1400 133/75 97.6 °F (36.4 °C) 85 21 100 %   11/03/20 1000 (!) 123/99 97.7 °F (36.5 °C) 71 18 100 %       Gen: NAD  HEENT: PERRL, Sclerae anicteric  Cv: RRR without m/r/g  Pulm: CTA bilaterally  Abd: NABS, NTND, No HSM  Ext: No c/c/e    Available labs reviewed:  Labs:    Recent Results (from the past 24 hour(s))   GLUCOSE, POC    Collection Time: 11/03/20 10:55 AM   Result Value Ref Range    Glucose (POC) 372 (H) 65 - 100 mg/dL    Performed by Kodi David    CBC WITH AUTOMATED DIFF    Collection Time: 11/03/20  1:33 PM   Result Value Ref Range    WBC 14.4 (H) 3.6 - 11.0 K/uL    RBC 3.54 (L) 3.80 - 5.20 M/uL    HGB 11.0 (L) 11.5 - 16.0 g/dL    HCT 36.1 35.0 - 47.0 %    .0 (H) 80.0 - 99.0 FL    MCH 31.1 26.0 - 34.0 PG    MCHC 30.5 30.0 - 36.5 g/dL    RDW 13.3 11.5 - 14.5 %    PLATELET 028 358 - 781 K/uL    MPV 10.5 8.9 - 12.9 FL    NRBC 0.0 0  WBC    ABSOLUTE NRBC 0.00 0.00 - 0.01 K/uL    NEUTROPHILS 94 (H) 32 - 75 %    LYMPHOCYTES 3 (L) 12 - 49 %    MONOCYTES 2 (L) 5 - 13 %    EOSINOPHILS 0 0 - 7 %    BASOPHILS 0 0 - 1 %    IMMATURE GRANULOCYTES 1 (H) 0.0 - 0.5 %    ABS. NEUTROPHILS 13.6 (H) 1.8 - 8.0 K/UL    ABS. LYMPHOCYTES 0.4 (L) 0.8 - 3.5 K/UL    ABS. MONOCYTES 0.3 0.0 - 1.0 K/UL    ABS. EOSINOPHILS 0.0 0.0 - 0.4 K/UL    ABS. BASOPHILS 0.0 0.0 - 0.1 K/UL    ABS. IMM.  GRANS. 0.1 (H) 0.00 - 0.04 K/UL    DF SMEAR SCANNED      RBC COMMENTS NORMOCYTIC, NORMOCHROMIC     METABOLIC PANEL, BASIC    Collection Time: 11/03/20  1:33 PM   Result Value Ref Range    Sodium 139 136 - 145 mmol/L    Potassium 4.6 3.5 - 5.1 mmol/L    Chloride 108 97 - 108 mmol/L    CO2 23 21 - 32 mmol/L    Anion gap 8 5 - 15 mmol/L    Glucose 310 (H) 65 - 100 mg/dL    BUN 48 (H) 6 - 20 MG/DL    Creatinine 1.05 (H) 0.55 - 1.02 MG/DL    BUN/Creatinine ratio 46 (H) 12 - 20      GFR est AA >60 >60 ml/min/1.73m2    GFR est non-AA 52 (L) >60 ml/min/1.73m2    Calcium 7.8 (L) 8.5 - 10.1 MG/DL   GLUCOSE, POC    Collection Time: 11/03/20  5:15 PM   Result Value Ref Range    Glucose (POC) 92 65 - 100 mg/dL    Performed by Сергей Fulton    GLUCOSE, POC    Collection Time: 11/03/20  9:19 PM   Result Value Ref Range    Glucose (POC) 168 (H) 65 - 100 mg/dL    Performed by Timoteo Burch    CBC WITH AUTOMATED DIFF    Collection Time: 11/04/20  4:40 AM   Result Value Ref Range    WBC 7.5 3.6 - 11.0 K/uL    RBC 3.51 (L) 3.80 - 5.20 M/uL    HGB 10.6 (L) 11.5 - 16.0 g/dL    HCT 35.1 35.0 - 47.0 %    .0 (H) 80.0 - 99.0 FL    MCH 30.2 26.0 - 34.0 PG    MCHC 30.2 30.0 - 36.5 g/dL    RDW 13.2 11.5 - 14.5 %    PLATELET 276 829 - 908 K/uL    MPV 10.6 8.9 - 12.9 FL    NRBC 0.0 0  WBC    ABSOLUTE NRBC 0.00 0.00 - 0.01 K/uL    NEUTROPHILS 91 (H) 32 - 75 %    LYMPHOCYTES 6 (L) 12 - 49 %    MONOCYTES 3 (L) 5 - 13 %    EOSINOPHILS 0 0 - 7 %    BASOPHILS 0 0 - 1 %    IMMATURE GRANULOCYTES 0 0.0 - 0.5 %    ABS. NEUTROPHILS 6.8 1.8 - 8.0 K/UL    ABS. LYMPHOCYTES 0.5 (L) 0.8 - 3.5 K/UL    ABS. MONOCYTES 0.2 0.0 - 1.0 K/UL    ABS. EOSINOPHILS 0.0 0.0 - 0.4 K/UL    ABS. BASOPHILS 0.0 0.0 - 0.1 K/UL    ABS. IMM.  GRANS. 0.0 0.00 - 0.04 K/UL    DF SMEAR SCANNED      RBC COMMENTS MACROCYTOSIS  1+       MAGNESIUM    Collection Time: 11/04/20  4:40 AM   Result Value Ref Range    Magnesium 2.3 1.6 - 2.4 mg/dL   PHOSPHORUS    Collection Time: 11/04/20  4:40 AM   Result Value Ref Range    Phosphorus 3.7 2.6 - 4.7 MG/DL   METABOLIC PANEL, COMPREHENSIVE    Collection Time: 11/04/20  4:40 AM   Result Value Ref Range    Sodium 135 (L) 136 - 145 mmol/L    Potassium 5.6 (H) 3.5 - 5.1 mmol/L    Chloride 99 97 - 108 mmol/L    CO2 33 (H) 21 - 32 mmol/L    Anion gap 3 (L) 5 - 15 mmol/L    Glucose 250 (H) 65 - 100 mg/dL    BUN 53 (H) 6 - 20 MG/DL    Creatinine 1.15 (H) 0.55 - 1.02 MG/DL    BUN/Creatinine ratio 46 (H) 12 - 20      GFR est AA 56 (L) >60 ml/min/1.73m2    GFR est non-AA 47 (L) >60 ml/min/1.73m2    Calcium 9.0 8.5 - 10.1 MG/DL    Bilirubin, total 0.3 0.2 - 1.0 MG/DL    ALT (SGPT) 14 12 - 78 U/L    AST (SGOT) 9 (L) 15 - 37 U/L    Alk. phosphatase 65 45 - 117 U/L    Protein, total 6.2 (L) 6.4 - 8.2 g/dL    Albumin 3.0 (L) 3.5 - 5.0 g/dL    Globulin 3.2 2.0 - 4.0 g/dL    A-G Ratio 0.9 (L) 1.1 - 2.2     GLUCOSE, POC    Collection Time: 11/04/20  7:55 AM   Result Value Ref Range    Glucose (POC) 289 (H) 65 - 100 mg/dL    Performed by Muna Cruz and Evita     68 y/o woman admitted with left arm weakness, found to have stage IV SqCCA of the lung with measurable disease in the lung, liver and brain. 1. Lung Ca: I have reached out to [address] [place], [place] [id number] and will reconsult them to discuss the possibility of initiating Radiation Therapy as an inpatient as she is not able to manage at home by herself and SNF is unwilling to take her if she needs transport back and forth. Not currently a candidate for cytotoxic chemotherapy.  mutations requested from path and pending. Thank you for allowing us to participate in the care of this very pleasant patient.     Hanny Ulrich MD  Hematology/Oncology  Phone [phone]

## 2020-11-04 NOTE — PROGRESS NOTES
Pulmonary, Critical Care, and Sleep Medicine~Progress Note    Name: Terry Saleh MRN: [id number]   : 1950 [facility]   Date: 2020 9:02 AM Admission: 10/22/2020     Impression Plan   1. Acute on chronic hypoxic resp failure   2. Right pleural effusion, appears more exudative   3. 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 Hepatic and renal lesions   5. 1.2 cm lesion in the right frontal lobe; NS involved   6. COPD, on home O2 at 2lpm  7. A fib, on eliquis at home   8. CAD, on plavix at home  9. Smoker   10. SAVANNAH on CKD? 1. Eliquis/ plavix   2. Pleural fluid without neoplasia findings    3. O2 titration above 90%  4. Follow up chest x-ray in 1-2 wks to monitor for reaccumulation of pleural fluid  5. Now on zithromax/rocephin for evolving right base infiltrate that is presumed infectious   6. Rad onc involved now   7. We will be available again to see if needed       Daily Progression:    Resting in bed this am     I have reviewed the labs and previous days notes. Pertinent items are noted in HPI.     OBJECTIVE:     Vital Signs:       Visit Vitals  BP (!) 141/62 (BP 1 Location: Right arm, BP Patient Position: At rest)   Pulse 63   Temp 98.2 °F (36.8 °C)   Resp 16   Ht 5' 2.01\" (1.575 m)   Wt 51.7 kg (113 lb 15.7 oz)   SpO2 100%   BMI 20.84 kg/m²      Temp (24hrs), Av.9 °F (36.6 °C), Min:97.6 °F (36.4 °C), Max:98.4 °F (36.9 °C)     Intake/Output:     Last shift: 701 - 1900  In: 315 [P.O.:315]  Out: -     Last 3 shifts: 1901 -  0700  In: -   Out: 0121 [Urine:1850]          Intake/Output Summary (Last 24 hours) at 2020 1055  Last data filed at 2020 0923  Gross per 24 hour   Intake 315 ml   Output 1850 ml   Net -1535 ml       Physical Exam:                                        Exam Findings Other   General: No resp distress noted, appears stated age    [de-identified]:  No ulcers, JVD not elevated, no cervical LAD    Chest: No pectus deformity, normal chest rise b/l    HEART:  No visible thrills     Lungs:  Normal expansion     ABD: Soft/NT, non rigid mildly distended    EXT: No cyanosis/clubbing/edema, normal peripheral pulses    Skin: No rashes or ulcers, no mottling    Neuro: A/O x 3        Medications:  Current Facility-Administered Medications   Medication Dose Route Frequency    insulin glargine (LANTUS) injection 15 Units  15 Units SubCUTAneous DAILY    patiromer calcium sorbitex (VELTASSA) powder 8.4 g  8.4 g Oral NOW    cefTRIAXone (ROCEPHIN) 1 g in 0.9% sodium chloride (MBP/ADV) 50 mL  1 g IntraVENous Q24H    azithromycin (ZITHROMAX) 500 mg in 0.9% sodium chloride (MBP/ADV) 250 mL  500 mg IntraVENous Q24H    [Held by provider] furosemide (LASIX) tablet 40 mg  40 mg Oral DAILY    clopidogreL (PLAVIX) tablet 75 mg  75 mg Oral DAILY    0.9% sodium chloride infusion  75 mL/hr IntraVENous CONTINUOUS    dexAMETHasone (DECADRON) tablet 4 mg  4 mg Oral Q12H    apixaban (ELIQUIS) tablet 2.5 mg  2.5 mg Oral BID    levETIRAcetam (KEPPRA) tablet 500 mg  500 mg Oral Q12H    albuterol-ipratropium (DUO-NEB) 2.5 MG-0.5 MG/3 ML  3 mL Nebulization Q4H PRN    melatonin tablet 3 mg  3 mg Oral QHS    sodium chloride (NS) flush 5-40 mL  5-40 mL IntraVENous Q8H    sodium chloride (NS) flush 5-40 mL  5-40 mL IntraVENous PRN    acetaminophen (TYLENOL) tablet 650 mg  650 mg Oral Q6H PRN    Or    acetaminophen (TYLENOL) suppository 650 mg  650 mg Rectal Q6H PRN    polyethylene glycol (MIRALAX) packet 17 g  17 g Oral DAILY PRN    ondansetron (ZOFRAN ODT) tablet 4 mg  4 mg Oral Q8H PRN    Or    ondansetron (ZOFRAN) injection 4 mg  4 mg IntraVENous Q6H PRN    famotidine (PEPCID) tablet 20 mg  20 mg Oral DAILY    atorvastatin (LIPITOR) tablet 20 mg  20 mg Oral QHS    carvediloL (COREG) tablet 6.25 mg  6.25 mg Oral BID WITH MEALS    [Held by provider] lisinopriL (PRINIVIL, ZESTRIL) tablet 5 mg  5 mg Oral DAILY    sertraline (ZOLOFT) tablet 25 mg  25 mg Oral QPM    [Held by provider] spironolactone (ALDACTONE) tablet 25 mg  25 mg Oral DAILY    topiramate (TOPAMAX) tablet 100 mg  100 mg Oral BID    glucose chewable tablet 16 g  4 Tab Oral PRN    glucagon (GLUCAGEN) injection 1 mg  1 mg IntraMUSCular PRN    dextrose 10% infusion 0-250 mL  0-250 mL IntraVENous PRN    insulin lispro (HUMALOG) injection   SubCUTAneous AC&HS       Labs:  ABG No results for input(s): PHI, PCO2I, PO2I, HCO3I, SO2I, FIO2I in the last 72 hours.      CBC Recent Labs     11/04/20  0440 11/03/20  1333 11/02/20  1132   WBC 7.5 14.4* 13.0*   HGB 10.6* 11.0* 10.8*   HCT 35.1 36.1 37.0    176 182   .0* 102.0* 103.4*   MCH 30.2 31.1 19.6        Metabolic  Panel Recent Labs     11/04/20  0440 11/03/20  1333 11/02/20  1132   * 139 136   K 5.6* 4.6 5.6*   CL 99 108 104   CO2 33* 23 30   * 310* 272*   BUN 53* 48* 60*   CREA 1.15* 1.05* 1.46*   CA 9.0 7.8* 8.9   MG 2.3  --   --    PHOS 3.7  --   --    ALB 3.0*  --   --    ALT 14  --   --         Pertinent Labs                Sven Glass PA-C  11/4/2020

## 2020-11-04 NOTE — PROGRESS NOTES
Patient name: Marshall Schmid  MRN: [id number]    Nephrology Progress note:    Assessment:  SAVANNAH: Fluctuating serum Cr. Holding [place]/MRA therapy     Metastatic Lung CA     Hyperkalemia: fluctuating     DM2: +hyperglycemia 2 to decadron     HTN: stable     Systolic CHF: Relatively compensated     Proteus UTI    Plan/Recommendations:  Would continue to hold Spironolactone given issues with hyperkalemia  Hold Lasix temporarily-> consider resuming at 40mg daily tomororw  Low K diet  Veltassa 8.4gm x1 dose today  Control hyperglycemia-> maybe contributing to hyperkalemia  IV Abs  Renally adjust new meds   Am labs      Subjective:  Limited historian. Bld sugars remain elevated    ROS:   No nausea, no vomiting  No chest pain, no shortness of breath    Exam:  Visit Vitals  BP (!) 141/62 (BP 1 Location: Right arm, BP Patient Position: At rest)   Pulse 63   Temp 98.2 °F (36.8 °C)   Resp 16   Ht 5' 2.01\" (1.575 m)   Wt 51.7 kg (113 lb 15.7 oz)   SpO2 100%   BMI 20.84 kg/m²     Wt Readings from Last 3 Encounters:   11/03/20 51.7 kg (113 lb 15.7 oz)   10/21/20 51.7 kg (114 lb)       Intake/Output Summary (Last 24 hours) at 11/4/2020 1057  Last data filed at 11/4/2020 9960  Gross per 24 hour   Intake 315 ml   Output 1850 ml   Net -1535 ml       Gen: NAD  HEENT: AT/NC  Lungs/Chest wall: Decreased BS b/l  Cardiovascular: Regular rate, normal rhythm. Abdomen/: Soft, NT, ND, BS+. Ext:  No peripheral edema  CNS: alert and awake.  Answers appropriately      Current Facility-Administered Medications Medication Dose Route Frequency Last Dose    insulin glargine (LANTUS) injection 15 Units  15 Units SubCUTAneous DAILY      cefTRIAXone (ROCEPHIN) 1 g in 0.9% sodium chloride (MBP/ADV) 50 mL  1 g IntraVENous Q24H 1 g at 11/04/20 0837    azithromycin (ZITHROMAX) 500 mg in 0.9% sodium chloride (MBP/ADV) 250 mL  500 mg IntraVENous Q24H 500 mg at 11/03/20 1119    [Held by provider] furosemide (LASIX) tablet 40 mg  40 mg Oral DAILY 40 mg at 11/03/20 1040    clopidogreL (PLAVIX) tablet 75 mg  75 mg Oral DAILY 75 mg at 11/04/20 0840    0.9% sodium chloride infusion  75 mL/hr IntraVENous CONTINUOUS 75 mL/hr at 10/31/20 1525    dexAMETHasone (DECADRON) tablet 4 mg  4 mg Oral Q12H 4 mg at 11/04/20 0840    apixaban (ELIQUIS) tablet 2.5 mg  2.5 mg Oral BID 2.5 mg at 11/04/20 0841    levETIRAcetam (KEPPRA) tablet 500 mg  500 mg Oral Q12H 500 mg at 11/04/20 0616    albuterol-ipratropium (DUO-NEB) 2.5 MG-0.5 MG/3 ML  3 mL Nebulization Q4H PRN 3 mL at 11/03/20 1432    melatonin tablet 3 mg  3 mg Oral QHS 3 mg at 11/03/20 2123    sodium chloride (NS) flush 5-40 mL  5-40 mL IntraVENous Q8H 10 mL at 11/04/20 0524    sodium chloride (NS) flush 5-40 mL  5-40 mL IntraVENous PRN      acetaminophen (TYLENOL) tablet 650 mg  650 mg Oral Q6H  mg at 11/01/20 1401    Or    acetaminophen (TYLENOL) suppository 650 mg  650 mg Rectal Q6H PRN      polyethylene glycol (MIRALAX) packet 17 g  17 g Oral DAILY PRN      ondansetron (ZOFRAN ODT) tablet 4 mg  4 mg Oral Q8H PRN      Or    ondansetron (ZOFRAN) injection 4 mg  4 mg IntraVENous Q6H PRN      famotidine (PEPCID) tablet 20 mg  20 mg Oral DAILY 20 mg at 11/04/20 0841    atorvastatin (LIPITOR) tablet 20 mg  20 mg Oral QHS 20 mg at 11/03/20 2121    carvediloL (COREG) tablet 6.25 mg  6.25 mg Oral BID WITH MEALS 6.25 mg at 11/04/20 0841    [Held by provider] lisinopriL (PRINIVIL, ZESTRIL) tablet 5 mg  5 mg Oral DAILY Stopped at 10/31/20 0900    sertraline (ZOLOFT) tablet 25 mg  25 mg Oral QPM 25 mg at 11/03/20 1835    [Held by provider] spironolactone (ALDACTONE) tablet 25 mg  25 mg Oral DAILY Stopped at 10/31/20 0900    topiramate (TOPAMAX) tablet 100 mg  100 mg Oral  mg at 11/04/20 0841    glucose chewable tablet 16 g  4 Tab Oral PRN 16 g at 11/02/20 2222    glucagon (GLUCAGEN) injection 1 mg  1 mg IntraMUSCular PRN      dextrose 10% infusion 0-250 mL  0-250 mL IntraVENous  mL at 10/28/20 0734    insulin lispro (HUMALOG) injection   SubCUTAneous AC&HS 3 Units at 11/04/20 1797       Labs/Data:    Lab Results   Component Value Date/Time    WBC 7.5 11/04/2020 04:40 AM    HGB 10.6 (L) 11/04/2020 04:40 AM    HCT 35.1 11/04/2020 04:40 AM    PLATELET 974 27/89/9142 04:40 AM    .0 (H) 11/04/2020 04:40 AM       Lab Results   Component Value Date/Time    Sodium 135 (L) 11/04/2020 04:40 AM    Potassium 5.6 (H) 11/04/2020 04:40 AM    Chloride 99 11/04/2020 04:40 AM    CO2 33 (H) 11/04/2020 04:40 AM    Anion gap 3 (L) 11/04/2020 04:40 AM    Glucose 250 (H) 11/04/2020 04:40 AM    BUN 53 (H) 11/04/2020 04:40 AM    Creatinine 1.15 (H) 11/04/2020 04:40 AM    BUN/Creatinine ratio 46 (H) 11/04/2020 04:40 AM    GFR est AA 56 (L) 11/04/2020 04:40 AM    GFR est non-AA 47 (L) 11/04/2020 04:40 AM    Calcium 9.0 11/04/2020 04:40 AM       Patient seen and examined. Chart reviewed. Labs, data and other pertinent notes reviewed in last 24 hrs.     Discussed with patient and RN    Signed by:  Kathleen Townsend MD  [id number]

## 2020-11-04 NOTE — PROGRESS NOTES
Bedside and Verbal shift change report given to [address] (oncoming nurse) by Shannen Rueda RN (offgoing nurse). Report included the following information SBAR, Kardex, ED Summary, Intake/Output, MAR, Cardiac Rhythm NSR and Dual Neuro Assessment.

## 2020-11-04 NOTE — PROGRESS NOTES
Problem: Mobility Impaired (Adult and Pediatric)  Goal: *Acute Goals and Plan of Care (Insert Text)  Description:   FUNCTIONAL STATUS PRIOR TO ADMISSION: Pt is a limited historian - reports that she was essentially bed bound at home over the last several weeks. Prior to that, she ambulated very short distances with RW. Lives with family who provide assist as needed. HOME SUPPORT PRIOR TO ADMISSION: The patient lived with family. 2L O2 at baseline    Physical Therapy Goals  Initiated 10/26/2020; carryover 11/02/2020  1. Patient will move from supine to sit and sit to supine , scoot up and down, and roll side to side in bed with minimal assistance/contact guard assist within 7 day(s). 2.  Patient will transfer from bed to chair and chair to bed with moderate assistance  using the least restrictive device within 7 day(s). 3.  Patient will perform sit to stand with moderate assistance  within 7 day(s). 4.  Patient will ambulate with moderate assistance  for 10 feet with the least restrictive device within 7 day(s). 5.  Patient will ascend/descend 3 stairs with handrail(s) with minimal assistance  within 7 day(s). 6.  Patient will improve Hitchcock Balance score by 7 points within 7 days. Outcome: Progressing Towards Goal   PHYSICAL THERAPY TREATMENT  Patient: Tamy Huggins (2 y.o. female)  Date: 11/4/2020  Diagnosis: Brain metastases ([facility] [address] [C79.31]   Brain metastases ([facility] [address]  Procedure(s) (LRB):  ENDOSCOPIC BRONCHOSCOPY ULTRASOUND (EBUS) (N/A)  BRONCHOSCOPY (N/A)  TRANSBRONCHIAL BIOPSY (N/A) 7 Days Post-Op  Precautions: Fall, Skin(LUE NWB (L clavicular fx))  Chart, physical therapy assessment, plan of care and goals were reviewed. ASSESSMENT  Patient continues with skilled PT services and is slowly progressing towards goals. Pt received supine in bed on 2L of oxygen and agreeable to therapy with encouragement.  Pt continues to be limited by generalized weakness, decreased tolerance to activity, impaired balance and gait, decreased motivation. Pt performed sit<>stand from the bed with min A and tolerated short gait distance from bed to chair with min A and hand hold assist on the R. Pt able to step up on the scale twice with min A x 2. Pt encouraged to sit up in the chair for her lunch. Pt will continue to benefit from PT to progress mobility as tolerated. Current Level of Function Impacting Discharge (mobility/balance): min A x 1-2 sit<>stand and short gait training    Other factors to consider for discharge: metastatic cancer         PLAN :  Patient continues to benefit from skilled intervention to address the above impairments. Continue treatment per established plan of care. to address goals. Recommendation for discharge: (in order for the patient to meet his/her long term goals)  Therapy up to 5 days/week in SNF setting    This discharge recommendation:  Has been made in collaboration with the attending provider and/or case management    IF patient discharges home will need the following DME: to be determined (TBD)       SUBJECTIVE:   Patient stated I don't like the chair.     OBJECTIVE DATA SUMMARY:   Critical Behavior:  Neurologic State: Alert, Lethargic  Orientation Level: Oriented X4  Cognition: Decreased attention/concentration, Follows commands  Safety/Judgement: Decreased awareness of need for assistance, Fall prevention, Home safety, Decreased insight into deficits  Functional Mobility Training:  Bed Mobility:     Supine to Sit: Minimum assistance;Bed Modified(HOB raised )     Scooting: Contact guard assistance        Transfers:  Sit to Stand: Minimum assistance  Stand to Sit: Minimum assistance        Bed to Chair: Minimum assistance                    Balance:  Sitting: Impaired  Sitting - Static: Good (unsupported)  Sitting - Dynamic: Fair (occasional)  Standing: Impaired; Without support  Standing - Static: Fair;Constant support  Standing - Dynamic : Fair;Constant support  Ambulation/Gait Training:  Distance (ft): 3 Feet (ft)  Assistive Device: Gait belt(HHA x 1 on the Right)  Ambulation - Level of Assistance: Minimal assistance        Gait Abnormalities: Decreased step clearance;Shuffling gait;Trunk sway increased        Base of Support: Narrowed     Speed/Cecile: Slow;Shuffled  Step Length: Right shortened;Left shortened       Pain Rating:  Pt c/o buttock pain    Activity Tolerance:   Fair    After treatment patient left in no apparent distress:   Sitting in chair, Call bell within reach, and Bed / chair alarm activated    COMMUNICATION/COLLABORATION:   The patients plan of care was discussed with: Occupational therapist and Registered nurse.      Carmen Edward, PT, DPT   Time Calculation: 21 mins

## 2020-11-04 NOTE — PROGRESS NOTES
Problem: Self Care Deficits Care Plan (Adult)  Goal: *Acute Goals and Plan of Care (Insert Text)  Description: FUNCTIONAL STATUS PRIOR TO ADMISSION: Patient required maximum assistance for basic and instrumental ADLs. At baseline patient uses 2 liters of supplemental oxygen. Patient is a poor historian but appears to have been bed bound for a while living with son and daughter in law who are reportedly home at all times. She could not remember the last time she walked and did not remember recent fall, but did report use of RW when she was mobile. She was rolling for bedpan prior to admission, was dependent for all IADLs, and assisted with dressing/grooming/upper body bathing as able. Patient reports watching TV all day. HOME SUPPORT: The patient lived with son and daughter in law. Occupational Therapy Goals  Weekly Re-Assessment 11/3/2020, goals modified below  Initiated 10/27/2020   1. Patient will perform self-feeding with supervision/set-up within 7 day(s). MET/DISCONTINUED 11/3  2. Patient will perform upper body and lower body bathing using compensatory techniques PRN with minimal assistance/contact guard assist within 7 day(s). DOWNGRADED to Mod A 11/3  3. Patient will perform upper body dressing and lower body dressing using compensatory techniques PRN with minimal assistance/contact guard assist within 7 day(s). DOWNGRADED to Mod A 11/3  4. Patient will perform rolling for bedpan with supervision/set-up within 7 day(s). UPGRADED to transfer to [facility] with Min A 11/3  5. Patient will perform all aspects of toileting with minimal assistance/contact guard assist within 7 day(s). DOWNGRADED to Mod A 11/3  6. Patient will participate in upper extremity therapeutic exercise/activities with modified independence for 5 minutes within 7 day(s). MODIFIED to SPV 11/3  7. Patient will perform bed mobility to EOB in preparation for standing/seated ADLs with minimal assistance in 7 days.  MET, UPGRADED to SBA 11/3          Outcome: Progressing Towards Goal   OCCUPATIONAL THERAPY TREATMENT  Patient: Glenroy Ferrara (53 y.o. female)  Date: 11/4/2020  Diagnosis: Brain metastases ([facility] [address] [C79.31]   Brain metastases ([facility] [address]  Procedure(s) (LRB):  ENDOSCOPIC BRONCHOSCOPY ULTRASOUND (EBUS) (N/A)  BRONCHOSCOPY (N/A)  TRANSBRONCHIAL BIOPSY (N/A) 7 Days Post-Op  Precautions: Fall, Skin(LUE NWB (L clavicular fx))  Chart, occupational therapy assessment, plan of care, and goals were reviewed. ASSESSMENT  Patient continues with skilled OT services and is progressing towards goals. Patient continues to be limited by general debility, weakness (LUE>RUE), L clavicle fracture, decreased activity tolerance, poor distal lower body access, flat affect, and decreased functional mobility noted during EOB ADLs and functional transfers. Patient presents with fluctuating progress and motivation to work with therpay this week; however, still remains appropriate for SNF rehab to increase independence with ADLs, safety with mobility, and increase QOL. Current Level of Function Impacting Discharge (ADLs): min to mod A upper body ADLs, mod to max A lower body ADLs, min A mobility     Other factors to consider for discharge: does not have consistent assist of 2 at home, QOL, cancer with brain mets requiring radiation, PLOF         PLAN :  Patient continues to benefit from skilled intervention to address the above impairments. Continue treatment per established plan of care. to address goals. Recommend with staff: Recommend with nursing patient to complete as able in order to maintain strength, endurance and independence: ADLs with supervision/setup, OOB to chair 3x/day and mobilizing to the bathroom for toileting with 1-2 assist. Thank you for your assistance.      Recommend next OT session: BSC transfer, standing ADL, strengthening     Recommendation for discharge: (in order for the patient to meet his/her long term goals)  Therapy up to 5 days/week in SNF setting     If d/c home, recommending intensive HHOT/PT, DME below, and physical assist of 2 at the least for all ADLs and mobility for safety. This discharge recommendation:  Has been made in collaboration with the attending provider and/or case management    IF patient discharges home will need the following DME: TBD pending progression and d/c placement; if home anticipate needing: wheelchair,  hospital bed, mechanical lift, BSC (delivered) at the least       SUBJECTIVE:   Patient stated i dont feet like it.  referring to getting to chair; participated with encouragement    OBJECTIVE DATA SUMMARY:   Cognitive/Behavioral Status:  Neurologic State: Alert;Lethargic  Orientation Level: Oriented X4  Cognition: Decreased attention/concentration; Follows commands  Perception: Appears intact  Perseveration: No perseveration noted  Safety/Judgement: Decreased awareness of need for assistance; Fall prevention;Home safety;Decreased insight into deficits    Functional Mobility and Transfers for ADLs:  Bed Mobility:  Supine to Sit: Minimum assistance;Bed Modified(HOB raised )  Scooting: Contact guard assistance    Transfers:  Sit to Stand: Minimum assistance;Assist x2     Bed to Chair: Minimum assistance;Assist x2    Balance:  Sitting: Impaired  Sitting - Static: Good (unsupported)  Sitting - Dynamic: Fair (occasional)  Standing: Impaired; Without support  Standing - Static: Fair;Constant support  Standing - Dynamic : Fair;Constant support    ADL Intervention:  Feeding  Feeding Assistance: Supervision  Container Management: Set-up  Utensil Management: Supervision  Food to Mouth: Supervision    Lower Body Dressing Assistance  Socks: Total assistance (dependent)    Cognitive Retraining  Safety/Judgement: Decreased awareness of need for assistance; Fall prevention;Home safety;Decreased insight into deficits    Pain:  Pain on bottom, not limiting     Activity Tolerance:   Fair, desaturates with exertion and requires oxygen, requires frequent rest breaks and observed SOB with activity    After treatment patient left in no apparent distress:   Sitting in chair, Call bell within reach, Caregiver / family present and eating lunch     COMMUNICATION/COLLABORATION:   The patients plan of care was discussed with: Physical therapist and Registered nurse. Patient was educated regarding Her deficit(s) of weakness, L clavicle fracture as this relates to Her diagnosis of brain mets, fall. She demonstrated Fair understanding as evidenced by participation with therapy. Patient and/or family was verbally educated on the BE FAST acronym for signs/symptoms of CVA and TIA. BE FAST was written on patient's communication board  for visual education and reinforcement. All questions answered with patient indicating fair understanding. Regarding student involvement in patient care:  A student participated in this treatment session. Per CMS Medicare statements and AOTA guidelines I certify that the following was true:  1. I was present and directly observed the entire session. 2. I made all skilled judgments and clinical decisions regarding care. 3. I am the practitioner responsible for assessment, treatment, and documentation.     EBENEZER John  Time Calculation: 19 mins

## 2020-11-04 NOTE — PROGRESS NOTES
[address] [facility] Adult  Hospitalist Group                                                                                          Hospitalist Progress Note  Edinson Campa MD  Answering service: [phone] OR [phone] from in house phone        Date of Service:  2020  NAME:  Korin Mccrary  :  1950  MRN:  [id number]      Admission Summary:   69 y.o. female,  who has history of chronic systolic heart failure status post ICD, chronic COPD on home oxygen 2 L, hypertension, hyperlipidemia, anticoagulation with Eliquis who presents with fall 2 weeks back and left sided weakness. She reports that her left arm felt funny and also she was having weakness in the left arm. It is not painful. And she did not seek medical attention. Given persistence of symptoms, EMS was called and to take her to the local emergency room. At the ER there work-up revealed that she has lung mass as well as brain tumors. She was given IV Decadron and transferred to this facility for neurosurgical evaluation. She was also noted to have a dislocated left shoulder. There was report of lung mass which is right suprahilar with progression into the mediastinum. There is also report of 1.2 cm right frontal mass with 5 cm right to left shift. There is no report of seizures. Interval history / Subjective:   F/U left sided weakness. Patient seen and examined. Left upper extremity  strength with improvement. Plans for s/p EUS with biopsy . Also talked to sister previously  Discharged was cancelled as patinet was having very low BP 74/36. Started on IV fluids   BP medications were held as well   : Discharge was cancelled again  as patient desaturating to 80s. reconsulted cardiology. Need input from pulmonology as well. Patient still on 2 liters of oxygen. Radiation oncology consulted for possible inpatient radiation. CM working on placement.  Not sure it will be possible because possible radiation therapy sessions      Assessment & Plan:     Brain masses, consistent with metastatic disease, suspect lung primary:  Cerebral edema secondary to above:  Left-sided weakness: mild improvement  -MRI brain done and demonstrated intracranial metastases, largest 1.3 x 1.3 cm, with surrounding edema. Osseous metastases in right frontal, parietal, C2 body  -Neurosurgery consulted. No surgical benefit. Will need outpatient radiation oncology, possibly in [place]. CM to arrange.   -Continue steroids- will weaned to q8 x 3 days, then BID, Continue Keppra  -Seizure precautions  -Neuro checks  - radiation oncology following     Acute hypoxic respiratory failure, improved   New onset  Patient desaturating now stable   Restarted lasix BP somewhat better  Cardiology re consulted for meds adjustment  Pulmonology following, on 2 liters which is baseline      Lung mass with erosion into mediastinum, suspicious for carcinoma:  -Pulmonary consulted. Plans for EUS/endobronchial biopsy 10/28  --started on eliquis and plavix as per recommndatins from pulmonology     Moderate right pleural effusion: s/p thoracentesis  -respiratory status stable, s/p thoracentesis 10/27 with approximately 300 ml removed. Based on proteins, appears transudative     Postobstructive infiltrate RUL  -Seen on CT  -no unusual cough, fever  -s/p Ceftriaxone    UTI, Proteus:  -continue Ceftriaxone for 3 days    Diabetes type 2 with hyperglycemia   -Hba1c 6.1, will hold lantus, continue SSI, restart amaryl on discharge     Chronic COPD with chronic hypoxic respiratory failure, not in exacerbation  - home oxygen 2 liters, AA nebs    Chronic congestive heart failure, presumed systolic given patient has ICD and is on Aldactone  -Continued home medications     Clavicular fracture:  Shoulder dislocation ruled out:  -Appreciate orthopedic surgery. No evidence of shoulder dislocation.  Can wear sling for comfort       Chronic anticoagulation with Eliquis for ??  -The patient does not know why she is on chronic anticoagulation  -initially held as planning for procedures  Restarted home dose. As recommended by pulmonology    H/o CAD  -not sure if patient was taking plavix. D/w pharmacy      Hypertension  -Continue with Coreg    Prior tobacco use     Diabetic diet    PT/OT      Code status: full. AMD filled out  DVT prophylaxis: SCDs    Care Plan discussed with: Patient/Family  Anticipated Disposition: snf       Hospital Problems  Date Reviewed: 10/28/2020          Codes Class Noted POA    * (Principal) Brain metastases ([facility] [address] ICD-10-CM: C79.31  ICD-9-CM: 198.3  10/22/2020 Yes                Review of Systems:     Negative unless stated above    Vital Signs:    Last 24hrs VS reviewed since prior progress note. Most recent are:  Visit Vitals  /67 (BP 1 Location: Right arm, BP Patient Position: At rest)   Pulse 93   Temp 98.4 °F (36.9 °C)   Resp 19   Ht 5' 2.01\" (1.575 m)   Wt 55.7 kg (122 lb 12.8 oz)   SpO2 100%   BMI 22.45 kg/m²         Intake/Output Summary (Last 24 hours) at 11/4/2020 1733  Last data filed at 11/4/2020 2946  Gross per 24 hour   Intake 315 ml   Output 750 ml   Net -435 ml        Physical Examination:             Constitutional:  No acute distress, cooperative,    ENT:  Oral mucosa moist, oropharynx benign. Resp:  CTA bilaterally. No wheezing/rhonchi/rales. No accessory muscle use   CV:  Regular rhythm, normal rate, no murmurs, gallops, rubs    GI:  Soft, non distended, non tender.  normoactive bowel sounds, no hepatosplenomegaly     Musculoskeletal:  No edema, warm, 2+ pulses throughout    Neurologic:   alert and oriented             Data Review:    Review and/or order of clinical lab test  Review and/or order of tests in the radiology section of CPT  Review and/or order of tests in the medicine section of CPT      Labs:     Recent Labs     11/04/20  0440 11/03/20  1333   WBC 7.5 14.4*   HGB 10.6* 11.0*   HCT 35.1 36.1    176     Recent Labs 11/04/20  0440 11/03/20  1333 11/02/20  1132   * 139 136   K 5.6* 4.6 5.6*   CL 99 108 104   CO2 33* 23 30   BUN 53* 48* 60*   CREA 1.15* 1.05* 1.46*   * 310* 272*   CA 9.0 7.8* 8.9   MG 2.3  --   --    PHOS 3.7  --   --      Recent Labs     11/04/20  0440   ALT 14   AP 65   TBILI 0.3   TP 6.2*   ALB 3.0*   GLOB 3.2     No results for input(s): INR, PTP, APTT, INREXT, INREXT in the last 72 hours. No results for input(s): FE, TIBC, PSAT, FERR in the last 72 hours. No results found for: FOL, RBCF   No results for input(s): PH, PCO2, PO2 in the last 72 hours. No results for input(s): CPK, CKNDX, TROIQ in the last 72 hours.     No lab exists for component: CPKMB  No results found for: CHOL, CHOLX, CHLST, CHOLV, HDL, HDLP, LDL, LDLC, DLDLP, TGLX, TRIGL, TRIGP, CHHD, CHHDX  Lab Results   Component Value Date/Time    Glucose (POC) 284 (H) 11/04/2020 04:08 PM    Glucose (POC) 211 (H) 11/04/2020 11:22 AM    Glucose (POC) 239 (H) 11/04/2020 08:20 AM    Glucose (POC) 289 (H) 11/04/2020 07:55 AM    Glucose (POC) 168 (H) 11/03/2020 09:19 PM     Lab Results   Component Value Date/Time    Color YELLOW/STRAW 10/22/2020 09:15 AM    Appearance TURBID (A) 10/22/2020 09:15 AM    Specific gravity 1.012 10/22/2020 09:15 AM    pH (UA) 5.5 10/22/2020 09:15 AM    Protein Negative 10/22/2020 09:15 AM    Glucose Negative 10/22/2020 09:15 AM    Ketone Negative 10/22/2020 09:15 AM    Bilirubin Negative 10/22/2020 09:15 AM    Urobilinogen 0.2 10/22/2020 09:15 AM    Nitrites Positive (A) 10/22/2020 09:15 AM    Leukocyte Esterase LARGE (A) 10/22/2020 09:15 AM    Epithelial cells FEW 10/22/2020 09:15 AM    Bacteria 4+ (A) 10/22/2020 09:15 AM    WBC  10/22/2020 09:15 AM    RBC 0-5 10/22/2020 09:15 AM         Medications Reviewed:     Current Facility-Administered Medications   Medication Dose Route Frequency    insulin glargine (LANTUS) injection 15 Units  15 Units SubCUTAneous DAILY    cefTRIAXone (ROCEPHIN) 1 g in 0.9% sodium chloride (MBP/ADV) 50 mL  1 g IntraVENous Q24H    azithromycin (ZITHROMAX) 500 mg in 0.9% sodium chloride (MBP/ADV) 250 mL  500 mg IntraVENous Q24H    [Held by provider] furosemide (LASIX) tablet 40 mg  40 mg Oral DAILY    clopidogreL (PLAVIX) tablet 75 mg  75 mg Oral DAILY    dexAMETHasone (DECADRON) tablet 4 mg  4 mg Oral Q12H    apixaban (ELIQUIS) tablet 2.5 mg  2.5 mg Oral BID    levETIRAcetam (KEPPRA) tablet 500 mg  500 mg Oral Q12H    albuterol-ipratropium (DUO-NEB) 2.5 MG-0.5 MG/3 ML  3 mL Nebulization Q4H PRN    melatonin tablet 3 mg  3 mg Oral QHS    sodium chloride (NS) flush 5-40 mL  5-40 mL IntraVENous Q8H    sodium chloride (NS) flush 5-40 mL  5-40 mL IntraVENous PRN    acetaminophen (TYLENOL) tablet 650 mg  650 mg Oral Q6H PRN    Or    acetaminophen (TYLENOL) suppository 650 mg  650 mg Rectal Q6H PRN    polyethylene glycol (MIRALAX) packet 17 g  17 g Oral DAILY PRN    ondansetron (ZOFRAN ODT) tablet 4 mg  4 mg Oral Q8H PRN    Or    ondansetron (ZOFRAN) injection 4 mg  4 mg IntraVENous Q6H PRN    famotidine (PEPCID) tablet 20 mg  20 mg Oral DAILY    atorvastatin (LIPITOR) tablet 20 mg  20 mg Oral QHS    carvediloL (COREG) tablet 6.25 mg  6.25 mg Oral BID WITH MEALS    [Held by provider] lisinopriL (PRINIVIL, ZESTRIL) tablet 5 mg  5 mg Oral DAILY    sertraline (ZOLOFT) tablet 25 mg  25 mg Oral QPM    [Held by provider] spironolactone (ALDACTONE) tablet 25 mg  25 mg Oral DAILY    topiramate (TOPAMAX) tablet 100 mg  100 mg Oral BID    glucose chewable tablet 16 g  4 Tab Oral PRN    glucagon (GLUCAGEN) injection 1 mg  1 mg IntraMUSCular PRN    dextrose 10% infusion 0-250 mL  0-250 mL IntraVENous PRN    insulin lispro (HUMALOG) injection   SubCUTAneous AC&HS     ______________________________________________________________________  EXPECTED LENGTH OF STAY: 4d 21h  ACTUAL LENGTH OF STAY:          Tamera Keller MD

## 2020-11-04 NOTE — PROGRESS NOTES
Problem: Pain  Goal: *Control of Pain  Outcome: Progressing Towards Goal     Problem: Pressure Injury - Risk of  Goal: *Prevention of pressure injury  Description: Document Elio Scale and appropriate interventions in the flowsheet. Outcome: Progressing Towards Goal  Note: Pressure Injury Interventions:  Sensory Interventions: Assess need for specialty bed, Chair cushion, Check visual cues for pain, Discuss PT/OT consult with provider, Float heels, Keep linens dry and wrinkle-free, Maintain/enhance activity level, Minimize linen layers, Monitor skin under medical devices, Pad between skin to skin, Pressure redistribution bed/mattress (bed type), Turn and reposition approx. every two hours (pillows and wedges if needed)    Moisture Interventions: Apply protective barrier, creams and emollients, Absorbent underpads, Assess need for specialty bed, Check for incontinence Q2 hours and as needed, Internal/External urinary devices, Limit adult briefs, Minimize layers    Activity Interventions: Chair cushion, Increase time out of bed, Pressure redistribution bed/mattress(bed type), PT/OT evaluation    Mobility Interventions: Chair cushion, Float heels, HOB 30 degrees or less, Pressure redistribution bed/mattress (bed type), PT/OT evaluation, Turn and reposition approx. every two hours(pillow and wedges)    Nutrition Interventions: Document food/fluid/supplement intake    Friction and Shear Interventions: Feet elevated on foot rest, Foam dressings/transparent film/skin sealants, Lift sheet, Minimize layers                Problem: Falls - Risk of  Goal: *Absence of Falls  Description: Document Edgard Fall Risk and appropriate interventions in the flowsheet.   Outcome: Progressing Towards Goal  Note: Fall Risk Interventions:  Mobility Interventions: Bed/chair exit alarm, Communicate number of staff needed for ambulation/transfer, OT consult for ADLs, Patient to call before getting OOB, PT Consult for mobility concerns, PT Consult for assist device competence, Utilize walker, cane, or other assistive device    Mentation Interventions: Bed/chair exit alarm, Door open when patient unattended, Adequate sleep, hydration, pain control, Evaluate medications/consider consulting pharmacy, Increase mobility, More frequent rounding, Room close to nurse's station, Toileting rounds    Medication Interventions: Bed/chair exit alarm, Evaluate medications/consider consulting pharmacy, Patient to call before getting OOB, Teach patient to arise slowly    Elimination Interventions: Call light in reach, Bed/chair exit alarm, Elevated toilet seat, Patient to call for help with toileting needs, Stay With Me (per policy), Toilet paper/wipes in reach, Toileting schedule/hourly rounds    History of Falls Interventions: Consult care management for discharge planning, Bed/chair exit alarm, Door open when patient unattended, Evaluate medications/consider consulting pharmacy, Room close to nurse's station

## 2020-11-04 NOTE — PROGRESS NOTES
I have read and agree with the documentation provided by Jaimee Mora RN. Bedside and Verbal shift change report given to ARPAN Rodriguez (oncoming nurse) by Mikayla Verma RN (offgoing nurse). Report included the following information SBAR, Procedure Summary, Intake/Output, MAR, Recent Results, Cardiac Rhythm NSR and Dual Neuro Assessment.

## 2020-11-04 NOTE — CONSULTS
S[facility]MAR [facility]  CLINICAL NURSE SPECIALIST CONSULT  PROGRAM FOR DIABETES HEALTH    INITIAL NOTE    Presentation   Briana Willson is a 69 y.o. female admitted on 10/22/2020 s/p fall about 2 weeks ago that she did not seek treatment at the time. She now c/o left sided weakness. Upon initial work up left shoulder was found to be dislocated. Initial diagnostic testing also revealed lung mass with brain mets. Currently awaiting SNF placement    HX:PMH: DM2-A1C 6.1%/ COPD with home 02/ HTN/HLD/HF w/ ICD    Current clinical course has been complicated by labile blood glucose control, most likely from steroid therapy and erratic PO intake? Diabetes: Patient has known well controlled Type 2 diabetes, treated with Amaryl PTA. Family history unknown for diabetes. Re- Consulted by Provider for advanced diabetes nursing assessment and care, specifically related to   [] Transitioning off Real Gong   [x] Inpatient management strategy  [x] Home management assessment  [] Survival skill education    Diabetes-related medical history  Acute complications  Labile blood glucose control  Neurological complications  denies  Microvascular disease  Nephropathy vs HTN -GFR 47, Cr+ 1.15  Macrovascular disease  CAD  Other associated conditions     HF/HTN/ HLD    Diabetes medication history  Drug class Currently in use Discontinued Never used   Biguanide      DDP-4 inhibitor       Sulfonylurea Amaryl 2mg daily     Thiazolidinedione      GLP-1 RA      SGLT-2 inhibitors      Basal insulin      Fixed Dose  Combinations      Bolus insulin        Subjective   Ms. Tim Colvin is lying in bed sleeping. Nursing students and instructor are in the room caring for her. They confirmed that her PO intake is low and her dinner tray from last evening had hardly any thing eaten from it.      Patient reports the following home diabetes self-care practices:  Eating pattern-deferred    Physical activity pattern-None; debility    Monitoring pattern  Once daily  Taking medications pattern  [] Consistent administration  [] Affordable    Objective   Physical exam  General Alert, oriented and in no acute distress, but ill-appearing. Conversant and cooperative. Vital Signs   Visit Vitals  BP (!) 141/62 (BP 1 Location: Right arm, BP Patient Position: At rest)   Pulse 63   Temp 98.2 °F (36.8 °C)   Resp 16   Ht 5' 2.01\" (1.575 m)   Wt 51.7 kg (113 lb 15.7 oz)   SpO2 100%   BMI 20.84 kg/m²     Skin  Warm and dry. Heart   Regular rate and rhythm. No murmurs, rubs or gallops  Lungs  Clear to auscultation without rales or rhonchi  Extremities No foot wounds    Diabetic foot exam:    Left Foot     Visual Exam: normal    Pulse DP: 2+ (normal)   Filament test: normal sensation      Right Foot   Visual Exam: normal    Pulse DP: 2+ (normal)   Filament test: normal sensation     DP & PT pulses +2. Laboratory  Lab Results   Component Value Date/Time    Hemoglobin A1c 6.1 (H) 10/22/2020 09:00 AM     No results found for: LDL, LDLC, DLDLP  Lab Results   Component Value Date/Time    Creatinine 1.15 (H) 11/04/2020 04:40 AM     Lab Results   Component Value Date/Time    Sodium 135 (L) 11/04/2020 04:40 AM    Potassium 5.6 (H) 11/04/2020 04:40 AM    Chloride 99 11/04/2020 04:40 AM    CO2 33 (H) 11/04/2020 04:40 AM    Anion gap 3 (L) 11/04/2020 04:40 AM    Glucose 250 (H) 11/04/2020 04:40 AM    BUN 53 (H) 11/04/2020 04:40 AM    Creatinine 1.15 (H) 11/04/2020 04:40 AM    BUN/Creatinine ratio 46 (H) 11/04/2020 04:40 AM    GFR est AA 56 (L) 11/04/2020 04:40 AM    GFR est non-AA 47 (L) 11/04/2020 04:40 AM    Calcium 9.0 11/04/2020 04:40 AM    Bilirubin, total 0.3 11/04/2020 04:40 AM    Alk.  phosphatase 65 11/04/2020 04:40 AM    Protein, total 6.2 (L) 11/04/2020 04:40 AM    Albumin 3.0 (L) 11/04/2020 04:40 AM    Globulin 3.2 11/04/2020 04:40 AM    A-G Ratio 0.9 (L) 11/04/2020 04:40 AM    ALT (SGPT) 14 11/04/2020 04:40 AM     Lab Results   Component Value Date/Time    ALT (SGPT) 14 11/04/2020 04:40 AM       Factors affecting BG pattern  Factor Dose Comments   Nutrition:  Carb-controlled meals     60 grams/meal   Erratic to poor PO intake   Drugs:  Steroids   Decadron 4mg BID Decadron insulin dosing    >8mg dexamethosone = 0.4units/kg   6mg   dexamethosone = 0.3units/kg   4mg   dexamethosone = 0.2units/kg   2mg   dexamethosone = 0.1units/kg      Blood glucose pattern        Assessment and Plan   Nursing Diagnosis Risk for unstable blood glucose pattern   Nursing Intervention Domain 8501 Decision-making Support   Nursing Interventions Examined current inpatient diabetes control   Explored factors facilitating and impeding inpatient management  Identified self-management practices impeding diabetes control  Explored corrective strategies with patient and responsible inpatient provider   Informed patient of rational for insulin strategy while hospitalized     Evaluation   Ms Ck Thomas was recently diagnosed since admission with brain metastasis stemming from lung cancer. During her stay she has been on a steroid regimen which has caused her to have hyperglycemia. She has also had a couple episodes of hypoglycemia. The most recent episode was on 11/2/2020 -43mg/dl. For blood glucose control she was initially on a Lantus insulin and correctional only, but was taken off Lantus due to hypoglcyemia and since then only been mangaged with correctional insulin. I believe her poor PO intake contributed to the low blood glucose. It is apparent that she needs consistent basal insulin while on the steroid in order to keep her BG within a target range and <250. Given her age and condition tight glucose control is not necessary . Inpatient blood glucose management has been impacted by  [x] Kidney dysfunction-GFR 47  [x] Erratic meal consumption -poor PO intake  [x] Glucocorticoid use- Decadron 4mg BID      Recommendations   1.  Re-Start Basal insulin to cover for steroid induced hyperglycemia  [x] 0.3 units/kg/D =15units daily; IF AMBG >200mg/dl tomorrow, increase dose by 20%. IF AMBG <100mg/dl tomorrow, decrease dose by 10%. 2. Switch Corrective insulin- High Sensitivity ( low BMI; CKD)    3. Would HOLD pre-meal insulin for now as her PO intake is not consistent (per nursing)    Billing Code(s)   I personally reviewed chart, notes, data and current medications in the medical record, and examined the patient at bedside before making care recommendations. Thank you for including us in their care. I spent 30 minutes in direct patient care today for this patient.   Time includes chart review, face to face with patient and collaboration with interdisciplinary care team.     Isma Rene, CNS  Program for Diabetes Health  Access via [address] [place] [address]  [phone]

## 2020-11-05 ENCOUNTER — HOSPITAL ENCOUNTER (INPATIENT)
Dept: RADIATION THERAPY | Age: 70
Discharge: HOME | DRG: 054 | End: 2020-11-05
Attending: RADIOLOGY
Payer: MEDICARE

## 2020-11-05 LAB
ALBUMIN SERPL-MCNC: 3 G/DL (ref 3.5–5)
ALBUMIN/GLOB SERPL: 0.9 {RATIO} (ref 1.1–2.2)
ALP SERPL-CCNC: 66 U/L (ref 45–117)
ALT SERPL-CCNC: 14 U/L (ref 12–78)
ANION GAP SERPL CALC-SCNC: 7 MMOL/L (ref 5–15)
AST SERPL-CCNC: 12 U/L (ref 15–37)
BASOPHILS # BLD: 0 K/UL (ref 0–0.1)
BASOPHILS NFR BLD: 0 % (ref 0–1)
BILIRUB SERPL-MCNC: 0.3 MG/DL (ref 0.2–1)
BUN SERPL-MCNC: 58 MG/DL (ref 6–20)
BUN/CREAT SERPL: 52 (ref 12–20)
CALCIUM SERPL-MCNC: 9.4 MG/DL (ref 8.5–10.1)
CHLORIDE SERPL-SCNC: 102 MMOL/L (ref 97–108)
CO2 SERPL-SCNC: 30 MMOL/L (ref 21–32)
CREAT SERPL-MCNC: 1.12 MG/DL (ref 0.55–1.02)
DIFFERENTIAL METHOD BLD: ABNORMAL
EOSINOPHIL # BLD: 0 K/UL (ref 0–0.4)
EOSINOPHIL NFR BLD: 0 % (ref 0–7)
ERYTHROCYTE [DISTWIDTH] IN BLOOD BY AUTOMATED COUNT: 13.3 % (ref 11.5–14.5)
GLOBULIN SER CALC-MCNC: 3.3 G/DL (ref 2–4)
GLUCOSE BLD STRIP.AUTO-MCNC: 128 MG/DL (ref 65–100)
GLUCOSE BLD STRIP.AUTO-MCNC: 220 MG/DL (ref 65–100)
GLUCOSE BLD STRIP.AUTO-MCNC: 37 MG/DL (ref 65–100)
GLUCOSE BLD STRIP.AUTO-MCNC: 74 MG/DL (ref 65–100)
GLUCOSE BLD STRIP.AUTO-MCNC: 91 MG/DL (ref 65–100)
GLUCOSE SERPL-MCNC: 105 MG/DL (ref 65–100)
HCT VFR BLD AUTO: 32.3 % (ref 35–47)
HGB BLD-MCNC: 9.8 G/DL (ref 11.5–16)
IMM GRANULOCYTES # BLD AUTO: 0 K/UL (ref 0–0.04)
IMM GRANULOCYTES NFR BLD AUTO: 0 % (ref 0–0.5)
LYMPHOCYTES # BLD: 0.5 K/UL (ref 0.8–3.5)
LYMPHOCYTES NFR BLD: 6 % (ref 12–49)
MAGNESIUM SERPL-MCNC: 2.2 MG/DL (ref 1.6–2.4)
MCH RBC QN AUTO: 30.2 PG (ref 26–34)
MCHC RBC AUTO-ENTMCNC: 30.3 G/DL (ref 30–36.5)
MCV RBC AUTO: 99.4 FL (ref 80–99)
MONOCYTES # BLD: 0.2 K/UL (ref 0–1)
MONOCYTES NFR BLD: 2 % (ref 5–13)
NEUTS SEG # BLD: 7.1 K/UL (ref 1.8–8)
NEUTS SEG NFR BLD: 92 % (ref 32–75)
NRBC # BLD: 0 K/UL (ref 0–0.01)
NRBC BLD-RTO: 0 PER 100 WBC
PHOSPHATE SERPL-MCNC: 4.2 MG/DL (ref 2.6–4.7)
PLATELET # BLD AUTO: 172 K/UL (ref 150–400)
PMV BLD AUTO: 10.5 FL (ref 8.9–12.9)
POTASSIUM SERPL-SCNC: 4.8 MMOL/L (ref 3.5–5.1)
PROT SERPL-MCNC: 6.3 G/DL (ref 6.4–8.2)
RBC # BLD AUTO: 3.25 M/UL (ref 3.8–5.2)
RBC MORPH BLD: ABNORMAL
SERVICE CMNT-IMP: ABNORMAL
SERVICE CMNT-IMP: NORMAL
SERVICE CMNT-IMP: NORMAL
SODIUM SERPL-SCNC: 139 MMOL/L (ref 136–145)
WBC # BLD AUTO: 7.8 K/UL (ref 3.6–11)

## 2020-11-05 PROCEDURE — 77417 THER RADIOLOGY PORT IMAGE(S): CPT

## 2020-11-05 PROCEDURE — 84100 ASSAY OF PHOSPHORUS: CPT

## 2020-11-05 PROCEDURE — 97530 THERAPEUTIC ACTIVITIES: CPT

## 2020-11-05 PROCEDURE — 74011250636 HC RX REV CODE- 250/636: Performed by: INTERNAL MEDICINE

## 2020-11-05 PROCEDURE — 74011250637 HC RX REV CODE- 250/637: Performed by: INTERNAL MEDICINE

## 2020-11-05 PROCEDURE — 74011636637 HC RX REV CODE- 636/637: Performed by: HOSPITALIST

## 2020-11-05 PROCEDURE — 74011250637 HC RX REV CODE- 250/637: Performed by: HOSPITALIST

## 2020-11-05 PROCEDURE — 85025 COMPLETE CBC W/AUTO DIFF WBC: CPT

## 2020-11-05 PROCEDURE — 74011636637 HC RX REV CODE- 636/637: Performed by: INTERNAL MEDICINE

## 2020-11-05 PROCEDURE — 36415 COLL VENOUS BLD VENIPUNCTURE: CPT

## 2020-11-05 PROCEDURE — 80053 COMPREHEN METABOLIC PANEL: CPT

## 2020-11-05 PROCEDURE — 74011000258 HC RX REV CODE- 258: Performed by: INTERNAL MEDICINE

## 2020-11-05 PROCEDURE — 77334 RADIATION TREATMENT AID(S): CPT

## 2020-11-05 PROCEDURE — 77030038269 HC DRN EXT URIN PURWCK BARD -A

## 2020-11-05 PROCEDURE — 74011250637 HC RX REV CODE- 250/637: Performed by: NURSE PRACTITIONER

## 2020-11-05 PROCEDURE — 65660000000 HC RM CCU STEPDOWN

## 2020-11-05 PROCEDURE — 83735 ASSAY OF MAGNESIUM: CPT

## 2020-11-05 PROCEDURE — 82962 GLUCOSE BLOOD TEST: CPT

## 2020-11-05 PROCEDURE — 77300 RADIATION THERAPY DOSE PLAN: CPT

## 2020-11-05 PROCEDURE — 77295 3-D RADIOTHERAPY PLAN: CPT

## 2020-11-05 PROCEDURE — 97535 SELF CARE MNGMENT TRAINING: CPT

## 2020-11-05 PROCEDURE — 77412 RADIATION TX DELIVERY LVL 3: CPT

## 2020-11-05 RX ORDER — DEXAMETHASONE 1 MG/1
2 TABLET ORAL EVERY 12 HOURS
Status: DISCONTINUED | OUTPATIENT
Start: 2020-11-05 | End: 2020-11-08

## 2020-11-05 RX ADMIN — Medication 10 ML: at 06:02

## 2020-11-05 RX ADMIN — LEVETIRACETAM 500 MG: 500 TABLET, FILM COATED ORAL at 18:23

## 2020-11-05 RX ADMIN — CLOPIDOGREL BISULFATE 75 MG: 75 TABLET ORAL at 08:59

## 2020-11-05 RX ADMIN — INSULIN GLARGINE 15 UNITS: 100 INJECTION, SOLUTION SUBCUTANEOUS at 08:58

## 2020-11-05 RX ADMIN — FAMOTIDINE 20 MG: 20 TABLET ORAL at 08:58

## 2020-11-05 RX ADMIN — ATORVASTATIN CALCIUM 20 MG: 20 TABLET, FILM COATED ORAL at 21:25

## 2020-11-05 RX ADMIN — INSULIN LISPRO 3 UNITS: 100 INJECTION, SOLUTION INTRAVENOUS; SUBCUTANEOUS at 17:18

## 2020-11-05 RX ADMIN — AZITHROMYCIN MONOHYDRATE 500 MG: 500 INJECTION, POWDER, LYOPHILIZED, FOR SOLUTION INTRAVENOUS at 09:33

## 2020-11-05 RX ADMIN — CEFTRIAXONE SODIUM 1 G: 1 INJECTION, POWDER, FOR SOLUTION INTRAMUSCULAR; INTRAVENOUS at 08:54

## 2020-11-05 RX ADMIN — TOPIRAMATE 100 MG: 100 TABLET, FILM COATED ORAL at 08:58

## 2020-11-05 RX ADMIN — APIXABAN 2.5 MG: 2.5 TABLET, FILM COATED ORAL at 17:18

## 2020-11-05 RX ADMIN — CARVEDILOL 6.25 MG: 6.25 TABLET, FILM COATED ORAL at 08:59

## 2020-11-05 RX ADMIN — LEVETIRACETAM 500 MG: 500 TABLET, FILM COATED ORAL at 06:02

## 2020-11-05 RX ADMIN — Medication 10 ML: at 21:26

## 2020-11-05 RX ADMIN — APIXABAN 2.5 MG: 2.5 TABLET, FILM COATED ORAL at 08:59

## 2020-11-05 RX ADMIN — DEXAMETHASONE 2 MG: 1 TABLET ORAL at 21:25

## 2020-11-05 RX ADMIN — SERTRALINE HYDROCHLORIDE 25 MG: 50 TABLET ORAL at 17:18

## 2020-11-05 RX ADMIN — Medication 10 ML: at 13:12

## 2020-11-05 RX ADMIN — TOPIRAMATE 100 MG: 100 TABLET, FILM COATED ORAL at 17:18

## 2020-11-05 RX ADMIN — FUROSEMIDE 40 MG: 40 TABLET ORAL at 09:15

## 2020-11-05 RX ADMIN — CARVEDILOL 6.25 MG: 6.25 TABLET, FILM COATED ORAL at 17:18

## 2020-11-05 RX ADMIN — Medication 3 MG: at 21:25

## 2020-11-05 RX ADMIN — DEXAMETHASONE 4 MG: 4 TABLET ORAL at 08:59

## 2020-11-05 NOTE — PROGRESS NOTES
1150: Patient with 6 beat run of asymptomatic Vtach. MD notified. Will continue to monitor. Problem: Pressure Injury - Risk of  Goal: *Prevention of pressure injury  Description: Document Elio Scale and appropriate interventions in the flowsheet. 11/5/2020 1002 by Marty Dill  Outcome: Progressing Towards Goal  Note: Pressure Injury Interventions:  Sensory Interventions: Assess need for specialty bed, Assess changes in LOC, Avoid rigorous massage over bony prominences, Chair cushion, Check visual cues for pain, Discuss PT/OT consult with provider, Float heels, Keep linens dry and wrinkle-free, Maintain/enhance activity level, Minimize linen layers, Monitor skin under medical devices, Pad between skin to skin, Pressure redistribution bed/mattress (bed type), Turn and reposition approx. every two hours (pillows and wedges if needed)    Moisture Interventions: Apply protective barrier, creams and emollients, Absorbent underpads, Assess need for specialty bed, Check for incontinence Q2 hours and as needed, Internal/External urinary devices, Limit adult briefs, Maintain skin hydration (lotion/cream), Minimize layers, Moisture barrier    Activity Interventions: Assess need for specialty bed, Chair cushion, Increase time out of bed, Pressure redistribution bed/mattress(bed type), PT/OT evaluation    Mobility Interventions: Chair cushion, Float heels, HOB 30 degrees or less, Pressure redistribution bed/mattress (bed type), PT/OT evaluation, Turn and reposition approx.  every two hours(pillow and wedges)    Nutrition Interventions: Document food/fluid/supplement intake    Friction and Shear Interventions: Apply protective barrier, creams and emollients, Feet elevated on foot rest, Foam dressings/transparent film/skin sealants, HOB 30 degrees or less, Lift sheet, Minimize layers             11/5/2020 1002 by Marty Dill  Outcome: Progressing Towards Goal  Note: Pressure Injury Interventions:  Sensory Interventions: Assess need for specialty bed, Assess changes in LOC, Avoid rigorous massage over bony prominences, Chair cushion, Check visual cues for pain, Discuss PT/OT consult with provider, Float heels, Keep linens dry and wrinkle-free, Maintain/enhance activity level, Minimize linen layers, Monitor skin under medical devices, Pad between skin to skin, Pressure redistribution bed/mattress (bed type), Turn and reposition approx. every two hours (pillows and wedges if needed)    Moisture Interventions: Apply protective barrier, creams and emollients, Absorbent underpads, Assess need for specialty bed, Check for incontinence Q2 hours and as needed, Internal/External urinary devices, Limit adult briefs, Maintain skin hydration (lotion/cream), Minimize layers, Moisture barrier    Activity Interventions: Assess need for specialty bed, Chair cushion, Increase time out of bed, Pressure redistribution bed/mattress(bed type), PT/OT evaluation    Mobility Interventions: Chair cushion, Float heels, HOB 30 degrees or less, Pressure redistribution bed/mattress (bed type), PT/OT evaluation, Turn and reposition approx. every two hours(pillow and wedges)    Nutrition Interventions: Document food/fluid/supplement intake    Friction and Shear Interventions: Apply protective barrier, creams and emollients, Feet elevated on foot rest, Foam dressings/transparent film/skin sealants, HOB 30 degrees or less, Lift sheet, Minimize layers                Problem: Falls - Risk of  Goal: *Absence of Falls  Description: Document Edgard Fall Risk and appropriate interventions in the flowsheet.   Outcome: Progressing Towards Goal  Note: Fall Risk Interventions:  Mobility Interventions: Bed/chair exit alarm, Communicate number of staff needed for ambulation/transfer, OT consult for ADLs, Patient to call before getting OOB, PT Consult for assist device competence, PT Consult for mobility concerns, Utilize walker, cane, or other assistive device    Mentation Interventions: Bed/chair exit alarm, Adequate sleep, hydration, pain control, Door open when patient unattended, Evaluate medications/consider consulting pharmacy, Family/sitter at bedside, Increase mobility, More frequent rounding, Reorient patient, Room close to nurse's station, Toileting rounds    Medication Interventions: Evaluate medications/consider consulting pharmacy, Bed/chair exit alarm, Patient to call before getting OOB, Teach patient to arise slowly    Elimination Interventions: Bed/chair exit alarm, Call light in reach, Elevated toilet seat, Patient to call for help with toileting needs, Stay With Me (per policy), Toilet paper/wipes in reach, Toileting schedule/hourly rounds    History of Falls Interventions: Bed/chair exit alarm, Consult care management for discharge planning, Door open when patient unattended, Evaluate medications/consider consulting pharmacy, Room close to nurse's station

## 2020-11-05 NOTE — CONSULTS
[facility]  CLINICAL NURSE SPECIALIST CONSULT  [facility]    INITIAL NOTE    Presentation   Nick Gtz is a 69 y.o. female admitted on 10/22/2020 s/p fall about 2 weeks ago that she did not seek treatment at the time. She now c/o left sided weakness. Upon initial work up left shoulder was found to be dislocated. Initial diagnostic testing also revealed lung mass with brain mets. Currently awaiting SNF placement    HX:PMH: DM2-A1C 6.1%/ COPD with home 02/ HTN/HLD/HF w/ ICD    Current clinical course has been complicated by labile blood glucose control, most likely from steroid therapy and erratic PO intake? Diabetes: Patient has known well controlled Type 2 diabetes, treated with Amaryl PTA. Family history unknown for diabetes. Re- Consulted by Provider for advanced diabetes nursing assessment and care, specifically related to   [] Transitioning off [place] [facility]   [x] Inpatient management strategy  [x] Home management assessment  [] Survival skill education    Diabetes-related medical history  Acute complications  Labile blood glucose control  Neurological complications  denies  Microvascular disease  Nephropathy vs HTN -GFR 47, Cr+ 1.15  Macrovascular disease  CAD  Other associated conditions     HF/HTN/ HLD    Diabetes medication history  Drug class Currently in use Discontinued Never used   Biguanide      DDP-4 inhibitor       Sulfonylurea Amaryl 2mg daily     Thiazolidinedione      GLP-1 RA      SGLT-2 inhibitors      Basal insulin      Fixed Dose  Combinations      Bolus insulin        Subjective   Ms. Damion Butler is lying in bed sleeping. Nursing students and instructor are in the room caring for her. They confirmed that her PO intake is low and her dinner tray from last evening had hardly any thing eaten from it.      Patient reports the following home diabetes self-care practices:  Eating pattern-deferred    Physical activity pattern-None; debility    Monitoring pattern  Once daily  Taking medications pattern  [] Consistent administration  [] Affordable    Objective   Physical exam  General Alert, oriented and in no acute distress, but ill-appearing. Conversant and cooperative. Vital Signs   Visit Vitals  BP (!) 147/69 (BP 1 Location: Right arm, BP Patient Position: At rest)   Pulse 61   Temp 98.2 °F (36.8 °C)   Resp 18   Ht 5' 2.01\" (1.575 m)   Wt 54.6 kg (120 lb 5.9 oz)   SpO2 100%   BMI 22.01 kg/m²     Skin  Warm and dry. Heart   Regular rate and rhythm. No murmurs, rubs or gallops  Lungs  Clear to auscultation without rales or rhonchi  Extremities No foot wounds    Diabetic foot exam:    Left Foot     Visual Exam: normal    Pulse DP: 2+ (normal)   Filament test: normal sensation      Right Foot   Visual Exam: normal    Pulse DP: 2+ (normal)   Filament test: normal sensation     DP & PT pulses +2. Laboratory  Lab Results   Component Value Date/Time    Hemoglobin A1c 6.1 (H) 10/22/2020 09:00 AM     No results found for: LDL, LDLC, DLDLP  Lab Results   Component Value Date/Time    Creatinine 1.12 (H) 11/05/2020 03:45 AM     Lab Results   Component Value Date/Time    Sodium 139 11/05/2020 03:45 AM    Potassium 4.8 11/05/2020 03:45 AM    Chloride 102 11/05/2020 03:45 AM    CO2 30 11/05/2020 03:45 AM    Anion gap 7 11/05/2020 03:45 AM    Glucose 105 (H) 11/05/2020 03:45 AM    BUN 58 (H) 11/05/2020 03:45 AM    Creatinine 1.12 (H) 11/05/2020 03:45 AM    BUN/Creatinine ratio 52 (H) 11/05/2020 03:45 AM    GFR est AA 58 (L) 11/05/2020 03:45 AM    GFR est non-AA 48 (L) 11/05/2020 03:45 AM    Calcium 9.4 11/05/2020 03:45 AM    Bilirubin, total 0.3 11/05/2020 03:45 AM    Alk.  phosphatase 66 11/05/2020 03:45 AM    Protein, total 6.3 (L) 11/05/2020 03:45 AM    Albumin 3.0 (L) 11/05/2020 03:45 AM    Globulin 3.3 11/05/2020 03:45 AM    A-G Ratio 0.9 (L) 11/05/2020 03:45 AM    ALT (SGPT) 14 11/05/2020 03:45 AM     Lab Results   Component Value Date/Time    ALT (SGPT) 14 11/05/2020 03:45 AM Factors affecting BG pattern  Factor Dose Comments   Nutrition:  Carb-controlled meals     60 grams/meal   Erratic to poor PO intake   Drugs:  Steroids   Decadron 4mg BID Decadron insulin dosing    >8mg dexamethosone = 0.4units/kg   6mg   dexamethosone = 0.3units/kg   4mg   dexamethosone = 0.2units/kg   2mg   dexamethosone = 0.1units/kg      Blood glucose pattern        Assessment and Plan   Nursing Diagnosis Risk for unstable blood glucose pattern   Nursing Intervention Domain 5254 Decision-making Support   Nursing Interventions Examined current inpatient diabetes control   Explored factors facilitating and impeding inpatient management  Identified self-management practices impeding diabetes control  Explored corrective strategies with patient and responsible inpatient provider   Informed patient of rational for insulin strategy while hospitalized     Evaluation   Ms Kari Rodríguez was recently diagnosed since admission with brain metastasis stemming from lung cancer. During her stay she has been on a steroid regimen which has caused her to have hyperglycemia. She has also had a couple episodes of hypoglycemia. The most recent episode was on 11/2/2020 -43mg/dl. For blood glucose control she was initially on a Lantus insulin and correctional only, but was taken off Lantus due to hypoglcyemia and since then only been mangaged with correctional insulin. I believe her poor PO intake contributed to the low blood glucose. It is apparent that she needs consistent basal insulin while on the steroid in order to keep her BG within a target range and <250. Given her age and condition tight glucose control is not necessary . BG trends have continued to decrease since yesterday. AMBG 128 today, however, pre-prandial BG 74 before lunch. Patient's PO intake is poor. To decrease the risk of hypoglycemia, reducing the basal insulin to 10units daily is appropriate.     Inpatient blood glucose management has been impacted by  [x] Kidney dysfunction-GFR 47  [x] Erratic meal consumption -poor PO intake  [x] Glucocorticoid use- Decadron 4mg BID      Recommendations   1. DECREASE  Basal insulin to cover for steroid induced hyperglycemia  [x] 0.2 units/kg/D =10units daily;.     2. Switch Corrective insulin- High Sensitivity ( low BMI; CKD)    3. Would HOLD pre-meal insulin for now as her PO intake is not consistent (per nursing)    Billing Code(s)   I personally reviewed chart, notes, data and current medications in the medical record, and examined the patient at bedside before making care recommendations. Thank you for including us in their care. I spent 20 minutes in direct patient care today for this patient.   Time includes chart review, face to face with patient and collaboration with interdisciplinary care team.     DIVYA De La Vega  Program for Diabetes Health  Access via [address] [place] [address]  [phone]

## 2020-11-05 NOTE — PROGRESS NOTES
Problem: Self Care Deficits Care Plan (Adult)  Goal: *Acute Goals and Plan of Care (Insert Text)  Description: FUNCTIONAL STATUS PRIOR TO ADMISSION: Patient required maximum assistance for basic and instrumental ADLs. At baseline patient uses 2 liters of supplemental oxygen. Patient is a poor historian but appears to have been bed bound for a while living with son and daughter in law who are reportedly home at all times. She could not remember the last time she walked and did not remember recent fall, but did report use of RW when she was mobile. She was rolling for bedpan prior to admission, was dependent for all IADLs, and assisted with dressing/grooming/upper body bathing as able. Patient reports watching TV all day. HOME SUPPORT: The patient lived with son and daughter in law. Occupational Therapy Goals  Weekly Re-Assessment 11/3/2020, goals modified below  Initiated 10/27/2020   1. Patient will perform self-feeding with supervision/set-up within 7 day(s). MET/DISCONTINUED 11/3  2. Patient will perform upper body and lower body bathing using compensatory techniques PRN with minimal assistance/contact guard assist within 7 day(s). DOWNGRADED to Mod A 11/3  3. Patient will perform upper body dressing and lower body dressing using compensatory techniques PRN with minimal assistance/contact guard assist within 7 day(s). DOWNGRADED to Mod A 11/3  4. Patient will perform rolling for bedpan with supervision/set-up within 7 day(s). UPGRADED to transfer to [facility] with Min A 11/3  5. Patient will perform all aspects of toileting with minimal assistance/contact guard assist within 7 day(s). DOWNGRADED to Mod A 11/3  6. Patient will participate in upper extremity therapeutic exercise/activities with modified independence for 5 minutes within 7 day(s). MODIFIED to SPV 11/3  7. Patient will perform bed mobility to EOB in preparation for standing/seated ADLs with minimal assistance in 7 days.  MET, UPGRADED to SBA 11/3          Outcome: Progressing Towards Goal    OCCUPATIONAL THERAPY TREATMENT  Patient: Annis Cushing (17 y.o. female)  Date: 11/5/2020  Diagnosis: Brain metastases ([facility] [address] [C79.31]   Brain metastases ([facility] [address]  Procedure(s) (LRB):  ENDOSCOPIC BRONCHOSCOPY ULTRASOUND (EBUS) (N/A)  BRONCHOSCOPY (N/A)  TRANSBRONCHIAL BIOPSY (N/A) 8 Days Post-Op  Precautions: Fall, Skin(LUE NWB (L clavicular fx))  Chart, occupational therapy assessment, plan of care, and goals were reviewed. ASSESSMENT  Patient continues with skilled OT services and is progressing towards goals. Patient continues to be limited by general debility, weakness (LUE>RUE), L clavicle fracture, decreased activity tolerance, functional mobility, volition and initiation, poor distal lower body access, and flat affect noted during BSC transfer, standing ADLs, and in room mobility. Patient is increasing time OOB and functional mobility each session this week with max encouragement and remains SNF appropriate to increase QOL, independence with ADLs, and mobility. Current Level of Function Impacting Discharge (ADLs): up to max A lower body ADLs, SBA to mod A upper body ADLs, min to max A x1-2 with R hand hold assist for functional mobility     Other factors to consider for discharge: does not have consistent assist of 2 at home, QOL, cancer with brain mets requiring radiation, PLOF         PLAN :  Patient continues to benefit from skilled intervention to address the above impairments. Continue treatment per established plan of care. to address goals. Recommend with staff: Recommend with nursing patient to complete as able in order to maintain strength, endurance and independence: ADLs with assist, OOB to chair 3x/day and mobilizing to the [facility] for toileting with 2 assist. Thank you for your assistance.      Recommend next OT session: Standing ADLs     Recommendation for discharge: (in order for the patient to meet his/her long term goals)  Therapy up to 5 days/week in SNF setting    If d/c home, recommending intensive HHOT/PT, DME below, and physical assist of 2 at the least for all ADLs and mobility for safety. This discharge recommendation:  Has been made in collaboration with the attending provider and/or case management    IF patient discharges home will need the following DME: TBD pending progression and d/c placement; if home anticipate needing: wheelchair,  hospital bed, mechanical lift, BSC (delivered) at the least       SUBJECTIVE:   Patient stated I dont know.  in response to most questions     OBJECTIVE DATA SUMMARY:   Cognitive/Behavioral Status:  Neurologic State: Alert;Drowsy  Orientation Level: Oriented X4  Cognition: Decreased attention/concentration; Follows commands;Memory loss(question memory vs motivation to respond to questions)  Perception: Appears intact  Perseveration: No perseveration noted  Safety/Judgement: Decreased awareness of environment;Decreased awareness of need for assistance;Decreased awareness of need for safety; Fall prevention;Home safety; Lack of insight into deficits    Functional Mobility and Transfers for ADLs:  Bed Mobility:  Supine to Sit: Moderate assistance;Assist x1;Additional time  Scooting: Maximum assistance;Contact guard assistance(CGA for EOB, increasing to max A with fatigue/initiation)    Transfers:  Sit to Stand: Moderate assistance;Assist x2; Additional time  Functional Transfers  Toilet Transfer : Assist x2; Moderate assistance(2nd for line management and brief stabalization)  Cues: Physical assistance;Verbal cues provided; Tactile cues provided  Adaptive Equipment: Bedside commode       Balance:  Sitting: Impaired; Without support  Sitting - Static: Good (unsupported)  Sitting - Dynamic: Fair (occasional)  Standing: Impaired; Without support  Standing - Static: Fair;Constant support  Standing - Dynamic : Poor;Constant support    ADL Intervention:      Grooming  Position Performed: Standing, 2 minutes   Washing Face: Contact guard assistance  Brushing/Combing Hair: Minimum assistance  Cues: Physical assistance; Tactile cues provided;Verbal cues provided    Lower Body Dressing Assistance  Socks: Moderate assistance; Compensatory technique training(tailor sit )  Leg Crossed Method Used: Yes  Position Performed: Seated edge of bed  Cues: Don;Physical assistance; Tactile cues provided    Cognitive Retraining  Problem Solving: Awareness of environment; Identifying the task  Executive Functions: Executing cognitive plans  Organizing/Sequencing: Breaking task down  Attention to Task: Distractibility; Single task  Maintains Attention For (Time): 30 seconds  Following Commands: Awareness of environment; Follows one step commands/directions  Safety/Judgement: Decreased awareness of environment;Decreased awareness of need for assistance;Decreased awareness of need for safety; Fall prevention;Home safety; Lack of insight into deficits  Cues: Tactile cues provided;Verbal cues provided;Visual cues provided    Pain:  Pain reported on bottom     Activity Tolerance:   Fair, desaturates with exertion and requires oxygen (2L), requires frequent rest breaks, and observed SOB with activity    After treatment patient left in no apparent distress:   Sitting in chair, Call bell within reach, and Bed / chair alarm activated    COMMUNICATION/COLLABORATION:   The patients plan of care was discussed with: Registered nurse. Patient was educated regarding Her deficit(s) of impaired balance, mobility, pain, L clavicle fracture  as this relates to Her diagnosis of cancer with brain mets, CKD. She demonstrated poor understanding as evidenced by decreased verbalization. Patient and/or family was verbally educated on the BE FAST acronym for signs/symptoms of CVA and TIA. BE FAST was written on patient's communication board  for visual education and reinforcement. All questions answered with patient indicating fair understanding.      Regarding student involvement in patient care:  A student participated in this treatment session. Per CMS Medicare statements and AOTA guidelines I certify that the following was true:  1. I was present and directly observed the entire session. 2. I made all skilled judgments and clinical decisions regarding care. 3. I am the practitioner responsible for assessment, treatment, and documentation.       Geraldine Felty, OTS  Time Calculation: 30 mins

## 2020-11-05 NOTE — PROGRESS NOTES
[facility] [facility] Adult  Hospitalist Group                                                                                          Hospitalist Progress Note  Young Harper MD  Answering service: [phone] from in house phone        Date of Service:  2020  NAME:  Delbert Aguirre  :  1950  MRN:  [id number]      Admission Summary:   69 y.o. female,  who has history of chronic systolic heart failure status post ICD, chronic COPD on home oxygen 2 L, hypertension, hyperlipidemia, anticoagulation with Eliquis who presents with fall 2 weeks back and left sided weakness. She reports that her left arm felt funny and also she was having weakness in the left arm. It is not painful. And she did not seek medical attention. Given persistence of symptoms, EMS was called and to take her to the local emergency room. At the ER there work-up revealed that she has lung mass as well as brain tumors. She was given IV Decadron and transferred to this facility for neurosurgical evaluation. She was also noted to have a dislocated left shoulder. There was report of lung mass which is right suprahilar with progression into the mediastinum. There is also report of 1.2 cm right frontal mass with 5 cm right to left shift. There is no report of seizures. Interval history / Subjective:   F/U left sided weakness. Patient is seen and examined at bedside this AM. Just worked with PT. Feels tired and sob. Waiting for Rad-onc recommendations. Discussed with nursing - Vtach runs, asymptomatic     Spoke with daughter in law mpoa on phone and updated patient's status - started on XRT 1/10 today      Assessment & Plan:     Brain masses, consistent with metastatic disease, suspect lung primary. Cerebral edema secondary to above  Left-sided weakness: mild improvement  -MRI brain : intracranial metastases, largest 1.3 x 1.3 cm, with surrounding edema.  Osseous metastases in right frontal, parietal, C2 body  -Neurosurgery consulted. No surgical benefit.   -Continue steroids BID - weaning down, Continue Keppra  -Seizure precautions  -Neuro checks  - radiation oncology following   - started on XRT 1/10 on 11/5    Acute on chronic hypoxic respiratory failure, improved   Chronic COPD with chronic hypoxic respiratory failure, not in exacerbation  - C/w lasix   - Pulmonology following  - saturating on 2 liters which is baseline   - on IV ceftriaxone and azithromycin      Lung carcinoma with erosion into mediastinum  - EUS/endobronchial biopsy 10/28- Pathology :  Squamous cell carcinoma     Moderate right pleural effusion: s/p thoracentesis  -s/p thoracentesis 10/27 with approximately 300 ml removed- appears exudative    Postobstructive infiltrate RUL  -Seen on CT  -no unusual cough, fever  -s/p Ceftriaxone    UTI, Proteus:  -completed Ceftriaxone     Hyperkalemia - resolved ( fluctuating)  - nephrology following    Diabetes type 2 with hyperglycemia   -Hba1c 6.1,  Continue lantus 15U, SSI, restart amaryl on discharge     Chronic congestive heart failure, presumed systolic given patient has ICD -Continued home medications plavix, coreg, statin     Clavicular fracture:  Shoulder dislocation ruled out:  -Appreciate orthopedic surgery. No evidence of shoulder dislocation. Can wear sling for comfort       Afib on Eliquis   -rate controlled on coreg. C/w eliquis    Hx CAD     Hypertension -Continue with Coreg. Lisinopril and aldactone on hold     Prior tobacco use     Code status: full. AMD filled out  DVT prophylaxis: SCDs    Care Plan discussed with: Patient/Family  Anticipated Disposition: snf after completion of XRT? Hospital Problems  Date Reviewed: 10/28/2020          Codes Class Noted POA    * (Principal) Brain metastases [facility]) ICD-10-CM: C79.31  ICD-9-CM: 198.3  10/22/2020 Yes                Review of Systems:     Negative unless stated above    Vital Signs:    Last 24hrs VS reviewed since prior progress note. Most recent are:  Visit Vitals  /82 (BP 1 Location: Left arm, BP Patient Position: At rest)   Pulse 73   Temp 98.4 °F (36.9 °C)   Resp 20   Ht 5' 2.01\" (1.575 m)   Wt 54.6 kg (120 lb 5.9 oz)   SpO2 99%   BMI 22.01 kg/m²         Intake/Output Summary (Last 24 hours) at 11/5/2020 1609  Last data filed at 11/5/2020 1300  Gross per 24 hour   Intake    Output 1500 ml   Net -1500 ml        Physical Examination:             Constitutional:  No acute distress, cooperative, ill looking    ENT:  Oral mucosa moist, oropharynx benign. Resp:  decreased breath sounds    CV:  Regular rhythm, normal rate, no murmurs, gallops, rubs    GI:  Soft, non distended, non tender. normoactive bowel sounds, no hepatosplenomegaly     Musculoskeletal:  No edema, warm, 2+ pulses throughout    Neurologic:   alert and oriented x3            Data Review:    Review and/or order of clinical lab test  Review and/or order of tests in the radiology section of CPT  Review and/or order of tests in the medicine section of CPT      Labs:     Recent Labs     11/05/20 0345 11/04/20 0440   WBC 7.8 7.5   HGB 9.8* 10.6*   HCT 32.3* 35.1    172     Recent Labs     11/05/20 0345 11/04/20 0440 11/03/20  1333    135* 139   K 4.8 5.6* 4.6    99 108   CO2 30 33* 23   BUN 58* 53* 48*   CREA 1.12* 1.15* 1.05*   * 250* 310*   CA 9.4 9.0 7.8*   MG 2.2 2.3  --    PHOS 4.2 3.7  --      Recent Labs     11/05/20 0345 11/04/20 0440   ALT 14 14   AP 66 65   TBILI 0.3 0.3   TP 6.3* 6.2*   ALB 3.0* 3.0*   GLOB 3.3 3.2     No results for input(s): INR, PTP, APTT, INREXT, INREXT in the last 72 hours. No results for input(s): FE, TIBC, PSAT, FERR in the last 72 hours. No results found for: FOL, RBCF   No results for input(s): PH, PCO2, PO2 in the last 72 hours. No results for input(s): CPK, CKNDX, TROIQ in the last 72 hours.     No lab exists for component: CPKMB  No results found for: CHOL, CHOLX, CHLST, CHOLV, HDL, HDLP, LDL, LDLC, Agni Valdez, [facility], [facility]  Lab Results   Component Value Date/Time    Glucose (POC) 74 2020 11:41 AM    Glucose (POC) 128 (H) 2020 07:33 AM    Glucose (POC) 138 (H) 2020 09:15 PM    Glucose (POC) 284 (H) 2020 04:08 PM    Glucose (POC) 211 (H) 2020 11:22 AM     Lab Results   Component Value Date/Time    Color YELLOW/STRAW 10/22/2020 09:15 AM    Appearance TURBID (A) 10/22/2020 09:15 AM    Specific gravity 1.012 10/22/2020 09:15 AM    pH (UA) 5.5 10/22/2020 09:15 AM    Protein Negative 10/22/2020 09:15 AM    Glucose Negative 10/22/2020 09:15 AM    Ketone Negative 10/22/2020 09:15 AM    Bilirubin Negative 10/22/2020 09:15 AM    Urobilinogen 0.2 10/22/2020 09:15 AM    Nitrites Positive (A) 10/22/2020 09:15 AM    Leukocyte Esterase LARGE (A) 10/22/2020 09:15 AM    Epithelial cells FEW 10/22/2020 09:15 AM    Bacteria 4+ (A) 10/22/2020 09:15 AM    WBC  10/22/2020 09:15 AM    RBC 0-5 10/22/2020 09:15 AM         Medications Reviewed:     Current Facility-Administered Medications   Medication Dose Route Frequency    insulin glargine (LANTUS) injection 15 Units  15 Units SubCUTAneous DAILY    cefTRIAXone (ROCEPHIN) 1 g in 0.9% sodium chloride (MBP/ADV) 50 mL  1 g IntraVENous Q24H    azithromycin (ZITHROMAX) 500 mg in 0.9% sodium chloride (MBP/ADV) 250 mL  500 mg IntraVENous Q24H    furosemide (LASIX) tablet 40 mg  40 mg Oral DAILY    clopidogreL (PLAVIX) tablet 75 mg  75 mg Oral DAILY    dexAMETHasone (DECADRON) tablet 4 mg  4 mg Oral Q12H    apixaban (ELIQUIS) tablet 2.5 mg  2.5 mg Oral BID    levETIRAcetam (KEPPRA) tablet 500 mg  500 mg Oral Q12H    albuterol-ipratropium (DUO-NEB) 2.5 MG-0.5 MG/3 ML  3 mL Nebulization Q4H PRN    melatonin tablet 3 mg  3 mg Oral QHS    sodium chloride (NS) flush 5-40 mL  5-40 mL IntraVENous Q8H    sodium chloride (NS) flush 5-40 mL  5-40 mL IntraVENous PRN    acetaminophen (TYLENOL) tablet 650 mg  650 mg Oral Q6H PRN    Or    acetaminophen (TYLENOL) suppository 650 mg  650 mg Rectal Q6H PRN    polyethylene glycol (MIRALAX) packet 17 g  17 g Oral DAILY PRN    ondansetron (ZOFRAN ODT) tablet 4 mg  4 mg Oral Q8H PRN    Or    ondansetron (ZOFRAN) injection 4 mg  4 mg IntraVENous Q6H PRN    famotidine (PEPCID) tablet 20 mg  20 mg Oral DAILY    atorvastatin (LIPITOR) tablet 20 mg  20 mg Oral QHS    carvediloL (COREG) tablet 6.25 mg  6.25 mg Oral BID WITH MEALS    [Held by provider] lisinopriL (PRINIVIL, ZESTRIL) tablet 5 mg  5 mg Oral DAILY    sertraline (ZOLOFT) tablet 25 mg  25 mg Oral QPM    [Held by provider] spironolactone (ALDACTONE) tablet 25 mg  25 mg Oral DAILY    topiramate (TOPAMAX) tablet 100 mg  100 mg Oral BID    glucose chewable tablet 16 g  4 Tab Oral PRN    glucagon (GLUCAGEN) injection 1 mg  1 mg IntraMUSCular PRN    dextrose 10% infusion 0-250 mL  0-250 mL IntraVENous PRN    insulin lispro (HUMALOG) injection   SubCUTAneous AC&HS     ______________________________________________________________________  EXPECTED LENGTH OF STAY: 4d 21h  ACTUAL LENGTH OF STAY:          14                 Deatra Simmonds, MD

## 2020-11-05 NOTE — PROGRESS NOTES
Patient name: Lobo Tran  MRN: [id number]    Nephrology Progress note:    Assessment:  SAVANNAH: Cr stable at 1.1mg/dl. Holding [place]STON/MRA therapy     Metastatic Lung CA     Hyperkalemia: fluctuating-> better today     DM2: +hyperglycemia 2 to decadron-> better     HTN: stable     Systolic CHF: Relatively compensated     Proteus UTI    Plan/Recommendations:  Would continue to hold Spironolactone and Lisinopril given issues with hyperkalemia  Resume Lasix 40mg daily   Low K diet  Renally adjust new meds   Am labs      Subjective:   Bld sugars better. Resting comfortably    ROS:   UTO    Exam:  Visit Vitals  BP (!) 147/69 (BP 1 Location: Right arm, BP Patient Position: At rest)   Pulse 61   Temp 98.2 °F (36.8 °C)   Resp 18   Ht 5' 2.01\" (1.575 m)   Wt 54.6 kg (120 lb 5.9 oz)   SpO2 100%   BMI 22.01 kg/m²     Wt Readings from Last 3 Encounters:   11/05/20 54.6 kg (120 lb 5.9 oz)   10/21/20 51.7 kg (114 lb)       Intake/Output Summary (Last 24 hours) at 11/5/2020 1221  Last data filed at 11/5/2020 0400  Gross per 24 hour   Intake    Output 1500 ml   Net -1500 ml       Gen: NAD  HEENT: AT/NC, dry mm  Lungs/Chest wall: Decreased BS b/l  Cardiovascular: Regular rate, normal rhythm. Abdomen/: Soft, NT, ND, BS+.    Ext:  No peripheral edema        Current Facility-Administered Medications   Medication Dose Route Frequency Last Dose    insulin glargine (LANTUS) injection 15 Units  15 Units SubCUTAneous DAILY 15 Units at 11/05/20 0858    cefTRIAXone (ROCEPHIN) 1 g in 0.9% sodium chloride (MBP/ADV) 50 mL  1 g IntraVENous Q24H 1 g at 11/05/20 0854    azithromycin (ZITHROMAX) 500 mg in 0.9% sodium chloride (MBP/ADV) 250 mL  500 mg IntraVENous Q24H 500 mg at 11/05/20 0933    furosemide (LASIX) tablet 40 mg  40 mg Oral DAILY 40 mg at 11/05/20 0915    clopidogreL (PLAVIX) tablet 75 mg  75 mg Oral DAILY 75 mg at 11/05/20 0859    dexAMETHasone (DECADRON) tablet 4 mg  4 mg Oral Q12H 4 mg at 11/05/20 0859    apixaban (ELIQUIS) tablet 2.5 mg  2.5 mg Oral BID 2.5 mg at 11/05/20 0859    levETIRAcetam (KEPPRA) tablet 500 mg  500 mg Oral Q12H 500 mg at 11/05/20 0602    albuterol-ipratropium (DUO-NEB) 2.5 MG-0.5 MG/3 ML  3 mL Nebulization Q4H PRN 3 mL at 11/03/20 1432    melatonin tablet 3 mg  3 mg Oral QHS 3 mg at 11/04/20 2123    sodium chloride (NS) flush 5-40 mL  5-40 mL IntraVENous Q8H 10 mL at 11/05/20 0602    sodium chloride (NS) flush 5-40 mL  5-40 mL IntraVENous PRN      acetaminophen (TYLENOL) tablet 650 mg  650 mg Oral Q6H  mg at 11/04/20 1810    Or    acetaminophen (TYLENOL) suppository 650 mg  650 mg Rectal Q6H PRN      polyethylene glycol (MIRALAX) packet 17 g  17 g Oral DAILY PRN      ondansetron (ZOFRAN ODT) tablet 4 mg  4 mg Oral Q8H PRN      Or    ondansetron (ZOFRAN) injection 4 mg  4 mg IntraVENous Q6H PRN      famotidine (PEPCID) tablet 20 mg  20 mg Oral DAILY 20 mg at 11/05/20 0858    atorvastatin (LIPITOR) tablet 20 mg  20 mg Oral QHS 20 mg at 11/04/20 2123    carvediloL (COREG) tablet 6.25 mg  6.25 mg Oral BID WITH MEALS 6.25 mg at 11/05/20 0859    [Held by provider] lisinopriL (PRINIVIL, ZESTRIL) tablet 5 mg  5 mg Oral DAILY Stopped at 10/31/20 0900    sertraline (ZOLOFT) tablet 25 mg  25 mg Oral QPM 25 mg at 11/04/20 1728    [Held by provider] spironolactone (ALDACTONE) tablet 25 mg  25 mg Oral DAILY Stopped at 10/31/20 0900    topiramate (TOPAMAX) tablet 100 mg  100 mg Oral  mg at 11/05/20 0858    glucose chewable tablet 16 g  4 Tab Oral PRN 16 g at 11/02/20 2222    glucagon (GLUCAGEN) injection 1 mg  1 mg IntraMUSCular PRN      dextrose 10% infusion 0-250 mL  0-250 mL IntraVENous  mL at 10/28/20 0734    insulin lispro (HUMALOG) injection   SubCUTAneous AC&HS Stopped at 11/04/20 2200       Labs/Data:    Lab Results   Component Value Date/Time    WBC 7.8 11/05/2020 03:45 AM    HGB 9.8 (L) 11/05/2020 03:45 AM    HCT 32.3 (L) 11/05/2020 03:45 AM    PLATELET 826 05/51/5552 03:45 AM    MCV 99.4 (H) 11/05/2020 03:45 AM       Lab Results   Component Value Date/Time    Sodium 139 11/05/2020 03:45 AM    Potassium 4.8 11/05/2020 03:45 AM    Chloride 102 11/05/2020 03:45 AM    CO2 30 11/05/2020 03:45 AM    Anion gap 7 11/05/2020 03:45 AM    Glucose 105 (H) 11/05/2020 03:45 AM    BUN 58 (H) 11/05/2020 03:45 AM    Creatinine 1.12 (H) 11/05/2020 03:45 AM    BUN/Creatinine ratio 52 (H) 11/05/2020 03:45 AM    GFR est AA 58 (L) 11/05/2020 03:45 AM    GFR est non-AA 48 (L) 11/05/2020 03:45 AM    Calcium 9.4 11/05/2020 03:45 AM       Patient seen and examined. Chart reviewed. Labs, data and other pertinent notes reviewed in last 24 hrs.     Discussed with RN    Signed by:  Maria Guadalupe Salmon MD  [id number]

## 2020-11-05 NOTE — PROGRESS NOTES
Bedside and Verbal shift change report given to Jennifer RN (oncoming nurse) by [facility] RN (offgoing nurse). Report included the following information SBAR, Kardex, Intake/Output, MAR, Recent Results, Cardiac Rhythm NSR and Dual Neuro Assessment.

## 2020-11-05 NOTE — PROGRESS NOTES
Bedside and Verbal shift change report given to [address] [place] [address] (oncoming nurse) by Vale Sanders RN (offgoing nurse). Report included the following information SBAR, Kardex, ED Summary, Intake/Output, MAR, Cardiac Rhythm [address] [place] [address] and Dual Neuro Assessment.

## 2020-11-06 LAB
ANION GAP SERPL CALC-SCNC: 7 MMOL/L (ref 5–15)
BASOPHILS # BLD: 0 K/UL (ref 0–0.1)
BASOPHILS NFR BLD: 0 % (ref 0–1)
BUN SERPL-MCNC: 53 MG/DL (ref 6–20)
BUN/CREAT SERPL: 48 (ref 12–20)
CALCIUM SERPL-MCNC: 9 MG/DL (ref 8.5–10.1)
CHLORIDE SERPL-SCNC: 97 MMOL/L (ref 97–108)
CO2 SERPL-SCNC: 30 MMOL/L (ref 21–32)
CREAT SERPL-MCNC: 1.11 MG/DL (ref 0.55–1.02)
DIFFERENTIAL METHOD BLD: ABNORMAL
EOSINOPHIL # BLD: 0.1 K/UL (ref 0–0.4)
EOSINOPHIL NFR BLD: 1 % (ref 0–7)
ERYTHROCYTE [DISTWIDTH] IN BLOOD BY AUTOMATED COUNT: 13.4 % (ref 11.5–14.5)
GLUCOSE BLD STRIP.AUTO-MCNC: 128 MG/DL (ref 65–100)
GLUCOSE BLD STRIP.AUTO-MCNC: 230 MG/DL (ref 65–100)
GLUCOSE BLD STRIP.AUTO-MCNC: 346 MG/DL (ref 65–100)
GLUCOSE BLD STRIP.AUTO-MCNC: 90 MG/DL (ref 65–100)
GLUCOSE SERPL-MCNC: 179 MG/DL (ref 65–100)
HCT VFR BLD AUTO: 32.7 % (ref 35–47)
HGB BLD-MCNC: 10.1 G/DL (ref 11.5–16)
IMM GRANULOCYTES # BLD AUTO: 0.1 K/UL (ref 0–0.04)
IMM GRANULOCYTES NFR BLD AUTO: 1 % (ref 0–0.5)
LYMPHOCYTES # BLD: 0.5 K/UL (ref 0.8–3.5)
LYMPHOCYTES NFR BLD: 5 % (ref 12–49)
MCH RBC QN AUTO: 30.3 PG (ref 26–34)
MCHC RBC AUTO-ENTMCNC: 30.9 G/DL (ref 30–36.5)
MCV RBC AUTO: 98.2 FL (ref 80–99)
MONOCYTES # BLD: 0.5 K/UL (ref 0–1)
MONOCYTES NFR BLD: 5 % (ref 5–13)
NEUTS SEG # BLD: 9.7 K/UL (ref 1.8–8)
NEUTS SEG NFR BLD: 88 % (ref 32–75)
NRBC # BLD: 0 K/UL (ref 0–0.01)
NRBC BLD-RTO: 0 PER 100 WBC
PLATELET # BLD AUTO: 177 K/UL (ref 150–400)
PMV BLD AUTO: 10.3 FL (ref 8.9–12.9)
POTASSIUM SERPL-SCNC: 4.8 MMOL/L (ref 3.5–5.1)
RBC # BLD AUTO: 3.33 M/UL (ref 3.8–5.2)
RBC MORPH BLD: ABNORMAL
SERVICE CMNT-IMP: ABNORMAL
SERVICE CMNT-IMP: NORMAL
SODIUM SERPL-SCNC: 134 MMOL/L (ref 136–145)
WBC # BLD AUTO: 10.9 K/UL (ref 3.6–11)

## 2020-11-06 PROCEDURE — 36415 COLL VENOUS BLD VENIPUNCTURE: CPT

## 2020-11-06 PROCEDURE — 74011250637 HC RX REV CODE- 250/637: Performed by: INTERNAL MEDICINE

## 2020-11-06 PROCEDURE — 74011000258 HC RX REV CODE- 258: Performed by: INTERNAL MEDICINE

## 2020-11-06 PROCEDURE — 74011250637 HC RX REV CODE- 250/637: Performed by: NURSE PRACTITIONER

## 2020-11-06 PROCEDURE — 74011250637 HC RX REV CODE- 250/637: Performed by: HOSPITALIST

## 2020-11-06 PROCEDURE — 85025 COMPLETE CBC W/AUTO DIFF WBC: CPT

## 2020-11-06 PROCEDURE — 77030040361 HC SLV COMPR DVT MDII -B

## 2020-11-06 PROCEDURE — 74011250636 HC RX REV CODE- 250/636: Performed by: INTERNAL MEDICINE

## 2020-11-06 PROCEDURE — 97530 THERAPEUTIC ACTIVITIES: CPT

## 2020-11-06 PROCEDURE — 74011000250 HC RX REV CODE- 250: Performed by: INTERNAL MEDICINE

## 2020-11-06 PROCEDURE — 94640 AIRWAY INHALATION TREATMENT: CPT

## 2020-11-06 PROCEDURE — 82962 GLUCOSE BLOOD TEST: CPT

## 2020-11-06 PROCEDURE — 99232 SBSQ HOSP IP/OBS MODERATE 35: CPT | Performed by: NURSE PRACTITIONER

## 2020-11-06 PROCEDURE — 80048 BASIC METABOLIC PNL TOTAL CA: CPT

## 2020-11-06 PROCEDURE — 74011636637 HC RX REV CODE- 636/637: Performed by: HOSPITALIST

## 2020-11-06 PROCEDURE — 65660000000 HC RM CCU STEPDOWN

## 2020-11-06 RX ORDER — INSULIN GLARGINE 100 [IU]/ML
8 INJECTION, SOLUTION SUBCUTANEOUS DAILY
Status: DISCONTINUED | OUTPATIENT
Start: 2020-11-06 | End: 2020-11-06

## 2020-11-06 RX ADMIN — CARVEDILOL 6.25 MG: 6.25 TABLET, FILM COATED ORAL at 17:10

## 2020-11-06 RX ADMIN — ATORVASTATIN CALCIUM 20 MG: 20 TABLET, FILM COATED ORAL at 21:33

## 2020-11-06 RX ADMIN — TOPIRAMATE 100 MG: 100 TABLET, FILM COATED ORAL at 09:00

## 2020-11-06 RX ADMIN — LEVETIRACETAM 500 MG: 500 TABLET, FILM COATED ORAL at 18:19

## 2020-11-06 RX ADMIN — CEFTRIAXONE SODIUM 1 G: 1 INJECTION, POWDER, FOR SOLUTION INTRAMUSCULAR; INTRAVENOUS at 12:13

## 2020-11-06 RX ADMIN — DEXAMETHASONE 2 MG: 1 TABLET ORAL at 09:37

## 2020-11-06 RX ADMIN — Medication 3 MG: at 21:32

## 2020-11-06 RX ADMIN — LEVETIRACETAM 500 MG: 500 TABLET, FILM COATED ORAL at 06:39

## 2020-11-06 RX ADMIN — DEXAMETHASONE 2 MG: 1 TABLET ORAL at 21:33

## 2020-11-06 RX ADMIN — ACETAMINOPHEN 650 MG: 325 TABLET ORAL at 21:33

## 2020-11-06 RX ADMIN — FAMOTIDINE 20 MG: 20 TABLET ORAL at 09:37

## 2020-11-06 RX ADMIN — IPRATROPIUM BROMIDE AND ALBUTEROL SULFATE 3 ML: .5; 3 SOLUTION RESPIRATORY (INHALATION) at 22:30

## 2020-11-06 RX ADMIN — TOPIRAMATE 100 MG: 100 TABLET, FILM COATED ORAL at 17:10

## 2020-11-06 RX ADMIN — CLOPIDOGREL BISULFATE 75 MG: 75 TABLET ORAL at 09:36

## 2020-11-06 RX ADMIN — CARVEDILOL 6.25 MG: 6.25 TABLET, FILM COATED ORAL at 09:36

## 2020-11-06 RX ADMIN — APIXABAN 2.5 MG: 2.5 TABLET, FILM COATED ORAL at 09:37

## 2020-11-06 RX ADMIN — APIXABAN 2.5 MG: 2.5 TABLET, FILM COATED ORAL at 17:10

## 2020-11-06 RX ADMIN — INSULIN LISPRO 2 UNITS: 100 INJECTION, SOLUTION INTRAVENOUS; SUBCUTANEOUS at 21:31

## 2020-11-06 RX ADMIN — SERTRALINE HYDROCHLORIDE 25 MG: 50 TABLET ORAL at 17:10

## 2020-11-06 RX ADMIN — Medication 10 ML: at 06:39

## 2020-11-06 RX ADMIN — FUROSEMIDE 40 MG: 40 TABLET ORAL at 09:37

## 2020-11-06 RX ADMIN — Medication 10 ML: at 21:33

## 2020-11-06 RX ADMIN — INSULIN LISPRO 7 UNITS: 100 INJECTION, SOLUTION INTRAVENOUS; SUBCUTANEOUS at 17:10

## 2020-11-06 RX ADMIN — AZITHROMYCIN MONOHYDRATE 500 MG: 500 INJECTION, POWDER, LYOPHILIZED, FOR SOLUTION INTRAVENOUS at 09:53

## 2020-11-06 NOTE — PROGRESS NOTES
Bedside and Verbal shift change report given to [address] [place] [address] (oncoming nurse) by Keisha Hoffmann RN(offgoing nurse). Report included the following information SBAR, Kardex, Intake/Output, MAR, Recent Results, Cardiac Rhythm NSR and Dual Neuro Assessment.

## 2020-11-06 NOTE — PROGRESS NOTES
Physical therapy:    Attempted PT session, but pt politely declined. Encouraged pt and educated pt on benefits of mobility and risks of immobility, but pt continued to decline. Will defer and continue to follow.  Thank you    Urbano Dennis

## 2020-11-06 NOTE — PROGRESS NOTES
Branden [address] [id number]     1950  69 y.o.  female    Patient Telephone Number: [phone] (home)   Yazidi Affiliation: Unknown   Language: English   Patient Active Problem List    Diagnosis Date Noted    Brain metastases ([facility] [address] 10/22/2020    Chronic bronchitis ([facility] [address] 10/16/2020    Contusion, shoulder and upper arm, multiple sites, initial encounter 10/16/2020    Current episode of major depressive disorder without prior episode 2020        Date: 2020            Total Time (in minutes): 23          [facility] 6S NEURO-SCI TELE    Mental Status:   [x] Alert [  ] Sara Surya [  ]  Confused  [  ] Minimally responsive  [  ] Sleeping    Communication Status: [  ] Impaired Speech [  ] Nonverbal -N/A    Physical Status:   [  ] Oxygen in use  [  ] Hard of Hearing [  ] Vision Impaired  [  ] Ambulatory  [  ] Ambulatory with assistance [  ] Non-ambulatory -N/A    Music Preferences, Background: Classic country    Clinical Problem addressed: relaxation    Goal(s) met in session:  Physical/Pain management (Scale of 1-10):    Pre-session ratin  Post-session rating: Pt said pain lowered but didn't report a rating  [x] Increased relaxation   [x] Affected breathing patterns  [  ] Decreased muscle tension   [  ] Decreased agitation  [  ] Affected heart rate    [  ] Increased alertness     Emotional/Psychological:  [  ] Increased self-expression   [  ] Decreased aggressive behavior   [  ] Decreased feelings of stress  [  ] Discussed healthy coping skills     [  ] Improved mood    [  ] Decreased withdrawn behavior     Social:  [  ] Decreased feelings of isolation/loneliness [  ] Positive social interaction   [  ] Provided support and/or comfort for family/friends    Spiritual:  [  ] Spiritual support    [  ] Expressed peace  [  ] Expressed jace    [  ] Discussed beliefs    Techniques Utilized (Check all that apply):   [  ] Procedural support MT [x] Music for relaxation [ ] Patient preferred music  [  ] Rosanne analysis  [  ] Bailee Stiles choice  [x] Music for validation  [  ] Entrainment  [  ] Movement to music [  ] Guided visualization  [  ] Denilson López  [  ] Patient instrument playing [  ] Bailee Stiles writing  [  ] Rizwana Jimenez along   [  ] Marguerita Gens  [  ] Sensory stimulation  [  ] Active Listening  [  ] Music for spiritual support [  ] Making of CDs as gifts    Session Observations: Referred by Nathalie El, Patient Advocate for the [address]. Patient was alert lying in bed, appearing lethargic. This music therapy intern (MTI) asked how the pt was feeling and what her favorite kinds of music were. She shared these. MTI offered a music therapy session to support relaxation and assist with bringing down her pain. She agreed to this. MTI sat at bedside and sang and played [address] [place] with guitar at a slow tempo. Pt increased relaxation in response to the music as evidenced by (AEB) closing her eyes and her breath softening. She would briefly lift her head as though she was stretching her neck throughout the songs. MTI sang and played Jesus Pettit' [address] and [employer] with guitar at a slow tempo. Pt's eyes remained closed during this. Pt opened her eyes as the music ended and thanked the MTI for the visit. MTI asked about the pt's pain and she reported it went down a bit. Will follow as able. Katherine Ardon, Music Therapy Intern  Spiritual Care Services Dept.    Referral-based service

## 2020-11-06 NOTE — PROGRESS NOTES
visit for routine follow up, palliative care consult. Patient resting in bed sleeping comfortably. Did not awaken to knock at door, verbal greeting, or presence at bedside. Will continue to follow up as needed and upon request as able. Visited by Rev. Jeff Alaniz MDiv, [facility], [facility]   paging service: [phone])

## 2020-11-06 NOTE — PROGRESS NOTES
[address] Adult  Hospitalist Group                                                                                          Hospitalist Progress Note  Mayela Coker MD  Answering service: [phone] from in house phone        Date of Service:  2020  NAME:  Nick Gtz  :  1950  MRN:  [id number]      Admission Summary:   69 y.o. female,  who has history of chronic systolic heart failure status post ICD, chronic COPD on home oxygen 2 L, hypertension, hyperlipidemia, anticoagulation with Eliquis who presents with fall 2 weeks back and left sided weakness. She reports that her left arm felt funny and also she was having weakness in the left arm. It is not painful. And she did not seek medical attention. Given persistence of symptoms, EMS was called and to take her to the local emergency room. At the ER there work-up revealed that she has lung mass as well as brain tumors. She was given IV Decadron and transferred to this facility for neurosurgical evaluation. She was also noted to have a dislocated left shoulder. There was report of lung mass which is right suprahilar with progression into the mediastinum. There is also report of 1.2 cm right frontal mass with 5 cm right to left shift. There is no report of seizures. Interval history / Subjective:   F/U left sided weakness. Patient is seen and examined at bedside this AM. Feels very lethargic. Poor appetite. Discussed with nursing      Assessment & Plan:     Brain masses, consistent with metastatic disease, suspect lung primary. Cerebral edema secondary to above  Left-sided weakness: mild improvement  -MRI brain : intracranial metastases, largest 1.3 x 1.3 cm, with surrounding edema.  Osseous metastases in right frontal, parietal, C2 body  -Neurosurgery: No surgical benefit.   -Continue steroids BID - weaning down, Continue Keppra  -Seizure precautions  -Neuro checks  - radiation oncology following - started on XRT 1/10 on 11/5    Acute on chronic hypoxic respiratory failure, improved   Chronic COPD with chronic hypoxic respiratory failure, not in exacerbation  - C/w lasix   - Pulmonology following  - saturating on 2 liters which is baseline   - on IV ceftriaxone and azithromycin      Lung carcinoma with erosion into mediastinum  - EUS/endobronchial biopsy 10/28- Pathology :  Squamous cell carcinoma     Moderate right pleural effusion: s/p thoracentesis  -s/p thoracentesis 10/27 with approximately 300 ml removed- appears exudative    Postobstructive infiltrate RUL  -Seen on CT  -no unusual cough, fever  -s/p Ceftriaxone    UTI, Proteus:  -completed Ceftriaxone     Hyperkalemia - resolved ( fluctuating)  - nephrology following    Diabetes type 2 with hyperglycemia   -Hba1c 6.1, hypoglycemic. dced lantus. C/w SSI     Chronic congestive heart failure, presumed systolic given patient has ICD -Continued home medications plavix, coreg, statin     Clavicular fracture:  Shoulder dislocation ruled out:  -Appreciate orthopedic surgery. No evidence of shoulder dislocation. Can wear sling for comfort       Afib on Eliquis   -rate controlled on coreg. C/w eliquis    Hx CAD     Hypertension -Continue with Coreg. Lisinopril and aldactone on hold     Prior tobacco use    Palliative care reconsulted - overall declining     Code status: full. AMD filled out  DVT prophylaxis: SCDs    Care Plan discussed with: Patient/Family  Anticipated Disposition: snf after completion of XRT? Hospital Problems  Date Reviewed: 10/28/2020          Codes Class Noted POA    * (Principal) Brain metastases [facility]) ICD-10-CM: C79.31  ICD-9-CM: 198.3  10/22/2020 Yes                Review of Systems:     Negative unless stated above    Vital Signs:    Last 24hrs VS reviewed since prior progress note.  Most recent are:  Visit Vitals  BP (!) 102/49 (BP 1 Location: Right arm, BP Patient Position: At rest)   Pulse 76   Temp 98.3 °F (36.8 °C)   Resp 18 Ht 5' 2.01\" (1.575 m)   Wt 55.9 kg (123 lb 2.7 oz)   SpO2 100%   BMI 22.52 kg/m²         Intake/Output Summary (Last 24 hours) at 11/6/2020 1140  Last data filed at 11/6/2020 0545  Gross per 24 hour   Intake 350 ml   Output 1300 ml   Net -950 ml        Physical Examination:             Constitutional:  No acute distress, cooperative, ill looking    ENT:  Oral mucosa moist, oropharynx benign. Resp:  decreased breath sounds    CV:  Regular rhythm, normal rate, no murmurs, gallops, rubs    GI:  Soft, non distended, non tender. normoactive bowel sounds, no hepatosplenomegaly     Musculoskeletal:  No edema, warm, 2+ pulses throughout    Neurologic:   alert and oriented x3            Data Review:    Review and/or order of clinical lab test  Review and/or order of tests in the radiology section of [facility]  Review and/or order of tests in the medicine section of [facility]      Labs:     Recent Labs     11/06/20 0100 11/05/20 0345   WBC 10.9 7.8   HGB 10.1* 9.8*   HCT 32.7* 32.3*    172     Recent Labs     11/06/20 0100 11/05/20 0345 11/04/20  0440   * 139 135*   K 4.8 4.8 5.6*   CL 97 102 99   CO2 30 30 33*   BUN 53* 58* 53*   CREA 1.11* 1.12* 1.15*   * 105* 250*   CA 9.0 9.4 9.0   MG  --  2.2 2.3   PHOS  --  4.2 3.7     Recent Labs     11/05/20 0345 11/04/20  0440   ALT 14 14   AP 66 65   TBILI 0.3 0.3   TP 6.3* 6.2*   ALB 3.0* 3.0*   GLOB 3.3 3.2     No results for input(s): INR, PTP, APTT, INREXT, INREXT in the last 72 hours. No results for input(s): FE, TIBC, PSAT, FERR in the last 72 hours. No results found for: FOL, RBCF   No results for input(s): PH, PCO2, PO2 in the last 72 hours. No results for input(s): CPK, CKNDX, TROIQ in the last 72 hours.     No lab exists for component: CPKMB  No results found for: CHOL, CHOLX, CHLST, CHOLV, HDL, HDLP, LDL, LDLC, DLDLP, TGLX, TRIGL, TRIGP, CHHD, CHHDX  Lab Results   Component Value Date/Time    Glucose (POC) 90 11/06/2020 09:49 AM    Glucose (POC) 128 (H) 11/06/2020 07:12 AM    Glucose (POC) 91 11/05/2020 09:56 PM    Glucose (POC) 37 (LL) 11/05/2020 09:31 PM    Glucose (POC) 220 (H) 11/05/2020 04:56 PM     Lab Results   Component Value Date/Time    Color YELLOW/STRAW 10/22/2020 09:15 AM    Appearance TURBID (A) 10/22/2020 09:15 AM    Specific gravity 1.012 10/22/2020 09:15 AM    pH (UA) 5.5 10/22/2020 09:15 AM    Protein Negative 10/22/2020 09:15 AM    Glucose Negative 10/22/2020 09:15 AM    Ketone Negative 10/22/2020 09:15 AM    Bilirubin Negative 10/22/2020 09:15 AM    Urobilinogen 0.2 10/22/2020 09:15 AM    Nitrites Positive (A) 10/22/2020 09:15 AM    Leukocyte Esterase LARGE (A) 10/22/2020 09:15 AM    Epithelial cells FEW 10/22/2020 09:15 AM    Bacteria 4+ (A) 10/22/2020 09:15 AM    WBC  10/22/2020 09:15 AM    RBC 0-5 10/22/2020 09:15 AM         Medications Reviewed:     Current Facility-Administered Medications   Medication Dose Route Frequency    insulin glargine (LANTUS) injection 8 Units  8 Units SubCUTAneous DAILY    dexAMETHasone (DECADRON) tablet 2 mg  2 mg Oral Q12H    cefTRIAXone (ROCEPHIN) 1 g in 0.9% sodium chloride (MBP/ADV) 50 mL  1 g IntraVENous Q24H    furosemide (LASIX) tablet 40 mg  40 mg Oral DAILY    clopidogreL (PLAVIX) tablet 75 mg  75 mg Oral DAILY    apixaban (ELIQUIS) tablet 2.5 mg  2.5 mg Oral BID    levETIRAcetam (KEPPRA) tablet 500 mg  500 mg Oral Q12H    albuterol-ipratropium (DUO-NEB) 2.5 MG-0.5 MG/3 ML  3 mL Nebulization Q4H PRN    melatonin tablet 3 mg  3 mg Oral QHS    sodium chloride (NS) flush 5-40 mL  5-40 mL IntraVENous Q8H    sodium chloride (NS) flush 5-40 mL  5-40 mL IntraVENous PRN    acetaminophen (TYLENOL) tablet 650 mg  650 mg Oral Q6H PRN    Or    acetaminophen (TYLENOL) suppository 650 mg  650 mg Rectal Q6H PRN    polyethylene glycol (MIRALAX) packet 17 g  17 g Oral DAILY PRN    ondansetron (ZOFRAN ODT) tablet 4 mg  4 mg Oral Q8H PRN    Or    ondansetron (ZOFRAN) injection 4 mg  4 mg IntraVENous Q6H PRN    famotidine (PEPCID) tablet 20 mg  20 mg Oral DAILY    atorvastatin (LIPITOR) tablet 20 mg  20 mg Oral QHS    carvediloL (COREG) tablet 6.25 mg  6.25 mg Oral BID WITH MEALS    [Held by provider] lisinopriL (PRINIVIL, ZESTRIL) tablet 5 mg  5 mg Oral DAILY    sertraline (ZOLOFT) tablet 25 mg  25 mg Oral QPM    [Held by provider] spironolactone (ALDACTONE) tablet 25 mg  25 mg Oral DAILY    topiramate (TOPAMAX) tablet 100 mg  100 mg Oral BID    glucose chewable tablet 16 g  4 Tab Oral PRN    glucagon (GLUCAGEN) injection 1 mg  1 mg IntraMUSCular PRN    dextrose 10% infusion 0-250 mL  0-250 mL IntraVENous PRN    insulin lispro (HUMALOG) injection   SubCUTAneous AC&HS     ______________________________________________________________________  EXPECTED LENGTH OF STAY: 4d 21h  ACTUAL LENGTH OF STAY:          15                 Finesse Hoang MD

## 2020-11-06 NOTE — PROGRESS NOTES
Comprehensive Nutrition Assessment      Comprehensive Nutrition Assessment    Type and Reason for Visit: Reassess    Nutrition Recommendations/Plan:   1. Continue Consistent Carb diet  2. Monitor BG, weight, PO intake  3. Add Glucerna BID for added kcal/protein in preparation for potential treatment. Nutrition Assessment:       Pt admitted for Brain metastases ([facility] [address] [C79.31], multiple CNS lesions, liver lesions, hilar mass. Pt w PMHx: tobacco abuse, DM, COPD, HTN, HF, Cholecystectomy, defibrillator. Pt reassessed. Pt awaiting plan for radiation treatment. Possibly in house, then rehab? See CM notes for details. Transfer to Oncology floor? Noted BG have been significantly elevated with one low reading of 37. Pt on Consistent Carb diet with A1c 6.1. Likely related to decadron. No wt hx in chart. Pt on low end of normal for age. PO documented as 100%, but reports of poor PO and low appetite. No reported n/v, chew/swallow difficulties, or c/d. Pt will need higher kcal/pro intake if plans to do treatment. Monitor plan going forward. Attached coupon to d/c instructions. Patient Vitals for the past 168 hrs:   % Diet Eaten   11/04/20 0923 100 %       Wt Readings from Last 30 Encounters:   11/06/20 55.9 kg (123 lb 2.7 oz)   10/21/20 51.7 kg (114 lb)      Malnutrition Assessment:  Malnutrition Status: At risk for malnutrition (specify)    Context:      Low PO currently, cancer, possible treatment. Estimated Daily Nutrient Needs:  Energy (kcal):  1350  Protein (g):  57-68 (1.0-1.2g/kg)       Fluid (ml/day):  1500 for elderly    Nutrition Related Findings:       Last BM 11/6, LUE 2+ pitting. Nutrition related Medications:  Eliquis, Lipitor, Decadron, Pepcid, Lasix, Humalog, Keppra.      Wounds:    None   - blister- left groin    Current Nutrition Therapies:  DIET ONE TIME MESSAGE  DIET ONE TIME MESSAGE  DIET DIABETIC CONSISTENT CARB Regular; Low Potassium    Anthropometric Measures:  · Height:  5' 2.01\" (157.5 cm)  · Current Body Wt:  56.5 kg (124 lb 9 oz)   · Admission Body Wt:  115 lb 15.4 oz    · Usual Body Wt:    ?    · Ideal Body Wt:  110 lbs:  113.2 %    · BMI Category:  Normal weight (BMI 22.0-24.9) age over 72       Nutrition Diagnosis:   · Altered nutrition-related lab values related to endocrine dysfunction as evidenced by lab values(BG elevated.  A1c 6.1, on steroids.)      Nutrition Interventions:   Food and/or Nutrient Delivery: Continue current diet  Nutrition Education and Counseling: No recommendations at this time  Coordination of Nutrition Care: Interdisciplinary rounds, Continue to monitor while inpatient    Goals:  pt will consume >50% of meals with BG < 180 mg/dL within 5-7 days       Nutrition Monitoring and Evaluation:   Behavioral-Environmental Outcomes: None identified  Food/Nutrient Intake Outcomes: Food and nutrient intake  Physical Signs/Symptoms Outcomes: Biochemical data, Weight    Discharge Planning:    Continue oral nutrition supplement, Continue current diet     Electronically signed by Tierra Erickson RD on 11/6/2020    Contact: [phone]

## 2020-11-06 NOTE — PROGRESS NOTES
Patient name: Nghia Andrade  MRN: [id number]    Nephrology Progress note:    Assessment:  SAVANNAH: Cr stable at 1.1mg/dl. Holding [place]STON/MRA therapy     Metastatic Lung CA: not a candidate for chemotherapy per oncology     Hyperkalemia: fluctuating-> better today     DM2: +hyperglycemia 2 to decadron-> better     HTN: stable     Systolic CHF: Relatively compensated     Proteus UTI    Plan/Recommendations:  Would continue to hold Spironolactone and Lisinopril given issues with hyperkalemia  Ct Lasix 40mg daily   Low K diet  Renally adjust new meds   Am labs    Will see again on MOnday. Call us if needed over the weekend    Subjective:   Remains listless. Answers few questions but falls asleep    ROS:   UTO    Exam:  Visit Vitals  BP (!) 119/48 (BP 1 Location: Right arm, BP Patient Position: Sitting)   Pulse 79   Temp 98.3 °F (36.8 °C)   Resp 20   Ht 5' 2.01\" (1.575 m)   Wt 55.9 kg (123 lb 2.7 oz)   SpO2 100%   BMI 22.52 kg/m²     Wt Readings from Last 3 Encounters:   11/06/20 55.9 kg (123 lb 2.7 oz)   10/21/20 51.7 kg (114 lb)       Intake/Output Summary (Last 24 hours) at 11/6/2020 1420  Last data filed at 11/6/2020 0545  Gross per 24 hour   Intake 350 ml   Output 700 ml   Net -350 ml       Gen: NAD/Frial  HEENT: AT/NC, dry mm  Lungs/Chest wall: Decreased BS b/l  Cardiovascular: Regular rate, normal rhythm. Abdomen/: Soft, NT, ND, BS+.    Ext:  No peripheral edema        Current Facility-Administered Medications   Medication Dose Route Frequency Last Dose    dexAMETHasone (DECADRON) tablet 2 mg  2 mg Oral Q12H 2 mg at 11/06/20 4015    furosemide (LASIX) tablet 40 mg  40 mg Oral DAILY 40 mg at 11/06/20 0937    clopidogreL (PLAVIX) tablet 75 mg  75 mg Oral DAILY 75 mg at 11/06/20 0936    apixaban (ELIQUIS) tablet 2.5 mg  2.5 mg Oral BID 2.5 mg at 11/06/20 5446    levETIRAcetam (KEPPRA) tablet 500 mg  500 mg Oral Q12H 500 mg at 11/06/20 0611    albuterol-ipratropium (DUO-NEB) 2.5 MG-0.5 MG/3 ML  3 mL Nebulization Q4H PRN 3 mL at 11/03/20 1432    melatonin tablet 3 mg  3 mg Oral QHS 3 mg at 11/05/20 2125    sodium chloride (NS) flush 5-40 mL  5-40 mL IntraVENous Q8H 10 mL at 11/06/20 0639    sodium chloride (NS) flush 5-40 mL  5-40 mL IntraVENous PRN      acetaminophen (TYLENOL) tablet 650 mg  650 mg Oral Q6H  mg at 11/04/20 1810    Or    acetaminophen (TYLENOL) suppository 650 mg  650 mg Rectal Q6H PRN      polyethylene glycol (MIRALAX) packet 17 g  17 g Oral DAILY PRN      ondansetron (ZOFRAN ODT) tablet 4 mg  4 mg Oral Q8H PRN      Or    ondansetron (ZOFRAN) injection 4 mg  4 mg IntraVENous Q6H PRN      famotidine (PEPCID) tablet 20 mg  20 mg Oral DAILY 20 mg at 11/06/20 0937    atorvastatin (LIPITOR) tablet 20 mg  20 mg Oral QHS 20 mg at 11/05/20 2125    carvediloL (COREG) tablet 6.25 mg  6.25 mg Oral BID WITH MEALS 6.25 mg at 11/06/20 0936    [Held by provider] lisinopriL (PRINIVIL, ZESTRIL) tablet 5 mg  5 mg Oral DAILY Stopped at 10/31/20 0900    sertraline (ZOLOFT) tablet 25 mg  25 mg Oral QPM 25 mg at 11/05/20 1718    [Held by provider] spironolactone (ALDACTONE) tablet 25 mg  25 mg Oral DAILY Stopped at 10/31/20 0900    topiramate (TOPAMAX) tablet 100 mg  100 mg Oral  mg at 11/06/20 0900    glucose chewable tablet 16 g  4 Tab Oral PRN 16 g at 11/02/20 2222    glucagon (GLUCAGEN) injection 1 mg  1 mg IntraMUSCular PRN      dextrose 10% infusion 0-250 mL  0-250 mL IntraVENous  mL at 10/28/20 0734    insulin lispro (HUMALOG) injection SubCUTAneous AC&HS Stopped at 11/05/20 2200       Labs/Data:    Lab Results   Component Value Date/Time    WBC 10.9 11/06/2020 01:00 AM    HGB 10.1 (L) 11/06/2020 01:00 AM    HCT 32.7 (L) 11/06/2020 01:00 AM    PLATELET 739 46/00/7202 01:00 AM    MCV 98.2 11/06/2020 01:00 AM       Lab Results   Component Value Date/Time    Sodium 134 (L) 11/06/2020 01:00 AM    Potassium 4.8 11/06/2020 01:00 AM    Chloride 97 11/06/2020 01:00 AM    CO2 30 11/06/2020 01:00 AM    Anion gap 7 11/06/2020 01:00 AM    Glucose 179 (H) 11/06/2020 01:00 AM    BUN 53 (H) 11/06/2020 01:00 AM    Creatinine 1.11 (H) 11/06/2020 01:00 AM    BUN/Creatinine ratio 48 (H) 11/06/2020 01:00 AM    GFR est AA 59 (L) 11/06/2020 01:00 AM    GFR est non-AA 48 (L) 11/06/2020 01:00 AM    Calcium 9.0 11/06/2020 01:00 AM       Patient seen and examined. Chart reviewed. Labs, data and other pertinent notes reviewed in last 24 hrs.     Discussed with RN    Signed by:  Emma Guzman MD  [address]

## 2020-11-06 NOTE — DIABETES MGMT
[facility]  CLINICAL NURSE SPECIALIST CONSULT  PROGRAM FOR DIABETES HEALTH    Follow UP  NOTE    Presentation   Javan Carreon is a 69 y.o. female admitted on 10/22/2020 s/p fall about 2 weeks ago that she did not seek treatment at the time. She now c/o left sided weakness. Upon initial work up left shoulder was found to be dislocated. Initial diagnostic testing also revealed lung mass with brain mets. Currently awaiting SNF placement    HX:PMH: DM2-A1C 6.1%/ COPD with home 02/ HTN/HLD/HF w/ ICD    Current clinical course has been complicated by labile blood glucose control, most likely from steroid therapy and erratic PO intake? Diabetes: Patient has known well controlled Type 2 diabetes, treated with Amaryl PTA. Family history unknown for diabetes. Re- Consulted by Provider for advanced diabetes nursing assessment and care, specifically related to   [] Transitioning off Zee Minks   [x] Inpatient management strategy  [x] Home management assessment  [] Survival skill education    Diabetes-related medical history  Acute complications  Labile blood glucose control  Neurological complications  denies  Microvascular disease  Nephropathy vs HTN -GFR 47, Cr+ 1.15  Macrovascular disease  CAD  Other associated conditions     HF/HTN/ HLD    Diabetes medication history  Drug class Currently in use Discontinued Never used   Biguanide      DDP-4 inhibitor       Sulfonylurea Amaryl 2mg daily     Thiazolidinedione      GLP-1 RA      SGLT-2 inhibitors      Basal insulin      Fixed Dose  Combinations      Bolus insulin        Subjective   Ms. Koko Tran had low BG overnight of 37mg/dl.       Patient reports the following home diabetes self-care practices:  Eating pattern-deferred    Physical activity pattern-None; debility    Monitoring pattern  Once daily  Taking medications pattern  [] Consistent administration  [] Affordable    Objective   Physical exam  General Alert, oriented and in no acute distress, but ill-appearing. Conversant and cooperative. Vital Signs   Visit Vitals  BP (!) 115/47 (BP 1 Location: Left arm, BP Patient Position: At rest)   Pulse 60   Temp 98.1 °F (36.7 °C)   Resp 18   Ht 5' 2.01\" (1.575 m)   Wt 55.9 kg (123 lb 2.7 oz)   SpO2 100%   BMI 22.52 kg/m²     Skin  Warm and dry. Heart   Regular rate and rhythm. No murmurs, rubs or gallops  Lungs  Clear to auscultation without rales or rhonchi  Extremities No foot wounds    Diabetic foot exam:    Left Foot     Visual Exam: normal    Pulse DP: 2+ (normal)   Filament test: normal sensation      Right Foot   Visual Exam: normal    Pulse DP: 2+ (normal)   Filament test: normal sensation     DP & PT pulses +2. Laboratory  Lab Results   Component Value Date/Time    Hemoglobin A1c 6.1 (H) 10/22/2020 09:00 AM     No results found for: LDL, LDLC, DLDLP  Lab Results   Component Value Date/Time    Creatinine 1.11 (H) 11/06/2020 01:00 AM     Lab Results   Component Value Date/Time    Sodium 134 (L) 11/06/2020 01:00 AM    Potassium 4.8 11/06/2020 01:00 AM    Chloride 97 11/06/2020 01:00 AM    CO2 30 11/06/2020 01:00 AM    Anion gap 7 11/06/2020 01:00 AM    Glucose 179 (H) 11/06/2020 01:00 AM    BUN 53 (H) 11/06/2020 01:00 AM    Creatinine 1.11 (H) 11/06/2020 01:00 AM    BUN/Creatinine ratio 48 (H) 11/06/2020 01:00 AM    GFR est AA 59 (L) 11/06/2020 01:00 AM    GFR est non-AA 48 (L) 11/06/2020 01:00 AM    Calcium 9.0 11/06/2020 01:00 AM    Bilirubin, total 0.3 11/05/2020 03:45 AM    Alk.  phosphatase 66 11/05/2020 03:45 AM    Protein, total 6.3 (L) 11/05/2020 03:45 AM    Albumin 3.0 (L) 11/05/2020 03:45 AM    Globulin 3.3 11/05/2020 03:45 AM    A-G Ratio 0.9 (L) 11/05/2020 03:45 AM    ALT (SGPT) 14 11/05/2020 03:45 AM     Lab Results   Component Value Date/Time    ALT (SGPT) 14 11/05/2020 03:45 AM       Factors affecting BG pattern  Factor Dose Comments   Nutrition:  Carb-controlled meals     60 grams/meal   Erratic to poor PO intake   Drugs:  Steroids Decadron 2mg BID Decadron insulin dosing    >8mg dexamethosone = 0.4units/kg   6mg   dexamethosone = 0.3units/kg   4mg   dexamethosone = 0.2units/kg   2mg   dexamethosone = 0.1units/kg      Blood glucose pattern        Assessment and Plan   Nursing Diagnosis Risk for unstable blood glucose pattern   Nursing Intervention Domain 5252 Decision-making Support   Nursing Interventions Examined current inpatient diabetes control   Explored factors facilitating and impeding inpatient management  Identified self-management practices impeding diabetes control  Explored corrective strategies with patient and responsible inpatient provider   Informed patient of rational for insulin strategy while hospitalized     Evaluation   Ms Kristofer Cotton was recently diagnosed since admission with brain metastasis stemming from lung cancer. During her stay she has been on a steroid regimen which has caused her to have hyperglycemia. She has also had a couple episodes of hypoglycemia. The most recent episode was on 11/2/2020 -43mg/dl. For blood glucose control she was initially on a Lantus insulin and correctional only, but was taken off Lantus due to hypoglcyemia and since then only been mangaged with correctional insulin. I believe her poor PO intake contributed to the low blood glucose. .  Given her age and condition tight glucose control is not necessary . BG trends have continued to decrease and she had a low BG last evening-37mg/dl. .AMBG 128   Today. Patient's PO intake is poor. Loosening glycemic targets for her would be appropriate as she is taking poor PO. Overall BG over past 2 days have been steadily decreasing. Steroid is also being tapered to Decadron 2mg BID. Inpatient blood glucose management has been impacted by  [x] Kidney dysfunction-GFR 47  [x] Erratic meal consumption -poor PO intake  [x] Glucocorticoid use- Decadron 4mg BID      Recommendations   1. HOLD Basal insulin today and see where BG trends; IF AM BG <100mg/dl, continue to hold. 2. Switch Corrective insulin- High Sensitivity ( low BMI; CKD)    3. Would HOLD pre-meal insulin for now as her PO intake is not consistent (per nursing)    Billing Code(s)   I personally reviewed chart, notes, data and current medications in the medical record, and examined the patient at bedside before making care recommendations. Thank you for including us in their care. I spent 20 minutes in direct patient care today for this patient.   Time includes chart review, face to face with patient and collaboration with interdisciplinary care team.     Daphney Guerrero, CNS  Program for Diabetes Health  Access via [address] [place] [address]  [phone]

## 2020-11-06 NOTE — PROGRESS NOTES
Problem: Pain  Goal: *Control of Pain  Outcome: Progressing Towards Goal  Goal: *PALLIATIVE CARE:  Alleviation of Pain  Outcome: Progressing Towards Goal     Problem: Patient Education: Go to Patient Education Activity  Goal: Patient/Family Education  Outcome: Progressing Towards Goal     Problem: Pressure Injury - Risk of  Goal: *Prevention of pressure injury  Description: Document Elio Scale and appropriate interventions in the flowsheet. Outcome: Progressing Towards Goal  Note: Pressure Injury Interventions:  Sensory Interventions: Assess changes in LOC, Discuss PT/OT consult with provider, Keep linens dry and wrinkle-free, Minimize linen layers, Pressure redistribution bed/mattress (bed type)    Moisture Interventions: Apply protective barrier, creams and emollients, Internal/External urinary devices, Minimize layers    Activity Interventions: Pressure redistribution bed/mattress(bed type), PT/OT evaluation    Mobility Interventions: HOB 30 degrees or less, Pressure redistribution bed/mattress (bed type), Turn and reposition approx. every two hours(pillow and wedges)    Nutrition Interventions: Document food/fluid/supplement intake, Offer support with meals,snacks and hydration, Discuss nutritional consult with provider    Friction and Shear Interventions: Lift sheet, Minimize layers, Transferring/repositioning devices                Problem: Patient Education: Go to Patient Education Activity  Goal: Patient/Family Education  Outcome: Progressing Towards Goal     Problem: Falls - Risk of  Goal: *Absence of Falls  Description: Document Lizzette Verde Fall Risk and appropriate interventions in the flowsheet.   Outcome: Progressing Towards Goal  Note: Fall Risk Interventions:  Mobility Interventions: Patient to call before getting OOB    Mentation Interventions: Adequate sleep, hydration, pain control, Evaluate medications/consider consulting pharmacy, More frequent rounding, Toileting rounds    Medication Interventions: Evaluate medications/consider consulting pharmacy, Patient to call before getting OOB    Elimination Interventions: Call light in reach, Patient to call for help with toileting needs, Toileting schedule/hourly rounds    History of Falls Interventions: Bed/chair exit alarm, Evaluate medications/consider consulting pharmacy, Utilize gait belt for transfer/ambulation         Problem: Patient Education: Go to Patient Education Activity  Goal: Patient/Family Education  Outcome: Progressing Towards Goal

## 2020-11-06 NOTE — PROGRESS NOTES
Problem: Self Care Deficits Care Plan (Adult)  Goal: *Acute Goals and Plan of Care (Insert Text)  Description: FUNCTIONAL STATUS PRIOR TO ADMISSION: Patient required maximum assistance for basic and instrumental ADLs. At baseline patient uses 2 liters of supplemental oxygen. Patient is a poor historian but appears to have been bed bound for a while living with son and daughter in law who are reportedly home at all times. She could not remember the last time she walked and did not remember recent fall, but did report use of RW when she was mobile. She was rolling for bedpan prior to admission, was dependent for all IADLs, and assisted with dressing/grooming/upper body bathing as able. Patient reports watching TV all day. HOME SUPPORT: The patient lived with son and daughter in law. Occupational Therapy Goals  Weekly Re-Assessment 11/3/2020, goals modified below  Initiated 10/27/2020   1. Patient will perform self-feeding with supervision/set-up within 7 day(s). MET/DISCONTINUED 11/3  2. Patient will perform upper body and lower body bathing using compensatory techniques PRN with minimal assistance/contact guard assist within 7 day(s). DOWNGRADED to Mod A 11/3  3. Patient will perform upper body dressing and lower body dressing using compensatory techniques PRN with minimal assistance/contact guard assist within 7 day(s). DOWNGRADED to Mod A 11/3  4. Patient will perform rolling for bedpan with supervision/set-up within 7 day(s). UPGRADED to transfer to [facility] with Min A 11/3  5. Patient will perform all aspects of toileting with minimal assistance/contact guard assist within 7 day(s). DOWNGRADED to Mod A 11/3  6. Patient will participate in upper extremity therapeutic exercise/activities with modified independence for 5 minutes within 7 day(s). MODIFIED to SPV 11/3  7. Patient will perform bed mobility to EOB in preparation for standing/seated ADLs with minimal assistance in 7 days.  MET, UPGRADED to SBA 11/3          Outcome: Not Progressing Towards Goal     OCCUPATIONAL THERAPY TREATMENT  Patient: Lynn Win (21 y.o. female)  Date: 11/6/2020  Diagnosis: Brain metastases ([facility] [address] [C79.31]   Brain metastases ([facility] [address]  Procedure(s) (LRB):  ENDOSCOPIC BRONCHOSCOPY ULTRASOUND (EBUS) (N/A)  BRONCHOSCOPY (N/A)  TRANSBRONCHIAL BIOPSY (N/A) 9 Days Post-Op  Precautions: Fall, Skin(LUE NWB (L clavicular fx))  Chart, occupational therapy assessment, plan of care, and goals were reviewed. ASSESSMENT  Patient continues with skilled OT services and is not progressing towards goals, remains limited by global debility, poor volition initiation, & cardiopulmonary endurance, as well as L clavicle fx and lung CA with mets. She continues to require MAX encouragement for participation in minimal functional reaching task EOB this session, flat affect (seemingly depressed), terminating activity quickly. Discussed goals of therapy with patient, she is very disengaged, amenable to downgrade of POC however would greatly benefit from new goals of care discussion with palliative care & medical team as well as family. At this time, she continues to require physical assist of 1-2 for limited mobility & all ADLs. Would recommend SNF for rehab, however if home, 2 assist, [address], and DME below (vs hospice?). Current Level of Function Impacting Discharge (ADLs): Mod-Total A for ADLs, Min-Max A x1-2 for minimal mobility     Other factors to consider for discharge: fall risk, PMH, PLOF, flat affect, prognosis, goals of care discussion         PLAN :  Patient continues to benefit from skilled intervention to address the above impairments. Continue treatment per established plan of care. to address goals.     Recommend with staff: Recommend with nursing patient to complete as able in order to maintain strength, endurance and independence: ADLs with assist, bed in chair position 3x/day and rolling in supine for toileting with 2 assist. Thank you for your assistance. Recommend next OT session: Bed level ADLs, EOB activity    Recommendation for discharge: (in order for the patient to meet his/her long term goals)  Therapy up to 5 days/week in SNF setting vs TBD pending goals of care discussion    If patient were to d/c home, recommend d/c with HHOT/PT vs hospice, physical assist of 1-2, and DME below    This discharge recommendation:  A follow-up discussion with the attending provider and/or case management is planned    IF patient discharges home will need the following DME: hospital bed, mechanical lift, BSC, and assist of 2 at the least       SUBJECTIVE:   Patient stated I just don't want to live like this.     OBJECTIVE DATA SUMMARY:   Cognitive/Behavioral Status:  Neurologic State: Alert  Orientation Level: Oriented X4  Cognition: Appropriate for age attention/concentration; Follows commands  Perception: Appears intact  Perseveration: No perseveration noted  Safety/Judgement: Awareness of environment    Functional Mobility and Transfers for ADLs:  Bed Mobility:  Supine to Sit: Moderate assistance;Assist x1;Additional time  Sit to Supine: Moderate assistance(BLE mgmt)  Scooting: Maximum assistance    Transfers:   Patient declined OOB mobility despite education & encouragement          Balance:  Sitting: Impaired  Sitting - Static: Good (unsupported)  Sitting - Dynamic: Fair (occasional)    ADL Intervention:     Lower Body Dressing Assistance  Dressing Assistance: Total assistance(dependent)  Socks:  Total assistance (dependent)(declined attempt to doff herself)         Cognitive Retraining  Safety/Judgement: Awareness of environment    Therapeutic Exercises:   Seated EOB x5 minutes, completed functional reaching in all anterior & lateral planes with RUE x6 with SBA-CGA for steadying (moreso anteriorly), patient terminating task    Pain:  Global pain reported    Activity Tolerance:   Poor    After treatment patient left in no apparent distress:   Supine in bed, Call bell within reach, Bed / chair alarm activated, and Side rails x 3    COMMUNICATION/COLLABORATION:   The patients plan of care was discussed with: Physical therapist and Registered nurse.      Waneta Morning, OTD, OTR/L  Time Calculation: 13 mins

## 2020-11-06 NOTE — PROGRESS NOTES
Blood sugar 37. Juice given to patient. Blood sugar rechecked. BS 91. Will continue to monitor. HYPOGLYCEMIC EPISODE DOCUMENTATION    Patient with hypoglycemic episode(s) at 2131(time) on 11/5(date). BG value(s) pre-treatment 40    Was patient symptomatic? [] yes, [x] no  Patient was treated with the following rescue medications/treatments: [] D50                [] Glucose tablets                [] Glucagon                [x] 4oz juice                [] 6oz reg soda                [] 8oz low fat milk  BG value post-treatment: 91  Once BG treated and value greater than 80mg/dl, pt was provided with the following:  [x] snack  [] meal  LULI Dacosta notified. Orders to give pt food/milk.

## 2020-11-06 NOTE — PROGRESS NOTES
Problem: Pain  Goal: *Control of Pain  Outcome: Progressing Towards Goal     Problem: Pressure Injury - Risk of  Goal: *Prevention of pressure injury  Description: Document Elio Scale and appropriate interventions in the flowsheet. Outcome: Progressing Towards Goal  Note: Pressure Injury Interventions:  Sensory Interventions: Check visual cues for pain, Assess changes in LOC, Keep linens dry and wrinkle-free, Minimize linen layers    Moisture Interventions: Absorbent underpads, Moisture barrier    Activity Interventions: Increase time out of bed, Pressure redistribution bed/mattress(bed type)    Mobility Interventions: HOB 30 degrees or less, Assess need for specialty bed, Pressure redistribution bed/mattress (bed type)    Nutrition Interventions: Document food/fluid/supplement intake, Offer support with meals,snacks and hydration    Friction and Shear Interventions: Lift team/patient mobility team, Minimize layers                Problem: Patient Education: Go to Patient Education Activity  Goal: Patient/Family Education  Outcome: Progressing Towards Goal     Problem: Falls - Risk of  Goal: *Absence of Falls  Description: Document Edgard Fall Risk and appropriate interventions in the flowsheet.   Outcome: Progressing Towards Goal  Note: Fall Risk Interventions:  Mobility Interventions: Bed/chair exit alarm, Communicate number of staff needed for ambulation/transfer, Patient to call before getting OOB, Utilize gait belt for transfers/ambulation    Mentation Interventions: Door open when patient unattended, Eyeglasses and hearing aids, Gait belt with transfers/ambulation, Reorient patient, Toileting rounds, Update white board    Medication Interventions: Bed/chair exit alarm, Teach patient to arise slowly, Utilize gait belt for transfers/ambulation, Evaluate medications/consider consulting pharmacy    Elimination Interventions: Bed/chair exit alarm, Call light in reach, Patient to call for help with toileting needs, Toilet paper/wipes in reach, Toileting schedule/hourly rounds    History of Falls Interventions: Bed/chair exit alarm, Evaluate medications/consider consulting pharmacy, Utilize gait belt for transfer/ambulation         Problem: Patient Education: Go to Patient Education Activity  Goal: Patient/Family Education  Outcome: Progressing Towards Goal

## 2020-11-06 NOTE — PROGRESS NOTES
Hematology-Oncology Progress Note      Mildred Bello  1950  [id number]  11/6/2020      Subjective:     Patient awake and denies pain and dyspnea, but is weak. Allergies: Patient has no known allergies.   Current Facility-Administered Medications   Medication Dose Route Frequency Provider Last Rate Last Dose    insulin glargine (LANTUS) injection 8 Units  8 Units SubCUTAneous DAILY Irasema Faye MD        dexAMETHasone (DECADRON) tablet 2 mg  2 mg Oral Q12H Madala, Sushma, MD   2 mg at 11/05/20 2125    cefTRIAXone (ROCEPHIN) 1 g in 0.9% sodium chloride (MBP/ADV) 50 mL  1 g IntraVENous Q24H Rod Schulte  mL/hr at 11/05/20 0854 1 g at 11/05/20 0854    azithromycin (ZITHROMAX) 500 mg in 0.9% sodium chloride (MBP/ADV) 250 mL  500 mg IntraVENous Q24H Keri Olsen  mL/hr at 11/05/20 0933 500 mg at 11/05/20 0933    furosemide (LASIX) tablet 40 mg  40 mg Oral DAILY Keri Olsen MD   40 mg at 11/05/20 0915    clopidogreL (PLAVIX) tablet 75 mg  75 mg Oral DAILY Keri Olsen MD   75 mg at 11/05/20 0859    apixaban (ELIQUIS) tablet 2.5 mg  2.5 mg Oral BID Keri Olsen MD   2.5 mg at 11/05/20 1718    levETIRAcetam (KEPPRA) tablet 500 mg  500 mg Oral Q12H Lexi Amin NP   500 mg at 11/06/20 8038    albuterol-ipratropium (DUO-NEB) 2.5 MG-0.5 MG/3 ML  3 mL Nebulization Q4H PRN Rajesh Rogel M, DO   3 mL at 11/03/20 1432    melatonin tablet 3 mg  3 mg Oral QHS Samantha Burgess M, DO   3 mg at 11/05/20 2125    sodium chloride (NS) flush 5-40 mL  5-40 mL IntraVENous Q8H Harris Guo MD   10 mL at 11/06/20 0738    sodium chloride (NS) flush 5-40 mL  5-40 mL IntraVENous PRN Arnie Hill MD        acetaminophen (TYLENOL) tablet 650 mg  650 mg Oral Q6H PRN Harris Guo MD   650 mg at 11/04/20 1810    Or    acetaminophen (TYLENOL) suppository 650 mg  650 mg Rectal Q6H PRN Arnie Hill MD        polyethylene glycol (MIRALAX) packet 17 g  17 g Oral DAILY PRN Criselda Jansen MD        ondansetron (ZOFRAN ODT) tablet 4 mg  4 mg Oral Q8H PRN Summer Hill MD        Or    ondansetron [facility]) injection 4 mg  4 mg IntraVENous Q6H PRN Summer Hill MD        famotidine (PEPCID) tablet 20 mg  20 mg Oral DAILY Criselda Jansen MD   20 mg at 11/05/20 7269    atorvastatin (LIPITOR) tablet 20 mg  20 mg Oral QHS Criselda Jansen MD   20 mg at 11/05/20 2125    carvediloL (COREG) tablet 6.25 mg  6.25 mg Oral BID WITH MEALS Krystina Eckert MD   6.25 mg at 11/05/20 1718    [Held by provider] lisinopriL (PRINIVIL, ZESTRIL) tablet 5 mg  5 mg Oral DAILY Criselda Jansen MD   Stopped at 10/31/20 0900    sertraline (ZOLOFT) tablet 25 mg  25 mg Oral QPM Criselda Jansen MD   25 mg at 11/05/20 1718    [Held by provider] spironolactone (ALDACTONE) tablet 25 mg  25 mg Oral DAILY Criselda Jansen MD   Stopped at 10/31/20 0900    topiramate (TOPAMAX) tablet 100 mg  100 mg Oral BID Criselda Jansen MD   100 mg at 11/05/20 1718    glucose chewable tablet 16 g  4 Tab Oral PRN Criselda Jansen MD   16 g at 11/02/20 2222    glucagon (GLUCAGEN) injection 1 mg  1 mg IntraMUSCular PRN Criselda Jansen MD        dextrose 10% infusion 0-250 mL  0-250 mL IntraVENous PRN Criselda Jansen  mL/hr at 10/28/20 0734 125 mL at 10/28/20 0734    insulin lispro (HUMALOG) injection   SubCUTAneous AC&HS Criselda Jansen MD   Stopped at 11/05/20 2200     Objective:     Patient Vitals for the past 24 hrs:   BP Temp Pulse Resp SpO2 Weight   11/06/20 0600 (!) 115/47 98.1 °F (36.7 °C) 60 18 100 %    11/06/20 0200 (!) 117/56 97.9 °F (36.6 °C) 68 19 100 % 55.9 kg (123 lb 2.7 oz)   11/05/20 2151 114/62 98.2 °F (36.8 °C) 69 20 100 %    11/05/20 1752 123/66 98.3 °F (36.8 °C) 71 19 100 %    11/05/20 1718 (!) 120/59  75      11/05/20 1447 133/82 98.4 °F (36.9 °C) 73 20 99 %    11/05/20 1006 (!) 147/69 98.2 °F (36.8 °C) 61 18 100 %    11/05/20 0915 Marcella All ) 142/74  62      11/05/20 0858 (!) 142/74  64          Gen: NAD  HEENT: PERRL, Sclerae anicteric  Cv: RRR without m/r/g  Pulm: CTA bilaterally  Abd: NABS, NTND, No HSM  Ext: No c/c/e    Available labs reviewed:  Labs:    Recent Results (from the past 24 hour(s))   GLUCOSE, POC    Collection Time: 11/05/20 11:41 AM   Result Value Ref Range    Glucose (POC) 74 65 - 100 mg/dL    Performed by Katharina Brunner    GLUCOSE, POC    Collection Time: 11/05/20  4:56 PM   Result Value Ref Range    Glucose (POC) 220 (H) 65 - 100 mg/dL    Performed by Christy Ormond    GLUCOSE, POC    Collection Time: 11/05/20  9:31 PM   Result Value Ref Range    Glucose (POC) 37 (LL) 65 - 100 mg/dL    Performed by Aster Haney    GLUCOSE, POC    Collection Time: 11/05/20  9:56 PM   Result Value Ref Range    Glucose (POC) 91 65 - 100 mg/dL    Performed by Rusty Rodriguez    CBC WITH AUTOMATED DIFF    Collection Time: 11/06/20  1:00 AM   Result Value Ref Range    WBC 10.9 3.6 - 11.0 K/uL    RBC 3.33 (L) 3.80 - 5.20 M/uL    HGB 10.1 (L) 11.5 - 16.0 g/dL    HCT 32.7 (L) 35.0 - 47.0 %    MCV 98.2 80.0 - 99.0 FL    MCH 30.3 26.0 - 34.0 PG    MCHC 30.9 30.0 - 36.5 g/dL    RDW 13.4 11.5 - 14.5 %    PLATELET 776 386 - 776 K/uL    MPV 10.3 8.9 - 12.9 FL    NRBC 0.0 0  WBC    ABSOLUTE NRBC 0.00 0.00 - 0.01 K/uL    NEUTROPHILS 88 (H) 32 - 75 %    LYMPHOCYTES 5 (L) 12 - 49 %    MONOCYTES 5 5 - 13 %    EOSINOPHILS 1 0 - 7 %    BASOPHILS 0 0 - 1 %    IMMATURE GRANULOCYTES 1 (H) 0.0 - 0.5 %    ABS. NEUTROPHILS 9.7 (H) 1.8 - 8.0 K/UL    ABS. LYMPHOCYTES 0.5 (L) 0.8 - 3.5 K/UL    ABS. MONOCYTES 0.5 0.0 - 1.0 K/UL    ABS. EOSINOPHILS 0.1 0.0 - 0.4 K/UL    ABS. BASOPHILS 0.0 0.0 - 0.1 K/UL    ABS. IMM.  GRANS. 0.1 (H) 0.00 - 0.04 K/UL    DF SMEAR SCANNED      RBC COMMENTS MACROCYTOSIS  1+       METABOLIC PANEL, BASIC    Collection Time: 11/06/20  1:00 AM   Result Value Ref Range    Sodium 134 (L) 136 - 145 mmol/L    Potassium 4.8 3.5 - 5.1 mmol/L Chloride 97 97 - 108 mmol/L    CO2 30 21 - 32 mmol/L    Anion gap 7 5 - 15 mmol/L    Glucose 179 (H) 65 - 100 mg/dL    BUN 53 (H) 6 - 20 MG/DL    Creatinine 1.11 (H) 0.55 - 1.02 MG/DL    BUN/Creatinine ratio 48 (H) 12 - 20      GFR est AA 59 (L) >60 ml/min/1.73m2    GFR est non-AA 48 (L) >60 ml/min/1.73m2    Calcium 9.0 8.5 - 10.1 MG/DL   GLUCOSE, POC    Collection Time: 11/06/20  7:12 AM   Result Value Ref Range    Glucose (POC) 128 (H) 65 - 100 mg/dL    Performed by Felisha Sage and Plan     68 y/o woman presenting with stage IV SQCCA of the lung, now with declining performance status receiving WBRT. 1. Lung Ca: Given her declining health, I have reached out to her NOK, her daughter-in-law who is not immediately available by phone (voicemail full). Will ask Palliative Care to see to discuss goals of care. Not currently a candidate for chemotherapy. WBRT is important in this situation as it improves both quality of life and length of life, but will need to discuss goals of care after WBRT complete. Thank you for allowing us to participate in the care of this very pleasant patient.     Irma Callahan MD  Hematology/Oncology  Phone [phone]

## 2020-11-06 NOTE — PROGRESS NOTES
Palliative Medicine Consult  El: [phone]    Patient Name: Sofia Zuniga  YOB: 1950    Date of Initial Consult: October 23, 2020  Reason for Consult: Care decisions  Requesting Provider: Dr. Nisa Kinsey  Primary Care Physician: Vasu Malik NP     SUMMARY:   Sofia Zuniga is a 69 y.o. with a past history of COPD, CHF, CAD, ICD, HTN, DM2, previous tobb use, cholecystectomy, who was admitted on 10/22/2020 from home after falling 2 weeks prior with resultant left sided weakness and imaging revealed rt hilar mass and multiple brain lesions. MRI with multiple brain mets precluding her from surgery. Radiation onc recommended by NSG. Path pending. Seen by oncology and there is high suspicion of stage 4 lung cancer. No fracture noted from fall. Brain mass, consistent with mets, lung mass with erosion into mediastinal, moderate right pleural effusion, postobstructive infiltrate RUL  Probable UTI,     Current medical issues leading to Palliative Medicine involvement include: support with care decisions given presumed metastatic disease. Full Code status. Rad onc consulted. SOCIAL HISTORY:  She is marred. She lives with her son and daughter-in-law. Her  unfortunately is incarcerated. She quit using tobacco in 06/2020. Former . Interval History:  10/27/20: thoracentesis    PALLIATIVE DIAGNOSES:   1. Brain mass  2. Lung Mass  3. ACP  4. Rt infiltrate  5. Physical debility      PLAN:   1. Pt seen without family present. She does vaguely recalls meeting with me previously  2. I mentioned to her that we completed the medical directive last time. Pt alert sitting up in bed eating. 3. I offered to have a family meeting to discuss care and options and she agreed  4. Call placed to daughter. Unfortunately, she was not home but I left my contact information with the son.     5. Initial consult note routed to primary continuity provider and/or primary health care team members  6. Communicated plan of care with: Palliative IDT, [facility] Team     GOALS OF CARE / TREATMENT PREFERENCES:     GOALS OF CARE:  Patient/Health Care Proxy Stated Goals: Prolong life    TREATMENT PREFERENCES:   Code Status: Full Code    Advance Care Planning:  [x] The [facility] Interdisciplinary Team has updated the ACP Navigator with Health Care Decision Maker and Patient Capacity    Primary Decision Maker: Lindsey Sigala DaughterZakiyain-Law - [phone]      Advance Care Planning 10/27/2020   Patient's Healthcare Decision Maker is: Named in scanned ACP document   Confirm Advance Directive Yes, on file   Patient Would Like to Complete Advance Directive Yes       Medical Interventions: Full interventions     Other Instructions:   Artificially Administered Nutrition: No feeding tube     Other:    As far as possible, the palliative care team has discussed with patient / health care proxy about goals of care / treatment preferences for patient. HISTORY:     History obtained from: chart, team    CHIEF COMPLAINT: pt admitted with aforementioned history and issues    HPI/SUBJECTIVE:    The patient is:   [x] Verbal and participatory  [] Non-participatory due to:    No pain    Clinical Pain Assessment (nonverbal scale for severity on nonverbal patients):   Clinical Pain Assessment  Severity: 0          Duration: for how long has pt been experiencing pain (e.g., 2 days, 1 month, years)  Frequency: how often pain is an issue (e.g., several times per day, once every few days, constant)     FUNCTIONAL ASSESSMENT:     Palliative Performance Scale (PPS):  PPS: 40       PSYCHOSOCIAL/SPIRITUAL SCREENING:     Palliative IDT has assessed this patient for cultural preferences / practices and a referral made as appropriate to needs (Cultural Services, Patient Advocacy, Ethics, etc.)    Any spiritual / Yazidism concerns:  [] Yes /  [x] No    Caregiver Burnout:  [] Yes /  [x] No /  [] No Caregiver Present Anticipatory grief assessment:   [x] Normal  / [] Maladaptive       ESAS Anxiety: Anxiety: 0    ESAS Depression: Depression: 0        REVIEW OF SYSTEMS:     Positive and pertinent negative findings in ROS are noted above in HPI. The following systems were [x] reviewed / [] unable to be reviewed as noted in HPI  Other findings are noted below. Systems: constitutional, ears/nose/mouth/throat, respiratory, gastrointestinal, genitourinary, musculoskeletal, integumentary, neurologic, psychiatric, endocrine. Positive findings noted below. Modified ESAS Completed by: provider   Fatigue: 3 Drowsiness: 0   Depression: 0 Pain: 0   Anxiety: 0 Nausea: 0   Anorexia: 2 Dyspnea: 0           Stool Occurrence(s): 1        PHYSICAL EXAM:     From RN flowsheet:  Wt Readings from Last 3 Encounters:   11/06/20 123 lb 2.7 oz (55.9 kg)   10/21/20 114 lb (51.7 kg)     Blood pressure (!) 119/48, pulse 79, temperature 98.3 °F (36.8 °C), resp. rate 20, height 5' 2.01\" (1.575 m), weight 123 lb 2.7 oz (55.9 kg), SpO2 100 %.     Pain Scale 1: Numeric (0 - 10)  Pain Intensity 1: 0  Pain Onset 1: acute  Pain Location 1: Head  Pain Orientation 1: Anterior  Pain Description 1: Aching  Pain Intervention(s) 1: Medication (see MAR), Position  Last bowel movement, if known:     Constitutional: alert, conversant  Eyes: pupils equal, anicteric  ENMT: no nasal discharge, moist mucous membranes  Cardiovascular: regular rhythm, distal pulses intact  Respiratory: breathing not labored, symmetric  Gastrointestinal: soft non-tender, +bowel sounds  Musculoskeletal: no deformity, left shoulder pain with manipulation  Skin: warm, dry  Neurologic: following commands, moving all extremities  Psychiatric: flat affect, no hallucinations  Other:       HISTORY:     Principal Problem:    Brain metastases ([facility] [address] (10/22/2020)      Past Medical History:   Diagnosis Date    Chronic back pain     Chronic obstructive pulmonary disease ([facility] [address]     Diabetes ([facility] [address]     Fall in home     Heart failure ([facility] [address]     Hypertension     Ill-defined condition     [facility] [address] [facility]      Past Surgical History:   Procedure Laterality Date    CARDIAC SURG PROCEDURE UNLIST      internal cardiac defibrillator    HX CHOLECYSTECTOMY      HX CYST REMOVAL      HX PACEMAKER  08/08/2016    [employer]     HX TUBAL LIGATION        Family History   Problem Relation Age of Onset    Diabetes Mother     No Known Problems Father     Cancer Brother       History reviewed, no pertinent family history.   Social History     Tobacco Use    Smoking status: Former Smoker    Smokeless tobacco: Never Used    Tobacco comment: quit June 2020   Substance Use Topics    Alcohol use: Not Currently     No Known Allergies   Current Facility-Administered Medications   Medication Dose Route Frequency    dexAMETHasone (DECADRON) tablet 2 mg  2 mg Oral Q12H    furosemide (LASIX) tablet 40 mg  40 mg Oral DAILY    clopidogreL (PLAVIX) tablet 75 mg  75 mg Oral DAILY    apixaban (ELIQUIS) tablet 2.5 mg  2.5 mg Oral BID    levETIRAcetam (KEPPRA) tablet 500 mg  500 mg Oral Q12H    albuterol-ipratropium (DUO-NEB) 2.5 MG-0.5 MG/3 ML  3 mL Nebulization Q4H PRN    melatonin tablet 3 mg  3 mg Oral QHS    sodium chloride (NS) flush 5-40 mL  5-40 mL IntraVENous Q8H    sodium chloride (NS) flush 5-40 mL  5-40 mL IntraVENous PRN    acetaminophen (TYLENOL) tablet 650 mg  650 mg Oral Q6H PRN    Or    acetaminophen (TYLENOL) suppository 650 mg  650 mg Rectal Q6H PRN    polyethylene glycol (MIRALAX) packet 17 g  17 g Oral DAILY PRN    ondansetron (ZOFRAN ODT) tablet 4 mg  4 mg Oral Q8H PRN    Or    ondansetron (ZOFRAN) injection 4 mg  4 mg IntraVENous Q6H PRN    famotidine (PEPCID) tablet 20 mg  20 mg Oral DAILY    atorvastatin (LIPITOR) tablet 20 mg  20 mg Oral QHS    carvediloL (COREG) tablet 6.25 mg  6.25 mg Oral BID WITH MEALS    [Held by provider] lisinopriL (PRINIVIL, ZESTRIL) tablet 5 mg  5 mg Oral DAILY    sertraline (ZOLOFT) tablet 25 mg  25 mg Oral QPM    [Held by provider] spironolactone (ALDACTONE) tablet 25 mg  25 mg Oral DAILY    topiramate (TOPAMAX) tablet 100 mg  100 mg Oral BID    glucose chewable tablet 16 g  4 Tab Oral PRN    glucagon (GLUCAGEN) injection 1 mg  1 mg IntraMUSCular PRN    dextrose 10% infusion 0-250 mL  0-250 mL IntraVENous PRN    insulin lispro (HUMALOG) injection   SubCUTAneous AC&HS          LAB AND IMAGING FINDINGS:     Lab Results   Component Value Date/Time    WBC 10.9 11/06/2020 01:00 AM    HGB 10.1 (L) 11/06/2020 01:00 AM    PLATELET 538 34/84/9890 01:00 AM     Lab Results   Component Value Date/Time    Sodium 134 (L) 11/06/2020 01:00 AM    Potassium 4.8 11/06/2020 01:00 AM    Chloride 97 11/06/2020 01:00 AM    CO2 30 11/06/2020 01:00 AM    BUN 53 (H) 11/06/2020 01:00 AM    Creatinine 1.11 (H) 11/06/2020 01:00 AM    Calcium 9.0 11/06/2020 01:00 AM    Magnesium 2.2 11/05/2020 03:45 AM    Phosphorus 4.2 11/05/2020 03:45 AM      Lab Results   Component Value Date/Time    Alk. phosphatase 66 11/05/2020 03:45 AM    Protein, total 6.3 (L) 11/05/2020 03:45 AM    Albumin 3.0 (L) 11/05/2020 03:45 AM    Globulin 3.3 11/05/2020 03:45 AM     No results found for: INR, PTMR, PTP, PT1, PT2, APTT, INREXT, INREXT   No results found for: IRON, FE, TIBC, IBCT, PSAT, FERR   No results found for: PH, PCO2, PO2  No components found for: GLPOC   No results found for: CPK, CKMB             Total time:  25 min  Counseling / coordination time, spent as noted above: 20 min  > 50% counseling / coordination?:  y    Prolonged service was provided for  []30 min   []75 min in face to face time in the presence of the patient, spent as noted above. Time Start:   Time End:   Note: this can only be billed with 66712 (initial) or 59191 (follow up). If multiple start / stop times, list each separately.

## 2020-11-06 NOTE — PROGRESS NOTES
Bedside and Verbal shift change report given to Jennifer RN and ARPAN Garcia (oncoming nurse) by Gio Huggins RN (offgoing nurse). Report included the following information SBAR, Kardex, Intake/Output, MAR, Recent Results, Med Rec Status, Cardiac Rhythm SR and Dual Neuro Assessment.

## 2020-11-07 LAB
ALBUMIN SERPL-MCNC: 3 G/DL (ref 3.5–5)
ALBUMIN/GLOB SERPL: 1 {RATIO} (ref 1.1–2.2)
ALP SERPL-CCNC: 73 U/L (ref 45–117)
ALT SERPL-CCNC: 16 U/L (ref 12–78)
ANION GAP SERPL CALC-SCNC: 7 MMOL/L (ref 5–15)
AST SERPL-CCNC: 15 U/L (ref 15–37)
BASOPHILS # BLD: 0 K/UL (ref 0–0.1)
BASOPHILS NFR BLD: 0 % (ref 0–1)
BILIRUB SERPL-MCNC: 0.3 MG/DL (ref 0.2–1)
BUN SERPL-MCNC: 50 MG/DL (ref 6–20)
BUN/CREAT SERPL: 46 (ref 12–20)
CALCIUM SERPL-MCNC: 8.8 MG/DL (ref 8.5–10.1)
CHLORIDE SERPL-SCNC: 101 MMOL/L (ref 97–108)
CO2 SERPL-SCNC: 31 MMOL/L (ref 21–32)
CREAT SERPL-MCNC: 1.08 MG/DL (ref 0.55–1.02)
DIFFERENTIAL METHOD BLD: ABNORMAL
EOSINOPHIL # BLD: 0.1 K/UL (ref 0–0.4)
EOSINOPHIL NFR BLD: 1 % (ref 0–7)
ERYTHROCYTE [DISTWIDTH] IN BLOOD BY AUTOMATED COUNT: 13.4 % (ref 11.5–14.5)
GLOBULIN SER CALC-MCNC: 3 G/DL (ref 2–4)
GLUCOSE BLD STRIP.AUTO-MCNC: 131 MG/DL (ref 65–100)
GLUCOSE BLD STRIP.AUTO-MCNC: 132 MG/DL (ref 65–100)
GLUCOSE BLD STRIP.AUTO-MCNC: 380 MG/DL (ref 65–100)
GLUCOSE SERPL-MCNC: 85 MG/DL (ref 65–100)
HCT VFR BLD AUTO: 31.9 % (ref 35–47)
HGB BLD-MCNC: 9.7 G/DL (ref 11.5–16)
IMM GRANULOCYTES # BLD AUTO: 0.1 K/UL (ref 0–0.04)
IMM GRANULOCYTES NFR BLD AUTO: 1 % (ref 0–0.5)
LYMPHOCYTES # BLD: 0.7 K/UL (ref 0.8–3.5)
LYMPHOCYTES NFR BLD: 8 % (ref 12–49)
MAGNESIUM SERPL-MCNC: 2.1 MG/DL (ref 1.6–2.4)
MCH RBC QN AUTO: 29.9 PG (ref 26–34)
MCHC RBC AUTO-ENTMCNC: 30.4 G/DL (ref 30–36.5)
MCV RBC AUTO: 98.5 FL (ref 80–99)
MONOCYTES # BLD: 0.4 K/UL (ref 0–1)
MONOCYTES NFR BLD: 5 % (ref 5–13)
NEUTS SEG # BLD: 7.4 K/UL (ref 1.8–8)
NEUTS SEG NFR BLD: 85 % (ref 32–75)
NRBC # BLD: 0 K/UL (ref 0–0.01)
NRBC BLD-RTO: 0 PER 100 WBC
PHOSPHATE SERPL-MCNC: 3.3 MG/DL (ref 2.6–4.7)
PLATELET # BLD AUTO: 154 K/UL (ref 150–400)
PMV BLD AUTO: 10.7 FL (ref 8.9–12.9)
POTASSIUM SERPL-SCNC: 4 MMOL/L (ref 3.5–5.1)
PROT SERPL-MCNC: 6 G/DL (ref 6.4–8.2)
RBC # BLD AUTO: 3.24 M/UL (ref 3.8–5.2)
RBC MORPH BLD: ABNORMAL
SERVICE CMNT-IMP: ABNORMAL
SODIUM SERPL-SCNC: 139 MMOL/L (ref 136–145)
WBC # BLD AUTO: 8.7 K/UL (ref 3.6–11)

## 2020-11-07 PROCEDURE — 80053 COMPREHEN METABOLIC PANEL: CPT

## 2020-11-07 PROCEDURE — 77030038269 HC DRN EXT URIN PURWCK BARD -A

## 2020-11-07 PROCEDURE — 74011250637 HC RX REV CODE- 250/637: Performed by: HOSPITALIST

## 2020-11-07 PROCEDURE — 85025 COMPLETE CBC W/AUTO DIFF WBC: CPT

## 2020-11-07 PROCEDURE — 65660000000 HC RM CCU STEPDOWN

## 2020-11-07 PROCEDURE — 74011250637 HC RX REV CODE- 250/637: Performed by: NURSE PRACTITIONER

## 2020-11-07 PROCEDURE — 36415 COLL VENOUS BLD VENIPUNCTURE: CPT

## 2020-11-07 PROCEDURE — 74011250637 HC RX REV CODE- 250/637: Performed by: INTERNAL MEDICINE

## 2020-11-07 PROCEDURE — 84100 ASSAY OF PHOSPHORUS: CPT

## 2020-11-07 PROCEDURE — 82962 GLUCOSE BLOOD TEST: CPT

## 2020-11-07 PROCEDURE — 83735 ASSAY OF MAGNESIUM: CPT

## 2020-11-07 PROCEDURE — 74011636637 HC RX REV CODE- 636/637: Performed by: NURSE PRACTITIONER

## 2020-11-07 PROCEDURE — 74011250636 HC RX REV CODE- 250/636: Performed by: INTERNAL MEDICINE

## 2020-11-07 RX ORDER — INSULIN LISPRO 100 [IU]/ML
6 INJECTION, SOLUTION INTRAVENOUS; SUBCUTANEOUS ONCE
Status: COMPLETED | OUTPATIENT
Start: 2020-11-07 | End: 2020-11-07

## 2020-11-07 RX ADMIN — ATORVASTATIN CALCIUM 20 MG: 20 TABLET, FILM COATED ORAL at 22:14

## 2020-11-07 RX ADMIN — Medication 10 ML: at 22:15

## 2020-11-07 RX ADMIN — DEXAMETHASONE 2 MG: 1 TABLET ORAL at 22:14

## 2020-11-07 RX ADMIN — APIXABAN 2.5 MG: 2.5 TABLET, FILM COATED ORAL at 17:12

## 2020-11-07 RX ADMIN — Medication 5 ML: at 14:00

## 2020-11-07 RX ADMIN — SERTRALINE HYDROCHLORIDE 25 MG: 50 TABLET ORAL at 17:12

## 2020-11-07 RX ADMIN — LEVETIRACETAM 500 MG: 500 TABLET, FILM COATED ORAL at 06:05

## 2020-11-07 RX ADMIN — TOPIRAMATE 100 MG: 100 TABLET, FILM COATED ORAL at 17:12

## 2020-11-07 RX ADMIN — CARVEDILOL 6.25 MG: 6.25 TABLET, FILM COATED ORAL at 17:12

## 2020-11-07 RX ADMIN — ACETAMINOPHEN 650 MG: 325 TABLET ORAL at 17:18

## 2020-11-07 RX ADMIN — CARVEDILOL 6.25 MG: 6.25 TABLET, FILM COATED ORAL at 08:00

## 2020-11-07 RX ADMIN — INSULIN LISPRO 6 UNITS: 100 INJECTION, SOLUTION INTRAVENOUS; SUBCUTANEOUS at 22:31

## 2020-11-07 RX ADMIN — TOPIRAMATE 100 MG: 100 TABLET, FILM COATED ORAL at 08:28

## 2020-11-07 RX ADMIN — Medication 3 MG: at 22:14

## 2020-11-07 RX ADMIN — FUROSEMIDE 40 MG: 40 TABLET ORAL at 08:28

## 2020-11-07 RX ADMIN — APIXABAN 2.5 MG: 2.5 TABLET, FILM COATED ORAL at 08:28

## 2020-11-07 RX ADMIN — CLOPIDOGREL BISULFATE 75 MG: 75 TABLET ORAL at 08:28

## 2020-11-07 RX ADMIN — ACETAMINOPHEN 650 MG: 325 TABLET ORAL at 22:14

## 2020-11-07 RX ADMIN — DEXAMETHASONE 2 MG: 1 TABLET ORAL at 08:28

## 2020-11-07 RX ADMIN — FAMOTIDINE 20 MG: 20 TABLET ORAL at 08:28

## 2020-11-07 RX ADMIN — Medication 10 ML: at 05:49

## 2020-11-07 RX ADMIN — LEVETIRACETAM 500 MG: 500 TABLET, FILM COATED ORAL at 17:30

## 2020-11-07 NOTE — PROGRESS NOTES
Bedside and Verbal shift change report given to [address] [place] [address] (oncoming nurse) by Danielle Jones and Lianna Yang RN (offgoing nurse). Report included the following information SBAR, Kardex, Intake/Output, MAR, Recent Results, Cardiac Rhythm NSR and Dual Neuro Assessment.

## 2020-11-07 NOTE — PROGRESS NOTES
[facility]  Hospitalist Group                                                                                          Hospitalist Progress Note  Annika Goode MD  Answering service: [phone] from in house phone        Date of Service:  2020  NAME:  Kunal Hernadez  :  1950  MRN:  [id number]      Admission Summary:   69 y.o. female,  who has history of chronic systolic heart failure status post ICD, chronic COPD on home oxygen 2 L, hypertension, hyperlipidemia, anticoagulation with Eliquis who presents with fall 2 weeks back and left sided weakness. She reports that her left arm felt funny and also she was having weakness in the left arm. It is not painful. And she did not seek medical attention. Given persistence of symptoms, EMS was called and to take her to the local emergency room. At the ER there work-up revealed that she has lung mass as well as brain tumors. She was given IV Decadron and transferred to this facility for neurosurgical evaluation. She was also noted to have a dislocated left shoulder. There was report of lung mass which is right suprahilar with progression into the mediastinum. There is also report of 1.2 cm right frontal mass with 5 cm right to left shift. There is no report of seizures. Interval history / Subjective:   F/U left sided weakness. Patient is seen and examined at bedside this AM. Feels very lethargic. Poor appetite. Discussed with nursing - declining      Assessment & Plan:     Brain masses, consistent with metastatic disease, suspect lung primary. Cerebral edema secondary to above  Left-sided weakness: mild improvement  -MRI brain : intracranial metastases, largest 1.3 x 1.3 cm, with surrounding edema.  Osseous metastases in right frontal, parietal, C2 body  -Neurosurgery: No surgical benefit.   -Continue steroids BID - weaning down, Continue Keppra  -Seizure precautions  -Neuro checks  - appreciate oncology input - Not currently a candidate for chemotherapy  - radiation oncology following   - started on XRT 1/10 on 11/5    Acute on chronic hypoxic respiratory failure, improved   Chronic COPD with chronic hypoxic respiratory failure, not in exacerbation  - C/w lasix   - Pulmonology following  - saturating on 2 liters which is baseline   - completed 5 days of  IV ceftriaxone and azithromycin      Lung carcinoma with erosion into mediastinum  - EUS/endobronchial biopsy 10/28- Pathology :  Squamous cell carcinoma     Moderate right pleural effusion: s/p thoracentesis  -s/p thoracentesis 10/27 with approximately 300 ml removed- appears exudative    Postobstructive infiltrate RUL  -Seen on CT  -no unusual cough, fever  -s/p Ceftriaxone    UTI, Proteus:  -completed Ceftriaxone     Hyperkalemia - resolved ( fluctuating)  - nephrology following    Diabetes type 2 with hyperglycemia   -Hba1c 6.1, hypoglycemic due to poor oral intake. dced lantus. C/w SSI     Chronic congestive heart failure, presumed systolic given patient has ICD -Continued home medications plavix, coreg, statin     Clavicular fracture:  Shoulder dislocation ruled out:  -Appreciate orthopedic surgery. No evidence of shoulder dislocation. Can wear sling for comfort       Afib on Eliquis   -rate controlled on coreg. C/w eliquis    Hx CAD     Hypertension -Continue with Coreg. Lisinopril and aldactone on hold     Prior tobacco use    Palliative care reconsulted - overall declining    Poor prognosis. Hospice is appropriate      Code status: full. AMD filled out  DVT prophylaxis: SCDs    Care Plan discussed with: Patient/Family  Anticipated Disposition: snf after completion of XRT?        Hospital Problems  Date Reviewed: 10/28/2020          Codes Class Noted POA    * (Principal) Brain metastases [facility]) ICD-10-CM: C79.31  ICD-9-CM: 198.3  10/22/2020 Yes                Review of Systems:     Negative unless stated above    Vital Signs:    Last 24hrs VS reviewed since prior progress note. Most recent are:  Visit Vitals  BP (!) 121/49   Pulse 62   Temp 98.2 °F (36.8 °C)   Resp 16   Ht 5' 2.01\" (1.575 m)   Wt 57.8 kg (127 lb 8 oz)   SpO2 100%   BMI 23.31 kg/m²         Intake/Output Summary (Last 24 hours) at 11/7/2020 1447  Last data filed at 11/7/2020 0555  Gross per 24 hour   Intake 240 ml   Output 250 ml   Net -10 ml        Physical Examination:             Constitutional:  No acute distress, cooperative, ill looking    ENT:  Oral mucosa moist, oropharynx benign. Resp:  decreased breath sounds    CV:  Regular rhythm, normal rate, no murmurs, gallops, rubs    GI:  Soft, non distended, non tender. normoactive bowel sounds, no hepatosplenomegaly     Musculoskeletal:  No edema, warm, 2+ pulses throughout    Neurologic:   alert and oriented x3            Data Review:    Review and/or order of clinical lab test  Review and/or order of tests in the radiology section of CPT  Review and/or order of tests in the medicine section of CPT      Labs:     Recent Labs     11/07/20 0259 11/06/20 0100   WBC 8.7 10.9   HGB 9.7* 10.1*   HCT 31.9* 32.7*    177     Recent Labs     11/07/20 0259 11/06/20 0100 11/05/20  0345    134* 139   K 4.0 4.8 4.8    97 102   CO2 31 30 30   BUN 50* 53* 58*   CREA 1.08* 1.11* 1.12*   GLU 85 179* 105*   CA 8.8 9.0 9.4   MG 2.1  --  2.2   PHOS 3.3  --  4.2     Recent Labs     11/07/20 0259 11/05/20  0345   ALT 16 14   AP 73 66   TBILI 0.3 0.3   TP 6.0* 6.3*   ALB 3.0* 3.0*   GLOB 3.0 3.3     No results for input(s): INR, PTP, APTT, INREXT, INREXT in the last 72 hours. No results for input(s): FE, TIBC, PSAT, FERR in the last 72 hours. No results found for: FOL, RBCF   No results for input(s): PH, PCO2, PO2 in the last 72 hours. No results for input(s): CPK, CKNDX, TROIQ in the last 72 hours.     No lab exists for component: CPKMB  No results found for: CHOL, CHOLX, CHLST, CHOLV, HDL, HDLP, LDL, LDLC, DLDLP, TGLX, TRIGL, TRIGP, CHHD, [facility]  Lab Results   Component Value Date/Time    Glucose (POC) 131 (H) 11/07/2020 11:47 AM    Glucose (POC) 230 (H) 11/06/2020 09:05 PM    Glucose (POC) 346 (H) 11/06/2020 04:24 PM    Glucose (POC) 90 11/06/2020 09:49 AM    Glucose (POC) 128 (H) 11/06/2020 07:12 AM     Lab Results   Component Value Date/Time    Color YELLOW/STRAW 10/22/2020 09:15 AM    Appearance TURBID (A) 10/22/2020 09:15 AM    Specific gravity 1.012 10/22/2020 09:15 AM    pH (UA) 5.5 10/22/2020 09:15 AM    Protein Negative 10/22/2020 09:15 AM    Glucose Negative 10/22/2020 09:15 AM    Ketone Negative 10/22/2020 09:15 AM    Bilirubin Negative 10/22/2020 09:15 AM    Urobilinogen 0.2 10/22/2020 09:15 AM    Nitrites Positive (A) 10/22/2020 09:15 AM    Leukocyte Esterase LARGE (A) 10/22/2020 09:15 AM    Epithelial cells FEW 10/22/2020 09:15 AM    Bacteria 4+ (A) 10/22/2020 09:15 AM    WBC  10/22/2020 09:15 AM    RBC 0-5 10/22/2020 09:15 AM         Medications Reviewed:     Current Facility-Administered Medications   Medication Dose Route Frequency    dexAMETHasone (DECADRON) tablet 2 mg  2 mg Oral Q12H    furosemide (LASIX) tablet 40 mg  40 mg Oral DAILY    clopidogreL (PLAVIX) tablet 75 mg  75 mg Oral DAILY    apixaban (ELIQUIS) tablet 2.5 mg  2.5 mg Oral BID    levETIRAcetam (KEPPRA) tablet 500 mg  500 mg Oral Q12H    albuterol-ipratropium (DUO-NEB) 2.5 MG-0.5 MG/3 ML  3 mL Nebulization Q4H PRN    melatonin tablet 3 mg  3 mg Oral QHS    sodium chloride (NS) flush 5-40 mL  5-40 mL IntraVENous Q8H    sodium chloride (NS) flush 5-40 mL  5-40 mL IntraVENous PRN    acetaminophen (TYLENOL) tablet 650 mg  650 mg Oral Q6H PRN    Or    acetaminophen (TYLENOL) suppository 650 mg  650 mg Rectal Q6H PRN    polyethylene glycol (MIRALAX) packet 17 g  17 g Oral DAILY PRN    ondansetron (ZOFRAN ODT) tablet 4 mg  4 mg Oral Q8H PRN    Or    ondansetron (ZOFRAN) injection 4 mg  4 mg IntraVENous Q6H PRN    famotidine (PEPCID) tablet 20 mg  20 mg Oral DAILY    atorvastatin (LIPITOR) tablet 20 mg  20 mg Oral QHS    carvediloL (COREG) tablet 6.25 mg  6.25 mg Oral BID WITH MEALS    [Held by provider] lisinopriL (PRINIVIL, ZESTRIL) tablet 5 mg  5 mg Oral DAILY    sertraline (ZOLOFT) tablet 25 mg  25 mg Oral QPM    [Held by provider] spironolactone (ALDACTONE) tablet 25 mg  25 mg Oral DAILY    topiramate (TOPAMAX) tablet 100 mg  100 mg Oral BID    glucose chewable tablet 16 g  4 Tab Oral PRN    glucagon (GLUCAGEN) injection 1 mg  1 mg IntraMUSCular PRN    dextrose 10% infusion 0-250 mL  0-250 mL IntraVENous PRN    insulin lispro (HUMALOG) injection   SubCUTAneous AC&HS     ______________________________________________________________________  EXPECTED LENGTH OF STAY: 4d 21h  ACTUAL LENGTH OF STAY:          16                 Richard Morocho MD

## 2020-11-07 NOTE — PROGRESS NOTES
Problem: Pain  Goal: *Control of Pain  Outcome: Progressing Towards Goal     Problem: Patient Education: Go to Patient Education Activity  Goal: Patient/Family Education  Outcome: Progressing Towards Goal     Problem: Pressure Injury - Risk of  Goal: *Prevention of pressure injury  Description: Document Elio Scale and appropriate interventions in the flowsheet. Outcome: Progressing Towards Goal  Note: Pressure Injury Interventions:  Sensory Interventions: Assess changes in LOC, Discuss PT/OT consult with provider, Keep linens dry and wrinkle-free, Minimize linen layers, Pressure redistribution bed/mattress (bed type), Assess need for specialty bed, Avoid rigorous massage over bony prominences, Check visual cues for pain, Float heels, Maintain/enhance activity level, Monitor skin under medical devices, Sit a 90-degree angle/use footstool if needed, Turn and reposition approx. every two hours (pillows and wedges if needed)    Moisture Interventions: Apply protective barrier, creams and emollients, Internal/External urinary devices, Minimize layers, Assess need for specialty bed, Check for incontinence Q2 hours and as needed, Moisture barrier, Offer toileting Q_hr    Activity Interventions: Pressure redistribution bed/mattress(bed type), PT/OT evaluation, Increase time out of bed    Mobility Interventions: HOB 30 degrees or less, Pressure redistribution bed/mattress (bed type), Turn and reposition approx.  every two hours(pillow and wedges), Assess need for specialty bed, Float heels, PT/OT evaluation    Nutrition Interventions: Document food/fluid/supplement intake, Discuss nutritional consult with provider, Offer support with meals,snacks and hydration    Friction and Shear Interventions: Lift sheet, Minimize layers, Transferring/repositioning devices, Apply protective barrier, creams and emollients, HOB 30 degrees or less, Lift team/patient mobility team, Sit at 90-degree angle                Problem: Patient Education: Go to Patient Education Activity  Goal: Patient/Family Education  Outcome: Progressing Towards Goal     Problem: Falls - Risk of  Goal: *Absence of Falls  Description: Document Jose Francisco Crowe Fall Risk and appropriate interventions in the flowsheet.   Outcome: Progressing Towards Goal  Note: Fall Risk Interventions:  Mobility Interventions: Patient to call before getting OOB, Bed/chair exit alarm, Communicate number of staff needed for ambulation/transfer, OT consult for ADLs, PT Consult for mobility concerns, PT Consult for assist device competence, Strengthening exercises (ROM-active/passive), Utilize walker, cane, or other assistive device, Utilize gait belt for transfers/ambulation    Mentation Interventions: Adequate sleep, hydration, pain control, Bed/chair exit alarm, Evaluate medications/consider consulting pharmacy, More frequent rounding, Toileting rounds, Gait belt with transfers/ambulation, Increase mobility, Room close to nurse's station, Update white board    Medication Interventions: Evaluate medications/consider consulting pharmacy, Patient to call before getting OOB, Bed/chair exit alarm, Assess postural VS orthostatic hypotension, Teach patient to arise slowly, Utilize gait belt for transfers/ambulation    Elimination Interventions: Bed/chair exit alarm, Call light in reach, Patient to call for help with toileting needs, Toileting schedule/hourly rounds, Stay With Me (per policy)    History of Falls Interventions: Bed/chair exit alarm, Door open when patient unattended, Room close to nurse's station, Assess for delayed presentation/identification of injury for 48 hrs (comment for end date), Vital signs minimum Q4HRs X 24 hrs (comment for end date), Consult care management for discharge planning, Evaluate medications/consider consulting pharmacy, Investigate reason for fall, Utilize gait belt for transfer/ambulation         Problem: Patient Education: Go to Patient Education Activity  Goal: Patient/Family Education  Outcome: Progressing Towards Goal

## 2020-11-08 LAB
COMMENT, HOLDF: NORMAL
GLUCOSE BLD STRIP.AUTO-MCNC: 136 MG/DL (ref 65–100)
GLUCOSE BLD STRIP.AUTO-MCNC: 146 MG/DL (ref 65–100)
GLUCOSE BLD STRIP.AUTO-MCNC: 147 MG/DL (ref 65–100)
GLUCOSE BLD STRIP.AUTO-MCNC: 416 MG/DL (ref 65–100)
GLUCOSE BLD STRIP.AUTO-MCNC: 435 MG/DL (ref 65–100)
SAMPLES BEING HELD,HOLD: NORMAL
SERVICE CMNT-IMP: ABNORMAL

## 2020-11-08 PROCEDURE — 74011250637 HC RX REV CODE- 250/637: Performed by: INTERNAL MEDICINE

## 2020-11-08 PROCEDURE — 82962 GLUCOSE BLOOD TEST: CPT

## 2020-11-08 PROCEDURE — 77030038269 HC DRN EXT URIN PURWCK BARD -A

## 2020-11-08 PROCEDURE — 74011250637 HC RX REV CODE- 250/637: Performed by: HOSPITALIST

## 2020-11-08 PROCEDURE — 65660000000 HC RM CCU STEPDOWN

## 2020-11-08 PROCEDURE — 74011250637 HC RX REV CODE- 250/637: Performed by: NURSE PRACTITIONER

## 2020-11-08 RX ORDER — DEXAMETHASONE 1 MG/1
2 TABLET ORAL DAILY
Status: COMPLETED | OUTPATIENT
Start: 2020-11-09 | End: 2020-11-11

## 2020-11-08 RX ORDER — SERTRALINE HYDROCHLORIDE 50 MG/1
50 TABLET, FILM COATED ORAL EVERY EVENING
Status: DISCONTINUED | OUTPATIENT
Start: 2020-11-08 | End: 2020-11-11 | Stop reason: HOSPADM

## 2020-11-08 RX ORDER — INSULIN LISPRO 100 [IU]/ML
6 INJECTION, SOLUTION INTRAVENOUS; SUBCUTANEOUS ONCE
Status: COMPLETED | OUTPATIENT
Start: 2020-11-09 | End: 2020-11-09

## 2020-11-08 RX ADMIN — CLOPIDOGREL BISULFATE 75 MG: 75 TABLET ORAL at 08:17

## 2020-11-08 RX ADMIN — FAMOTIDINE 20 MG: 20 TABLET ORAL at 08:17

## 2020-11-08 RX ADMIN — ACETAMINOPHEN 650 MG: 325 TABLET ORAL at 06:10

## 2020-11-08 RX ADMIN — Medication 10 ML: at 22:46

## 2020-11-08 RX ADMIN — LEVETIRACETAM 500 MG: 500 TABLET, FILM COATED ORAL at 17:50

## 2020-11-08 RX ADMIN — SERTRALINE HYDROCHLORIDE 50 MG: 50 TABLET ORAL at 17:48

## 2020-11-08 RX ADMIN — CARVEDILOL 6.25 MG: 6.25 TABLET, FILM COATED ORAL at 08:17

## 2020-11-08 RX ADMIN — TOPIRAMATE 100 MG: 100 TABLET, FILM COATED ORAL at 17:48

## 2020-11-08 RX ADMIN — ATORVASTATIN CALCIUM 20 MG: 20 TABLET, FILM COATED ORAL at 22:45

## 2020-11-08 RX ADMIN — Medication 3 MG: at 22:45

## 2020-11-08 RX ADMIN — FUROSEMIDE 40 MG: 40 TABLET ORAL at 08:17

## 2020-11-08 RX ADMIN — Medication 10 ML: at 06:10

## 2020-11-08 RX ADMIN — APIXABAN 2.5 MG: 2.5 TABLET, FILM COATED ORAL at 17:48

## 2020-11-08 RX ADMIN — LEVETIRACETAM 500 MG: 500 TABLET, FILM COATED ORAL at 06:10

## 2020-11-08 RX ADMIN — TOPIRAMATE 100 MG: 100 TABLET, FILM COATED ORAL at 08:17

## 2020-11-08 RX ADMIN — CARVEDILOL 6.25 MG: 6.25 TABLET, FILM COATED ORAL at 17:48

## 2020-11-08 RX ADMIN — APIXABAN 2.5 MG: 2.5 TABLET, FILM COATED ORAL at 09:00

## 2020-11-08 RX ADMIN — Medication 5 ML: at 14:00

## 2020-11-08 NOTE — PROGRESS NOTES
Problem: Pain  Goal: *Control of Pain  Outcome: Progressing Towards Goal     Problem: Patient Education: Go to Patient Education Activity  Goal: Patient/Family Education  Outcome: Progressing Towards Goal     Problem: Pressure Injury - Risk of  Goal: *Prevention of pressure injury  Description: Document Leio Scale and appropriate interventions in the flowsheet. Outcome: Progressing Towards Goal  Note: Pressure Injury Interventions:  Sensory Interventions: Assess changes in LOC, Assess need for specialty bed, Avoid rigorous massage over bony prominences, Check visual cues for pain, Discuss PT/OT consult with provider, Float heels, Keep linens dry and wrinkle-free, Maintain/enhance activity level, Monitor skin under medical devices, Pressure redistribution bed/mattress (bed type), Turn and reposition approx. every two hours (pillows and wedges if needed), Use 30-degree side-lying position    Moisture Interventions: Absorbent underpads, Apply protective barrier, creams and emollients, Assess need for specialty bed, Check for incontinence Q2 hours and as needed, Internal/External urinary devices, Maintain skin hydration (lotion/cream), Moisture barrier, Offer toileting Q_hr    Activity Interventions: Assess need for specialty bed, Increase time out of bed, Pressure redistribution bed/mattress(bed type), PT/OT evaluation    Mobility Interventions: Assess need for specialty bed, Float heels, HOB 30 degrees or less, Pressure redistribution bed/mattress (bed type), PT/OT evaluation, Turn and reposition approx.  every two hours(pillow and wedges)    Nutrition Interventions: Document food/fluid/supplement intake, Discuss nutritional consult with provider, Offer support with meals,snacks and hydration    Friction and Shear Interventions: Apply protective barrier, creams and emollients, Feet elevated on foot rest, Foam dressings/transparent film/skin sealants, HOB 30 degrees or less, Lift sheet, Lift team/patient mobility team, Minimize layers, Sit at 90-degree angle                Problem: Patient Education: Go to Patient Education Activity  Goal: Patient/Family Education  Outcome: Progressing Towards Goal     Problem: Falls - Risk of  Goal: *Absence of Falls  Description: Document Olinda Aparicio Fall Risk and appropriate interventions in the flowsheet.   Outcome: Progressing Towards Goal  Note: Fall Risk Interventions:  Mobility Interventions: Assess mobility with egress test, Bed/chair exit alarm, Communicate number of staff needed for ambulation/transfer, OT consult for ADLs, Patient to call before getting OOB, PT Consult for mobility concerns, PT Consult for assist device competence, Strengthening exercises (ROM-active/passive), Utilize walker, cane, or other assistive device, Utilize gait belt for transfers/ambulation    Mentation Interventions: Adequate sleep, hydration, pain control, Bed/chair exit alarm, Evaluate medications/consider consulting pharmacy, More frequent rounding, Toileting rounds, Gait belt with transfers/ambulation, Increase mobility, Room close to nurse's station, Update white board    Medication Interventions: Bed/chair exit alarm, Evaluate medications/consider consulting pharmacy, Patient to call before getting OOB, Teach patient to arise slowly, Utilize gait belt for transfers/ambulation    Elimination Interventions: Bed/chair exit alarm, Call light in reach, Patient to call for help with toileting needs, Stay With Me (per policy), Toilet paper/wipes in reach, Toileting schedule/hourly rounds    History of Falls Interventions: Bed/chair exit alarm, Consult care management for discharge planning, Door open when patient unattended, Evaluate medications/consider consulting pharmacy, Investigate reason for fall, Room close to nurse's station, Utilize gait belt for transfer/ambulation, Assess for delayed presentation/identification of injury for 48 hrs (comment for end date), Vital signs minimum Q4HRs X 24 hrs (comment for end date)         Problem: Patient Education: Go to Patient Education Activity  Goal: Patient/Family Education  Outcome: Progressing Towards Goal

## 2020-11-08 NOTE — ACP (ADVANCE CARE PLANNING)
[facility]  Hospitalist Group                                      Advance Care Planning Note    Name: Sindy Cason  YOB: 1950  MRN: [id number]  Admission Date: 10/22/2020  3:15 AM    Date of discussion: 11/8/2020    Active Diagnoses:    Hospital Problems  Date Reviewed: 10/28/2020          Codes Class Noted POA    * (Principal) Brain metastases ([facility] [address] ICD-10-CM: C79.31  ICD-9-CM: 198.3  10/22/2020 Yes              These active diagnoses are of sufficient risk that focused discussion on advance care planning is indicated in order to allow the patient to thoughtfully consider personal goals of care, and if situations arise that prevent the ability to personally give input, to ensure appropriate representation of their personal desires for different levels and aggressiveness of care. Discussion:     Persons present and participating in discussion: Nino Ledezma MD, daughter in law Bala Rich     Topics Discussed:  Patient's medical condition and diagnosis: [ X ] yes [  ] no   Surrogate decision maker: [  ] yes [  ] no   Patient's current physical function/cognitive function/frailty: [  Sangeeta Bonus yes [  ] no   Code Status: RabiaLaneer  ] yes [  ] no   Artificial Nutrition / Dialysis / Non-Invasive Ventilation / Blood Transfusion: [  ] yes [  ] no  Potential Resources for home (durable medical equipment, home nursing, home O2): [facility]  ] yes [  ] no    Overview of Discussion:     discussed with patient in the morning regarding goals of care/ code status/ terminal cancer/ XRT/ not a candidate for chem - she was not much interactive and keep saying \" I don't know\". Spoke with daughter in law Bala Rich on phone and updated patient's status - pt declining and overall poor prognosis. Discussed goals of care. Code status/ cardiac arrest/ intubation/ DNR which will increase her pain and suffering. Bala Collinsman says that \"pt expressed to be at home when she dies\".  Explained regarding home hospice and daughter in law understands. She and her  will talk with the patient and decide on home hospice or not    Addendum: patient and family agreed for home hospice. Hospice consulted     Time Spent:     Total time spent face-to-face in education and discussion: 20 minutes.      Kenneth Latham MD  Date of Service:  11/8/2020  2:48 PM

## 2020-11-08 NOTE — PROGRESS NOTES
[facility]  Hospitalist Group                                                                                          Hospitalist Progress Note  Sawyer Melara MD  Answering service: [phone] from in house phone        Date of Service:  2020  NAME:  Shay Live  :  1950  MRN:  [id number]      Admission Summary:   69 y.o. female,  who has history of chronic systolic heart failure status post ICD, chronic COPD on home oxygen 2 L, hypertension, hyperlipidemia, anticoagulation with Eliquis who presents with fall 2 weeks back and left sided weakness. She reports that her left arm felt funny and also she was having weakness in the left arm. It is not painful. And she did not seek medical attention. Given persistence of symptoms, EMS was called and to take her to the local emergency room. At the ER there work-up revealed that she has lung mass as well as brain tumors. She was given IV Decadron and transferred to this facility for neurosurgical evaluation. She was also noted to have a dislocated left shoulder. There was report of lung mass which is right suprahilar with progression into the mediastinum. There is also report of 1.2 cm right frontal mass with 5 cm right to left shift. There is no report of seizures. Interval history / Subjective:   F/U left sided weakness. Patient is seen and examined at bedside this AM. Feels very lethargic. Poor appetite. Not much communicative. Tired to explain that she appears too weak to continue XRT and discussed goals of care but pt not much interactive and keep saying \" I don't know\"   Discussed with nursing - no overnight events     Spoke with daughter in law mpoa and updated patient's status - pt declining and overall poor prognosis. Discussed goals of care pt expressed to be at home when she dies. Explained regarding home hospice and daughter in law understands.  She and her  will talk with the patient and decide on home hospice or not. Assessment & Plan:     Brain masses, consistent with metastatic disease, suspect lung primary. Cerebral edema secondary to above  Left-sided weakness  -MRI brain : intracranial metastases, largest 1.3 x 1.3 cm, with surrounding edema. Osseous metastases in right frontal, parietal, C2 body  -Neurosurgery: No surgical benefit.   -Continue steroids BID - weaning down, Continue Keppra  -Seizure precautions  -Neuro checks  - appreciate oncology input - Not currently a candidate for chemotherapy  - radiation oncology following   - started XRT 1/10 on 11/5    Acute on chronic hypoxic respiratory failure, improved   Chronic COPD with chronic hypoxic respiratory failure, not in exacerbation  - C/w lasix   - Pulmonology following  - saturating on 2 liters which is baseline   - completed 5 days of  IV ceftriaxone and azithromycin      Lung carcinoma with erosion into mediastinum  - EUS/endobronchial biopsy 10/28- Pathology :  Squamous cell carcinoma     Moderate right pleural effusion: s/p thoracentesis  -s/p thoracentesis 10/27 with approximately 300 ml removed- appears exudative    Postobstructive infiltrate RUL  -Seen on CT  -no unusual cough, fever  -s/p Ceftriaxone    UTI, Proteus:  -completed Ceftriaxone     Hyperkalemia - resolved ( fluctuating)  - nephrology following    Diabetes type 2 with hyperglycemia   -Hba1c 6.1, hypoglycemic due to poor oral intake. dced lantus. C/w SSI     Chronic congestive heart failure, presumed systolic given patient has ICD -Continued home medications plavix, coreg, statin     Clavicular fracture:  Shoulder dislocation ruled out:  -Appreciate orthopedic surgery. No evidence of shoulder dislocation. Can wear sling for comfort       Afib on Eliquis   -rate controlled on coreg. C/w eliquis    Hx CAD     Hypertension -Continue with Coreg.  Lisinopril and aldactone on hold   Depression - increased zoloft    Prior tobacco use    Palliative care reconsulted - overall declining    Poor prognosis. Hospice is appropriate      Code status: full. AMD filled out  DVT prophylaxis: SCDs    Care Plan discussed with: Patient/Family  Anticipated Disposition: snf after completion of XRT? Vs home hospice        Hospital Problems  Date Reviewed: 10/28/2020          Codes Class Noted POA    * (Principal) Brain metastases [facility]) ICD-10-CM: C79.31  ICD-9-CM: 198.3  10/22/2020 Yes                Review of Systems:     Negative unless stated above    Vital Signs:    Last 24hrs VS reviewed since prior progress note. Most recent are:  Visit Vitals  /60 (BP 1 Location: Right arm, BP Patient Position: At rest)   Pulse 66   Temp 97.6 °F (36.4 °C)   Resp 21   Ht 5' 2.01\" (1.575 m)   Wt 55.7 kg (122 lb 12.8 oz)   SpO2 100%   BMI 22.45 kg/m²         Intake/Output Summary (Last 24 hours) at 11/8/2020 1433  Last data filed at 11/8/2020 2043  Gross per 24 hour   Intake    Output 350 ml   Net -350 ml        Physical Examination:             Constitutional:   ill looking    ENT:  Oral mucosa moist, oropharynx benign. Resp:  decreased breath sounds    CV:  Regular rhythm, normal rate, no murmurs, gallops, rubs    GI:  Soft, non distended, non tender.  normoactive bowel sounds, no hepatosplenomegaly     Musculoskeletal:  No edema, warm, 2+ pulses throughout    Neurologic:   alert and oriented x3            Data Review:    Review and/or order of clinical lab test  Review and/or order of tests in the radiology section of CPT  Review and/or order of tests in the medicine section of CPT      Labs:     Recent Labs     11/07/20 0259 11/06/20 0100   WBC 8.7 10.9   HGB 9.7* 10.1*   HCT 31.9* 32.7*    177     Recent Labs     11/07/20 0259 11/06/20 0100    134*   K 4.0 4.8    97   CO2 31 30   BUN 50* 53*   CREA 1.08* 1.11*   GLU 85 179*   CA 8.8 9.0   MG 2.1  --    PHOS 3.3  --      Recent Labs     11/07/20 0259   ALT 16   AP 73   TBILI 0.3   TP 6.0*   ALB 3.0*   GLOB 3.0     No results for input(s): INR, PTP, APTT, INREXT, INREXT in the last 72 hours. No results for input(s): FE, TIBC, PSAT, FERR in the last 72 hours. No results found for: FOL, RBCF   No results for input(s): PH, PCO2, PO2 in the last 72 hours. No results for input(s): CPK, CKNDX, TROIQ in the last 72 hours.     No lab exists for component: CPKMB  No results found for: CHOL, CHOLX, CHLST, CHOLV, HDL, HDLP, LDL, LDLC, DLDLP, TGLX, TRIGL, TRIGP, CHHD, CHHDX  Lab Results   Component Value Date/Time    Glucose (POC) 136 (H) 11/08/2020 11:40 AM    Glucose (POC) 146 (H) 11/08/2020 07:56 AM    Glucose (POC) 380 (H) 11/07/2020 10:20 PM    Glucose (POC) 132 (H) 11/07/2020 04:27 PM    Glucose (POC) 131 (H) 11/07/2020 11:47 AM     Lab Results   Component Value Date/Time    Color YELLOW/STRAW 10/22/2020 09:15 AM    Appearance TURBID (A) 10/22/2020 09:15 AM    Specific gravity 1.012 10/22/2020 09:15 AM    pH (UA) 5.5 10/22/2020 09:15 AM    Protein Negative 10/22/2020 09:15 AM    Glucose Negative 10/22/2020 09:15 AM    Ketone Negative 10/22/2020 09:15 AM    Bilirubin Negative 10/22/2020 09:15 AM    Urobilinogen 0.2 10/22/2020 09:15 AM    Nitrites Positive (A) 10/22/2020 09:15 AM    Leukocyte Esterase LARGE (A) 10/22/2020 09:15 AM    Epithelial cells FEW 10/22/2020 09:15 AM    Bacteria 4+ (A) 10/22/2020 09:15 AM    WBC  10/22/2020 09:15 AM    RBC 0-5 10/22/2020 09:15 AM         Medications Reviewed:     Current Facility-Administered Medications   Medication Dose Route Frequency    [START ON 11/9/2020] dexAMETHasone (DECADRON) tablet 2 mg  2 mg Oral DAILY    furosemide (LASIX) tablet 40 mg  40 mg Oral DAILY    clopidogreL (PLAVIX) tablet 75 mg  75 mg Oral DAILY    apixaban (ELIQUIS) tablet 2.5 mg  2.5 mg Oral BID    levETIRAcetam (KEPPRA) tablet 500 mg  500 mg Oral Q12H    albuterol-ipratropium (DUO-NEB) 2.5 MG-0.5 MG/3 ML  3 mL Nebulization Q4H PRN    melatonin tablet 3 mg  3 mg Oral QHS    sodium chloride (NS) flush 5-40 mL  5-40 mL IntraVENous Q8H    sodium chloride (NS) flush 5-40 mL  5-40 mL IntraVENous PRN    acetaminophen (TYLENOL) tablet 650 mg  650 mg Oral Q6H PRN    Or    acetaminophen (TYLENOL) suppository 650 mg  650 mg Rectal Q6H PRN    polyethylene glycol (MIRALAX) packet 17 g  17 g Oral DAILY PRN    ondansetron (ZOFRAN ODT) tablet 4 mg  4 mg Oral Q8H PRN    Or    ondansetron (ZOFRAN) injection 4 mg  4 mg IntraVENous Q6H PRN    famotidine (PEPCID) tablet 20 mg  20 mg Oral DAILY    atorvastatin (LIPITOR) tablet 20 mg  20 mg Oral QHS    carvediloL (COREG) tablet 6.25 mg  6.25 mg Oral BID WITH MEALS    [Held by provider] lisinopriL (PRINIVIL, ZESTRIL) tablet 5 mg  5 mg Oral DAILY    sertraline (ZOLOFT) tablet 25 mg  25 mg Oral QPM    [Held by provider] spironolactone (ALDACTONE) tablet 25 mg  25 mg Oral DAILY    topiramate (TOPAMAX) tablet 100 mg  100 mg Oral BID    glucose chewable tablet 16 g  4 Tab Oral PRN    glucagon (GLUCAGEN) injection 1 mg  1 mg IntraMUSCular PRN    dextrose 10% infusion 0-250 mL  0-250 mL IntraVENous PRN    insulin lispro (HUMALOG) injection   SubCUTAneous AC&HS     ______________________________________________________________________  EXPECTED LENGTH OF STAY: 4d 21h  ACTUAL LENGTH OF STAY:          Roel Oliveira MD

## 2020-11-08 NOTE — PROGRESS NOTES
Bedside and Verbal shift change report given to [address] [place] [address] (oncoming nurse) by Nakita Gabriel (offgoing nurse). Report included the following information SBAR, Kardex, Intake/Output, MAR, Recent Results, Cardiac Rhythm NSR and Dual Neuro Assessment.

## 2020-11-09 LAB
ANION GAP SERPL CALC-SCNC: 6 MMOL/L (ref 5–15)
BUN SERPL-MCNC: 44 MG/DL (ref 6–20)
BUN/CREAT SERPL: 37 (ref 12–20)
CALCIUM SERPL-MCNC: 8.8 MG/DL (ref 8.5–10.1)
CHLORIDE SERPL-SCNC: 106 MMOL/L (ref 97–108)
CO2 SERPL-SCNC: 27 MMOL/L (ref 21–32)
CREAT SERPL-MCNC: 1.2 MG/DL (ref 0.55–1.02)
ERYTHROCYTE [DISTWIDTH] IN BLOOD BY AUTOMATED COUNT: 13.3 % (ref 11.5–14.5)
GLUCOSE BLD STRIP.AUTO-MCNC: 132 MG/DL (ref 65–100)
GLUCOSE BLD STRIP.AUTO-MCNC: 245 MG/DL (ref 65–100)
GLUCOSE BLD STRIP.AUTO-MCNC: 248 MG/DL (ref 65–100)
GLUCOSE BLD STRIP.AUTO-MCNC: 256 MG/DL (ref 65–100)
GLUCOSE BLD STRIP.AUTO-MCNC: 67 MG/DL (ref 65–100)
GLUCOSE SERPL-MCNC: 210 MG/DL (ref 65–100)
HCT VFR BLD AUTO: 32.1 % (ref 35–47)
HGB BLD-MCNC: 9.9 G/DL (ref 11.5–16)
MAGNESIUM SERPL-MCNC: 2.2 MG/DL (ref 1.6–2.4)
MCH RBC QN AUTO: 30.5 PG (ref 26–34)
MCHC RBC AUTO-ENTMCNC: 30.8 G/DL (ref 30–36.5)
MCV RBC AUTO: 98.8 FL (ref 80–99)
NRBC # BLD: 0 K/UL (ref 0–0.01)
NRBC BLD-RTO: 0 PER 100 WBC
PHOSPHATE SERPL-MCNC: 2.8 MG/DL (ref 2.6–4.7)
PLATELET # BLD AUTO: 123 K/UL (ref 150–400)
PMV BLD AUTO: 10.4 FL (ref 8.9–12.9)
POTASSIUM SERPL-SCNC: 3.6 MMOL/L (ref 3.5–5.1)
RBC # BLD AUTO: 3.25 M/UL (ref 3.8–5.2)
SERVICE CMNT-IMP: ABNORMAL
SERVICE CMNT-IMP: NORMAL
SODIUM SERPL-SCNC: 139 MMOL/L (ref 136–145)
WBC # BLD AUTO: 8 K/UL (ref 3.6–11)

## 2020-11-09 PROCEDURE — 74011636637 HC RX REV CODE- 636/637: Performed by: HOSPITALIST

## 2020-11-09 PROCEDURE — 74011250637 HC RX REV CODE- 250/637: Performed by: INTERNAL MEDICINE

## 2020-11-09 PROCEDURE — 36415 COLL VENOUS BLD VENIPUNCTURE: CPT

## 2020-11-09 PROCEDURE — 83735 ASSAY OF MAGNESIUM: CPT

## 2020-11-09 PROCEDURE — 74011636637 HC RX REV CODE- 636/637: Performed by: NURSE PRACTITIONER

## 2020-11-09 PROCEDURE — 94640 AIRWAY INHALATION TREATMENT: CPT

## 2020-11-09 PROCEDURE — 80048 BASIC METABOLIC PNL TOTAL CA: CPT

## 2020-11-09 PROCEDURE — 82962 GLUCOSE BLOOD TEST: CPT

## 2020-11-09 PROCEDURE — 74011250636 HC RX REV CODE- 250/636: Performed by: INTERNAL MEDICINE

## 2020-11-09 PROCEDURE — 74011250637 HC RX REV CODE- 250/637: Performed by: NURSE PRACTITIONER

## 2020-11-09 PROCEDURE — 74011000250 HC RX REV CODE- 250: Performed by: INTERNAL MEDICINE

## 2020-11-09 PROCEDURE — 65270000032 HC RM SEMIPRIVATE

## 2020-11-09 PROCEDURE — 85027 COMPLETE CBC AUTOMATED: CPT

## 2020-11-09 PROCEDURE — 74011250637 HC RX REV CODE- 250/637: Performed by: HOSPITALIST

## 2020-11-09 PROCEDURE — 84100 ASSAY OF PHOSPHORUS: CPT

## 2020-11-09 RX ADMIN — DEXAMETHASONE 2 MG: 1 TABLET ORAL at 08:17

## 2020-11-09 RX ADMIN — FAMOTIDINE 20 MG: 20 TABLET ORAL at 08:17

## 2020-11-09 RX ADMIN — INSULIN LISPRO 2 UNITS: 100 INJECTION, SOLUTION INTRAVENOUS; SUBCUTANEOUS at 22:00

## 2020-11-09 RX ADMIN — ATORVASTATIN CALCIUM 20 MG: 20 TABLET, FILM COATED ORAL at 22:00

## 2020-11-09 RX ADMIN — Medication 10 ML: at 06:34

## 2020-11-09 RX ADMIN — TOPIRAMATE 100 MG: 100 TABLET, FILM COATED ORAL at 08:16

## 2020-11-09 RX ADMIN — CARVEDILOL 6.25 MG: 6.25 TABLET, FILM COATED ORAL at 17:40

## 2020-11-09 RX ADMIN — INSULIN LISPRO 6 UNITS: 100 INJECTION, SOLUTION INTRAVENOUS; SUBCUTANEOUS at 00:19

## 2020-11-09 RX ADMIN — TOPIRAMATE 100 MG: 100 TABLET, FILM COATED ORAL at 17:39

## 2020-11-09 RX ADMIN — ACETAMINOPHEN 650 MG: 325 TABLET ORAL at 00:22

## 2020-11-09 RX ADMIN — INSULIN LISPRO 3 UNITS: 100 INJECTION, SOLUTION INTRAVENOUS; SUBCUTANEOUS at 12:09

## 2020-11-09 RX ADMIN — SERTRALINE HYDROCHLORIDE 50 MG: 50 TABLET ORAL at 17:39

## 2020-11-09 RX ADMIN — Medication 10 ML: at 22:00

## 2020-11-09 RX ADMIN — INSULIN LISPRO 4 UNITS: 100 INJECTION, SOLUTION INTRAVENOUS; SUBCUTANEOUS at 00:18

## 2020-11-09 RX ADMIN — Medication 3 MG: at 22:00

## 2020-11-09 RX ADMIN — LEVETIRACETAM 500 MG: 500 TABLET, FILM COATED ORAL at 19:38

## 2020-11-09 RX ADMIN — IPRATROPIUM BROMIDE AND ALBUTEROL SULFATE 3 ML: .5; 3 SOLUTION RESPIRATORY (INHALATION) at 13:28

## 2020-11-09 RX ADMIN — CLOPIDOGREL BISULFATE 75 MG: 75 TABLET ORAL at 08:16

## 2020-11-09 RX ADMIN — LEVETIRACETAM 500 MG: 500 TABLET, FILM COATED ORAL at 06:34

## 2020-11-09 RX ADMIN — INSULIN LISPRO 5 UNITS: 100 INJECTION, SOLUTION INTRAVENOUS; SUBCUTANEOUS at 17:39

## 2020-11-09 RX ADMIN — APIXABAN 2.5 MG: 2.5 TABLET, FILM COATED ORAL at 08:17

## 2020-11-09 RX ADMIN — FUROSEMIDE 40 MG: 40 TABLET ORAL at 08:17

## 2020-11-09 RX ADMIN — APIXABAN 2.5 MG: 2.5 TABLET, FILM COATED ORAL at 17:39

## 2020-11-09 RX ADMIN — Medication 10 ML: at 15:27

## 2020-11-09 RX ADMIN — CARVEDILOL 6.25 MG: 6.25 TABLET, FILM COATED ORAL at 08:17

## 2020-11-09 NOTE — PROGRESS NOTES
[address] [facility] Adult  Hospitalist Group                                                                                          Hospitalist Progress Note  Shea Hua MD  Answering service: [phone] from in house phone        Date of Service:  2020  NAME:  Kaela Boone  :  1950  MRN:  [id number]      Admission Summary:   69 y.o. female,  who has history of chronic systolic heart failure status post ICD, chronic COPD on home oxygen 2 L, hypertension, hyperlipidemia, anticoagulation with Eliquis who presents with fall 2 weeks back and left sided weakness. She reports that her left arm felt funny and also she was having weakness in the left arm. It is not painful. And she did not seek medical attention. Given persistence of symptoms, EMS was called and to take her to the local emergency room. At the ER there work-up revealed that she has lung mass as well as brain tumors. She was given IV Decadron and transferred to this facility for neurosurgical evaluation. She was also noted to have a dislocated left shoulder. There was report of lung mass which is right suprahilar with progression into the mediastinum. There is also report of 1.2 cm right frontal mass with 5 cm right to left shift. There is no report of seizures. Interval history / Subjective:   F/U left sided weakness. Patient is seen and examined at bedside this AM. Lethargic, minimally communicative. She reconfirmed going home with hospice care. Does not want XRT. Discussed with nursing       Assessment & Plan:     Brain masses, consistent with metastatic disease, suspect lung primary. Cerebral edema secondary to above  Left-sided weakness  -MRI brain : intracranial metastases, largest 1.3 x 1.3 cm, with surrounding edema.  Osseous metastases in right frontal, parietal, C2 body  -Neurosurgery: No surgical benefit.   -Continue steroids BID - weaning down, Continue Keppra  -Seizure precautions  -Neuro checks  - appreciate oncology input - Not currently a candidate for chemotherapy  - radiation oncology following   - started XRT 1/10 on 11/5    Acute on chronic hypoxic respiratory failure, improved   Chronic COPD with chronic hypoxic respiratory failure, not in exacerbation  - C/w lasix   - Pulmonology following  - saturating on 2 liters which is baseline   - completed 5 days of  IV ceftriaxone and azithromycin      Lung carcinoma with erosion into mediastinum  - EUS/endobronchial biopsy 10/28- Pathology :  Squamous cell carcinoma     Moderate right pleural effusion: s/p thoracentesis  -s/p thoracentesis 10/27 with approximately 300 ml removed- appears exudative    Postobstructive infiltrate RUL  -Seen on CT  -no unusual cough, fever  -s/p Ceftriaxone    UTI, Proteus:  -completed Ceftriaxone     Hyperkalemia - resolved ( fluctuating)  - nephrology following    Diabetes type 2 with hyperglycemia   -Hba1c 6.1, hypoglycemic due to poor oral intake. dced lantus. C/w SSI     Chronic congestive heart failure, presumed systolic given patient has ICD -Continued home medications plavix, coreg, statin     Clavicular fracture:  Shoulder dislocation ruled out:  -Appreciate orthopedic surgery. No evidence of shoulder dislocation. Can wear sling for comfort       Afib on Eliquis   -rate controlled on coreg. C/w eliquis    Hx CAD     Hypertension -Continue with Coreg. Lisinopril and aldactone on hold   Depression - increased zoloft    Prior tobacco use    Patient and family decided on home hospice on 11/9. Hospice consulted and comfort measures initiated.      Code status: full. AMD filled out  DVT prophylaxis: SCDs    Care Plan discussed with: Patient/Family  Anticipated Disposition:  home hospice tomorrow?         Hospital Problems  Date Reviewed: 10/28/2020          Codes Class Noted POA    * (Principal) Brain metastases ([facility] [address] ICD-10-CM: C79.31  ICD-9-CM: 198.3  10/22/2020 Yes                Review of Systems:     Negative unless stated above    Vital Signs:    Last 24hrs VS reviewed since prior progress note. Most recent are:  Visit Vitals  /69 (BP 1 Location: Right arm, BP Patient Position: At rest)   Pulse 65   Temp 98.2 °F (36.8 °C)   Resp 17   Ht 5' 2.01\" (1.575 m)   Wt 54.7 kg (120 lb 8 oz)   SpO2 100%   BMI 22.03 kg/m²       No intake or output data in the 24 hours ending 11/09/20 1239     Physical Examination:             Constitutional:   ill looking    ENT:  Oral mucosa moist, oropharynx benign. Resp:  decreased breath sounds    CV:  Regular rhythm, normal rate, no murmurs, gallops, rubs    GI:  Soft, non distended, non tender. normoactive bowel sounds, no hepatosplenomegaly     Musculoskeletal:  No edema, warm, 2+ pulses throughout    Neurologic:   lethargic             Data Review:    Review and/or order of clinical lab test  Review and/or order of tests in the radiology section of CPT  Review and/or order of tests in the medicine section of CPT      Labs:     Recent Labs     11/09/20 0222 11/07/20 0259   WBC 8.0 8.7   HGB 9.9* 9.7*   HCT 32.1* 31.9*   * 154     Recent Labs     11/09/20 0222 11/07/20 0259    139   K 3.6 4.0    101   CO2 27 31   BUN 44* 50*   CREA 1.20* 1.08*   * 85   CA 8.8 8.8   MG 2.2 2.1   PHOS 2.8 3.3     Recent Labs     11/07/20 0259   ALT 16   AP 73   TBILI 0.3   TP 6.0*   ALB 3.0*   GLOB 3.0     No results for input(s): INR, PTP, APTT, INREXT, INREXT in the last 72 hours. No results for input(s): FE, TIBC, PSAT, FERR in the last 72 hours. No results found for: FOL, RBCF   No results for input(s): PH, PCO2, PO2 in the last 72 hours. No results for input(s): CPK, CKNDX, TROIQ in the last 72 hours.     No lab exists for component: CPKMB  No results found for: CHOL, CHOLX, CHLST, CHOLV, HDL, HDLP, LDL, LDLC, DLDLP, TGLX, TRIGL, TRIGP, CHHD, CHHDX  Lab Results   Component Value Date/Time    Glucose (POC) 245 (H) 11/09/2020 11:59 AM Glucose (POC) 132 (H) 11/09/2020 07:06 AM    Glucose (POC) 67 11/09/2020 06:36 AM    Glucose (POC) 435 (H) 11/08/2020 10:44 PM    Glucose (POC) 416 (H) 11/08/2020 10:40 PM     Lab Results   Component Value Date/Time    Color YELLOW/STRAW 10/22/2020 09:15 AM    Appearance TURBID (A) 10/22/2020 09:15 AM    Specific gravity 1.012 10/22/2020 09:15 AM    pH (UA) 5.5 10/22/2020 09:15 AM    Protein Negative 10/22/2020 09:15 AM    Glucose Negative 10/22/2020 09:15 AM    Ketone Negative 10/22/2020 09:15 AM    Bilirubin Negative 10/22/2020 09:15 AM    Urobilinogen 0.2 10/22/2020 09:15 AM    Nitrites Positive (A) 10/22/2020 09:15 AM    Leukocyte Esterase LARGE (A) 10/22/2020 09:15 AM    Epithelial cells FEW 10/22/2020 09:15 AM    Bacteria 4+ (A) 10/22/2020 09:15 AM    WBC  10/22/2020 09:15 AM    RBC 0-5 10/22/2020 09:15 AM         Medications Reviewed:     Current Facility-Administered Medications   Medication Dose Route Frequency    dexAMETHasone (DECADRON) tablet 2 mg  2 mg Oral DAILY    sertraline (ZOLOFT) tablet 50 mg  50 mg Oral QPM    furosemide (LASIX) tablet 40 mg  40 mg Oral DAILY    clopidogreL (PLAVIX) tablet 75 mg  75 mg Oral DAILY    apixaban (ELIQUIS) tablet 2.5 mg  2.5 mg Oral BID    levETIRAcetam (KEPPRA) tablet 500 mg  500 mg Oral Q12H    albuterol-ipratropium (DUO-NEB) 2.5 MG-0.5 MG/3 ML  3 mL Nebulization Q4H PRN    melatonin tablet 3 mg  3 mg Oral QHS    sodium chloride (NS) flush 5-40 mL  5-40 mL IntraVENous Q8H    sodium chloride (NS) flush 5-40 mL  5-40 mL IntraVENous PRN    acetaminophen (TYLENOL) tablet 650 mg  650 mg Oral Q6H PRN    Or    acetaminophen (TYLENOL) suppository 650 mg  650 mg Rectal Q6H PRN    polyethylene glycol (MIRALAX) packet 17 g  17 g Oral DAILY PRN    ondansetron (ZOFRAN ODT) tablet 4 mg  4 mg Oral Q8H PRN    Or    ondansetron (ZOFRAN) injection 4 mg  4 mg IntraVENous Q6H PRN    famotidine (PEPCID) tablet 20 mg  20 mg Oral DAILY    atorvastatin (LIPITOR) tablet 20 mg  20 mg Oral QHS    carvediloL (COREG) tablet 6.25 mg  6.25 mg Oral BID WITH MEALS    [Held by provider] lisinopriL (PRINIVIL, ZESTRIL) tablet 5 mg  5 mg Oral DAILY    [Held by provider] spironolactone (ALDACTONE) tablet 25 mg  25 mg Oral DAILY    topiramate (TOPAMAX) tablet 100 mg  100 mg Oral BID    glucose chewable tablet 16 g  4 Tab Oral PRN    glucagon (GLUCAGEN) injection 1 mg  1 mg IntraMUSCular PRN    dextrose 10% infusion 0-250 mL  0-250 mL IntraVENous PRN    insulin lispro (HUMALOG) injection   SubCUTAneous AC&HS     ______________________________________________________________________  EXPECTED LENGTH OF STAY: 4d 21h  ACTUAL LENGTH OF STAY:          18                 Bo De Santiago MD

## 2020-11-09 NOTE — PROGRESS NOTES
YAO  RUR -25%  -Home with hospice   -Seeking hospice agency in patients area  -BLS at discharge. CM spoke with daughter about hospice options. Patient daughter advised CM to speak with the patient. CM spoke with patient and patient advised to speak with her daughter. Patient and family to further discuss and follow up with CM. CM met with patient. Patient is in agreement with hospice. Patients sister has concerns about the living condition of the patients home and doesn't feel that it is adequate per she reported mice and roaches and foul odors. CM questioned patient about home hospice and patient prefers home. Patients sister can only help if she knew the duration needed for care. CM called [facility], left message to discuss hospice agencies in the patients area per the daughters request, awaiting a return call.      CM will follow     LEENA De La Cruz/CECE

## 2020-11-09 NOTE — PROGRESS NOTES
Palliative Medicine Social Work      This  with patient and sister Melissa Garrison. Patient agreeable to hospice and DNR. Patient requesting to go home with hospice (she lives with son and daughter in law). It's unclear if she will have enough support there. Sister said she would consider taking patient to her house but would need to know prognosis (she doesn't think she could care for her for an extended period of time.)    CM/hospice RN updated. Patient will benefit from having sister and daughter in law involved in hospice meeting as they are her main supports. Will f/u to complete DDNR            Thank you for the opportunity to be involved in the care of Ms. London Charles and her family.     Virginie Gallagher LMSW, Supervisee in Social Work  Palliative Medicine   [phone]    Start time: 14:00  End time: 14:45

## 2020-11-09 NOTE — PROGRESS NOTES
Problem: Pain  Goal: *Control of Pain  Outcome: Progressing Towards Goal  Goal: *PALLIATIVE CARE:  Alleviation of Pain  Outcome: Progressing Towards Goal     Problem: Patient Education: Go to Patient Education Activity  Goal: Patient/Family Education  Outcome: Progressing Towards Goal     Problem: Pressure Injury - Risk of  Goal: *Prevention of pressure injury  Description: Document Elio Scale and appropriate interventions in the flowsheet. Outcome: Progressing Towards Goal  Note: Pressure Injury Interventions:  Sensory Interventions: Assess changes in LOC, Keep linens dry and wrinkle-free, Minimize linen layers, Monitor skin under medical devices, Turn and reposition approx. every two hours (pillows and wedges if needed)    Moisture Interventions: Apply protective barrier, creams and emollients, Assess need for specialty bed, Check for incontinence Q2 hours and as needed, Internal/External urinary devices, Minimize layers    Activity Interventions: Assess need for specialty bed, Pressure redistribution bed/mattress(bed type)    Mobility Interventions: Pressure redistribution bed/mattress (bed type), PT/OT evaluation, Turn and reposition approx. every two hours(pillow and wedges), HOB 30 degrees or less, Float heels    Nutrition Interventions: Document food/fluid/supplement intake, Discuss nutritional consult with provider, Offer support with meals,snacks and hydration    Friction and Shear Interventions: HOB 30 degrees or less, Lift sheet, Transferring/repositioning devices                Problem: Patient Education: Go to Patient Education Activity  Goal: Patient/Family Education  Outcome: Progressing Towards Goal     Problem: Falls - Risk of  Goal: *Absence of Falls  Description: Document Edgard Fall Risk and appropriate interventions in the flowsheet.   Outcome: Progressing Towards Goal  Note: Fall Risk Interventions:  Mobility Interventions: Bed/chair exit alarm, Communicate number of staff needed for ambulation/transfer, Patient to call before getting OOB    Mentation Interventions: Adequate sleep, hydration, pain control, Bed/chair exit alarm, Evaluate medications/consider consulting pharmacy, More frequent rounding    Medication Interventions: Bed/chair exit alarm, Evaluate medications/consider consulting pharmacy, Patient to call before getting OOB, Teach patient to arise slowly    Elimination Interventions: Bed/chair exit alarm, Call light in reach, Toileting schedule/hourly rounds    History of Falls Interventions: Consult care management for discharge planning, Bed/chair exit alarm, Door open when patient unattended         Problem: Patient Education: Go to Patient Education Activity  Goal: Patient/Family Education  Outcome: Progressing Towards Goal

## 2020-11-09 NOTE — DIABETES MGMT
Diabetes Management Team to sign off at this point as patient's will be placed on hospice. Please re-consult us if patient needs change. Thank you for including us in their care.      Signed By: DIVYA Zaragoza   [facility]    November 9, 2020

## 2020-11-09 NOTE — PROGRESS NOTES
Problem: Pain  Goal: *Control of Pain  Outcome: Progressing Towards Goal     Problem: Pressure Injury - Risk of  Goal: *Prevention of pressure injury  Description: Document Elio Scale and appropriate interventions in the flowsheet. Outcome: Progressing Towards Goal  Note: Pressure Injury Interventions:  Sensory Interventions: Assess changes in LOC, Avoid rigorous massage over bony prominences, Float heels, Keep linens dry and wrinkle-free, Maintain/enhance activity level, Minimize linen layers    Moisture Interventions: Assess need for specialty bed, Internal/External urinary devices, Maintain skin hydration (lotion/cream), Minimize layers, Moisture barrier    Activity Interventions: Increase time out of bed, Pressure redistribution bed/mattress(bed type), PT/OT evaluation    Mobility Interventions: Float heels, HOB 30 degrees or less, Pressure redistribution bed/mattress (bed type)    Nutrition Interventions: Document food/fluid/supplement intake    Friction and Shear Interventions: Lift sheet, Lift team/patient mobility team, Minimize layers                Problem: Falls - Risk of  Goal: *Absence of Falls  Description: Document Edgard Fall Risk and appropriate interventions in the flowsheet.   Outcome: Progressing Towards Goal  Note: Fall Risk Interventions:  Mobility Interventions: Patient to call before getting OOB, Communicate number of staff needed for ambulation/transfer, Strengthening exercises (ROM-active/passive)    Mentation Interventions: Adequate sleep, hydration, pain control, Bed/chair exit alarm, Evaluate medications/consider consulting pharmacy, More frequent rounding, Toileting rounds, Gait belt with transfers/ambulation, Increase mobility, Room close to nurse's station, Update white board    Medication Interventions: Patient to call before getting OOB, Teach patient to arise slowly    Elimination Interventions: Call light in reach, Stay With Me (per policy)    History of Falls Interventions: Consult care management for discharge planning, Door open when patient unattended, Investigate reason for fall

## 2020-11-09 NOTE — PROGRESS NOTES
Physical Therapy Note  11/09/2020    Chart reviewed -note pt is minimally responsive and definitive plans have been made for comfort measures and home with hospice. Will sign off at this time - please re-consult if change in status occurs.      Thank you,  Raza Rosen, PT, DPT

## 2020-11-09 NOTE — PROGRESS NOTES
Bedside and Verbal shift change report given to Lynne Parra RN (oncoming nurse) by Jessica Moreland RN (offgoing nurse). Report included the following information SBAR, Kardex, Intake/Output, MAR, Recent Results, Cardiac Rhythm NSR/Sinus Annie Luna and Quality Measures.

## 2020-11-09 NOTE — PROGRESS NOTES
RENAL  PROGRESS NOTE        Subjective:   weak  Objective:   VITALS SIGNS:    Visit Vitals  /69 (BP 1 Location: Right arm, BP Patient Position: At rest)   Pulse 65   Temp 98.2 °F (36.8 °C)   Resp 17   Ht 5' 2.01\" (1.575 m)   Wt 54.7 kg (120 lb 8 oz)   SpO2 100%   BMI 22.03 kg/m²       O2 Device: Room air   O2 Flow Rate (L/min): 2 l/min   Temp (24hrs), Av.8 °F (36.6 °C), Min:97.4 °F (36.3 °C), Max:98.2 °F (36.8 °C)         PHYSICAL EXAM:  NAD    DATA REVIEW:     INTAKE / OUTPUT:   Last shift:      No intake/output data recorded.   Last 3 shifts: 1 - 700  In: -   Out: 350 [Urine:350]  No intake or output data in the 24 hours ending 20 1024      LABS:   Recent Labs     20  0259   WBC 8.0 8.7   HGB 9.9* 9.7*   HCT 32.1* 31.9*   * 154     Recent Labs     20  0259    139   K 3.6 4.0    101   CO2 27 31   * 85   BUN 44* 50*   CREA 1.20* 1.08*   CA 8.8 8.8   MG 2.2 2.1   PHOS 2.8 3.3   ALB  --  3.0*   TBILI  --  0.3   ALT  --  16           Assessment:  SAVANNAH: Cr stable    Holding [place]STON/MRA therapy     Metastatic Lung CA: not a candidate for chemotherapy per oncology     Hyperkalemia: fluctuating-> better today     DM2      HTN: stable     Systolic CHF: Relatively compensated     Proteus UTI     Plan/Recommendations:    hold Spironolactone and Lisinopril    Ct Lasix 40mg daily   Low K diet   will follow  Javan Gomez MD

## 2020-11-09 NOTE — WOUND CARE
WOCN Note:  
 
New consult for skin tear and sacrum. Chart shows: 
Admitted for recent fall with left shoulder dislocation; new dx of lung mass and brain mets History of heart failure, COPD Assessment:  
Non-conversational but nods head yes or no. Ongoing grunting throughout visit. Required full assists in repositioning. Wearing briefs and has a Pure Wick. Bed: total are with foam mattress and bed alarm pad Bilateral heels, buttocks, and sacral skin intact with blanching redness. Heels offloaded with pillows. Skin tear to left upper arm = 5 x 1 x 0.1 cm 100% moist pink base with slight bleeding. Petrolatum applied with foam cover dressing. Recommendations:   
Skin tears: clean with saline, apply petrolatum and foam cover dressing. Change every 3 days. Sacrum and heels: venelex twice daily. EHOB waffle cushion under sacrum to cushion from bed alarm pad. Turn/reposition approximately every 2 hours and offload heels with pillows at all times in bed. Discussed with RN, Jesus Allen. Transition of Care: Plan to follow weekly and as needed while admitted to hospital.  
 
Amira Montoya, TAIN, RN, [facility] [place] Certified Wound, Ostomy, Continence Nurse 
office [phone] 
pager [phone] or call  to page

## 2020-11-10 LAB
GLUCOSE BLD STRIP.AUTO-MCNC: 124 MG/DL (ref 65–100)
GLUCOSE BLD STRIP.AUTO-MCNC: 131 MG/DL (ref 65–100)
GLUCOSE BLD STRIP.AUTO-MCNC: 187 MG/DL (ref 65–100)
GLUCOSE BLD STRIP.AUTO-MCNC: 446 MG/DL (ref 65–100)
GLUCOSE BLD STRIP.AUTO-MCNC: 475 MG/DL (ref 65–100)
SERVICE CMNT-IMP: ABNORMAL

## 2020-11-10 PROCEDURE — 74011250637 HC RX REV CODE- 250/637: Performed by: INTERNAL MEDICINE

## 2020-11-10 PROCEDURE — 74011250637 HC RX REV CODE- 250/637: Performed by: HOSPITALIST

## 2020-11-10 PROCEDURE — 74011636637 HC RX REV CODE- 636/637: Performed by: HOSPITALIST

## 2020-11-10 PROCEDURE — 77010033678 HC OXYGEN DAILY

## 2020-11-10 PROCEDURE — 74011250636 HC RX REV CODE- 250/636: Performed by: INTERNAL MEDICINE

## 2020-11-10 PROCEDURE — 82962 GLUCOSE BLOOD TEST: CPT

## 2020-11-10 PROCEDURE — 74011250637 HC RX REV CODE- 250/637: Performed by: NURSE PRACTITIONER

## 2020-11-10 PROCEDURE — 65270000032 HC RM SEMIPRIVATE

## 2020-11-10 RX ADMIN — DEXAMETHASONE 2 MG: 1 TABLET ORAL at 09:58

## 2020-11-10 RX ADMIN — LEVETIRACETAM 500 MG: 500 TABLET, FILM COATED ORAL at 06:57

## 2020-11-10 RX ADMIN — CLOPIDOGREL BISULFATE 75 MG: 75 TABLET ORAL at 09:58

## 2020-11-10 RX ADMIN — LEVETIRACETAM 500 MG: 500 TABLET, FILM COATED ORAL at 18:19

## 2020-11-10 RX ADMIN — Medication 3 MG: at 21:08

## 2020-11-10 RX ADMIN — ATORVASTATIN CALCIUM 20 MG: 20 TABLET, FILM COATED ORAL at 21:08

## 2020-11-10 RX ADMIN — TOPIRAMATE 100 MG: 100 TABLET, FILM COATED ORAL at 18:20

## 2020-11-10 RX ADMIN — Medication 10 ML: at 21:08

## 2020-11-10 RX ADMIN — APIXABAN 2.5 MG: 2.5 TABLET, FILM COATED ORAL at 18:20

## 2020-11-10 RX ADMIN — ACETAMINOPHEN 650 MG: 325 TABLET ORAL at 21:12

## 2020-11-10 RX ADMIN — FUROSEMIDE 40 MG: 40 TABLET ORAL at 09:58

## 2020-11-10 RX ADMIN — ACETAMINOPHEN 650 MG: 325 TABLET ORAL at 16:27

## 2020-11-10 RX ADMIN — TOPIRAMATE 100 MG: 100 TABLET, FILM COATED ORAL at 09:58

## 2020-11-10 RX ADMIN — INSULIN LISPRO 2 UNITS: 100 INJECTION, SOLUTION INTRAVENOUS; SUBCUTANEOUS at 06:57

## 2020-11-10 RX ADMIN — Medication 10 ML: at 14:02

## 2020-11-10 RX ADMIN — CARVEDILOL 6.25 MG: 6.25 TABLET, FILM COATED ORAL at 18:19

## 2020-11-10 RX ADMIN — SERTRALINE HYDROCHLORIDE 50 MG: 50 TABLET ORAL at 18:19

## 2020-11-10 RX ADMIN — FAMOTIDINE 20 MG: 20 TABLET ORAL at 09:58

## 2020-11-10 RX ADMIN — APIXABAN 2.5 MG: 2.5 TABLET, FILM COATED ORAL at 09:58

## 2020-11-10 RX ADMIN — Medication 10 ML: at 06:57

## 2020-11-10 NOTE — PROGRESS NOTES
[address] [facility]  Hospitalist Group                                                                                          Hospitalist Progress Note  Tamera Abdul MD  Answering service: [phone] OR 36 from in house phone        Date of Service:  11/10/2020  NAME:  Lynn Gonzales  :  1950  MRN:  [id number]      Admission Summary:   69 y.o. female,  who has history of chronic systolic heart failure status post ICD, chronic COPD on home oxygen 2 L, hypertension, hyperlipidemia, anticoagulation with Eliquis who presents with fall 2 weeks back and left sided weakness. She reports that her left arm felt funny and also she was having weakness in the left arm. It is not painful. And she did not seek medical attention. Given persistence of symptoms, EMS was called and to take her to the local emergency room. At the ER there work-up revealed that she has lung mass as well as brain tumors. She was given IV Decadron and transferred to this facility for neurosurgical evaluation. She was also noted to have a dislocated left shoulder. There was report of lung mass which is right suprahilar with progression into the mediastinum. There is also report of 1.2 cm right frontal mass with 5 cm right to left shift. There is no report of seizures. Interval history / Subjective:   F/U left sided weakness. Patient is seen and examined at bedside this AM. Lethargic, minimally communicative. No complaints. Waiting for hospice at home   Discussed with nursing       Assessment & Plan:     Brain masses, consistent with metastatic disease, suspect lung primary. Cerebral edema secondary to above  Left-sided weakness  -MRI brain : intracranial metastases, largest 1.3 x 1.3 cm, with surrounding edema.  Osseous metastases in right frontal, parietal, C2 body  -Neurosurgery: No surgical benefit.   -Continue steroids BID - weaning down, Continue Keppra  - appreciate oncology input - Not currently a candidate for chemotherapy  - received pne cycle of XRT 11/5    Acute on chronic hypoxic respiratory failure, improved   Chronic COPD with chronic hypoxic respiratory failure, not in exacerbation  - C/w po lasix   - saturating on 2 liters which is baseline   - completed 5 days of  IV ceftriaxone and azithromycin      Lung carcinoma with erosion into mediastinum  - EUS/endobronchial biopsy 10/28- Pathology :  Squamous cell carcinoma     Moderate right pleural effusion: s/p thoracentesis  -s/p thoracentesis 10/27 with approximately 300 ml removed- appears exudative    Postobstructive infiltrate RUL  -Seen on CT. no cough, fever on poa  -s/p Ceftriaxone    UTI, Proteus:  -completed Ceftriaxone     Hyperkalemia - resolved ( fluctuating)  - appreciate nephrology input     Diabetes type 2 with hyperglycemia   -Hba1c 6.1, hypoglycemic due to poor oral intake. dced lantus. C/w SSI     Chronic congestive heart failure, presumed systolic given patient has ICD  Hx CAD    -Continued home medications plavix, coreg, statin     Clavicular fracture:  Shoulder dislocation ruled out:  -Appreciate orthopedic surgery. No evidence of shoulder dislocation. Can wear sling for comfort       Afib on Eliquis   -rate controlled on coreg. C/w eliquis    Hypertension -Continue with Coreg. Lisinopril and aldactone on hold   Depression - c/w zoloft    Prior tobacco use    Appreciate palliative care input     Patient and family decided on home hospice on 11/9. Hospice consulted and comfort measures initiated 11/9     Code status: DNR - pt has ICD  DVT prophylaxis: SCDs    Care Plan discussed with: Patient/Family  Anticipated Disposition:  Stable to be discharged.  CM working on home hospice         Hospital Problems  Date Reviewed: 10/28/2020          Codes Class Noted POA    * (Principal) Brain metastases [facility]) ICD-10-CM: C79.31  ICD-9-CM: 198.3  10/22/2020 Yes                Review of Systems:     Negative unless stated above    Vital Signs: Last 24hrs VS reviewed since prior progress note. Most recent are:  Visit Vitals  /68 (BP 1 Location: Right arm, BP Patient Position: At rest)   Pulse 65   Temp 97.9 °F (36.6 °C)   Resp 16   Ht 5' 2.01\" (1.575 m)   Wt 54.7 kg (120 lb 8 oz)   SpO2 99%   BMI 22.03 kg/m²       No intake or output data in the 24 hours ending 11/10/20 1212     Physical Examination:             Constitutional:   ill looking    ENT:  Oral mucosa moist, oropharynx benign. Resp:  decreased breath sounds    CV:  Regular rhythm, normal rate, no murmurs, gallops, rubs    GI:  Soft, non distended, non tender. normoactive bowel sounds, no hepatosplenomegaly     Musculoskeletal:  No edema, warm, 2+ pulses throughout    Neurologic:   lethargic             Data Review:    Review and/or order of clinical lab test  Review and/or order of tests in the radiology section of CPT  Review and/or order of tests in the medicine section of CPT      Labs:     Recent Labs     11/09/20 0222   WBC 8.0   HGB 9.9*   HCT 32.1*   *     Recent Labs     11/09/20 0222      K 3.6      CO2 27   BUN 44*   CREA 1.20*   *   CA 8.8   MG 2.2   PHOS 2.8     No results for input(s): ALT, AP, TBIL, TBILI, TP, ALB, GLOB, GGT, AML, LPSE in the last 72 hours. No lab exists for component: SGOT, GPT, AMYP, HLPSE  No results for input(s): INR, PTP, APTT, INREXT, INREXT in the last 72 hours. No results for input(s): FE, TIBC, PSAT, FERR in the last 72 hours. No results found for: FOL, RBCF   No results for input(s): PH, PCO2, PO2 in the last 72 hours. No results for input(s): CPK, CKNDX, TROIQ in the last 72 hours.     No lab exists for component: CPKMB  No results found for: CHOL, CHOLX, CHLST, CHOLV, HDL, HDLP, LDL, LDLC, DLDLP, TGLX, TRIGL, TRIGP, CHHD, CHHDX  Lab Results   Component Value Date/Time    Glucose (POC) 124 (H) 11/10/2020 11:51 AM    Glucose (POC) 187 (H) 11/10/2020 06:37 AM    Glucose (POC) 248 (H) 11/09/2020 09:34 PM Glucose (POC) 256 (H) 11/09/2020 04:41 PM    Glucose (POC) 245 (H) 11/09/2020 11:59 AM     Lab Results   Component Value Date/Time    Color YELLOW/STRAW 10/22/2020 09:15 AM    Appearance TURBID (A) 10/22/2020 09:15 AM    Specific gravity 1.012 10/22/2020 09:15 AM    pH (UA) 5.5 10/22/2020 09:15 AM    Protein Negative 10/22/2020 09:15 AM    Glucose Negative 10/22/2020 09:15 AM    Ketone Negative 10/22/2020 09:15 AM    Bilirubin Negative 10/22/2020 09:15 AM    Urobilinogen 0.2 10/22/2020 09:15 AM    Nitrites Positive (A) 10/22/2020 09:15 AM    Leukocyte Esterase LARGE (A) 10/22/2020 09:15 AM    Epithelial cells FEW 10/22/2020 09:15 AM    Bacteria 4+ (A) 10/22/2020 09:15 AM    WBC  10/22/2020 09:15 AM    RBC 0-5 10/22/2020 09:15 AM         Medications Reviewed:     Current Facility-Administered Medications   Medication Dose Route Frequency    dexAMETHasone (DECADRON) tablet 2 mg  2 mg Oral DAILY    sertraline (ZOLOFT) tablet 50 mg  50 mg Oral QPM    furosemide (LASIX) tablet 40 mg  40 mg Oral DAILY    clopidogreL (PLAVIX) tablet 75 mg  75 mg Oral DAILY    apixaban (ELIQUIS) tablet 2.5 mg  2.5 mg Oral BID    levETIRAcetam (KEPPRA) tablet 500 mg  500 mg Oral Q12H    albuterol-ipratropium (DUO-NEB) 2.5 MG-0.5 MG/3 ML  3 mL Nebulization Q4H PRN    melatonin tablet 3 mg  3 mg Oral QHS    sodium chloride (NS) flush 5-40 mL  5-40 mL IntraVENous Q8H    sodium chloride (NS) flush 5-40 mL  5-40 mL IntraVENous PRN    acetaminophen (TYLENOL) tablet 650 mg  650 mg Oral Q6H PRN    Or    acetaminophen (TYLENOL) suppository 650 mg  650 mg Rectal Q6H PRN    polyethylene glycol (MIRALAX) packet 17 g  17 g Oral DAILY PRN    ondansetron (ZOFRAN ODT) tablet 4 mg  4 mg Oral Q8H PRN    Or    ondansetron (ZOFRAN) injection 4 mg  4 mg IntraVENous Q6H PRN    famotidine (PEPCID) tablet 20 mg  20 mg Oral DAILY    atorvastatin (LIPITOR) tablet 20 mg  20 mg Oral QHS    carvediloL (COREG) tablet 6.25 mg  6.25 mg Oral BID WITH MEALS    [Held by provider] lisinopriL (PRINIVIL, ZESTRIL) tablet 5 mg  5 mg Oral DAILY    [Held by provider] spironolactone (ALDACTONE) tablet 25 mg  25 mg Oral DAILY    topiramate (TOPAMAX) tablet 100 mg  100 mg Oral BID    glucose chewable tablet 16 g  4 Tab Oral PRN    glucagon (GLUCAGEN) injection 1 mg  1 mg IntraMUSCular PRN    dextrose 10% infusion 0-250 mL  0-250 mL IntraVENous PRN    insulin lispro (HUMALOG) injection   SubCUTAneous AC&HS     ______________________________________________________________________  EXPECTED LENGTH OF STAY: 4d 21h  ACTUAL LENGTH OF STAY:          19                 Jess Tavera MD

## 2020-11-10 NOTE — PROGRESS NOTES
RENAL  PROGRESS NOTE        Subjective:   Resting     Objective:   VITALS SIGNS:    Visit Vitals  /70 (BP 1 Location: Right arm, BP Patient Position: At rest)   Pulse 71   Temp 98 °F (36.7 °C)   Resp 16   Ht 5' 2.01\" (1.575 m)   Wt 54.7 kg (120 lb 8 oz)   SpO2 99%   BMI 22.03 kg/m²       O2 Device: Nasal cannula   O2 Flow Rate (L/min): 2 l/min   Temp (24hrs), Av.1 °F (36.7 °C), Min:97.3 °F (36.3 °C), Max:98.5 °F (36.9 °C)         PHYSICAL EXAM:  resting    DATA REVIEW:     INTAKE / OUTPUT:   Last shift:      No intake/output data recorded. Last 3 shifts: No intake/output data recorded.   No intake or output data in the 24 hours ending 11/10/20 0934      LABS:   Recent Labs     20   WBC 8.0   HGB 9.9*   HCT 32.1*   *     Recent Labs     20      K 3.6      CO2 27   *   BUN 44*   CREA 1.20*   CA 8.8   MG 2.2   PHOS 2.8           Assessment:  SAVANNAH: Cr stable    Holding [place]/MRA therapy     Metastatic Lung CA: not a candidate for chemotherapy per oncology     Hyperkalemia: fluctuating-> better today     DM2      HTN: stable     Systolic CHF: Relatively compensated     Proteus UTI     Plan/Recommendations:    hold Spironolactone and Lisinopril     no labs today  Will follow  Nusrat Huerta MD

## 2020-11-10 NOTE — PROGRESS NOTES
Problem: Pain  Goal: *Control of Pain  Outcome: Progressing Towards Goal     Problem: Pressure Injury - Risk of  Goal: *Prevention of pressure injury  Description: Document Elio Scale and appropriate interventions in the flowsheet. Outcome: Progressing Towards Goal  Note: Pressure Injury Interventions:  Sensory Interventions: Assess changes in LOC, Keep linens dry and wrinkle-free, Minimize linen layers, Monitor skin under medical devices, Turn and reposition approx. every two hours (pillows and wedges if needed)    Moisture Interventions: Apply protective barrier, creams and emollients, Assess need for specialty bed, Check for incontinence Q2 hours and as needed, Internal/External urinary devices, Minimize layers    Activity Interventions: Assess need for specialty bed, Pressure redistribution bed/mattress(bed type)    Mobility Interventions: Pressure redistribution bed/mattress (bed type), PT/OT evaluation, Turn and reposition approx. every two hours(pillow and wedges), HOB 30 degrees or less, Float heels    Nutrition Interventions: Document food/fluid/supplement intake, Discuss nutritional consult with provider, Offer support with meals,snacks and hydration    Friction and Shear Interventions: HOB 30 degrees or less, Lift sheet, Transferring/repositioning devices       Problem: Falls - Risk of  Goal: *Absence of Falls  Description: Document Edgard Fall Risk and appropriate interventions in the flowsheet.   Outcome: Progressing Towards Goal  Note: Fall Risk Interventions:  Mobility Interventions: Bed/chair exit alarm, Communicate number of staff needed for ambulation/transfer, Patient to call before getting OOB    Mentation Interventions: Adequate sleep, hydration, pain control, Bed/chair exit alarm, Evaluate medications/consider consulting pharmacy, More frequent rounding    Medication Interventions: Bed/chair exit alarm, Evaluate medications/consider consulting pharmacy, Patient to call before getting OOB, Teach patient to arise slowly    Elimination Interventions: Bed/chair exit alarm, Call light in reach, Toileting schedule/hourly rounds    History of Falls Interventions: Consult care management for discharge planning, Bed/chair exit alarm, Door open when patient unattended

## 2020-11-10 NOTE — PROGRESS NOTES
Palliative Medicine Social Work      This  attempted to see patient to complete DDNR. PAtient sound asleep. Will f/u Wed. Thank you for the opportunity to be involved in the care of Ms.  Kristofer Cotton and her family    Errol Hodge LMSW, Supervisee in Social Work  Palliative Medicine   [phone]    :

## 2020-11-10 NOTE — PROGRESS NOTES
[facility]-Hospice  RUR-26% Moderate    CM placed call to daughter in-law to discuss hospice options. Daughter provided  [address] contact. Patient lives in [place]. CM placed call to Any Krueger and Stacia Arciniega- referral sent via 2U awaiting response. Transition of Care Plan:     The Plan for Transition of Care is related to the following treatment goals:  Hospice    The Patient and/or patient representative was provided with a choice of provider and agrees  with the discharge plan. Yes [x] No []    A Freedom of choice list was provided with basic dialogue that supports the patient's individualized plan of care/goals and shares the quality data associated with the providers.        Yes [x] No []    Minor Head MS

## 2020-11-10 NOTE — PROGRESS NOTES
Occupational Therapy: Noted patient now comfort care and transition to hospice. Will discharge from OT.   LEIGHANN Gray/ROOPA

## 2020-11-10 NOTE — ROUTINE PROCESS
Bedside shift change report given to Laverne Viera RN (oncoming nurse) by Katherin Matias RN (offgoing nurse). Report included the following information SBAR, Kardex, Intake/Output, MAR, Accordion, Recent Results and Cardiac Rhythm NSR.

## 2020-11-11 VITALS
HEIGHT: 62 IN | BODY MASS INDEX: 22.18 KG/M2 | TEMPERATURE: 97.6 F | HEART RATE: 73 BPM | WEIGHT: 120.5 LBS | DIASTOLIC BLOOD PRESSURE: 66 MMHG | RESPIRATION RATE: 16 BRPM | OXYGEN SATURATION: 97 % | SYSTOLIC BLOOD PRESSURE: 116 MMHG

## 2020-11-11 PROCEDURE — 74011250636 HC RX REV CODE- 250/636: Performed by: INTERNAL MEDICINE

## 2020-11-11 PROCEDURE — 74011250637 HC RX REV CODE- 250/637: Performed by: INTERNAL MEDICINE

## 2020-11-11 PROCEDURE — 74011250637 HC RX REV CODE- 250/637: Performed by: HOSPITALIST

## 2020-11-11 PROCEDURE — 74011250637 HC RX REV CODE- 250/637: Performed by: NURSE PRACTITIONER

## 2020-11-11 PROCEDURE — 77010033678 HC OXYGEN DAILY

## 2020-11-11 PROCEDURE — 94640 AIRWAY INHALATION TREATMENT: CPT

## 2020-11-11 RX ORDER — LEVETIRACETAM 500 MG/1
500 TABLET ORAL EVERY 12 HOURS
Qty: 60 TAB | Refills: 0 | Status: SHIPPED | OUTPATIENT
Start: 2020-11-11

## 2020-11-11 RX ADMIN — APIXABAN 2.5 MG: 2.5 TABLET, FILM COATED ORAL at 10:00

## 2020-11-11 RX ADMIN — FAMOTIDINE 20 MG: 20 TABLET ORAL at 09:57

## 2020-11-11 RX ADMIN — Medication 10 ML: at 06:17

## 2020-11-11 RX ADMIN — CLOPIDOGREL BISULFATE 75 MG: 75 TABLET ORAL at 09:58

## 2020-11-11 RX ADMIN — DEXAMETHASONE 2 MG: 1 TABLET ORAL at 09:57

## 2020-11-11 RX ADMIN — TOPIRAMATE 100 MG: 100 TABLET, FILM COATED ORAL at 09:58

## 2020-11-11 RX ADMIN — LEVETIRACETAM 500 MG: 500 TABLET, FILM COATED ORAL at 06:17

## 2020-11-11 NOTE — DISCHARGE SUMMARY
Discharge Summary     Patient:  Sharonda Toro       MRN: [id number]       YOB: 1950       Age: 69 y.o. Date of admission:  10/22/2020    Date of discharge:  11/11/2020    Primary care provider: Dr. Sandhya Beck, NP    Admitting provider:  Efraín Zavaleta MD    Discharging provider:  Chraisma Rosas [address] [phone]. If unavailable, call [phone] and ask the  to page the triage hospitalist.    Consultations  · IP CONSULT TO NEUROSURGERY  · IP CONSULT TO PULMONOLOGY  · IP CONSULT TO ONCOLOGY  · IP CONSULT TO RADIATION ONCOLOGY  · IP CONSULT TO NEPHROLOGY  · IP CONSULT TO PATHOLOGY  · IP CONSULT TO RADIATION ONCOLOGY  · IP CONSULT TO PALLIATIVE CARE - PROVIDER  · IP CONSULT TO ORTHOPEDIC SURGERY  · IP CONSULT TO CARDIOLOGY  · IP CONSULT TO CARDIOLOGY    Procedures  · Procedure(s):  · ENDOSCOPIC BRONCHOSCOPY ULTRASOUND (EBUS)  · BRONCHOSCOPY  · TRANSBRONCHIAL BIOPSY    Discharge destination: home hospice     Admission diagnosis  · [facility] [C79.31]    Current Discharge Medication List      START taking these medications    Details   levETIRAcetam (KEPPRA) 500 mg tablet Take 1 Tab by mouth every twelve (12) hours every twelve (12) hours. Qty: 60 Tab, Refills: 0         CONTINUE these medications which have CHANGED    Details   apixaban (ELIQUIS) 2.5 mg tablet Take 1 Tab by mouth two (2) times a day.   Qty: 60 Tab, Refills: 0         CONTINUE these medications which have NOT CHANGED    Details   atorvastatin (LIPITOR) 20 mg tablet atorvastatin 20 mg tablet   TAKE 1 TABLET BY MOUTH ONCE DAILY FOR 90 DAYS      potassium chloride SR (KLOR-CON 10) 10 mEq tablet Take 1 tablet by mouth once daily  Qty: 30 Tab, Refills: 0      glimepiride (AMARYL) 2 mg tablet Take 1 tablet by mouth once daily for 90 days  Qty: 90 Tab, Refills: 0      topiramate (TOPAMAX) 100 mg tablet Take 100 mg by mouth two (2) times a day. traZODone (DESYREL) 50 mg tablet trazodone 50 mg tablet   TAKE 2 TO 3 TABLETS BY MOUTH EVERY DAY AT BEDTIME AS NEEDED      clopidogrel bisulfate (PLAVIX PO) Plavix      cyclobenzaprine-TENS electrode 10 mg cmpk cyclobenzaprine 10 mg tablet   Take 1 tablet twice a day by oral route as needed for 90 days. sertraline (ZOLOFT) 25 mg tablet Take 1 tablet by mouth once daily  Qty: 30 Tab, Refills: 0    Associated Diagnoses: Current moderate episode of major depressive disorder without prior episode (HCC)         STOP taking these medications       spironolactone (ALDACTONE) 25 mg tablet Comments:   Reason for Stopping:         simvastatin (ZOCOR) 10 mg tablet Comments:   Reason for Stopping:         celecoxib (CeleBREX) 200 mg capsule Comments:   Reason for Stopping:         carvediloL (COREG) 6.25 mg tablet Comments:   Reason for Stopping:         furosemide (LASIX) 40 mg tablet Comments:   Reason for Stopping:         lisinopriL (PRINIVIL, ZESTRIL) 5 mg tablet Comments:   Reason for Stopping:         metoprolol succinate (TOPROL-XL) 25 mg XL tablet Comments:   Reason for Stopping: Follow-up Information     Follow up With Specialties Details Why [address]  [id number] [phone]          Final discharge diagnoses and brief hospital course    69 y. o. female,  who has history of chronic systolic heart failure status post ICD, chronic COPD on home oxygen 2 L, hypertension, hyperlipidemia, anticoagulation with Eliquis who presents with fall 2 weeks back and left sided weakness.  She reports that her left arm felt funny and also she was having weakness in the left arm.  It is not painful.  And she did not seek medical attention.  Given persistence of symptoms, EMS was called and to take her to the local emergency room.  At the ER there work-up revealed that she has lung mass as well as brain tumors.  She was given IV Decadron and transferred to this facility for neurosurgical evaluation. She was also noted to have a dislocated left shoulder. Jyothi Mann was report of lung mass which is right suprahilar with progression into the mediastinum. Jyothi Mann is also report of 1.2 cm right frontal mass with 5 cm right to left shift. There is no report of seizures. Brain masses, consistent with metastatic disease, suspect lung primary. Cerebral edema secondary to above  Left-sided weakness  -MRI brain : intracranial metastases, largest 1.3 x 1.3 cm, with surrounding edema. Osseous metastases in right frontal, parietal, C2 body  -Neurosurgery: No surgical benefit. -weaned off steroids. Continue Keppra  - appreciate oncology input - Not currently a candidate for chemotherapy  - received one cycle of XRT 11/5     Acute on chronic hypoxic respiratory failure, improved   Chronic COPD with chronic hypoxic respiratory failure, not in exacerbation  - C/w po lasix   - saturating on 2 liters which is baseline   - completed 5 days of  IV ceftriaxone and azithromycin      Lung carcinoma with erosion into mediastinum  - EUS/endobronchial biopsy 10/28- Pathology :  Squamous cell carcinoma      Moderate right pleural effusion: s/p thoracentesis  -s/p thoracentesis 10/27 with approximately 300 ml removed- appears exudative     Postobstructive infiltrate RUL  -Seen on CT. no cough, fever on poa  -s/p Ceftriaxone     UTI, Proteus:  -completed Ceftriaxone      Hyperkalemia - resolved ( fluctuating)  - appreciate nephrology input      Diabetes type 2 with hyperglycemia   -Hba1c 6.1, hypoglycemic due to poor oral intake. dced lantus. C/w SSI     Chronic congestive heart failure, presumed systolic given patient has ICD  Hx CAD    -Continued home medications plavix, coreg, statin     Clavicular fracture:  Shoulder dislocation ruled out:  -Appreciate orthopedic surgery. No evidence of shoulder dislocation.  Can wear sling for comfort       Afib on Eliquis   -rate controlled on coreg. C/w eliquis     Hypertension -Continue with Coreg. Lisinopril and aldactone on hold   Depression - c/w zoloft     Prior tobacco use     Appreciate palliative care input      Patient and family decided on home hospice on 11/9. Hospice consulted and comfort measures initiated 11/9   [address] accepted the patient and was discharged to home 11/11       Physical examination at discharge  Visit Vitals  BP (!) 108/56 (BP 1 Location: Right arm, BP Patient Position: At rest)   Pulse 71   Temp 97.5 °F (36.4 °C)   Resp 16   Ht 5' 2.01\" (1.575 m)   Wt 54.7 kg (120 lb 8 oz)   SpO2 97%   BMI 22.03 kg/m²     Constitutional:   ill looking    ENT:  Oral mucosa moist, oropharynx benign. Resp:  decreased breath sounds    CV:  Regular rhythm, normal rate, no murmurs, gallops, rubs    GI:  Soft, non distended, non tender. normoactive bowel sounds, no hepatosplenomegaly     Musculoskeletal:  No edema, warm, 2+ pulses throughout    Neurologic:   lethargic                                Pertinent imaging studies:    Per EMR     Recent Labs     11/09/20 0222   WBC 8.0   HGB 9.9*   HCT 32.1*   *     Recent Labs     11/09/20 0222      K 3.6      CO2 27   BUN 44*   CREA 1.20*   *   CA 8.8   MG 2.2   PHOS 2.8     No results for input(s): AP, TBIL, TP, ALB, GLOB, GGT, AML, LPSE in the last 72 hours. No lab exists for component: SGOT, GPT, AMYP, HLPSE  No results for input(s): INR, PTP, APTT, INREXT in the last 72 hours. No results for input(s): FE, TIBC, PSAT, FERR in the last 72 hours. No results for input(s): PH, PCO2, PO2 in the last 72 hours. No results for input(s): CPK, CKMB in the last 72 hours.     No lab exists for component: TROPONINI  No components found for: [place]    Chronic Diagnoses:    Problem List as of 11/11/2020 Date Reviewed: 10/28/2020          Codes Class Noted - Resolved    * (Principal) Brain metastases [facility]) ICD-10-CM: C79.31  ICD-9-CM: 198.3  10/22/2020 - Present        Chronic bronchitis ([facility] ICD-10-CM: L34  ICD-9-CM: 491.9  10/16/2020 - Present        Contusion, shoulder and upper arm, multiple sites, initial encounter ICD-10-CM: S40.019A, T08.060J  ICD-9-CM: 923.09  10/16/2020 - Present        Current episode of major depressive disorder without prior episode ICD-10-CM: F32.9  ICD-9-CM: 296.20  8/13/2020 - Present              Time spent on discharge related activities today greater than 30 minutes.       Signed:  Vianney Fournier MD                 Hospitalist, Internal Medicine      Cc: Debby Mckee NP

## 2020-11-11 NOTE — DISCHARGE INSTRUCTIONS
Discharge Instructions       PATIENT ID: Krystal Higgins  MRN: [id number]   YOB: 1950    DATE OF ADMISSION: 10/22/2020  3:15 AM    DATE OF DISCHARGE: 11/1/2020    PRIMARY CARE PROVIDER: Jeni Davalos NP     ATTENDING PHYSICIAN: Best Jean MD  DISCHARGING PROVIDER: Alix Weeks MD    To contact this individual call [phone] and ask the  to page. If unavailable ask to be transferred the Adult Hospitalist Department. DISCHARGE DIAGNOSES  Lung mass, brain mets     CONSULTATIONS: IP CONSULT TO NEUROSURGERY  IP CONSULT TO PULMONOLOGY  IP CONSULT TO ONCOLOGY  IP CONSULT TO RADIATION ONCOLOGY  IP CONSULT TO ORTHOPEDIC SURGERY  IP CONSULT TO CARDIOLOGY    PROCEDURES/SURGERIES: Procedure(s):  ENDOSCOPIC BRONCHOSCOPY ULTRASOUND (EBUS)  BRONCHOSCOPY  TRANSBRONCHIAL BIOPSY    FOLLOW UP APPOINTMENTS:   Follow-up Information    None          ADDITIONAL CARE RECOMMENDATIONS:   You are being discharge to home with hospice care     DIET: Diabetic Diet    ACTIVITY: Activity as tolerated    DISPOSITION:    Home With:   OT  PT  HH  RN       SNF/Inpatient Rehab/LTAC    Independent/assisted living   X Hospice    Other:         I understand and acknowledge receipt of the instructions indicated above. Physician's or R.N.'s Signature                                                                  Date/Time                                                                                                                                              Patient or Representative Signature                                                          Date/Time          Signed:   Alix Weeks MD  11/11/2020         Hospice: Care Instructions  Your Care Instructions  Hospice care provides medical treatment to relieve symptoms at the end of life.  The goal is to keep you comfortable, not to try to cure you. Hospice care does not speed up or lengthen dying. It focuses on easing pain and other symptoms. Hospice caregivers want to enhance your quality of life. Hospice care also offers emotional help and spiritual support when you are dying. It also helps family members care for a loved one who is dying. Hospice care can help you review your life, say important things to family and friends, and explore spiritual issues. Hospice also helps your family and friends deal with their grief after you die. You can use hospice care if your illness cannot be cured and doctors believe you have no more than 6 months to live. You do not need to be confined to a bed or in a hospital to benefit from this type of care. The hospice team includes nurses, counselors, therapists, social workers, pastors, home health aides, and trained volunteers. You can get care in your own home or in a hospice center. Some hospice workers also go to nursing homes or hospitals. How can you care for yourself at home? · Prepare a list of advance directives. These are instructions to your doctor and family members about what kind of care you want if you become unable to speak or express yourself. · Find out if your health insurance covers hospice care. · Find hospice programs in your area. People who can help include your doctor, your state health department, and your insurance company. · Decide what kinds of hospice services you want. It helps to know what you want before you enter a hospice program.  · Think about some questions when preparing for hospice care. ? Who do you want to make decisions about your medical care if you are not able to? Many people choose their spouse, child, or doctor. ? What are you most afraid of that might happen? You might be afraid of having pain or losing your independence. Let your hospice team know your fears. The team can help you deal with them. ?  Where would you prefer to die? Choices include your home, a hospital, or a nursing home. ? Do you want to donate organs when you die? Make sure that your family clearly understands this. ? Do you want any Bahai rites or practices to be done before you die? Let your hospice team know what you want. Where can you learn more? Go to http://www.gray.com/  Enter Y546 in the search box to learn more about \"Hospice: Care Instructions. \"  Current as of: December 9, 2019               Content Version: 12.6  © 7090-5801 [employer]. Care instructions adapted under license by [employer] (which disclaims liability or warranty for this information).  If you have questions about a medical condition or this instruction, always ask your healthcare professional. [address] any warranty or liability for your use of this information.

## 2020-11-11 NOTE — PROGRESS NOTES
YAO-Home with  [address]  RUR-26% Moderate    CM confirmed with AT Orlando Nay and daughter in-law Prachi Jacques [phone] that patient will be admitted to hospice today. CM has confirmed transport for 1400 via [facility]. Kala Medina MS    [facility] updated transport time to 1530. [facility]) phone [phone].      Maribell Cast, MS

## 2023-03-10 NOTE — PROGRESS NOTES
Problem: Mobility Impaired (Adult and Pediatric)  Goal: *Acute Goals and Plan of Care (Insert Text)  Description:   FUNCTIONAL STATUS PRIOR TO ADMISSION: Pt is a limited historian - reports that she was essentially bed bound at home over the last several weeks. Prior to that, she ambulated very short distances with RW. Lives with family who provide assist as needed. HOME SUPPORT PRIOR TO ADMISSION: The patient lived with family. 2L O2 at baseline    Physical Therapy Goals  Initiated 10/26/2020; carryover 11/02/2020  1. Patient will move from supine to sit and sit to supine , scoot up and down, and roll side to side in bed with minimal assistance/contact guard assist within 7 day(s). 2.  Patient will transfer from bed to chair and chair to bed with moderate assistance  using the least restrictive device within 7 day(s). 3.  Patient will perform sit to stand with moderate assistance  within 7 day(s). 4.  Patient will ambulate with moderate assistance  for 10 feet with the least restrictive device within 7 day(s). 5.  Patient will ascend/descend 3 stairs with handrail(s) with minimal assistance  within 7 day(s). 6.  Patient will improve Hitchcock Balance score by 7 points within 7 days. Outcome: Not Progressing Towards Goal   PHYSICAL THERAPY TREATMENT  Patient: Nick Gtz (80 y.o. female)  Date: 11/6/2020  Diagnosis: Brain metastases ([facility] [address] [C79.31]   Brain metastases ([facility] [address]  Procedure(s) (LRB):  ENDOSCOPIC BRONCHOSCOPY ULTRASOUND (EBUS) (N/A)  BRONCHOSCOPY (N/A)  TRANSBRONCHIAL BIOPSY (N/A) 9 Days Post-Op  Precautions: Fall, Skin(LUE NWB (L clavicular fx))  Chart, physical therapy assessment, plan of care and goals were reviewed. ASSESSMENT  Patient continues with skilled PT services and is not progressing towards goals. Pt received supine in bed and agreeable to therapy with encouragement. Pt completed bed mobility with mod A and demonstrated good seated balance.  Pt declined standing or transferring to the chair at this time. Pt expressed to therapist's that \"she does not want to live like this any more. \"--Notified RN. Pt was not pushed any further to mobilize with therapy and was assisted back to supine position. Will decreased frequency to 3x/week and continue to follow along for plan of care. Current Level of Function Impacting Discharge (mobility/balance): mod A x 1 supine<>sit     Other factors to consider for discharge: pt declining therapy, metastatic cancer         PLAN :  Patient continues to benefit from skilled intervention to address the above impairments. Continue treatment per established plan of care. to address goals. Recommendation for discharge: (in order for the patient to meet his/her long term goals)  Therapy up to 5 days/week in SNF setting versus TBD pending plan of care     This discharge recommendation:  Has been made in collaboration with the attending provider and/or case management    IF patient discharges home will need the following DME: to be determined (TBD)       SUBJECTIVE:   Patient stated I don't want to live like this any more.   Pt expressed to PT/OT this afternoon her wishes. Encouraged pt to express these to her doctor and family. Notified RN    OBJECTIVE DATA SUMMARY:   Critical Behavior:  Neurologic State: Alert  Orientation Level: Oriented X4  Cognition: Follows commands, Decreased attention/concentration  Safety/Judgement: Decreased awareness of environment, Decreased awareness of need for assistance, Decreased awareness of need for safety, Fall prevention, Home safety, Lack of insight into deficits  Functional Mobility Training:  Bed Mobility:     Supine to Sit: Moderate assistance;Assist x1;Additional time  Sit to Supine:  Moderate assistance  Scooting: Maximum assistance    Balance:  Sitting: Impaired  Sitting - Static: Good (unsupported)  Sitting - Dynamic: Fair (occasional)       Therapeutic Exercises:   Reaching to target  Pain Rating:  Pt c/o pain all over    Activity Tolerance:   Fair    After treatment patient left in no apparent distress:   Supine in bed, Call bell within reach, Bed / chair alarm activated, and Side rails x 3    COMMUNICATION/COLLABORATION:   The patients plan of care was discussed with: Occupational therapist and Registered nurse.      Ginger Escobar, PT, DPT   Time Calculation: 17 mins DISPLAY PLAN FREE TEXT

## 2024-05-21 NOTE — PROGRESS NOTES
Bedside and Verbal shift change report given to Kevin Nj (oncoming nurse) by Andre Patel (offgoing nurse). Report included the following information SBAR, Kardex, Intake/Output, Recent Results, Cardiac Rhythm NSR and Dual Neuro Assessment. Opt out

## (undated) DEVICE — BAG SPEC BIOHZRD 10 X 10 IN --

## (undated) DEVICE — Device: Brand: MEDICAL ACTION INDUSTRIES

## (undated) DEVICE — SINGLE USE SUCTION VALVE MAJ-209: Brand: SINGLE USE SUCTION VALVE (STERILE)

## (undated) DEVICE — NEONATAL-ADULT SPO2 SENSOR: Brand: NELLCOR

## (undated) DEVICE — AIRLIFE™ ADULT CUSHION NASAL CANNULA 14 FOOT (4.3) CRUSH-RESISTANT OXYGEN TUBING, AND U/CONNECT-IT ADAPTER: Brand: AIRLIFE™

## (undated) DEVICE — SYR LR LCK 1ML GRAD NSAF 30ML --

## (undated) DEVICE — ELECTRODE,RADIOTRANSLUCENT,FOAM,3PK: Brand: MEDLINE

## (undated) DEVICE — SYR 5ML 1/5 GRAD LL NSAF LF --

## (undated) DEVICE — BITE BLK ENDOSCP AD 54FR GRN POLYETH ENDOSCP W STRP SLD

## (undated) DEVICE — 3M™ CUROS™ DISINFECTING CAP FOR NEEDLELESS CONNECTORS 270/CARTON 20 CARTONS/CASE CFF1-270: Brand: CUROS™

## (undated) DEVICE — PREP SKN CHLRAPRP SNGL 1.75ML --

## (undated) DEVICE — CATH SUC CTRL PRT TRIFLO 14FR --

## (undated) DEVICE — SYR 3ML LL TIP 1/10ML GRAD --

## (undated) DEVICE — TUBING, SUCTION, 3/16" X 6', STRAIGHT: Brand: MEDLINE

## (undated) DEVICE — SET ADMIN 16ML TBNG L100IN 2 Y INJ SITE IV PIGGY BK DISP

## (undated) DEVICE — ENDOBRONCHIAL ULTRASOUND TRANSBRONCHIAL ASPIRATION NEEDLE: Brand: EXPECT PULMONARY

## (undated) DEVICE — CUFF BLD PRSS AD L SZ 12L FOR 32-43CM LIMB LNG VYN SFT W/O

## (undated) DEVICE — SOL IRRIGATION INJ NACL 0.9% 500ML BTL

## (undated) DEVICE — QUILTED PREMIUM COMFORT UNDERPAD,EXTRA HEAVY: Brand: WINGS

## (undated) DEVICE — SYRINGE MED 20ML STD CLR PLAS LUERSLIP TIP N CTRL DISP

## (undated) DEVICE — KIT COLON W/ 1.1OZ LUB AND 2 END

## (undated) DEVICE — NDL FLTR TIP 5 MIC 18GX1.5IN --

## (undated) DEVICE — BASIN SPNG 32OZ BLU STRL --

## (undated) DEVICE — TRAP,MUCUS SPECIMEN, 80CC: Brand: MEDLINE

## (undated) DEVICE — Z CONVERTED USE 2274305 CUFF BLD PRSS AD L SZ 12 FOR 32-43CM LIMB VLY SFT W/O TUBE

## (undated) DEVICE — CONTAINER SPEC 20 ML LID NEUT BUFF FORMALIN 10 % POLYPR STS

## (undated) DEVICE — 1200 GUARD II KIT W/5MM TUBE W/O VAC TUBE: Brand: GUARDIAN

## (undated) DEVICE — SINGLE USE BIOPSY VALVE MAJ-210: Brand: SINGLE USE BIOPSY VALVE (STERILE)

## (undated) DEVICE — SYRINGE MED 10CC ECC TIP W/O NDL

## (undated) DEVICE — SOLIDIFIER FLD 3.2OZ 3000CC TRAD IN BTL LIQUI-LOC

## (undated) DEVICE — CATHETER IV 20GA L1IN FEP STR HUB INTROCAN SFTY